# Patient Record
Sex: MALE | Race: WHITE | NOT HISPANIC OR LATINO | Employment: OTHER | URBAN - METROPOLITAN AREA
[De-identification: names, ages, dates, MRNs, and addresses within clinical notes are randomized per-mention and may not be internally consistent; named-entity substitution may affect disease eponyms.]

---

## 2017-06-12 DIAGNOSIS — B00.1 RECURRENT COLD SORES: ICD-10-CM

## 2017-06-13 NOTE — TELEPHONE ENCOUNTER
acyclovir (ZOVIRAX) 400 MG tablet     Last Written Prescription Date: 3/2/2016  Last Fill Quantity: 15, # refills: 2  Last Office Visit with [facility], [facility] or [facility] prescribing provider: 10/27/2016        Creatinine   Date Value Ref Range Status   06/10/2016 1.18 0.66 - 1.25 mg/dL Final

## 2017-06-15 RX ORDER — ACYCLOVIR 400 MG/1
400 TABLET ORAL 3 TIMES DAILY
Qty: 15 TABLET | Refills: 0 | Status: SHIPPED | OUTPATIENT
Start: 2017-06-15 | End: 2017-10-24

## 2017-06-15 NOTE — TELEPHONE ENCOUNTER
Medication is being filled for 1 time refill only due Patient needs labs 6/16.   Julianna Chowdhury RN

## 2017-10-16 DIAGNOSIS — E78.1 HYPERTRIGLYCERIDEMIA: ICD-10-CM

## 2017-10-16 NOTE — TELEPHONE ENCOUNTER
simvastatin (ZOCOR) 40 MG tablet     Last Written Prescription Date: 10/27/2016  Last Fill Quantity: 90, # refills: 3  Last Office Visit with G, P or [facility] prescribing provider: 10/27/2016       Lab Results   Component Value Date    CHOL 254 10/27/2016     Lab Results   Component Value Date    HDL 44 10/27/2016     Lab Results   Component Value Date     10/27/2016     Lab Results   Component Value Date    TRIG 350 10/27/2016     Lab Results   Component Value Date    CHOLHDLRERINO 4.4 11/07/2014

## 2017-10-17 RX ORDER — SIMVASTATIN 40 MG
40 TABLET ORAL AT BEDTIME
Qty: 90 TABLET | Refills: 0 | Status: SHIPPED | OUTPATIENT
Start: 2017-10-17 | End: 2017-10-24

## 2017-10-17 NOTE — TELEPHONE ENCOUNTER
Patient calls back.  Advised.  Appointment scheduled.    Next 5 appointments (look out 90 days)     Oct 24, 2017  8:00 AM CDT   SHORT with Alfredo Fink MD   [facility]    [address] [place] [id number] [phone]                Adwoa Dominguez RN  Message handled by Nurse Triage.

## 2017-10-17 NOTE — TELEPHONE ENCOUNTER
LM on  to call back.     Routing refill request to provider for review/approval because:  Patient needs to be seen because:  Due for annual physical  LDL not at goal.     Bertha SOUZA, RN, BSN, PHN  [place] [facility] RN

## 2017-10-24 ENCOUNTER — OFFICE VISIT (OUTPATIENT)
Dept: PEDIATRICS | Facility: CLINIC | Age: 52
End: 2017-10-24
Payer: COMMERCIAL

## 2017-10-24 VITALS
SYSTOLIC BLOOD PRESSURE: 126 MMHG | OXYGEN SATURATION: 98 % | HEIGHT: 72 IN | BODY MASS INDEX: 26.28 KG/M2 | WEIGHT: 194 LBS | TEMPERATURE: 97.8 F | HEART RATE: 62 BPM | DIASTOLIC BLOOD PRESSURE: 88 MMHG

## 2017-10-24 DIAGNOSIS — M10.9 GOUT, UNSPECIFIED CAUSE, UNSPECIFIED CHRONICITY, UNSPECIFIED SITE: ICD-10-CM

## 2017-10-24 DIAGNOSIS — E78.1 HYPERTRIGLYCERIDEMIA: ICD-10-CM

## 2017-10-24 DIAGNOSIS — D22.5 MELANOCYTIC NEVUS OF TRUNK: ICD-10-CM

## 2017-10-24 DIAGNOSIS — B00.1 RECURRENT COLD SORES: ICD-10-CM

## 2017-10-24 DIAGNOSIS — Z00.00 ENCOUNTER FOR ROUTINE ADULT HEALTH EXAMINATION WITHOUT ABNORMAL FINDINGS: ICD-10-CM

## 2017-10-24 DIAGNOSIS — Z12.11 SCREEN FOR COLON CANCER: Primary | ICD-10-CM

## 2017-10-24 LAB
ALBUMIN SERPL-MCNC: 4.1 G/DL (ref 3.4–5)
ALP SERPL-CCNC: 57 U/L (ref 40–150)
ALT SERPL W P-5'-P-CCNC: 67 U/L (ref 0–70)
ANION GAP SERPL CALCULATED.3IONS-SCNC: 6 MMOL/L (ref 3–14)
AST SERPL W P-5'-P-CCNC: 47 U/L (ref 0–45)
BILIRUB SERPL-MCNC: 1.6 MG/DL (ref 0.2–1.3)
BUN SERPL-MCNC: 12 MG/DL (ref 7–30)
CALCIUM SERPL-MCNC: 9.1 MG/DL (ref 8.5–10.1)
CHLORIDE SERPL-SCNC: 101 MMOL/L (ref 94–109)
CHOLEST SERPL-MCNC: 209 MG/DL
CO2 SERPL-SCNC: 30 MMOL/L (ref 20–32)
CREAT SERPL-MCNC: 1.17 MG/DL (ref 0.66–1.25)
GFR SERPL CREATININE-BSD FRML MDRD: 65 ML/MIN/1.7M2
GLUCOSE SERPL-MCNC: 95 MG/DL (ref 70–99)
HDLC SERPL-MCNC: 45 MG/DL
LDLC SERPL CALC-MCNC: ABNORMAL MG/DL
LDLC SERPL DIRECT ASSAY-MCNC: 117 MG/DL
NONHDLC SERPL-MCNC: 164 MG/DL
POTASSIUM SERPL-SCNC: 3.7 MMOL/L (ref 3.4–5.3)
PROT SERPL-MCNC: 8.3 G/DL (ref 6.8–8.8)
SODIUM SERPL-SCNC: 137 MMOL/L (ref 133–144)
TRIGL SERPL-MCNC: 452 MG/DL
URATE SERPL-MCNC: 8.5 MG/DL (ref 3.5–7.2)

## 2017-10-24 PROCEDURE — 36415 COLL VENOUS BLD VENIPUNCTURE: CPT | Performed by: INTERNAL MEDICINE

## 2017-10-24 PROCEDURE — 80053 COMPREHEN METABOLIC PANEL: CPT | Performed by: INTERNAL MEDICINE

## 2017-10-24 PROCEDURE — 80061 LIPID PANEL: CPT | Performed by: INTERNAL MEDICINE

## 2017-10-24 PROCEDURE — 83721 ASSAY OF BLOOD LIPOPROTEIN: CPT | Mod: 59 | Performed by: INTERNAL MEDICINE

## 2017-10-24 PROCEDURE — 99396 PREV VISIT EST AGE 40-64: CPT | Performed by: INTERNAL MEDICINE

## 2017-10-24 PROCEDURE — 84550 ASSAY OF BLOOD/URIC ACID: CPT | Performed by: INTERNAL MEDICINE

## 2017-10-24 RX ORDER — ACYCLOVIR 400 MG/1
400 TABLET ORAL 3 TIMES DAILY
Qty: 15 TABLET | Refills: 3 | Status: SHIPPED | OUTPATIENT
Start: 2017-10-24 | End: 2018-12-26

## 2017-10-24 RX ORDER — SIMVASTATIN 40 MG
40 TABLET ORAL AT BEDTIME
Qty: 90 TABLET | Refills: 3 | Status: SHIPPED | OUTPATIENT
Start: 2017-10-24 | End: 2018-10-24

## 2017-10-24 NOTE — MR AVS SNAPSHOT
"              After Visit Summary   10/24/2017    Ian Moncada    MRN: [id number]           Patient Information     Date Of Birth          1965        Visit Information        Provider Department      10/24/2017 8:00 AM Alfredo Fink MD [facility]        Today's Diagnoses     Screen for colon cancer    -  1    Hypertriglyceridemia        Recurrent cold sores        Encounter for routine adult health examination without abnormal findings        Melanocytic nevus of trunk        Gout, unspecified cause, unspecified chronicity, unspecified site          Care Instructions    Lab work downstairs today.  Directions:  As you walk through the first floor, you'll see (on the right) first the pharmacy, then some bathrooms, then the \"Lab and Imaging\" area. Give them your name at the window there and wait for them to call you.     Colonoscopy: you can call [phone] to set up your colonoscopy.  If they have not heard from you, they may call you directly.    No change in meds.    Goal weight: < 190.    Conisder allopurinol for frequent gout attacks, especially if uric acid level is high.       Preventive Health Recommendations  Male Ages 50 - 64    Yearly exam:             See your health care provider every year in order to  o   Review health changes.   o   Discuss preventive care.    o   Review your medicines if your doctor has prescribed any.     Have a cholesterol test every 5 years, or more frequently if you are at risk for high cholesterol/heart disease.     Have a diabetes test (fasting glucose) every three years. If you are at risk for diabetes, you should have this test more often.     Have a colonoscopy at age 50, or have a yearly FIT test (stool test). These exams will check for colon cancer.      Talk with your health care provider about whether or not a prostate cancer screening test (PSA) is right for you.    You should be tested each year for STDs (sexually transmitted diseases), if you re at " risk.     Shots: Get a flu shot each year. Get a tetanus shot every 10 years.     Nutrition:    Eat at least 5 servings of fruits and vegetables daily.     Eat whole-grain bread, whole-wheat pasta and brown rice instead of white grains and rice.     Talk to your provider about Calcium and Vitamin D.     Lifestyle    Exercise for at least 150 minutes a week (30 minutes a day, 5 days a week). This will help you control your weight and prevent disease.     Limit alcohol to one drink per day.     No smoking.     Wear sunscreen to prevent skin cancer.     See your dentist every six months for an exam and cleaning.     See your eye doctor every 1 to 2 years.            Follow-ups after your visit        Additional Services     GASTROENTEROLOGY ADULT REF PROCEDURE ONLY       Last Lab Result: Creatinine (mg/dL)       Date                     Value                 06/10/2016               1.18             ----------  Body mass index is 26.31 kg/(m^2).     Needed:  No  Language:  English    Patient will be contacted to schedule procedure.     Please be aware that coverage of these services is subject to the terms and limitations of your health insurance plan.  Call member services at your health plan with any benefit or coverage questions.  Any procedures must be performed at a [facility] facility OR coordinated by your clinic's referral office.    Please bring the following with you to your appointment:    (1) Any X-Rays, CTs or MRIs which have been performed.  Contact the facility where they were done to arrange for  prior to your scheduled appointment.    (2) List of current medications   (3) This referral request   (4) Any documents/labs given to you for this referral                  Who to contact     If you have questions or need follow up information about today's clinic visit or your schedule please contact [facility] directly at [phone].  Normal or non-critical lab and imaging  results will be communicated to you by MyChart, letter or phone within 4 business days after the clinic has received the results. If you do not hear from us within 7 days, please contact the clinic through [employer] or phone. If you have a critical or abnormal lab result, we will notify you by phone as soon as possible.  Submit refill requests through [employer] or call your pharmacy and they will forward the refill request to us. Please allow 3 business days for your refill to be completed.          Additional Information About Your Visit        [employer] Information     [employer] gives you secure access to your electronic health record. If you see a primary care provider, you can also send messages to your care team and make appointments. If you have questions, please call your primary care clinic.  If you do not have a primary care provider, please call [phone] and they will assist you.        Care EveryWhere ID     This is your Care EveryWhere ID. This could be used by other organizations to access your [facility] medical records  [id number]        Your Vitals Were     Pulse Temperature Height Pulse Oximetry BMI (Body Mass Index)       62 97.8  F (36.6  C) (Oral) 6' (1.829 m) 98% 26.31 kg/m2        Blood Pressure from Last 3 Encounters:   10/24/17 126/88   10/27/16 120/78   06/10/16 122/80    Weight from Last 3 Encounters:   10/24/17 194 lb (88 kg)   10/27/16 190 lb 7 oz (86.4 kg)   06/10/16 192 lb 1 oz (87.1 kg)              We Performed the Following     Comprehensive metabolic panel     GASTROENTEROLOGY ADULT REF PROCEDURE ONLY     Lipid panel reflex to direct LDL     Uric acid          Where to get your medicines      These medications were sent to [facility] [address] - [address]  [address] [address]     Phone:  [phone]     acyclovir 400 MG tablet    simvastatin 40 MG tablet          Primary Care Provider Office Phone # Fax #    Alfredo Fink,  [phone] [address] [place] [address]        Equal Access to Services     [facility] : Hadii aad ku hadnilesrosa Bebe, wabrittanyda rossanamarcosha, qaybta kastaceyda bjsavchapis, waxadama mindiin hayaapaulo lorenzoladarius gayle laJuan Danielmacario crawford. So [facility] [phone].    ATENCIÓN: Si habla español, tiene a guerin disposición servicios gratuitos de asistencia lingüística. Llame al [phone].    We comply with applicable federal civil rights laws and [place] laws. We do not discriminate on the basis of race, color, national origin, age, disability, sex, sexual orientation, or gender identity.            Thank you!     Thank you for choosing [facility]  for your care. Our goal is always to provide you with excellent care. Hearing back from our patients is one way we can continue to improve our services. Please take a few minutes to complete the written survey that you may receive in the mail after your visit with us. Thank you!             Your Updated Medication List - Protect others around you: Learn how to safely use, store and throw away your medicines at www.disposemymeds.org.          This list is accurate as of: 10/24/17  8:29 AM.  Always use your most recent med list.                   Brand Name Dispense Instructions for use Diagnosis    acyclovir 400 MG tablet    ZOVIRAX    15 tablet    Take 1 tablet (400 mg) by mouth 3 times daily    Recurrent cold sores       fish oil-omega-3 fatty acids 1000 MG capsule     120 capsule    Take 2 capsules by mouth daily.    Hypertriglyceridemia       MULTIVITAMIN TABS   OR      1 TABLET DAILY        simvastatin 40 MG tablet    ZOCOR    90 tablet    Take 1 tablet (40 mg) by mouth At Bedtime    Hypertriglyceridemia

## 2017-10-24 NOTE — PROGRESS NOTES
SUBJECTIVE:   CC: Ian Moncada is an 52 year old male who presents for preventative health visit.     Physical   Annual:     Getting at least 3 servings of Calcium per day::  Yes    Bi-annual eye exam::  Yes    Dental care twice a year::  Yes    Sleep apnea or symptoms of sleep apnea::  None    Diet::  Regular (no restrictions)    Frequency of exercise::  None    Taking medications regularly::  Yes    Medication side effects::  None    Additional concerns today::  No      Pt has no specific concerns today. Needs refills of medication.        Today's PHQ-2 Score: PHQ-2 ( 1999 Pfizer) 10/24/2017   Q1: Little interest or pleasure in doing things 0   Q2: Feeling down, depressed or hopeless 0   PHQ-2 Score 0   Q1: Little interest or pleasure in doing things Not at all   Q2: Feeling down, depressed or hopeless Not at all   PHQ-2 Score 0       Abuse: Current or Past(Physical, Sexual or Emotional)- No  Do you feel safe in your environment - Yes    Social History   Substance Use Topics     Smoking status: Never Smoker     Smokeless tobacco: Never Used     Alcohol use 4.2 oz/week     7 Standard drinks or equivalent per week     The patient does not drink >3 drinks per day nor >7 drinks per week.    Last PSA: No results found for: PSA    Reviewed orders with patient. Reviewed health maintenance and updated orders accordingly - Yes  Labs reviewed in EPIC  BP Readings from Last 3 Encounters:   10/24/17 126/88   10/27/16 120/78   06/10/16 122/80    Wt Readings from Last 3 Encounters:   10/24/17 194 lb (88 kg)   10/27/16 190 lb 7 oz (86.4 kg)   06/10/16 192 lb 1 oz (87.1 kg)                  Patient Active Problem List   Diagnosis     External hemorrhoids with other complication     Family history of ischemic heart disease     Impaired fasting glucose     Hyperlipidemia LDL goal <130     Hypertriglyceridemia     Melanocytic nevus of trunk     Past Surgical History:   Procedure Laterality Date     TONSILLECTOMY  1970s       Social  History   Substance Use Topics     Smoking status: Never Smoker     Smokeless tobacco: Never Used     Alcohol use 4.2 oz/week     7 Standard drinks or equivalent per week     Family History   Problem Relation Age of Onset     HEART DISEASE Paternal Grandfather      Neurologic Disorder Father      Guillaine [place]     HEART DISEASE Mother      quad bipass     HEART DISEASE Maternal Grandfather      HEART DISEASE Maternal Uncle      Cancer - colorectal Maternal Grandmother      HEART DISEASE Maternal Aunt          Current Outpatient Prescriptions   Medication Sig Dispense Refill     simvastatin (ZOCOR) 40 MG tablet Take 1 tablet (40 mg) by mouth At Bedtime 90 tablet 3     acyclovir (ZOVIRAX) 400 MG tablet Take 1 tablet (400 mg) by mouth 3 times daily 15 tablet 3     fish oil-omega-3 fatty acids (OMEGA 3) 1000 MG capsule Take 2 capsules by mouth daily. 120 capsule 3     MULTIVITAMIN TABS   OR 1 TABLET DAILY       [DISCONTINUED] simvastatin (ZOCOR) 40 MG tablet Take 1 tablet (40 mg) by mouth At Bedtime 90 tablet 0     [DISCONTINUED] acyclovir (ZOVIRAX) 400 MG tablet Take 1 tablet (400 mg) by mouth 3 times daily 15 tablet 0     Allergies   Allergen Reactions     Walnuts [Nuts]            Reviewed and updated as needed this visit by clinical staffTobacco  Allergies  Meds  Med Hx  Surg Hx  Fam Hx  Soc Hx        Reviewed and updated as needed this visit by Provider          Past Medical History:   Diagnosis Date     External hemorrhoids with other complication 7/25/2008    Banding 2007.     Hyperlipidemia LDL goal <130 8/12/2010     Hypertriglyceridemia 8/19/2010     Impaired fasting glucose 12/7/2009      Past Surgical History:   Procedure Laterality Date     TONSILLECTOMY  1970s       Review of Systems  C: NEGATIVE for fever, chills, change in weight  I: NEGATIVE for worrisome rashes, moles or lesions  E: NEGATIVE for vision changes or irritation  ENT: NEGATIVE for ear, mouth and throat problems  R: NEGATIVE for  significant cough or SOB  CV: NEGATIVE for chest pain, palpitations or peripheral edema  GI: NEGATIVE for nausea, abdominal pain, heartburn, or change in bowel habits   male: negative for dysuria, hematuria, decreased urinary stream, erectile dysfunction, urethral discharge  M: NEGATIVE for significant arthralgias or myalgia  N: NEGATIVE for weakness, dizziness or paresthesias  P: NEGATIVE for changes in mood or affect    OBJECTIVE:   BP (!) 126/98 (BP Location: Right arm, Cuff Size: Adult Large)  Pulse 62  Temp 97.8  F (36.6  C) (Oral)  Ht 6' (1.829 m)  Wt 194 lb (88 kg)  SpO2 98%  BMI 26.31 kg/m2    Physical Exam  GENERAL: healthy, alert and no distress  EYES: Eyes grossly normal to inspection, PERRL and conjunctivae and sclerae normal  HENT: ear canals and TM's normal, nose and mouth without ulcers or lesions  NECK: no adenopathy, no asymmetry, masses, or scars and thyroid normal to palpation  RESP: lungs clear to auscultation - no rales, rhonchi or wheezes  CV: regular rate and rhythm, normal S1 S2, no S3 or S4, no murmur, click or rub, no peripheral edema and peripheral pulses strong  ABDOMEN: soft, nontender, no hepatosplenomegaly, no masses and bowel sounds normal   (male): normal male genitalia without lesions or urethral discharge, no hernia  MS: no gross musculoskeletal defects noted, no edema  SKIN: no suspicious lesions or rashes  NEURO: Normal strength and tone, mentation intact and speech normal  PSYCH: mentation appears normal, affect normal/bright    ASSESSMENT/PLAN:   1. Screen for colon cancer    - GASTROENTEROLOGY ADULT REF PROCEDURE ONLY    2. Hypertriglyceridemia  Continue with current dosing.  - simvastatin (ZOCOR) 40 MG tablet; Take 1 tablet (40 mg) by mouth At Bedtime  Dispense: 90 tablet; Refill: 3    3. Recurrent cold sores  as needed dosing.   - acyclovir (ZOVIRAX) 400 MG tablet; Take 1 tablet (400 mg) by mouth 3 times daily  Dispense: 15 tablet; Refill: 3    4. Encounter for  routine adult health examination without abnormal findings  Discussed diet, exercise, testicular self exam, blood pressure, cholesterol, and need for cancer surveillance at appropriate ages.   - Lipid panel reflex to direct LDL  - Comprehensive metabolic panel    5. Melanocytic nevus of trunk  Photographed today, on central part of back.     6. Gout, unspecified cause, unspecified chronicity, unspecified site  Frequent mild attacks; patient refuses allopurinol.   - Uric acid    COUNSELING:   Reviewed preventive health counseling, as reflected in patient instructions       Regular exercise       Healthy diet/nutrition       Vision screening       Hearing screening       Family planning       Safe sex practices/STD prevention       Colon cancer screening       Prostate cancer screening    BP Screening:   Last 3 BP Readings:    BP Readings from Last 3 Encounters:   10/24/17 126/88   10/27/16 120/78   06/10/16 122/80       The following was recommended to the patient:  Re-screen BP within a year and recommended lifestyle modifications       reports that he has never smoked. He has never used smokeless tobacco.      Estimated body mass index is 26.31 kg/(m^2) as calculated from the following:    Height as of this encounter: 6' (1.829 m).    Weight as of this encounter: 194 lb (88 kg).   Weight management plan: Patient was referred to their PCP to discuss a diet and exercise plan.    Counseling Resources:  ATP IV Guidelines  Pooled Cohorts Equation Calculator  FRAX Risk Assessment  ICSI Preventive Guidelines  Dietary Guidelines for Americans, 2010  USDA's MyPlate  ASA Prophylaxis  Lung CA Screening    Alfredo Fink MD  [facility]

## 2018-10-24 DIAGNOSIS — E78.1 HYPERTRIGLYCERIDEMIA: ICD-10-CM

## 2018-10-24 RX ORDER — SIMVASTATIN 40 MG
TABLET ORAL
Qty: 30 TABLET | Refills: 0 | Status: SHIPPED | OUTPATIENT
Start: 2018-10-24 | End: 2018-12-12

## 2018-10-24 NOTE — TELEPHONE ENCOUNTER
Medication is being filled for 1 time refill only due to:  Pt due for office visit and lab work.     Joanna Alberto RN

## 2018-12-12 DIAGNOSIS — E78.1 HYPERTRIGLYCERIDEMIA: ICD-10-CM

## 2018-12-12 NOTE — TELEPHONE ENCOUNTER
"Requested Prescriptions   Pending Prescriptions Disp Refills     simvastatin (ZOCOR) 40 MG tablet [Pharmacy Med Name: SIMVASTATIN 40MG TABLETS]    Last Written Prescription Date:  10/24/2018  Last Fill Quantity: 30,  # refills: 0   Last office visit: 10/24/2017 with prescribing provider:  Alfredo Fink     Future Office Visit:     30 tablet 0     Sig: TAKE 1 TABLET BY MOUTH EVERY NIGHT AT BEDTIME    Statins Protocol Failed - 12/12/2018  9:45 AM       Failed - LDL on file in past 12 months    Recent Labs   Lab Test 10/24/17  0831   LDL Cannot estimate LDL when triglyceride exceeds 400 mg/dL  117*            Failed - Recent (12 mo) or future (30 days) visit within the authorizing provider's specialty    Patient had office visit in the last 12 months or has a visit in the next 30 days with authorizing provider or within the authorizing provider's specialty.  See \"Patient Info\" tab in inbasket, or \"Choose Columns\" in Meds & Orders section of the refill encounter.             Passed - No abnormal creatine kinase in past 12 months    No lab results found.            Passed - Patient is age 18 or older          "

## 2018-12-14 RX ORDER — SIMVASTATIN 40 MG
TABLET ORAL
Qty: 90 TABLET | Refills: 3 | Status: SHIPPED | OUTPATIENT
Start: 2018-12-14 | End: 2019-12-07

## 2018-12-14 NOTE — TELEPHONE ENCOUNTER
Routing refill request to provider for review/approval because:  Christina given x1 and patient did not follow up, please advise  Labs not current:  LDL  Patient needs to be seen because it has been more than 1 year since last office visit.  Doreen VOGT RN - Triage  [facility]

## 2018-12-26 DIAGNOSIS — B00.1 RECURRENT COLD SORES: ICD-10-CM

## 2018-12-26 RX ORDER — ACYCLOVIR 400 MG/1
TABLET ORAL
Qty: 15 TABLET | Refills: 0 | Status: SHIPPED | OUTPATIENT
Start: 2018-12-26 | End: 2019-01-15

## 2018-12-26 NOTE — TELEPHONE ENCOUNTER
"Requested Prescriptions   Pending Prescriptions Disp Refills     acyclovir (ZOVIRAX) 400 MG tablet [Pharmacy Med Name: ACYCLOVIR 400MG TABLETS]    Last Written Prescription Date:  10/24/2017  Last Fill Quantity: 15,  # refills: 3   Last office visit: 10/24/2017 with prescribing provider:  Alfredo Fink     Future Office Visit:     15 tablet 0     Sig: TAKE 1 TABLET(400 MG) BY MOUTH THREE TIMES DAILY    Antivirals for Herpes Protocol Failed - 12/26/2018  8:18 AM       Failed - Recent (12 mo) or future (30 days) visit within the authorizing provider's specialty    Patient had office visit in the last 12 months or has a visit in the next 30 days with authorizing provider or within the authorizing provider's specialty.  See \"Patient Info\" tab in inbasket, or \"Choose Columns\" in Meds & Orders section of the refill encounter.             Failed - Normal serum creatinine on file in past 12 months    Recent Labs   Lab Test 10/24/17  0831   CR 1.17            Passed - Patient is age 12 or older          "

## 2018-12-26 NOTE — TELEPHONE ENCOUNTER
30 day supply given.  Patient is due for yearly physical and lab work.  Please call and assist with scheduling appointment prior to next refill   Doreen VOGT RN - Triage  [facility]

## 2018-12-27 ENCOUNTER — MYC MEDICAL ADVICE (OUTPATIENT)
Dept: PEDIATRICS | Facility: CLINIC | Age: 53
End: 2018-12-27

## 2019-01-15 ENCOUNTER — OFFICE VISIT (OUTPATIENT)
Dept: PEDIATRICS | Facility: CLINIC | Age: 54
End: 2019-01-15
Payer: COMMERCIAL

## 2019-01-15 VITALS
BODY MASS INDEX: 26.81 KG/M2 | SYSTOLIC BLOOD PRESSURE: 110 MMHG | HEART RATE: 59 BPM | HEIGHT: 72 IN | WEIGHT: 197.9 LBS | TEMPERATURE: 97.6 F | OXYGEN SATURATION: 96 % | DIASTOLIC BLOOD PRESSURE: 80 MMHG

## 2019-01-15 DIAGNOSIS — Z00.00 ENCOUNTER FOR ROUTINE ADULT HEALTH EXAMINATION WITHOUT ABNORMAL FINDINGS: Primary | ICD-10-CM

## 2019-01-15 DIAGNOSIS — L98.9 SKIN LESION: ICD-10-CM

## 2019-01-15 DIAGNOSIS — H90.8 MIXED CONDUCTIVE AND SENSORINEURAL HEARING LOSS, UNSPECIFIED LATERALITY: ICD-10-CM

## 2019-01-15 DIAGNOSIS — Z12.11 SPECIAL SCREENING FOR MALIGNANT NEOPLASMS, COLON: ICD-10-CM

## 2019-01-15 DIAGNOSIS — B00.1 RECURRENT COLD SORES: ICD-10-CM

## 2019-01-15 DIAGNOSIS — E78.1 HYPERTRIGLYCERIDEMIA: ICD-10-CM

## 2019-01-15 LAB
ALBUMIN SERPL-MCNC: 4.1 G/DL (ref 3.4–5)
ALP SERPL-CCNC: 45 U/L (ref 40–150)
ALT SERPL W P-5'-P-CCNC: 56 U/L (ref 0–70)
ANION GAP SERPL CALCULATED.3IONS-SCNC: 6 MMOL/L (ref 3–14)
AST SERPL W P-5'-P-CCNC: 43 U/L (ref 0–45)
BILIRUB SERPL-MCNC: 1.2 MG/DL (ref 0.2–1.3)
BUN SERPL-MCNC: 10 MG/DL (ref 7–30)
CALCIUM SERPL-MCNC: 9.5 MG/DL (ref 8.5–10.1)
CHLORIDE SERPL-SCNC: 103 MMOL/L (ref 94–109)
CHOLEST SERPL-MCNC: 196 MG/DL
CO2 SERPL-SCNC: 28 MMOL/L (ref 20–32)
CREAT SERPL-MCNC: 1.12 MG/DL (ref 0.66–1.25)
GFR SERPL CREATININE-BSD FRML MDRD: 74 ML/MIN/{1.73_M2}
GLUCOSE SERPL-MCNC: 99 MG/DL (ref 70–99)
HDLC SERPL-MCNC: 41 MG/DL
LDLC SERPL CALC-MCNC: 94 MG/DL
NONHDLC SERPL-MCNC: 155 MG/DL
POTASSIUM SERPL-SCNC: 3.9 MMOL/L (ref 3.4–5.3)
PROT SERPL-MCNC: 7.9 G/DL (ref 6.8–8.8)
SODIUM SERPL-SCNC: 137 MMOL/L (ref 133–144)
TRIGL SERPL-MCNC: 307 MG/DL
URATE SERPL-MCNC: 7.2 MG/DL (ref 3.5–7.2)

## 2019-01-15 PROCEDURE — 99396 PREV VISIT EST AGE 40-64: CPT | Performed by: INTERNAL MEDICINE

## 2019-01-15 PROCEDURE — 84550 ASSAY OF BLOOD/URIC ACID: CPT | Performed by: INTERNAL MEDICINE

## 2019-01-15 PROCEDURE — 80061 LIPID PANEL: CPT | Performed by: INTERNAL MEDICINE

## 2019-01-15 PROCEDURE — 80053 COMPREHEN METABOLIC PANEL: CPT | Performed by: INTERNAL MEDICINE

## 2019-01-15 PROCEDURE — 36415 COLL VENOUS BLD VENIPUNCTURE: CPT | Performed by: INTERNAL MEDICINE

## 2019-01-15 RX ORDER — ACYCLOVIR 400 MG/1
TABLET ORAL
Qty: 15 TABLET | Refills: 4 | Status: SHIPPED | OUTPATIENT
Start: 2019-01-15 | End: 2020-03-16

## 2019-01-15 ASSESSMENT — ENCOUNTER SYMPTOMS
NAUSEA: 0
SORE THROAT: 0
HEMATOCHEZIA: 0
JOINT SWELLING: 0
COUGH: 0
DYSURIA: 0
HEARTBURN: 0
ABDOMINAL PAIN: 0
HEMATURIA: 0
CONSTIPATION: 0
EYE PAIN: 0
DIARRHEA: 0
PALPITATIONS: 0
CHILLS: 0
ARTHRALGIAS: 0
SHORTNESS OF BREATH: 0
FREQUENCY: 0
HEADACHES: 0
PARESTHESIAS: 0
DIZZINESS: 0
MYALGIAS: 0
WEAKNESS: 0

## 2019-01-15 ASSESSMENT — MIFFLIN-ST. JEOR: SCORE: 1775.67

## 2019-01-15 NOTE — PATIENT INSTRUCTIONS
"Lab work downstairs today.  Directions:  As you walk through the first floor, you'll see (on the right) first the pharmacy, then some bathrooms, then the \"Lab and Imaging\" area. Give them your name at the window there and wait for them to call you.    Consider getting your shingles vaccine (\"Shingrix\") at our pharmacy downstairs (or at another pharmacy), sometime this year--even if you've already received the old \"Zostavax\" shingles vaccine.  The \"Shingrix\" has better immunity and lasts longer, and is cheaper if you get it directly at a pharmacy.  You should not need an appointment.     You will need a second Shingrix anywhere from 2-6 months later.    Colonoscopy: you can call [phone] to set up your colonoscopy.  If they have not heard from you, they may call you directly.    Cardio exercise is probably the most helpful thing for your heart and future health.  Try to get about 30 minutes of sustained cardio exercise (biking, running, swimming, fast walking) every other day or even every day.  Keeping your heart rate at a \"target\" of (220 - your age) x 0.80 will be your goal.  For example, if you're 60 years old, this would be (220 - 60) x 0.80 = 200 beats per minute for those 30 minutes of exercise.      Cholesterol and lipid values are broken down into three different types:  the LDL (or bad) cholesterol, the HDL (or good) cholesterol, and the Triglycerides.     The goals for each of these levels vary depending on your cardiac risk factors--that is, if you have more risk factors (like age, male sex, diabetes, hypertension, heart disease, stroke, smoking) then you would want your LDL cholesterol goal or your triglyceride goal to be lower.  If you have no risk factors, it may be acceptable to have your cholesterol level slightly higher.    The best way to achieve a lower cholesterol level is a diet that is similar to the Mediterranean diet: lean proteins like chicken, fish, and beans; lots of vegetables and " fruits; whole grain foods; and healthy fats like olive oil, avocado, and almonds.  Try to avoid foods that are higher in saturated fats (like red meats and cheese) as well as fried foods like chips, crackers, and anything deep fried.     Your HDL cholesterol, while not as critical as the LDL and triglyceride levels, can be improved with  exercise--preferably 30-45 minutes of cardiovascular activity 3 to 4 times per week.                 Preventive Health Recommendations  Male Ages 50 - 64    Yearly exam:             See your health care provider every year in order to  o   Review health changes.   o   Discuss preventive care.    o   Review your medicines if your doctor has prescribed any.     Have a cholesterol test every 5 years, or more frequently if you are at risk for high cholesterol/heart disease.     Have a diabetes test (fasting glucose) every three years. If you are at risk for diabetes, you should have this test more often.     Have a colonoscopy at age 50, or have a yearly FIT test (stool test). These exams will check for colon cancer.      Talk with your health care provider about whether or not a prostate cancer screening test (PSA) is right for you.    You should be tested each year for STDs (sexually transmitted diseases), if you re at risk.     Shots: Get a flu shot each year. Get a tetanus shot every 10 years.     Nutrition:    Eat at least 5 servings of fruits and vegetables daily.     Eat whole-grain bread, whole-wheat pasta and brown rice instead of white grains and rice.     Get adequate Calcium and Vitamin D.     Lifestyle    Exercise for at least 150 minutes a week (30 minutes a day, 5 days a week). This will help you control your weight and prevent disease.     Limit alcohol to one drink per day.     No smoking.     Wear sunscreen to prevent skin cancer.     See your dentist every six months for an exam and cleaning.     See your eye doctor every 1 to 2 years.

## 2019-01-15 NOTE — PROGRESS NOTES
SUBJECTIVE:   CC: Ian Moncada is an 53 year old male who presents for preventative health visit.     Physical   Annual:     Getting at least 3 servings of Calcium per day:  Yes    Bi-annual eye exam:  Yes    Dental care twice a year:  Yes    Sleep apnea or symptoms of sleep apnea:  None    Diet:  Regular (no restrictions)    Frequency of exercise:  2-3 days/week    Duration of exercise:  Less than 15 minutes    Taking medications regularly:  Yes    Medication side effects:  None    Additional concerns today:  No      Exercises: tries to do treadmill every other day 20 min.         Today's PHQ-2 Score:   PHQ-2 ( 1999 Pfizer) 1/15/2019   Q1: Little interest or pleasure in doing things 0   Q2: Feeling down, depressed or hopeless 0   PHQ-2 Score 0   Q1: Little interest or pleasure in doing things Not at all   Q2: Feeling down, depressed or hopeless Not at all   PHQ-2 Score 0       Abuse: Current or Past(Physical, Sexual or Emotional)- No  Do you feel safe in your environment? Yes    Social History     Tobacco Use     Smoking status: Never Smoker     Smokeless tobacco: Never Used   Substance Use Topics     Alcohol use: Yes     Alcohol/week: 4.2 oz     Types: 7 Standard drinks or equivalent per week     Alcohol Use 1/15/2019   If you drink alcohol do you typically have greater than 3 drinks per day OR greater than 7 drinks per week? No       Last PSA: No results found for: PSA    Reviewed orders with patient. Reviewed health maintenance and updated orders accordingly - Yes  Labs reviewed in EPIC  BP Readings from Last 3 Encounters:   01/15/19 110/80   10/24/17 126/88   10/27/16 120/78    Wt Readings from Last 3 Encounters:   01/15/19 89.8 kg (197 lb 14.4 oz)   10/24/17 88 kg (194 lb)   10/27/16 86.4 kg (190 lb 7 oz)                  Patient Active Problem List   Diagnosis     External hemorrhoids with other complication     Family history of ischemic heart disease     Impaired fasting glucose     Hyperlipidemia LDL goal  <130     Hypertriglyceridemia     Melanocytic nevus of trunk     Past Surgical History:   Procedure Laterality Date     TONSILLECTOMY  1970s       Social History     Tobacco Use     Smoking status: Never Smoker     Smokeless tobacco: Never Used   Substance Use Topics     Alcohol use: Yes     Alcohol/week: 4.2 oz     Types: 7 Standard drinks or equivalent per week     Family History   Problem Relation Age of Onset     Heart Disease Paternal Grandfather      Neurologic Disorder Father         [place]     Heart Disease Mother         quad bipass     Heart Disease Maternal Grandfather      Heart Disease Maternal Uncle      Cancer - colorectal Maternal Grandmother      Heart Disease Maternal Aunt          Current Outpatient Medications   Medication Sig Dispense Refill     acyclovir (ZOVIRAX) 400 MG tablet TAKE 1 TABLET(400 MG) BY MOUTH THREE TIMES DAILY 15 tablet 4     fish oil-omega-3 fatty acids (OMEGA 3) 1000 MG capsule Take 2 capsules by mouth daily. 120 capsule 3     MULTIVITAMIN TABS   OR 1 TABLET DAILY       simvastatin (ZOCOR) 40 MG tablet TAKE 1 TABLET BY MOUTH EVERY NIGHT AT BEDTIME 90 tablet 3     Allergies   Allergen Reactions     Walnuts [Nuts]        Reviewed and updated as needed this visit by clinical staff  Tobacco  Allergies  Meds         Reviewed and updated as needed this visit by Provider          Past Medical History:   Diagnosis Date     External hemorrhoids with other complication 7/25/2008    Banding 2007.     Hyperlipidemia LDL goal <130 8/12/2010     Hypertriglyceridemia 8/19/2010     Impaired fasting glucose 12/7/2009      Past Surgical History:   Procedure Laterality Date     TONSILLECTOMY  1970s       Review of Systems   Constitutional: Negative for chills.   HENT: Negative for congestion, ear pain, hearing loss and sore throat.    Eyes: Negative for pain and visual disturbance.   Respiratory: Negative for cough and shortness of breath.    Cardiovascular: Negative for chest pain,  palpitations and peripheral edema.   Gastrointestinal: Negative for abdominal pain, constipation, diarrhea, heartburn, hematochezia and nausea.   Genitourinary: Negative for discharge, dysuria, frequency, genital sores, hematuria, impotence and urgency.   Musculoskeletal: Negative for arthralgias, joint swelling and myalgias.   Skin: Negative for rash.   Neurological: Negative for dizziness, weakness, headaches and paresthesias.   Psychiatric/Behavioral: Negative for mood changes.     OBJECTIVE:   /80   Pulse 59   Temp 97.6  F (36.4  C) (Oral)   Ht 1.829 m (6')   Wt 89.8 kg (197 lb 14.4 oz)   SpO2 96%   BMI 26.84 kg/m      Physical Exam  GENERAL: healthy, alert and no distress  EYES: Eyes grossly normal to inspection, PERRL and conjunctivae and sclerae normal  HENT: ear canals and TM's normal, nose and mouth without ulcers or lesions  NECK: no adenopathy, no asymmetry, masses, or scars and thyroid normal to palpation  RESP: lungs clear to auscultation - no rales, rhonchi or wheezes  CV: regular rate and rhythm, normal S1 S2, no S3 or S4, no murmur, click or rub, no peripheral edema and peripheral pulses strong  ABDOMEN: soft, nontender, no hepatosplenomegaly, no masses and bowel sounds normal   (male): normal male genitalia without lesions or urethral discharge, no hernia  MS: no gross musculoskeletal defects noted, no edema  SKIN: no suspicious lesions or rashes  NEURO: Normal strength and tone, mentation intact and speech normal  PSYCH: mentation appears normal, affect normal/bright    Diagnostic Test Results:  none     ASSESSMENT/PLAN:   1. Recurrent cold sores  Refilled for as needed use; about every 4 months.   - acyclovir (ZOVIRAX) 400 MG tablet; TAKE 1 TABLET(400 MG) BY MOUTH THREE TIMES DAILY  Dispense: 15 tablet; Refill: 4    2. Hypertriglyceridemia  Continue statin for elevated triglycerides.   - Lipid Profile (Chol, Trig, HDL, LDL calc)    3. Special screening for malignant neoplasms,  colon    - GASTROENTEROLOGY ADULT REF PROCEDURE ONLY  - Comprehensive metabolic panel    4. Encounter for routine adult health examination without abnormal findings  Discussed diet, exercise, testicular self exam, blood pressure, cholesterol, and need for cancer surveillance at appropriate ages.   - Uric acid    5. Mixed conductive and sensorineural hearing loss, unspecified laterality    - OTOLARYNGOLOGY REFERRAL    6. Skin lesion  Lesion on back not very suspicious, but unable to monitor it well.   - DERMATOLOGY REFERRAL    COUNSELING:   Reviewed preventive health counseling, as reflected in patient instructions       Regular exercise       Healthy diet/nutrition       Vision screening       Hearing screening       Colon cancer screening       Prostate cancer screening    BP Readings from Last 1 Encounters:   01/15/19 110/80     Estimated body mass index is 26.84 kg/m  as calculated from the following:    Height as of this encounter: 1.829 m (6').    Weight as of this encounter: 89.8 kg (197 lb 14.4 oz).           reports that  has never smoked. he has never used smokeless tobacco.      Counseling Resources:  ATP IV Guidelines  Pooled Cohorts Equation Calculator  FRAX Risk Assessment  ICSI Preventive Guidelines  Dietary Guidelines for Americans, 2010  USDA's MyPlate  ASA Prophylaxis  Lung CA Screening    Alfredo Fink MD  [facility]

## 2019-03-29 ENCOUNTER — TRANSFERRED RECORDS (OUTPATIENT)
Dept: HEALTH INFORMATION MANAGEMENT | Facility: CLINIC | Age: 54
End: 2019-03-29

## 2019-09-22 ENCOUNTER — APPOINTMENT (OUTPATIENT)
Dept: GENERAL RADIOLOGY | Facility: CLINIC | Age: 54
End: 2019-09-22
Attending: EMERGENCY MEDICINE
Payer: COMMERCIAL

## 2019-09-22 ENCOUNTER — HOSPITAL ENCOUNTER (EMERGENCY)
Facility: CLINIC | Age: 54
Discharge: HOME | End: 2019-09-22
Attending: EMERGENCY MEDICINE | Admitting: EMERGENCY MEDICINE
Payer: COMMERCIAL

## 2019-09-22 VITALS
RESPIRATION RATE: 18 BRPM | SYSTOLIC BLOOD PRESSURE: 118 MMHG | DIASTOLIC BLOOD PRESSURE: 88 MMHG | HEART RATE: 72 BPM | OXYGEN SATURATION: 99 %

## 2019-09-22 DIAGNOSIS — R07.9 CHEST PAIN, UNSPECIFIED TYPE: ICD-10-CM

## 2019-09-22 LAB
ANION GAP SERPL CALCULATED.3IONS-SCNC: 7 MMOL/L (ref 3–14)
BASOPHILS # BLD AUTO: 0.1 10E9/L (ref 0–0.2)
BASOPHILS NFR BLD AUTO: 0.5 %
BUN SERPL-MCNC: 10 MG/DL (ref 7–30)
CALCIUM SERPL-MCNC: 9 MG/DL (ref 8.5–10.1)
CHLORIDE SERPL-SCNC: 106 MMOL/L (ref 94–109)
CO2 SERPL-SCNC: 27 MMOL/L (ref 20–32)
CREAT SERPL-MCNC: 1.02 MG/DL (ref 0.66–1.25)
DIFFERENTIAL METHOD BLD: ABNORMAL
EOSINOPHIL # BLD AUTO: 0.2 10E9/L (ref 0–0.7)
EOSINOPHIL NFR BLD AUTO: 1.9 %
ERYTHROCYTE [DISTWIDTH] IN BLOOD BY AUTOMATED COUNT: 11.8 % (ref 10–15)
GFR SERPL CREATININE-BSD FRML MDRD: 82 ML/MIN/{1.73_M2}
GLUCOSE SERPL-MCNC: 102 MG/DL (ref 70–99)
HCT VFR BLD AUTO: 38.5 % (ref 40–53)
HGB BLD-MCNC: 13.1 G/DL (ref 13.3–17.7)
IMM GRANULOCYTES # BLD: 0 10E9/L (ref 0–0.4)
IMM GRANULOCYTES NFR BLD: 0.3 %
LYMPHOCYTES # BLD AUTO: 1.4 10E9/L (ref 0.8–5.3)
LYMPHOCYTES NFR BLD AUTO: 13.2 %
MCH RBC QN AUTO: 33.9 PG (ref 26.5–33)
MCHC RBC AUTO-ENTMCNC: 34 G/DL (ref 31.5–36.5)
MCV RBC AUTO: 100 FL (ref 78–100)
MONOCYTES # BLD AUTO: 1 10E9/L (ref 0–1.3)
MONOCYTES NFR BLD AUTO: 9.3 %
NEUTROPHILS # BLD AUTO: 8 10E9/L (ref 1.6–8.3)
NEUTROPHILS NFR BLD AUTO: 74.8 %
NRBC # BLD AUTO: 0 10*3/UL
NRBC BLD AUTO-RTO: 0 /100
PLATELET # BLD AUTO: 284 10E9/L (ref 150–450)
POTASSIUM SERPL-SCNC: 4 MMOL/L (ref 3.4–5.3)
RBC # BLD AUTO: 3.86 10E12/L (ref 4.4–5.9)
SODIUM SERPL-SCNC: 140 MMOL/L (ref 133–144)
TROPONIN I SERPL-MCNC: <0.015 UG/L (ref 0–0.04)
TROPONIN I SERPL-MCNC: <0.015 UG/L (ref 0–0.04)
WBC # BLD AUTO: 10.7 10E9/L (ref 4–11)

## 2019-09-22 PROCEDURE — 80048 BASIC METABOLIC PNL TOTAL CA: CPT | Performed by: EMERGENCY MEDICINE

## 2019-09-22 PROCEDURE — 25000132 ZZH RX MED GY IP 250 OP 250 PS 637: Performed by: EMERGENCY MEDICINE

## 2019-09-22 PROCEDURE — 93005 ELECTROCARDIOGRAM TRACING: CPT

## 2019-09-22 PROCEDURE — 99285 EMERGENCY DEPT VISIT HI MDM: CPT | Mod: 25

## 2019-09-22 PROCEDURE — 71046 X-RAY EXAM CHEST 2 VIEWS: CPT

## 2019-09-22 PROCEDURE — 84484 ASSAY OF TROPONIN QUANT: CPT | Performed by: EMERGENCY MEDICINE

## 2019-09-22 PROCEDURE — 85025 COMPLETE CBC W/AUTO DIFF WBC: CPT | Performed by: EMERGENCY MEDICINE

## 2019-09-22 RX ORDER — NITROGLYCERIN 0.4 MG/1
0.4 TABLET SUBLINGUAL ONCE
Status: COMPLETED | OUTPATIENT
Start: 2019-09-22 | End: 2019-09-22

## 2019-09-22 RX ORDER — ASPIRIN 81 MG/1
81 TABLET ORAL DAILY
COMMUNITY
End: 2021-10-27

## 2019-09-22 RX ORDER — ASPIRIN 81 MG/1
162 TABLET, CHEWABLE ORAL ONCE
Status: COMPLETED | OUTPATIENT
Start: 2019-09-22 | End: 2019-09-22

## 2019-09-22 RX ADMIN — ASPIRIN 81 MG 162 MG: 81 TABLET ORAL at 09:16

## 2019-09-22 RX ADMIN — NITROGLYCERIN 0.4 MG: 0.4 TABLET SUBLINGUAL at 09:16

## 2019-09-22 ASSESSMENT — ENCOUNTER SYMPTOMS
SHORTNESS OF BREATH: 1
ARTHRALGIAS: 1
DIAPHORESIS: 1
LIGHT-HEADEDNESS: 0
APPETITE CHANGE: 1
DIZZINESS: 0
CHEST TIGHTNESS: 1
COUGH: 1
NUMBNESS: 1
NAUSEA: 0

## 2019-09-22 NOTE — ED PROVIDER NOTES
History     Chief Complaint:  Chest pain    HPI   Ian Moncada is a 54 year old male who presents to the emergency department today with chest pain. The patient reports chest pressure started last night around 2200 and has been constant since then. The pain starts on the left chest and radiates up to his left neck and shoulder, with associated left arm numbness last night, diaphoresis, decreased appetite, and shortness of breath. He has a nonbloody cough and some pain with deep breaths. He denies dizziness, light-headedness, nausea.  Patient had pain like this a few weeks ago when he was coming home from [facility]. This feels like the same type of pain. He does drink alcohol, but denies drug use.     Cardiac/PE/DVT Risk Factors:  History of hypertension - negative   History of hyperlipidemia - positive   History of diabetes - negative   History of smoking - negative   Personal history of PE/DVT - negative   Family history of PE/DVT - negative   Family history of heart complications - positive   Other immobilizations - negative  Cancer - negative      Allergies:  Walnuts [Nuts]     Medications:    Aspirin  Zovirax  Simvastatin     Past Medical History:    External hemorrhoids  Hyperlipidemia  Hypertriglyceridemia  Impaired fasting glucose  Melanocytic nevus of trunk     Past Surgical History:    Tonsillectomy     Family History:    Heart disease  Neurologic disorder  Colorectal cancer     Social History:  The patient was accompanied to the ED by son.  Smoking Status: Never  Smokeless Tobacco: Never  Alcohol Use: Yes   Marital Status:      Review of Systems   Constitutional: Positive for appetite change and diaphoresis.   Respiratory: Positive for cough (nonbloody), chest tightness and shortness of breath.    Cardiovascular: Positive for chest pain (left side).   Gastrointestinal: Negative for nausea.   Musculoskeletal: Positive for arthralgias (left shoulder pain).   Neurological: Positive for numbness (left  arm). Negative for dizziness and light-headedness.   All other systems reviewed and are negative.      Physical Exam     Patient Vitals for the past 24 hrs:   BP Pulse Heart Rate Resp SpO2   09/22/19 1200 118/88 72 -- -- 99 %   09/22/19 1115 (!) 125/91 71 -- -- 98 %   09/22/19 1045 (!) 114/93 74 -- -- 99 %   09/22/19 1010 110/72 72 -- -- 98 %   09/22/19 1005 96/68 69 -- -- 98 %   09/22/19 1000 109/71 73 -- -- 98 %   09/22/19 0955 97/80 73 -- -- --   09/22/19 0935 (!) 139/97 81 74 18 97 %   09/22/19 0930 (!) 131/99 82 78 19 96 %   09/22/19 0925 129/86 78 79 18 96 %   09/22/19 0900 (!) 144/102 92 81 12 98 %      Physical Exam  General: Patient is awake, alert and interactive when I enter the room  Head: The scalp, face, and head appear normal  Eyes: The pupils are equal, round, and reactive to light. Conjunctivae and sclerae are normal  ENT: External acoustic canals are normal. The oropharynx is normal without erythema. Uvula is in the midline  Neck: Normal range of motion. No anterior cervical lymphadenopathy noted  CV: Regular rate. S1/S2. No murmurs.   Resp: Lungs are clear without wheezes or rales. No respiratory distress.   GI: Abdomen is soft, no rigidity, guarding, or rebound. No distension. No tenderness to palpation in any quadrant.  MS: Normal tone. Joints grossly normal without effusions. No asymmetric leg swelling, calf or thigh tenderness.    Skin: No rash or lesions noted. Normal capillary refill noted  Neuro: Speech is normal and fluent. Face is symmetric. Moving all extremities.   Psych:  Normal affect.  Appropriate interactions.       Emergency Department Course     ECG:  Indication: Chest pain  Completed at 0854.  Read at 0857.   Normal sinus rhythm   Small T-wave inversion inferior V5-V6  Abnormal ECG    Rate 88 bpm. VA interval 146. QRS duration 80. QT/QTc 352/425. P-R-T axes 56 31 28.     Imaging:  Radiology findings were communicated with the patient and family who voiced understanding of the  findings.  XR Chest 2 Views   Final Result   IMPRESSION: No active infiltrates. No change.          MD JEANMARIE SANCHEZ Exercise stress test    (Results Pending)   Report per radiology      Laboratory:  Laboratory findings were communicated with the patient and family who voiced understanding of the findings.  Troponin (Collected 0856): <0.015  CBC: AWNL (WBC 10.7, HGB 13.1, )   CMP: 102 Glucose WNL (Creatinine 1.02)      Interventions:  0916: Aspirin 162 mg PO   0916: Nitrostat 0.4 mg Sublingual      Emergency Department Course:  Nursing notes and vitals reviewed.  0900: I performed an exam of the patient as documented above.   IV was inserted and blood was drawn for laboratory testing, results above.  The patient was sent for a Chest XR while in the emergency department, results above.   1146: Patient rechecked and updated.    1147: Findings and plan explained to the Patient. Patient discharged home with instructions regarding supportive care, medications, and reasons to return. The importance of close follow-up was reviewed.    I personally reviewed the laboratory and imaging results with the Patient and answered all related questions prior to discharge.     Impression & Plan     Medical Decision Making:  Patient is a 54-year-old gentleman with past medical history of hyperlipidemia who presents to the emergency department today with chest pain that began at 10:00 last night.  He notes associated symptoms of left arm pain, diaphoresis and shortness of breath.  Upon initial evaluation he is hemodynamically stable with normal vital signs.  He is afebrile.  Physical exam as detailed above highlighted by normal peripheral perfusion and no acute distress.  EKG was obtained which does show new T wave inversions in the inferior leads as well as V5 and V6 when compared to previous.  Troponins were obtained and trended and were negative x2.  Patient was given nitro which did improve his pain as well.  At this  point I had a discussion with the patient and I recommended staying overnight for observation given his classic cardiac history and EKG changes however the patient was adamant that he did not want to stay in the hospital overnight.  I did discussion with the patient regarding the risks of going home versus staying in the hospital and he and his son would like to go home at this time.  I will order a stress test for the patient as well as cardiology referral for further evaluation and treatment.  I did discuss return precautions with him and stressed to the need for follow-up.  All questions were answered and the patient was discharged home.  He remained hemodynamically stable under my care.    Diagnosis:    ICD-10-CM    1. Chest pain, unspecified type R07.9 CARDIOLOGY EVAL ADULT REFERRAL     NM Exercise stress test       Disposition:  discharged to home     Scribe Disclosure:  Eliza RODRÍGUEZ MD, am serving as a scribe at 9:05 AM on 9/22/2019 to document services personally performed by Claudio Lake based on my observations and the provider's statements to me.    9/22/2019   [facility] EMERGENCY DEPARTMENT       Claudio Lake MD  09/22/19 9085

## 2019-09-22 NOTE — ED AVS SNAPSHOT
[facility] Emergency Department  [address]  [facility] [address]  Phone:  [phone]  Fax:  [phone]                                    Ian Moncada   MRN: [id number]    Department:  [facility] Emergency Department   Date of Visit:  9/22/2019           After Visit Summary Signature Page    I have received my discharge instructions, and my questions have been answered. I have discussed any challenges I see with this plan with the nurse or doctor.    ..........................................................................................................................................  Patient/Patient Representative Signature      ..........................................................................................................................................  Patient Representative Print Name and Relationship to Patient    ..................................................               ................................................  Date                                   Time    ..........................................................................................................................................  Reviewed by Signature/Title    ...................................................              ..............................................  Date                                               Time          22EPIC Rev 08/18

## 2019-09-22 NOTE — ED TRIAGE NOTES
Last couple of weeks pt has had intermittent mid sternal chest pain that radiates to his back. Last night the patient's pain got worse and constant along with radiation now to his LUE. Pain currently 7/10. SOB present. Took 2 81 mg asa last night

## 2019-09-22 NOTE — ED NOTES
Pt ready to discharge home with family member. IV dc'd. Reviewed avs and f/u with cardiologist reccomendation

## 2019-09-23 LAB — INTERPRETATION ECG - MUSE: NORMAL

## 2019-09-26 ENCOUNTER — TRANSFERRED RECORDS (OUTPATIENT)
Dept: HEALTH INFORMATION MANAGEMENT | Facility: CLINIC | Age: 54
End: 2019-09-26

## 2019-10-03 ENCOUNTER — HOSPITAL ENCOUNTER (OUTPATIENT)
Dept: NUCLEAR MEDICINE | Facility: CLINIC | Age: 54
Setting detail: NUCLEAR MEDICINE
End: 2019-10-03
Attending: EMERGENCY MEDICINE
Payer: COMMERCIAL

## 2019-10-03 ENCOUNTER — HOSPITAL ENCOUNTER (OUTPATIENT)
Dept: CARDIOLOGY | Facility: CLINIC | Age: 54
Discharge: HOME | End: 2019-10-03
Attending: EMERGENCY MEDICINE | Admitting: EMERGENCY MEDICINE
Payer: COMMERCIAL

## 2019-10-03 ENCOUNTER — HEALTH MAINTENANCE LETTER (OUTPATIENT)
Age: 54
End: 2019-10-03

## 2019-10-03 DIAGNOSIS — R07.9 CHEST PAIN, UNSPECIFIED TYPE: ICD-10-CM

## 2019-10-03 PROCEDURE — A9502 TC99M TETROFOSMIN: HCPCS | Performed by: EMERGENCY MEDICINE

## 2019-10-03 PROCEDURE — 78452 HT MUSCLE IMAGE SPECT MULT: CPT

## 2019-10-03 PROCEDURE — 93016 CV STRESS TEST SUPVJ ONLY: CPT | Performed by: INTERNAL MEDICINE

## 2019-10-03 PROCEDURE — 93017 CV STRESS TEST TRACING ONLY: CPT

## 2019-10-03 PROCEDURE — 93018 CV STRESS TEST I&R ONLY: CPT | Performed by: INTERNAL MEDICINE

## 2019-10-03 PROCEDURE — 34300033 ZZH RX 343: Performed by: EMERGENCY MEDICINE

## 2019-10-03 PROCEDURE — 78452 HT MUSCLE IMAGE SPECT MULT: CPT | Performed by: INTERNAL MEDICINE

## 2019-10-03 RX ADMIN — TETROFOSMIN 31.5 MCI.: 1.38 INJECTION, POWDER, LYOPHILIZED, FOR SOLUTION INTRAVENOUS at 09:19

## 2019-10-03 RX ADMIN — TETROFOSMIN 10.1 MCI.: 1.38 INJECTION, POWDER, LYOPHILIZED, FOR SOLUTION INTRAVENOUS at 07:56

## 2019-10-09 ENCOUNTER — E-VISIT (OUTPATIENT)
Dept: PEDIATRICS | Facility: CLINIC | Age: 54
End: 2019-10-09
Payer: COMMERCIAL

## 2019-10-09 DIAGNOSIS — M10.9 GOUT, UNSPECIFIED CAUSE, UNSPECIFIED CHRONICITY, UNSPECIFIED SITE: Primary | ICD-10-CM

## 2019-10-09 PROCEDURE — 99444 ZZC PHYSICIAN ONLINE EVALUATION & MANAGEMENT SERVICE: CPT | Performed by: INTERNAL MEDICINE

## 2019-10-10 RX ORDER — INDOMETHACIN 50 MG/1
50 CAPSULE ORAL
Qty: 30 CAPSULE | Refills: 0 | Status: SHIPPED | OUTPATIENT
Start: 2019-10-10 | End: 2021-02-02

## 2019-11-01 ENCOUNTER — OFFICE VISIT (OUTPATIENT)
Dept: CARDIOLOGY | Facility: CLINIC | Age: 54
End: 2019-11-01
Attending: EMERGENCY MEDICINE
Payer: COMMERCIAL

## 2019-11-01 VITALS
HEIGHT: 72 IN | DIASTOLIC BLOOD PRESSURE: 88 MMHG | BODY MASS INDEX: 26.89 KG/M2 | WEIGHT: 198.5 LBS | SYSTOLIC BLOOD PRESSURE: 128 MMHG | HEART RATE: 68 BPM

## 2019-11-01 DIAGNOSIS — Z82.49 FAMILY HISTORY OF CORONARY ARTERIOSCLEROSIS: ICD-10-CM

## 2019-11-01 DIAGNOSIS — Z13.6 SCREENING FOR CARDIOVASCULAR CONDITION: Primary | ICD-10-CM

## 2019-11-01 DIAGNOSIS — E78.5 HYPERLIPIDEMIA LDL GOAL <100: ICD-10-CM

## 2019-11-01 PROCEDURE — 93000 ELECTROCARDIOGRAM COMPLETE: CPT | Performed by: INTERNAL MEDICINE

## 2019-11-01 PROCEDURE — 99204 OFFICE O/P NEW MOD 45 MIN: CPT | Performed by: INTERNAL MEDICINE

## 2019-11-01 ASSESSMENT — MIFFLIN-ST. JEOR: SCORE: 1773.39

## 2019-11-01 NOTE — PATIENT INSTRUCTIONS
November 1, 2019    Thank you for allowing our Cardiology team to participate in your care.     Please note the following changes to your heart treatment plan:     Medication changes:   - none    Tests to be done:  - CT calcium score    Follow up:  - We will arrange for you to follow up in 1 year or sooner as needed    Please contact our team at [phone] for any questions or concerns.   If you are having a medical emergency, please call 911.       Sincerely,    Keshav Ochoa MD  Cardiology - [facility]    [facility] - [facility] [place]

## 2019-11-01 NOTE — PROGRESS NOTES
Service Date: 11/01/2019      CARDIOLOGY CONSULT NOTE       HISTORY OF PRESENT ILLNESS:      I had the opportunity to see patient Ian Moncada today in Cardiology Clinic for consultation.  As you know, he is a 54-year-old male with a past medical history significant for hyperlipidemia who was recently seen in the Emergency Department for chest pain and who now presents for followup.      He reports that back in September he had an episode of chest discomfort with radiation to his left arm that occurred while he was about ready to go to bed.  This persisted overnight and lasted until the morning, and since it did not resolve on its own, he presented to the emergency department for further evaluation and management.  He was seen by Dr. Claudio Lake on 09/22/2019.  At that time in the emergency department, he was found to be normotensive with blood pressure of 118/88, heart rate 72.  ECG showed normal sinus rhythm with small T-wave inversions in leads III, aVF and nonspecific T-wave changes in V5 and V6.  QTc 425 milliseconds.  Evaluation included serial troponins which were negative x2.  At that point, he was found to be low risk and he was discharged with a plan for stress testing and cardiology followup.      After that episode, he states that his chest pain actually persisted for approximately 1-1/2 days afterward. On reflection, he remarks that his chest pain was associated with swelling and pain in his knees and ankles, which he associates with his typical gout attacks.  In retrospect, he feels like the chest discomfort as well as knee pain and ankle pain was representative of generalized arthralgias which he does have with his gout attacks.  He states that he did not really correlate the symptoms together because his gout attacks are fairly infrequent.  However, he did have salmon the day before that episode and he states that seafood does reliably bring about his gout attacks and related arthralgias.       He underwent an exercise nuclear stress test on 10/03/2019 that showed normal perfusion without evidence for ischemia or infarct.  Currently, he states that he feels better and has not had recurrence of the chest pain or generalized arthralgias that he had before.  At baseline, he is fairly physically active, walking for approximately 3-4 days a week for 2-3 miles each time.  He denies any associated chest pain, chest discomfort or abnormal shortness of breath.  No decline in exertional capacity.  He denies any recent episodes of dizziness, lightheadedness, palpitations.  No symptoms consistent with orthopnea, PND or lower extremity swelling.      ASSESSMENT AND PLAN:      In summary, patient Ian Moncada is a pleasant 54-year-old male with a past medical history significant for hyperlipidemia who presents for further evaluation and management of chest pain.  His chest pain is atypical in nature in that it is not exertional, not relieved by rest and this episode that occurred in September lasted for several days with nonspecific ECG changes as well as negative cardiac biomarkers.  He did have a nuclear stress test done on 10/03/2019 that was negative for ischemia.  I do not believe that this episode of chest pain represents angina.  He does have a history of gout and it is possible that this chest pain and arm pain is reflective of generalized arthralgias related to a gout flare.  His 10-year ASCVD risk is 5.8%.  He is already on simvastatin 40 mg daily.      At this time, we will proceed with a screening coronary calcium CT scan for further risk stratification.  His blood pressure remains well controlled.  He does not require any change to his current cardiac regimen at this time.  We will contact him with the results of the screening coronary calcium CT scan, and if this does show evidence of coronary calcifications, I will switch him to either atorvastatin or rosuvastatin, as well as recommend a daily aspirin.   Otherwise, we will plan to see him back in 1 year for followup.      Thank you for allowing our team to participate in the care of patient Flor Meyer.  Please do not hesitate to call or page me anytime with questions or concerns.      Keshav Ochoa MD  [facility]  Cardiology  Pager:  [phone]  Text Page   2019          D: 2019   T: 2019   MT: yvonne      Name:     FLOR MEYER   MRN:      [id number]        Account:      [id number]   :      1965           Service Date: 2019      Document: [id number]

## 2019-11-01 NOTE — LETTER
11/1/2019    Alfredo Fink MD  [address] [place] [address]    RE: Ian Moncada       Dear Colleague,    I had the pleasure of seeing Ian Moncada in the [facility].        Cardiology Clinic Consultation:    November 1, 2019   Patient Name: Ian Moncada  Patient MRN: [id number]    Consult reason: chest pain    HPI and Assessment and Plan/Recommendations:    Please see dictation. #[id number]    10-year ASCVD 5.8%    Thank you for allowing our team to participate in the care of Ian Moncada.  Please do not hesitate to call or page me with any questions or concerns.    Sincerely,     Kesahv Ochoa MD  [facility]  Cardiology  Pager:  [phone]  Text Page   November 1, 2019    cc  Claudio Lake MD  [facility]  [address] [address]    Past Medical History:   Past Medical History:   Diagnosis Date     External hemorrhoids with other complication 7/25/2008    Banding 2007.     Hyperlipidemia LDL goal <130 8/12/2010     Hypertriglyceridemia 8/19/2010     Impaired fasting glucose 12/7/2009       Past Surgical History:   Past Surgical History:   Procedure Laterality Date     TONSILLECTOMY  1970s       Medications (outpatient):  Current Outpatient Medications   Medication Sig Dispense Refill     acyclovir (ZOVIRAX) 400 MG tablet TAKE 1 TABLET(400 MG) BY MOUTH THREE TIMES DAILY 15 tablet 4     aspirin 81 MG EC tablet Take 81 mg by mouth daily       fish oil-omega-3 fatty acids (OMEGA 3) 1000 MG capsule Take 2 capsules by mouth daily. 120 capsule 3     indomethacin (INDOCIN) 50 MG capsule Take 1 capsule (50 mg) by mouth 3 times daily (with meals) 30 capsule 0     MULTIVITAMIN TABS   OR 1 TABLET DAILY       simvastatin (ZOCOR) 40 MG tablet TAKE 1 TABLET BY MOUTH EVERY NIGHT AT BEDTIME 90 tablet 3       Allergies:  Allergies   Allergen Reactions     Walnuts [Nuts]        Social History:   History   Drug Use No       History   Smoking Status     Never Smoker   Smokeless Tobacco     Never Used     Social History    Substance and Sexual Activity      Alcohol use: Yes        Alcohol/week: 7.0 standard drinks        Types: 7 Standard drinks or equivalent per week       Family History:  Family History   Problem Relation Age of Onset     Heart Disease Paternal Grandfather      Neurologic Disorder Father         Guillaine Justin     Heart Disease Mother         quad bipass     Heart Disease Maternal Grandfather      Heart Disease Maternal Uncle      Cancer - colorectal Maternal Grandmother      Heart Disease Maternal Aunt        Review of Systems:   A complete review of systems was negative except as mentioned in the History of Present Illness.     Objective & Physical Exam:  /88 (BP Location: Right arm, Patient Position: Sitting, Cuff Size: Adult Large)   Pulse 68   Ht 1.829 m (6')   Wt 90 kg (198 lb 8 oz)   BMI 26.92 kg/m     Wt Readings from Last 2 Encounters:   11/01/19 90 kg (198 lb 8 oz)   01/15/19 89.8 kg (197 lb 14.4 oz)     Body mass index is 26.92 kg/m .   Body surface area is 2.14 meters squared.    Constitutional: appears stated age, in no apparent distress, appears to be well nourished  Eyes: sclera anicteric, conjunctiva normal, no lesions on eyelids or lashes  ENT: normocephalic, without obvious abnormality, atraumatic, external ears without lesions   Pulmonary: clear to auscultation bilaterally, no wheezes, no rales, no increased work of breathing  Cardiovascular: JVP normal, regular rate, regular rhythm, normal S1 and S2, no S3, S4, no murmur appreciated, no lower extremity edema  Gastrointestinal: abdominal exam benign, non-tender, no rigidity, no guarding  Neurologic: awake, alert, face symmetrical, moves all extremities  Skin: no abnormal rashes or lesions on limited exam, nails normal without discoloration or clubbing, no jaundice  Psychiatric: affect is normal, answers questions appropriately,  oriented to self and place    Labs reviewed:  Lab Results   Component Value Date    WBC 10.7 09/22/2019    RBC 3.86 (L) 09/22/2019    HGB 13.1 (L) 09/22/2019    HCT 38.5 (L) 09/22/2019     09/22/2019    MCH 33.9 (H) 09/22/2019    MCHC 34.0 09/22/2019    RDW 11.8 09/22/2019     09/22/2019     Sodium   Date Value Ref Range Status   09/22/2019 140 133 - 144 mmol/L Final     Potassium   Date Value Ref Range Status   09/22/2019 4.0 3.4 - 5.3 mmol/L Final     Chloride   Date Value Ref Range Status   09/22/2019 106 94 - 109 mmol/L Final     Carbon Dioxide   Date Value Ref Range Status   09/22/2019 27 20 - 32 mmol/L Final     Anion Gap   Date Value Ref Range Status   09/22/2019 7 3 - 14 mmol/L Final     Glucose   Date Value Ref Range Status   09/22/2019 102 (H) 70 - 99 mg/dL Final     Urea Nitrogen   Date Value Ref Range Status   09/22/2019 10 7 - 30 mg/dL Final     Creatinine   Date Value Ref Range Status   09/22/2019 1.02 0.66 - 1.25 mg/dL Final     GFR Estimate   Date Value Ref Range Status   09/22/2019 82 >60 mL/min/[1.73_m2] Final     Comment:     Non  GFR Calc  Starting 12/18/2018, serum creatinine based estimated GFR (eGFR) will be   calculated using the Chronic Kidney Disease Epidemiology Collaboration   (CKD-EPI) equation.       Calcium   Date Value Ref Range Status   09/22/2019 9.0 8.5 - 10.1 mg/dL Final     Bilirubin Total   Date Value Ref Range Status   01/15/2019 1.2 0.2 - 1.3 mg/dL Final     Alkaline Phosphatase   Date Value Ref Range Status   01/15/2019 45 40 - 150 U/L Final     ALT   Date Value Ref Range Status   01/15/2019 56 0 - 70 U/L Final     AST   Date Value Ref Range Status   01/15/2019 43 0 - 45 U/L Final     Recent Labs   Lab Test 01/15/19  0853 10/24/17  0831  11/07/14  0829 10/04/13  0851   CHOL 196 209*   < > 222* 205*   HDL 41 45   < > 50 45   LDL 94 Cannot estimate LDL when triglyceride exceeds 400 mg/dL  117*   < > Cannot estimate LDL when triglyceride exceeds  400 mg/dL  128 83   TRIG 307* 452*   < > 458* 381*   CHOLHDLRATIO  --   --   --  4.4 4.5    < > = values in this interval not displayed.      No results found for: A1C       Service Date: 11/01/2019      CARDIOLOGY CONSULT NOTE       HISTORY OF PRESENT ILLNESS:      I had the opportunity to see patient Ian Moncada today in Cardiology Clinic for consultation.  As you know, he is a 54-year-old male with a past medical history significant for hyperlipidemia who was recently seen in the Emergency Department for chest pain and who now presents for followup.      He reports that back in September he had an episode of chest discomfort with radiation to his left arm that occurred while he was about ready to go to bed.  This persisted overnight and lasted until the morning, and since it did not resolve on its own, he presented to the emergency department for further evaluation and management.  He was seen by Dr. Claudio Lake on 09/22/2019.  At that time in the emergency department, he was found to be normotensive with blood pressure of 118/88, heart rate 72.  ECG showed normal sinus rhythm with small T-wave inversions in leads III, aVF and nonspecific T-wave changes in V5 and V6.  QTc 425 milliseconds.  Evaluation included serial troponins which were negative x2.  At that point, he was found to be low risk and he was discharged with a plan for stress testing and cardiology followup.      After that episode, he states that his chest pain actually persisted for approximately 1-1/2 days afterward. On reflection, he remarks that his chest pain was associated with swelling and pain in his knees and ankles, which he associates with his typical gout attacks.  In retrospect, he feels like the chest discomfort as well as knee pain and ankle pain was representative of generalized arthralgias which he does have with his gout attacks.  He states that he did not really correlate the symptoms together because his gout attacks are  fairly infrequent.  However, he did have salmon the day before that episode and he states that seafood does reliably bring about his gout attacks and related arthralgias.      He underwent an exercise nuclear stress test on 10/03/2019 that showed normal perfusion without evidence for ischemia or infarct.  Currently, he states that he feels better and has not had recurrence of the chest pain or generalized arthralgias that he had before.  At baseline, he is fairly physically active, walking for approximately 3-4 days a week for 2-3 miles each time.  He denies any associated chest pain, chest discomfort or abnormal shortness of breath.  No decline in exertional capacity.  He denies any recent episodes of dizziness, lightheadedness, palpitations.  No symptoms consistent with orthopnea, PND or lower extremity swelling.      ASSESSMENT AND PLAN:      In summary, patient Ian Moncada is a pleasant 54-year-old male with a past medical history significant for hyperlipidemia who presents for further evaluation and management of chest pain.  His chest pain is atypical in nature in that it is not exertional, not relieved by rest and this episode that occurred in September lasted for several days with nonspecific ECG changes as well as negative cardiac biomarkers.  He did have a nuclear stress test done on 10/03/2019 that was negative for ischemia.  I do not believe that this episode of chest pain represents angina.  He does have a history of gout and it is possible that this chest pain and arm pain is reflective of generalized arthralgias related to a gout flare.  His 10-year ASCVD risk is 5.8%.  He is already on simvastatin 40 mg daily.      At this time, we will proceed with a screening coronary calcium CT scan for further risk stratification.  His blood pressure remains well controlled.  He does not require any change to his current cardiac regimen at this time.  We will contact him with the results of the screening coronary  calcium CT scan, and if this does show evidence of coronary calcifications, I will switch him to either atorvastatin or rosuvastatin, as well as recommend a daily aspirin.  Otherwise, we will plan to see him back in 1 year for followup.      Thank you for allowing our team to participate in the care of patient Ian Meyer.  Please do not hesitate to call or page me anytime with questions or concerns.      Keshav Ochoa MD  [facility]  Cardiology  Pager:  [phone]  Text Page   2019          D: 2019   T: 2019   MT: yvonne      Name:     IAN MEYER   MRN:      [id number]        Account:      [id number]   :      1965           Service Date: 2019      Document: [id number]        Thank you for allowing me to participate in the care of your patient.      Sincerely,     Keshav Ochoa MD     [facility]    cc:   Claudio Lake MD  [facility]  [address] [place] [id number]

## 2019-11-01 NOTE — PROGRESS NOTES
Cardiology Clinic Consultation:    November 1, 2019   Patient Name: Ian Moncada  Patient MRN: [id number]    Consult reason: chest pain    HPI and Assessment and Plan/Recommendations:    Please see dictation. #[id number]    10-year ASCVD 5.8%    Thank you for allowing our team to participate in the care of Ian Moncada.  Please do not hesitate to call or page me with any questions or concerns.    Sincerely,     Keshav Ochoa MD  [facility]  Cardiology  Pager:  [phone]  Text Page   November 1, 2019    cc  Claudio Lake MD  [facility]  [address]  [place] [address]    Past Medical History:   Past Medical History:   Diagnosis Date     External hemorrhoids with other complication 7/25/2008    Banding 2007.     Hyperlipidemia LDL goal <130 8/12/2010     Hypertriglyceridemia 8/19/2010     Impaired fasting glucose 12/7/2009       Past Surgical History:   Past Surgical History:   Procedure Laterality Date     TONSILLECTOMY  1970s       Medications (outpatient):  Current Outpatient Medications   Medication Sig Dispense Refill     acyclovir (ZOVIRAX) 400 MG tablet TAKE 1 TABLET(400 MG) BY MOUTH THREE TIMES DAILY 15 tablet 4     aspirin 81 MG EC tablet Take 81 mg by mouth daily       fish oil-omega-3 fatty acids (OMEGA 3) 1000 MG capsule Take 2 capsules by mouth daily. 120 capsule 3     indomethacin (INDOCIN) 50 MG capsule Take 1 capsule (50 mg) by mouth 3 times daily (with meals) 30 capsule 0     MULTIVITAMIN TABS   OR 1 TABLET DAILY       simvastatin (ZOCOR) 40 MG tablet TAKE 1 TABLET BY MOUTH EVERY NIGHT AT BEDTIME 90 tablet 3       Allergies:  Allergies   Allergen Reactions     Walnuts [Nuts]        Social History:   History   Drug Use No      History   Smoking Status     Never Smoker   Smokeless Tobacco     Never Used     Social History    Substance and Sexual Activity      Alcohol use: Yes        Alcohol/week: 7.0 standard drinks        Types: 7  Standard drinks or equivalent per week       Family History:  Family History   Problem Relation Age of Onset     Heart Disease Paternal Grandfather      Neurologic Disorder Father         Guillaine Chester     Heart Disease Mother         quad bipass     Heart Disease Maternal Grandfather      Heart Disease Maternal Uncle      Cancer - colorectal Maternal Grandmother      Heart Disease Maternal Aunt        Review of Systems:   A complete review of systems was negative except as mentioned in the History of Present Illness.     Objective & Physical Exam:  /88 (BP Location: Right arm, Patient Position: Sitting, Cuff Size: Adult Large)   Pulse 68   Ht 1.829 m (6')   Wt 90 kg (198 lb 8 oz)   BMI 26.92 kg/m    Wt Readings from Last 2 Encounters:   11/01/19 90 kg (198 lb 8 oz)   01/15/19 89.8 kg (197 lb 14.4 oz)     Body mass index is 26.92 kg/m .   Body surface area is 2.14 meters squared.    Constitutional: appears stated age, in no apparent distress, appears to be well nourished  Eyes: sclera anicteric, conjunctiva normal, no lesions on eyelids or lashes  ENT: normocephalic, without obvious abnormality, atraumatic, external ears without lesions   Pulmonary: clear to auscultation bilaterally, no wheezes, no rales, no increased work of breathing  Cardiovascular: JVP normal, regular rate, regular rhythm, normal S1 and S2, no S3, S4, no murmur appreciated, no lower extremity edema  Gastrointestinal: abdominal exam benign, non-tender, no rigidity, no guarding  Neurologic: awake, alert, face symmetrical, moves all extremities  Skin: no abnormal rashes or lesions on limited exam, nails normal without discoloration or clubbing, no jaundice  Psychiatric: affect is normal, answers questions appropriately, oriented to self and place    Labs reviewed:  Lab Results   Component Value Date    WBC 10.7 09/22/2019    RBC 3.86 (L) 09/22/2019    HGB 13.1 (L) 09/22/2019    HCT 38.5 (L) 09/22/2019     09/22/2019    MCH 33.9  (H) 09/22/2019    MCHC 34.0 09/22/2019    RDW 11.8 09/22/2019     09/22/2019     Sodium   Date Value Ref Range Status   09/22/2019 140 133 - 144 mmol/L Final     Potassium   Date Value Ref Range Status   09/22/2019 4.0 3.4 - 5.3 mmol/L Final     Chloride   Date Value Ref Range Status   09/22/2019 106 94 - 109 mmol/L Final     Carbon Dioxide   Date Value Ref Range Status   09/22/2019 27 20 - 32 mmol/L Final     Anion Gap   Date Value Ref Range Status   09/22/2019 7 3 - 14 mmol/L Final     Glucose   Date Value Ref Range Status   09/22/2019 102 (H) 70 - 99 mg/dL Final     Urea Nitrogen   Date Value Ref Range Status   09/22/2019 10 7 - 30 mg/dL Final     Creatinine   Date Value Ref Range Status   09/22/2019 1.02 0.66 - 1.25 mg/dL Final     GFR Estimate   Date Value Ref Range Status   09/22/2019 82 >60 mL/min/[1.73_m2] Final     Comment:     Non  GFR Calc  Starting 12/18/2018, serum creatinine based estimated GFR (eGFR) will be   calculated using the Chronic Kidney Disease Epidemiology Collaboration   (CKD-EPI) equation.       Calcium   Date Value Ref Range Status   09/22/2019 9.0 8.5 - 10.1 mg/dL Final     Bilirubin Total   Date Value Ref Range Status   01/15/2019 1.2 0.2 - 1.3 mg/dL Final     Alkaline Phosphatase   Date Value Ref Range Status   01/15/2019 45 40 - 150 U/L Final     ALT   Date Value Ref Range Status   01/15/2019 56 0 - 70 U/L Final     AST   Date Value Ref Range Status   01/15/2019 43 0 - 45 U/L Final     Recent Labs   Lab Test 01/15/19  0853 10/24/17  0831  11/07/14  0829 10/04/13  0851   CHOL 196 209*   < > 222* 205*   HDL 41 45   < > 50 45   LDL 94 Cannot estimate LDL when triglyceride exceeds 400 mg/dL  117*   < > Cannot estimate LDL when triglyceride exceeds 400 mg/dL  128 83   TRIG 307* 452*   < > 458* 381*   CHOLHDLRATIO  --   --   --  4.4 4.5    < > = values in this interval not displayed.      No results found for: A1C

## 2019-11-01 NOTE — LETTER
11/1/2019      Alfredo Fink MD  [address] [place] [address]      RE: Ian Moncada       Dear Colleague,    I had the pleasure of seeing Ian Moncada in the [facility].    Service Date: 11/01/2019      CARDIOLOGY CONSULT NOTE       HISTORY OF PRESENT ILLNESS:      I had the opportunity to see patient Ian Moncada today in Cardiology Clinic for consultation.  As you know, he is a 54-year-old male with a past medical history significant for hyperlipidemia who was recently seen in the Emergency Department for chest pain and who now presents for followup.      He reports that back in September he had an episode of chest discomfort with radiation to his left arm that occurred while he was about ready to go to bed.  This persisted overnight and lasted until the morning, and since it did not resolve on its own, he presented to the emergency department for further evaluation and management.  He was seen by Dr. Claudio Lake on 09/22/2019.  At that time in the emergency department, he was found to be normotensive with blood pressure of 118/88, heart rate 72.  ECG showed normal sinus rhythm with small T-wave inversions in leads III, aVF and nonspecific T-wave changes in V5 and V6.  QTc 425 milliseconds.  Evaluation included serial troponins which were negative x2.  At that point, he was found to be low risk and he was discharged with a plan for stress testing and cardiology followup.      After that episode, he states that his chest pain actually persisted for approximately 1-1/2 days afterward. On reflection, he remarks that his chest pain was associated with swelling and pain in his knees and ankles, which he associates with his typical gout attacks.  In retrospect, he feels like the chest discomfort as well as knee pain and ankle pain was representative of generalized arthralgias which he does have with his gout attacks.  He states that he did not really correlate the  symptoms together because his gout attacks are fairly infrequent.  However, he did have salmon the day before that episode and he states that seafood does reliably bring about his gout attacks and related arthralgias.      He underwent an exercise nuclear stress test on 10/03/2019 that showed normal perfusion without evidence for ischemia or infarct.  Currently, he states that he feels better and has not had recurrence of the chest pain or generalized arthralgias that he had before.  At baseline, he is fairly physically active, walking for approximately 3-4 days a week for 2-3 miles each time.  He denies any associated chest pain, chest discomfort or abnormal shortness of breath.  No decline in exertional capacity.  He denies any recent episodes of dizziness, lightheadedness, palpitations.  No symptoms consistent with orthopnea, PND or lower extremity swelling.      ASSESSMENT AND PLAN:      In summary, patient Ian Moncada is a pleasant 54-year-old male with a past medical history significant for hyperlipidemia who presents for further evaluation and management of chest pain.  His chest pain is atypical in nature in that it is not exertional, not relieved by rest and this episode that occurred in September lasted for several days with nonspecific ECG changes as well as negative cardiac biomarkers.  He did have a nuclear stress test done on 10/03/2019 that was negative for ischemia.  I do not believe that this episode of chest pain represents angina.  He does have a history of gout and it is possible that this chest pain and arm pain is reflective of generalized arthralgias related to a gout flare.  His 10-year ASCVD risk is 5.8%.  He is already on simvastatin 40 mg daily.      At this time, we will proceed with a screening coronary calcium CT scan for further risk stratification.  His blood pressure remains well controlled.  He does not require any change to his current cardiac regimen at this time.  We will contact  him with the results of the screening coronary calcium CT scan, and if this does show evidence of coronary calcifications, I will switch him to either atorvastatin or rosuvastatin, as well as recommend a daily aspirin.  Otherwise, we will plan to see him back in 1 year for followup.      Thank you for allowing our team to participate in the care of patient Ian Meyer.  Please do not hesitate to call or page me anytime with questions or concerns.      Keshav Ochoa MD  [facility]  Cardiology  Pager:  [phone]  Text Page   2019          D: 2019   T: 2019   MT: yvonne      Name:     IAN MEYER   MRN:      [id number]        Account:      [id number]   :      1965           Service Date: 2019      Document: [id number]           Outpatient Encounter Medications as of 2019   Medication Sig Dispense Refill     acyclovir (ZOVIRAX) 400 MG tablet TAKE 1 TABLET(400 MG) BY MOUTH THREE TIMES DAILY 15 tablet 4     aspirin 81 MG EC tablet Take 81 mg by mouth daily       fish oil-omega-3 fatty acids (OMEGA 3) 1000 MG capsule Take 2 capsules by mouth daily. 120 capsule 3     indomethacin (INDOCIN) 50 MG capsule Take 1 capsule (50 mg) by mouth 3 times daily (with meals) 30 capsule 0     MULTIVITAMIN TABS   OR 1 TABLET DAILY       simvastatin (ZOCOR) 40 MG tablet TAKE 1 TABLET BY MOUTH EVERY NIGHT AT BEDTIME 90 tablet 3     No facility-administered encounter medications on file as of 2019.        Again, thank you for allowing me to participate in the care of your patient.      Sincerely,    Keshav Ochoa MD     [facility]    cc:   Claudio Lake MD  [facility]  [address]  [place] [address]

## 2019-12-09 ENCOUNTER — PRE VISIT (OUTPATIENT)
Dept: PEDIATRICS | Facility: CLINIC | Age: 54
End: 2019-12-09

## 2019-12-09 NOTE — TELEPHONE ENCOUNTER
Pre-Visit Planning     Future Appointments   Date Time Provider Department Center   12/10/2019 11:00 AM Kaykay Delgadillo APRN CNP [facility]     Arrival Time for this Appointment:    Appointment Notes for this encounter:   Data Unavailable    Questionnaires Reviewed/Assigned  No additional questionnaires are needed        Patient preferred phone number: [phone]    Unable to reach. Left voicemail. Advised patient to call clinic back at [phone].

## 2019-12-10 ENCOUNTER — OFFICE VISIT (OUTPATIENT)
Dept: PEDIATRICS | Facility: CLINIC | Age: 54
End: 2019-12-10
Payer: COMMERCIAL

## 2019-12-10 VITALS
SYSTOLIC BLOOD PRESSURE: 134 MMHG | TEMPERATURE: 98.2 F | OXYGEN SATURATION: 100 % | BODY MASS INDEX: 27.76 KG/M2 | HEART RATE: 65 BPM | HEIGHT: 71 IN | RESPIRATION RATE: 18 BRPM | WEIGHT: 198.3 LBS | DIASTOLIC BLOOD PRESSURE: 88 MMHG

## 2019-12-10 DIAGNOSIS — L23.9 ALLERGIC CONTACT DERMATITIS, UNSPECIFIED TRIGGER: Primary | ICD-10-CM

## 2019-12-10 PROCEDURE — 99213 OFFICE O/P EST LOW 20 MIN: CPT | Performed by: NURSE PRACTITIONER

## 2019-12-10 RX ORDER — TRIAMCINOLONE ACETONIDE 1 MG/G
CREAM TOPICAL
Qty: 80 G | Refills: 0 | Status: SHIPPED | OUTPATIENT
Start: 2019-12-10 | End: 2021-02-02

## 2019-12-10 SDOH — HEALTH STABILITY: MENTAL HEALTH: HOW MANY STANDARD DRINKS CONTAINING ALCOHOL DO YOU HAVE ON A TYPICAL DAY?: 1 OR 2

## 2019-12-10 SDOH — HEALTH STABILITY: MENTAL HEALTH: HOW OFTEN DO YOU HAVE A DRINK CONTAINING ALCOHOL?: 4 OR MORE TIMES A WEEK

## 2019-12-10 ASSESSMENT — MIFFLIN-ST. JEOR: SCORE: 1760.57

## 2019-12-10 NOTE — PATIENT INSTRUCTIONS
Avoid hot showers which will dry out the skin  Apply a gentle moisturizer to the skin 2-3 times daily  Apply Kenalog cream three times daily for no longer than 2 weeks  If rash worsens, or have not nearly resolved within 2 weeks please let me know  Ok to continue with Claritin in the morning and 50 mg Benadryl at bedtime

## 2019-12-10 NOTE — PROGRESS NOTES
Subjective     Ian Moncada is a 54 year old male who presents to clinic today for the following health issues:    HPI   Rash      Duration: 3 weeks    Description  Location: both arms   Itching: severe    Intensity:  moderate    Accompanying signs and symptoms: None    History (similar episodes/previous evaluation): None    Precipitating or alleviating factors:  New exposures:  None  Recent travel: no      Therapies tried and outcome: hydrocortisone cream -  Helps for 1 hour, Benadryl/diphenhydramine -  Helped him sleep and Claritin/loratadine -  Helped a little    He developed an itchy rash to bilateral upper extremities 3 weeks ago  No recent travel or change on topical products  No obvious contact with chemicals  Works as   Rash has not spread beyond upper extremities  He has been applying topical hydrocortisone and taking Claritin in the morning and Benadryl at night  Benadryl helps intially but he wakes up a few hours later with intense itching  Denies oral swelling, cough, SOB        Patient Active Problem List   Diagnosis     External hemorrhoids with other complication     Family history of ischemic heart disease     Impaired fasting glucose     Hyperlipidemia LDL goal <130     Hypertriglyceridemia     Melanocytic nevus of trunk     Past Surgical History:   Procedure Laterality Date     TONSILLECTOMY  [facility]       Social History     Tobacco Use     Smoking status: Never Smoker     Smokeless tobacco: Never Used   Substance Use Topics     Alcohol use: Yes     Alcohol/week: 7.0 standard drinks     Types: 7 Standard drinks or equivalent per week     Frequency: 4 or more times a week     Drinks per session: 1 or 2     Family History   Problem Relation Age of Onset     Heart Disease Paternal Grandfather      Neurologic Disorder Father         Guillaine [place]     Heart Disease Mother         quad bipass     Heart Disease Maternal Grandfather      Heart Disease Maternal Uncle      Cancer - colorectal  "Maternal Grandmother      Heart Disease Maternal Aunt          BP Readings from Last 3 Encounters:   12/10/19 134/88   11/01/19 128/88   09/22/19 118/88    Wt Readings from Last 3 Encounters:   12/10/19 89.9 kg (198 lb 4.8 oz)   11/01/19 90 kg (198 lb 8 oz)   01/15/19 89.8 kg (197 lb 14.4 oz)                    Reviewed and updated as needed this visit by Provider         Review of Systems   ROS COMP: Constitutional, HEENT, cardiovascular, pulmonary, gi and gu systems are negative, except as otherwise noted.      Objective    /88 (BP Location: Right arm, Patient Position: Chair, Cuff Size: Adult Regular)   Pulse 65   Temp 98.2  F (36.8  C) (Oral)   Resp 18   Ht 1.81 m (5' 11.25\")   Wt 89.9 kg (198 lb 4.8 oz)   SpO2 100%   BMI 27.46 kg/m    Body mass index is 27.46 kg/m .  Physical Exam   GENERAL: healthy, alert and no distress  RESP: lungs clear to auscultation - no rales, rhonchi or wheezes  CV: regular rate and rhythm, normal S1 S2, no S3 or S4, no murmur, click or rub, no peripheral edema and peripheral pulses strong  SKIN: Erythematous coalescing macular rash bilateral upper extremities, most pronounced distally, spares the hands. Approximately 10 excoriated papules bilateral forearms      Diagnostic Test Results:  Labs reviewed in Epic        Assessment & Plan       ICD-10-CM    1. Allergic contact dermatitis, unspecified trigger L23.9 triamcinolone (KENALOG) 0.1 % external cream        BMI:   Estimated body mass index is 27.46 kg/m  as calculated from the following:    Height as of this encounter: 1.81 m (5' 11.25\").    Weight as of this encounter: 89.9 kg (198 lb 4.8 oz).           Patient Instructions   Avoid hot showers which will dry out the skin  Apply a gentle moisturizer to the skin 2-3 times daily  Apply Kenalog cream three times daily for no longer than 2 weeks  If rash worsens, or have not nearly resolved within 2 weeks please let me know  Ok to continue with Claritin in the morning and " 50 mg Benadryl at bedtime        Return in about 1 month (around 1/10/2020) for Physical Exam.    SILVA [facility]argrove [facility]

## 2020-03-07 DIAGNOSIS — E78.1 HYPERTRIGLYCERIDEMIA: ICD-10-CM

## 2020-03-09 RX ORDER — SIMVASTATIN 40 MG
40 TABLET ORAL AT BEDTIME
Qty: 90 TABLET | Refills: 0 | Status: SHIPPED | OUTPATIENT
Start: 2020-03-09 | End: 2020-06-18

## 2020-03-09 NOTE — TELEPHONE ENCOUNTER
"Medication is being filled for 1 time refill only due to:  Patient needs labs : past due for annual LDL.   90 day redd refill given, per [facility] refill protocol for [facility] sites    Karolina Hunter RN  [facility]        Requested Prescriptions   Pending Prescriptions Disp Refills     simvastatin (ZOCOR) 40 MG tablet [Pharmacy Med Name: SIMVASTATIN 40MG TABLETS] 90 tablet 0     Sig: TAKE 1 TABLET BY MOUTH EVERY NIGHT AT BEDTIME       Statins Protocol Failed - 3/7/2020  3:27 AM        Failed - LDL on file in past 12 months     Recent Labs   Lab Test 01/15/19  0853   LDL 94             Passed - No abnormal creatine kinase in past 12 months     No lab results found.             Passed - Recent (12 mo) or future (30 days) visit within the authorizing provider's specialty     Patient has had an office visit with the authorizing provider or a provider within the authorizing providers department within the previous 12 mos or has a future within next 30 days. See \"Patient Info\" tab in inbasket, or \"Choose Columns\" in Meds & Orders section of the refill encounter.              Passed - Medication is active on med list        Passed - Patient is age 18 or older           Last Written Prescription Date:  12/9/19  Last Fill Quantity: 90,  # refills: 0   Last office visit: 12/10/2019 with prescribing provider:     Future Office Visit:      " Respiratory

## 2020-03-13 DIAGNOSIS — B00.1 RECURRENT COLD SORES: ICD-10-CM

## 2020-03-16 RX ORDER — ACYCLOVIR 400 MG/1
TABLET ORAL
Qty: 15 TABLET | Refills: 4 | Status: SHIPPED | OUTPATIENT
Start: 2020-03-16 | End: 2021-02-02

## 2020-03-22 ENCOUNTER — HEALTH MAINTENANCE LETTER (OUTPATIENT)
Age: 55
End: 2020-03-22

## 2020-04-02 ENCOUNTER — TRANSFERRED RECORDS (OUTPATIENT)
Dept: HEALTH INFORMATION MANAGEMENT | Facility: CLINIC | Age: 55
End: 2020-04-02

## 2020-05-01 ENCOUNTER — E-VISIT (OUTPATIENT)
Dept: PEDIATRICS | Facility: CLINIC | Age: 55
End: 2020-05-01
Payer: COMMERCIAL

## 2020-05-01 DIAGNOSIS — R21 RASH: Primary | ICD-10-CM

## 2020-05-01 PROCEDURE — 99422 OL DIG E/M SVC 11-20 MIN: CPT | Performed by: INTERNAL MEDICINE

## 2020-05-04 ENCOUNTER — MYC MEDICAL ADVICE (OUTPATIENT)
Dept: PEDIATRICS | Facility: CLINIC | Age: 55
End: 2020-05-04

## 2020-05-04 DIAGNOSIS — R21 RASH: Primary | ICD-10-CM

## 2020-05-04 RX ORDER — PREDNISONE 20 MG/1
40 TABLET ORAL DAILY
Qty: 10 TABLET | Refills: 0 | Status: SHIPPED | OUTPATIENT
Start: 2020-05-04 | End: 2020-05-09

## 2020-05-04 NOTE — TELEPHONE ENCOUNTER
Please see [facility] message from patient:    [facility] not received the prescription to prednisone    Will route to Dr. Fink to review/advise.    Aliya CRANDALL RN     no

## 2020-05-07 ENCOUNTER — TRANSFERRED RECORDS (OUTPATIENT)
Dept: HEALTH INFORMATION MANAGEMENT | Facility: CLINIC | Age: 55
End: 2020-05-07

## 2020-06-17 DIAGNOSIS — E78.1 HYPERTRIGLYCERIDEMIA: ICD-10-CM

## 2020-06-17 NOTE — TELEPHONE ENCOUNTER
Routing refill request to provider for review/approval because:  Labs not current:  LDL    Lidia Anne, RN   [facility] -- Triage Nurse

## 2020-06-18 RX ORDER — SIMVASTATIN 40 MG
TABLET ORAL
Qty: 90 TABLET | Refills: 0 | Status: SHIPPED | OUTPATIENT
Start: 2020-06-18 | End: 2020-10-02

## 2020-11-17 ENCOUNTER — OFFICE VISIT (OUTPATIENT)
Dept: PEDIATRICS | Facility: CLINIC | Age: 55
End: 2020-11-17
Payer: COMMERCIAL

## 2020-11-17 ENCOUNTER — MYC MEDICAL ADVICE (OUTPATIENT)
Dept: PEDIATRICS | Facility: CLINIC | Age: 55
End: 2020-11-17

## 2020-11-17 VITALS
SYSTOLIC BLOOD PRESSURE: 132 MMHG | RESPIRATION RATE: 12 BRPM | TEMPERATURE: 98 F | HEART RATE: 74 BPM | DIASTOLIC BLOOD PRESSURE: 90 MMHG | BODY MASS INDEX: 28.11 KG/M2 | OXYGEN SATURATION: 100 % | WEIGHT: 203 LBS

## 2020-11-17 DIAGNOSIS — S16.1XXA STRAIN OF NECK MUSCLE, INITIAL ENCOUNTER: Primary | ICD-10-CM

## 2020-11-17 PROCEDURE — 99213 OFFICE O/P EST LOW 20 MIN: CPT | Performed by: PHYSICIAN ASSISTANT

## 2020-11-17 RX ORDER — CYCLOBENZAPRINE HCL 10 MG
10 TABLET ORAL 3 TIMES DAILY PRN
Qty: 15 TABLET | Refills: 0 | Status: SHIPPED | OUTPATIENT
Start: 2020-11-17 | End: 2021-02-02

## 2020-11-17 NOTE — PROGRESS NOTES
Subjective     Ian Moncada is a 55 year old male who presents to clinic today for the following health issues:    HPI   Back Pain--neck left  Onset/Duration: 2am this morning  Description:   Location of pain: shoulders between shoulder blades  Character of pain: constant - like pulled muscle  Pain radiation: none  New numbness or weakness in arms: none  Intensity: Currently 8/10, At its worst 10+/10  Progression of Symptoms: same  History:   Specific cause: none  Left hand dominant  [place] health insurance  Pain interferes with job: YES  History of back problems: no prior back problems  Any previous MRI or X-rays: None  Sees a specialist for back pain: has been to chiropractor a few times a year   Alleviating factors:   Improved by: NSAIDs  - mild  Precipitating factors:  Worsened by: Standing and getting up from sitting, driving   Therapies tried and outcome: ibuprofen helped some    Soreness in calves bilaterally.     Review of Systems   Constitutional, HEENT, cardiovascular, pulmonary, gi and gu systems are negative, except as otherwise noted.      Objective    BP (!) 132/90 (BP Location: Right arm, Patient Position: Sitting, Cuff Size: Adult Large)   Pulse 74   Temp 98  F (36.7  C) (Tympanic)   Resp 12   Wt 92.1 kg (203 lb)   SpO2 100%   BMI 28.11 kg/m    Body mass index is 28.11 kg/m .  Physical Exam   ORTHO: Cervical Spine Exam: Inspection: normal cervical lordosis  Tender:  left paracervical muscles, left trapezius muscles  Range of Motion:  Limited Rotation laterally  Strength: Full strength of all neck muscles  Special tests:  Reproduction of radicular pattern    No results found for this or any previous visit (from the past 24 hour(s)).        Assessment & Plan   Strain of neck muscle, initial encounter  Begin flexeril, heat, ibuprofen.   - cyclobenzaprine (FLEXERIL) 10 MG tablet; Take 1 tablet (10 mg) by mouth 3 times daily as needed     MIGUEL Ojeda [facility]  [facility]

## 2021-01-15 ENCOUNTER — HEALTH MAINTENANCE LETTER (OUTPATIENT)
Age: 56
End: 2021-01-15

## 2021-02-02 ENCOUNTER — OFFICE VISIT (OUTPATIENT)
Dept: PEDIATRICS | Facility: CLINIC | Age: 56
End: 2021-02-02
Payer: COMMERCIAL

## 2021-02-02 VITALS
OXYGEN SATURATION: 99 % | WEIGHT: 198.8 LBS | TEMPERATURE: 96.5 F | SYSTOLIC BLOOD PRESSURE: 140 MMHG | HEIGHT: 64 IN | RESPIRATION RATE: 16 BRPM | DIASTOLIC BLOOD PRESSURE: 90 MMHG | HEART RATE: 69 BPM | BODY MASS INDEX: 33.94 KG/M2

## 2021-02-02 DIAGNOSIS — R03.0 ELEVATED BLOOD PRESSURE READING WITHOUT DIAGNOSIS OF HYPERTENSION: ICD-10-CM

## 2021-02-02 DIAGNOSIS — B00.1 RECURRENT COLD SORES: ICD-10-CM

## 2021-02-02 DIAGNOSIS — E78.5 HYPERLIPIDEMIA LDL GOAL <130: ICD-10-CM

## 2021-02-02 DIAGNOSIS — M25.562 ACUTE PAIN OF LEFT KNEE: ICD-10-CM

## 2021-02-02 DIAGNOSIS — Z00.00 ROUTINE GENERAL MEDICAL EXAMINATION AT A HEALTH CARE FACILITY: Primary | ICD-10-CM

## 2021-02-02 PROCEDURE — 80061 LIPID PANEL: CPT | Performed by: INTERNAL MEDICINE

## 2021-02-02 PROCEDURE — 99213 OFFICE O/P EST LOW 20 MIN: CPT | Mod: 25 | Performed by: INTERNAL MEDICINE

## 2021-02-02 PROCEDURE — 99396 PREV VISIT EST AGE 40-64: CPT | Performed by: INTERNAL MEDICINE

## 2021-02-02 PROCEDURE — 83721 ASSAY OF BLOOD LIPOPROTEIN: CPT | Mod: 59 | Performed by: INTERNAL MEDICINE

## 2021-02-02 PROCEDURE — 36415 COLL VENOUS BLD VENIPUNCTURE: CPT | Performed by: INTERNAL MEDICINE

## 2021-02-02 PROCEDURE — 80053 COMPREHEN METABOLIC PANEL: CPT | Performed by: INTERNAL MEDICINE

## 2021-02-02 RX ORDER — ACYCLOVIR 400 MG/1
400 TABLET ORAL EVERY 8 HOURS
Qty: 15 TABLET | Refills: 11 | Status: SHIPPED | OUTPATIENT
Start: 2021-02-02 | End: 2021-10-27

## 2021-02-02 ASSESSMENT — MIFFLIN-ST. JEOR: SCORE: 1639.26

## 2021-02-02 ASSESSMENT — ENCOUNTER SYMPTOMS
CHILLS: 0
HEMATOCHEZIA: 0
HEMATURIA: 0
CONSTIPATION: 0
DIARRHEA: 0
ABDOMINAL PAIN: 0
COUGH: 0

## 2021-02-02 NOTE — PATIENT INSTRUCTIONS
We are working hard to begin vaccinating more people against COVID-19. Currently, we are only vaccinating individuals age 75 and older and Phase 1a workers - healthcare workers who are unable to do their job remotely. Vaccine availability is very limited.    If you are 75 or older, or a healthcare worker who is unable to do your job remotely, please log in to EGT using this link to see if we have an open appointment and schedule an appointment.  If there are no appointments left, you will be unable to schedule and need to check back later.  If you are a healthcare worker, you will be asked to provide proof of employment at your appointment. If you cannot, you will be turned away.    Vaccine appointments are being added as they become available. Please check your EGT account frequently for availability. If you have technical difficulty using EGT, call [phone] for assistance.    You can learn more about the [facility]'s phased approach to administering the vaccine, with details on each phase, [email or web address].      Aa vaccine supply increases and we are able to open appointments to more groups, we will share that information on our website [email or web address]. Check this website to stay up to date on COVID-19 vaccination information.     Consider flu shot and shingles vaccine.     Follow up with pharmacy for a free blood pressure recheck in 2 weeks.  If still up, I'd recommend starting a blood pressure med and then coming back in 1 month for a visit with me.     If your knee still bothers you in 7-10 days, we can do an xray.     Otherwise, just ice three times daily for 15 min, and ibuprofen, and a knee sleeve.       Preventive Health Recommendations  Male Ages 50 - 64    Yearly exam:             See your health care provider every year in order to  o   Review health changes.   o   Discuss preventive care.    o   Review  your medicines if your doctor has prescribed any.     Have a cholesterol test every 5 years, or more frequently if you are at risk for high cholesterol/heart disease.     Have a diabetes test (fasting glucose) every three years. If you are at risk for diabetes, you should have this test more often.     Have a colonoscopy at age 50, or have a yearly FIT test (stool test). These exams will check for colon cancer.      Talk with your health care provider about whether or not a prostate cancer screening test (PSA) is right for you.    You should be tested each year for STDs (sexually transmitted diseases), if you re at risk.     Shots: Get a flu shot each year. Get a tetanus shot every 10 years.     Nutrition:    Eat at least 5 servings of fruits and vegetables daily.     Eat whole-grain bread, whole-wheat pasta and brown rice instead of white grains and rice.     Get adequate Calcium and Vitamin D.     Lifestyle    Exercise for at least 150 minutes a week (30 minutes a day, 5 days a week). This will help you control your weight and prevent disease.     Limit alcohol to one drink per day.     No smoking.     Wear sunscreen to prevent skin cancer.     See your dentist every six months for an exam and cleaning.     See your eye doctor every 1 to 2 years.

## 2021-02-02 NOTE — PROGRESS NOTES
SUBJECTIVE:   CC: Ian Moncada is an 55 year old male who presents for preventative health visit.       Patient has been advised of split billing requirements and indicates understanding: Yes  Healthy Habits:     Getting at least 3 servings of Calcium per day:  Yes    Bi-annual eye exam:  Yes    Dental care twice a year:  NO    Sleep apnea or symptoms of sleep apnea:  None    Diet:  Regular (no restrictions)    Frequency of exercise:  None    Taking medications regularly:  Yes    Medication side effects:  Not applicable    PHQ-2 Total Score: 0    Additional concerns today:  Yes        Today's PHQ-2 Score:   PHQ-2 ( 1999 Pfizer) 2/2/2021   Q1: Little interest or pleasure in doing things 0   Q2: Feeling down, depressed or hopeless 0   PHQ-2 Score 0   Q1: Little interest or pleasure in doing things Not at all   Q2: Feeling down, depressed or hopeless Not at all   PHQ-2 Score 0       Abuse: Current or Past(Physical, Sexual or Emotional)- No  Do you feel safe in your environment? Yes    Have you ever done Advance Care Planning? (For example, a Health Directive, POLST, or a discussion with a medical provider or your loved ones about your wishes): No, advance care planning information given to patient to review.  Patient declined advance care planning discussion at this time.    Social History     Tobacco Use     Smoking status: Never Smoker     Smokeless tobacco: Never Used   Substance Use Topics     Alcohol use: Yes     Alcohol/week: 7.0 standard drinks     Types: 7 Standard drinks or equivalent per week     Frequency: 4 or more times a week     Drinks per session: 1 or 2     Comment: pn the weekends     If you drink alcohol do you typically have >3 drinks per day or >7 drinks per week? No    Alcohol Use 2/2/2021   Prescreen: >3 drinks/day or >7 drinks/week? No   Prescreen: >3 drinks/day or >7 drinks/week? -       Last PSA: No results found for: PSA    Exercise:  Walking.  Has to stretch daily due to shingles.    Sleeping  "a problem due to knee and back pain.    Knee \"just popped\" a ffew days ago, and has pain on top of kneecap.  No trauma.  No history of similar pain.  Icing helps.      Reviewed orders with patient. Reviewed health maintenance and updated orders accordingly - Yes  Lab work is in process  Labs reviewed in EPIC  BP Readings from Last 3 Encounters:   02/02/21 (!) 140/90   11/17/20 (!) 132/90   12/10/19 134/88    Wt Readings from Last 3 Encounters:   02/02/21 90.2 kg (198 lb 12.8 oz)   11/17/20 92.1 kg (203 lb)   12/10/19 89.9 kg (198 lb 4.8 oz)                  Patient Active Problem List   Diagnosis     External hemorrhoids with other complication     Family history of ischemic heart disease     Impaired fasting glucose     Hyperlipidemia LDL goal <130     Hypertriglyceridemia     Melanocytic nevus of trunk     Past Surgical History:   Procedure Laterality Date     TONSILLECTOMY  1970s       Social History     Tobacco Use     Smoking status: Never Smoker     Smokeless tobacco: Never Used   Substance Use Topics     Alcohol use: Yes     Alcohol/week: 7.0 standard drinks     Types: 7 Standard drinks or equivalent per week     Frequency: 4 or more times a week     Drinks per session: 1 or 2     Comment: pn the weekends     Family History   Problem Relation Age of Onset     Heart Disease Paternal Grandfather      Neurologic Disorder Father         Guillaine [place]     Heart Disease Mother         quad bipass     Heart Disease Maternal Grandfather      Heart Disease Maternal Uncle      Cancer - colorectal Maternal Grandmother      Heart Disease Maternal Aunt          Current Outpatient Medications   Medication Sig Dispense Refill     acyclovir (ZOVIRAX) 400 MG tablet Take 1 tablet (400 mg) by mouth every 8 hours for 3 days 15 tablet 11     aspirin 81 MG EC tablet Take 81 mg by mouth daily       fish oil-omega-3 fatty acids (OMEGA 3) 1000 MG capsule Take 2 capsules by mouth daily. 120 capsule 3     MULTIVITAMIN TABS   OR 1 " "TABLET DAILY       simvastatin (ZOCOR) 40 MG tablet Take 1 tablet (40 mg) by mouth At Bedtime 90 tablet 3     Allergies   Allergen Reactions     Walnuts [Nuts]        Reviewed and updated as needed this visit by clinical staff  Tobacco  Allergies    Med Hx  Surg Hx  Fam Hx  Soc Hx        Reviewed and updated as needed this visit by Provider                  Past Medical History:   Diagnosis Date     External hemorrhoids with other complication 7/25/2008    Banding 2007.     Hyperlipidemia LDL goal <130 8/12/2010     Hypertriglyceridemia 8/19/2010     Impaired fasting glucose 12/7/2009      Past Surgical History:   Procedure Laterality Date     TONSILLECTOMY  1970s       Review of Systems   Constitutional: Negative for chills.   HENT: Negative for congestion.    Respiratory: Negative for cough.    Cardiovascular: Negative for chest pain.   Gastrointestinal: Negative for abdominal pain, constipation, diarrhea and hematochezia.   Genitourinary: Negative for hematuria.     CONSTITUTIONAL: NEGATIVE for fever, chills, change in weight  INTEGUMENTARY/SKIN: NEGATIVE for worrisome rashes, moles or lesions  EYES: NEGATIVE for vision changes or irritation  ENT: NEGATIVE for ear, mouth and throat problems  RESP: NEGATIVE for significant cough or SOB  CV: NEGATIVE for chest pain, palpitations or peripheral edema  GI: NEGATIVE for nausea, abdominal pain, heartburn, or change in bowel habits   male: negative for dysuria, hematuria, decreased urinary stream, erectile dysfunction, urethral discharge  MUSCULOSKELETAL: NEGATIVE for significant arthralgias or myalgia  NEURO: NEGATIVE for weakness, dizziness or paresthesias  PSYCHIATRIC: NEGATIVE for changes in mood or affect    OBJECTIVE:   BP (!) 140/90 (BP Location: Right arm, Patient Position: Sitting, Cuff Size: Adult Large)   Pulse 69   Temp 96.5  F (35.8  C) (Tympanic)   Resp 16   Ht 1.62 m (5' 3.78\")   Wt 90.2 kg (198 lb 12.8 oz)   SpO2 99%   BMI 34.36 kg/m  " "    Physical Exam  GENERAL: healthy, alert and no distress  EYES: Eyes grossly normal to inspection, PERRL and conjunctivae and sclerae normal  HENT: ear canals and TM's normal, nose and mouth without ulcers or lesions  NECK: no adenopathy, no asymmetry, masses, or scars and thyroid normal to palpation  RESP: lungs clear to auscultation - no rales, rhonchi or wheezes  CV: regular rate and rhythm, normal S1 S2, no S3 or S4, no murmur, click or rub, no peripheral edema and peripheral pulses strong  ABDOMEN: soft, nontender, no hepatosplenomegaly, no masses and bowel sounds normal  MS: pain on left patella, medially.   SKIN: no suspicious lesions or rashes  NEURO: Normal strength and tone, mentation intact and speech normal  PSYCH: mentation appears normal, affect normal/bright    Diagnostic Test Results:  Labs reviewed in Epic    ASSESSMENT/PLAN:   1. Routine general medical examination at a health care facility  Discussed diet, exercise, testicular self exam, blood pressure, cholesterol, and need for cancer surveillance at appropriate ages.   - Lipid panel reflex to direct LDL Fasting  - Comprehensive metabolic panel (BMP + Alb, Alk Phos, ALT, AST, Total. Bili, TP)    2. Recurrent cold sores  Refilled for 1 year.   - acyclovir (ZOVIRAX) 400 MG tablet; Take 1 tablet (400 mg) by mouth every 8 hours for 3 days  Dispense: 15 tablet; Refill: 11    3. Acute pain of left knee  Patient refused xray.  Uncertain mechanism of injury, but has pain along front side of patella, with a small \"step-off\" area.  Ibuprofen, ice, and compression for now.     4. Elevated blood pressure reading without diagnosis of hypertension  Not at goal.  Follow-up with pharmacy in 1 month; would starte ACE inhibitor and follow up 1 month later if needed.     5. Hyperlipidemia LDL goal <130  Continue statin.  Labs today.       Patient has been advised of split billing requirements and indicates understanding: Yes  COUNSELING:   Reviewed preventive " "health counseling, as reflected in patient instructions       Regular exercise       Healthy diet/nutrition       Vision screening       Alcohol Use        Colon cancer screening       Prostate cancer screening    Estimated body mass index is 34.36 kg/m  as calculated from the following:    Height as of this encounter: 1.62 m (5' 3.78\").    Weight as of this encounter: 90.2 kg (198 lb 12.8 oz).     Weight management plan: Patient was referred to their PCP to discuss a diet and exercise plan.    He reports that he has never smoked. He has never used smokeless tobacco.      Counseling Resources:  ATP IV Guidelines  Pooled Cohorts Equation Calculator  FRAX Risk Assessment  ICSI Preventive Guidelines  Dietary Guidelines for Americans, 2010  USDA's MyPlate  ASA Prophylaxis  Lung CA Screening    Alfredo Fink MD  [facility]  "

## 2021-02-03 LAB
ALBUMIN SERPL-MCNC: 4.1 G/DL (ref 3.4–5)
ALP SERPL-CCNC: 69 U/L (ref 40–150)
ALT SERPL W P-5'-P-CCNC: 83 U/L (ref 0–70)
ANION GAP SERPL CALCULATED.3IONS-SCNC: 2 MMOL/L (ref 3–14)
AST SERPL W P-5'-P-CCNC: 56 U/L (ref 0–45)
BILIRUB SERPL-MCNC: 1.1 MG/DL (ref 0.2–1.3)
BUN SERPL-MCNC: 14 MG/DL (ref 7–30)
CALCIUM SERPL-MCNC: 9.5 MG/DL (ref 8.5–10.1)
CHLORIDE SERPL-SCNC: 106 MMOL/L (ref 94–109)
CHOLEST SERPL-MCNC: 232 MG/DL
CO2 SERPL-SCNC: 28 MMOL/L (ref 20–32)
CREAT SERPL-MCNC: 1.04 MG/DL (ref 0.66–1.25)
GFR SERPL CREATININE-BSD FRML MDRD: 79 ML/MIN/{1.73_M2}
GLUCOSE SERPL-MCNC: 88 MG/DL (ref 70–99)
HDLC SERPL-MCNC: 53 MG/DL
LDLC SERPL CALC-MCNC: ABNORMAL MG/DL
LDLC SERPL DIRECT ASSAY-MCNC: 125 MG/DL
NONHDLC SERPL-MCNC: 179 MG/DL
POTASSIUM SERPL-SCNC: 4.2 MMOL/L (ref 3.4–5.3)
PROT SERPL-MCNC: 7.7 G/DL (ref 6.8–8.8)
SODIUM SERPL-SCNC: 136 MMOL/L (ref 133–144)
TRIGL SERPL-MCNC: 423 MG/DL

## 2021-06-21 ENCOUNTER — E-VISIT (OUTPATIENT)
Dept: PEDIATRICS | Facility: CLINIC | Age: 56
End: 2021-06-21
Payer: COMMERCIAL

## 2021-06-21 DIAGNOSIS — R55 VASOVAGAL SYNCOPE: Primary | ICD-10-CM

## 2021-06-21 PROCEDURE — 99421 OL DIG E/M SVC 5-10 MIN: CPT | Performed by: INTERNAL MEDICINE

## 2021-07-30 NOTE — PATIENT INSTRUCTIONS
"Lab work downstairs today.  Directions:  As you walk through the first floor, you'll see (on the right) first the pharmacy, then some bathrooms, then the \"Lab and Imaging\" area. Give them your name at the window there and wait for them to call you.     Colonoscopy: you can call [phone] to set up your colonoscopy.  If they have not heard from you, they may call you directly.    No change in meds.    Goal weight: < 190.    Conisder allopurinol for frequent gout attacks, especially if uric acid level is high.       Preventive Health Recommendations  Male Ages 50   64    Yearly exam:             See your health care provider every year in order to  o   Review health changes.   o   Discuss preventive care.    o   Review your medicines if your doctor has prescribed any.     Have a cholesterol test every 5 years, or more frequently if you are at risk for high cholesterol/heart disease.     Have a diabetes test (fasting glucose) every three years. If you are at risk for diabetes, you should have this test more often.     Have a colonoscopy at age 50, or have a yearly FIT test (stool test). These exams will check for colon cancer.      Talk with your health care provider about whether or not a prostate cancer screening test (PSA) is right for you.    You should be tested each year for STDs (sexually transmitted diseases), if you re at risk.     Shots: Get a flu shot each year. Get a tetanus shot every 10 years.     Nutrition:    Eat at least 5 servings of fruits and vegetables daily.     Eat whole-grain bread, whole-wheat pasta and brown rice instead of white grains and rice.     Talk to your provider about Calcium and Vitamin D.     Lifestyle    Exercise for at least 150 minutes a week (30 minutes a day, 5 days a week). This will help you control your weight and prevent disease.     Limit alcohol to one drink per day.     No smoking.     Wear sunscreen to prevent skin cancer.     See your dentist every six months for " Refill ropinirole and increase dose to 0.5mg. Doing better. Sleep study next month.    an exam and cleaning.     See your eye doctor every 1 to 2 years.

## 2021-09-05 ENCOUNTER — HEALTH MAINTENANCE LETTER (OUTPATIENT)
Age: 56
End: 2021-09-05

## 2021-10-13 ENCOUNTER — E-VISIT (OUTPATIENT)
Dept: PEDIATRICS | Facility: CLINIC | Age: 56
End: 2021-10-13
Payer: COMMERCIAL

## 2021-10-13 DIAGNOSIS — G40.909 RECURRENT SEIZURES (H): Primary | ICD-10-CM

## 2021-10-13 PROCEDURE — 99207 PR NO BILLABLE SERVICE THIS VISIT: CPT | Performed by: INTERNAL MEDICINE

## 2021-10-15 NOTE — TELEPHONE ENCOUNTER
The pt called back and he was offered appointments for the week of 10/18-10/22 but declined because he will be traveling out of town.   Lubna Gutierres on 10/15/2021 at 2:46 PM

## 2021-10-27 ENCOUNTER — OFFICE VISIT (OUTPATIENT)
Dept: PEDIATRICS | Facility: CLINIC | Age: 56
End: 2021-10-27
Payer: COMMERCIAL

## 2021-10-27 ENCOUNTER — TELEPHONE (OUTPATIENT)
Dept: NEUROLOGY | Facility: CLINIC | Age: 56
End: 2021-10-27

## 2021-10-27 ENCOUNTER — HOSPITAL ENCOUNTER (OUTPATIENT)
Dept: MRI IMAGING | Facility: CLINIC | Age: 56
Discharge: HOME | End: 2021-10-27
Attending: NURSE PRACTITIONER | Admitting: NURSE PRACTITIONER
Payer: COMMERCIAL

## 2021-10-27 VITALS
SYSTOLIC BLOOD PRESSURE: 138 MMHG | TEMPERATURE: 98.1 F | DIASTOLIC BLOOD PRESSURE: 95 MMHG | OXYGEN SATURATION: 99 % | BODY MASS INDEX: 33.7 KG/M2 | WEIGHT: 195 LBS | HEART RATE: 72 BPM

## 2021-10-27 VITALS
TEMPERATURE: 97.8 F | OXYGEN SATURATION: 97 % | HEART RATE: 72 BPM | DIASTOLIC BLOOD PRESSURE: 80 MMHG | WEIGHT: 195.4 LBS | SYSTOLIC BLOOD PRESSURE: 130 MMHG | BODY MASS INDEX: 33.77 KG/M2

## 2021-10-27 DIAGNOSIS — R79.89 ELEVATED LFTS: ICD-10-CM

## 2021-10-27 DIAGNOSIS — R03.0 ELEVATED BLOOD PRESSURE READING WITHOUT DIAGNOSIS OF HYPERTENSION: Primary | ICD-10-CM

## 2021-10-27 DIAGNOSIS — R56.9 SEIZURE-LIKE ACTIVITY (H): ICD-10-CM

## 2021-10-27 DIAGNOSIS — R56.9 SEIZURE-LIKE ACTIVITY (H): Primary | ICD-10-CM

## 2021-10-27 LAB
ALBUMIN SERPL-MCNC: 3.8 G/DL (ref 3.4–5)
ALP SERPL-CCNC: 55 U/L (ref 40–150)
ALT SERPL W P-5'-P-CCNC: 72 U/L (ref 0–70)
ANION GAP SERPL CALCULATED.3IONS-SCNC: 5 MMOL/L (ref 3–14)
AST SERPL W P-5'-P-CCNC: 48 U/L (ref 0–45)
BASOPHILS # BLD AUTO: 0 10E3/UL (ref 0–0.2)
BASOPHILS NFR BLD AUTO: 1 %
BILIRUB SERPL-MCNC: 0.8 MG/DL (ref 0.2–1.3)
BUN SERPL-MCNC: 11 MG/DL (ref 7–30)
CALCIUM SERPL-MCNC: 8.6 MG/DL (ref 8.5–10.1)
CHLORIDE BLD-SCNC: 106 MMOL/L (ref 94–109)
CO2 SERPL-SCNC: 28 MMOL/L (ref 20–32)
CREAT SERPL-MCNC: 0.98 MG/DL (ref 0.66–1.25)
CRP SERPL-MCNC: <2.9 MG/L (ref 0–8)
EOSINOPHIL # BLD AUTO: 0.1 10E3/UL (ref 0–0.7)
EOSINOPHIL NFR BLD AUTO: 3 %
ERYTHROCYTE [DISTWIDTH] IN BLOOD BY AUTOMATED COUNT: 11.6 % (ref 10–15)
GFR SERPL CREATININE-BSD FRML MDRD: 85 ML/MIN/1.73M2
GLUCOSE BLD-MCNC: 77 MG/DL (ref 70–99)
HCT VFR BLD AUTO: 41.7 % (ref 40–53)
HGB BLD-MCNC: 13.8 G/DL (ref 13.3–17.7)
IMM GRANULOCYTES # BLD: 0 10E3/UL
IMM GRANULOCYTES NFR BLD: 0 %
LYMPHOCYTES # BLD AUTO: 1.3 10E3/UL (ref 0.8–5.3)
LYMPHOCYTES NFR BLD AUTO: 29 %
MCH RBC QN AUTO: 33.9 PG (ref 26.5–33)
MCHC RBC AUTO-ENTMCNC: 33.1 G/DL (ref 31.5–36.5)
MCV RBC AUTO: 103 FL (ref 78–100)
MONOCYTES # BLD AUTO: 0.5 10E3/UL (ref 0–1.3)
MONOCYTES NFR BLD AUTO: 11 %
NEUTROPHILS # BLD AUTO: 2.4 10E3/UL (ref 1.6–8.3)
NEUTROPHILS NFR BLD AUTO: 56 %
NRBC # BLD AUTO: 0 10E3/UL
NRBC BLD AUTO-RTO: 0 /100
PLATELET # BLD AUTO: 227 10E3/UL (ref 150–450)
POTASSIUM BLD-SCNC: 3.9 MMOL/L (ref 3.4–5.3)
PROT SERPL-MCNC: 7.6 G/DL (ref 6.8–8.8)
RBC # BLD AUTO: 4.07 10E6/UL (ref 4.4–5.9)
SODIUM SERPL-SCNC: 139 MMOL/L (ref 133–144)
WBC # BLD AUTO: 4.4 10E3/UL (ref 4–11)

## 2021-10-27 PROCEDURE — 99207 REFERRAL TO ACUTE AND DIAGNOSTIC SERVICES: CPT | Performed by: STUDENT IN AN ORGANIZED HEALTH CARE EDUCATION/TRAINING PROGRAM

## 2021-10-27 PROCEDURE — 85025 COMPLETE CBC W/AUTO DIFF WBC: CPT | Performed by: NURSE PRACTITIONER

## 2021-10-27 PROCEDURE — A9585 GADOBUTROL INJECTION: HCPCS | Performed by: NURSE PRACTITIONER

## 2021-10-27 PROCEDURE — 70553 MRI BRAIN STEM W/O & W/DYE: CPT

## 2021-10-27 PROCEDURE — 80053 COMPREHEN METABOLIC PANEL: CPT | Performed by: NURSE PRACTITIONER

## 2021-10-27 PROCEDURE — 255N000002 HC RX 255 OP 636: Performed by: NURSE PRACTITIONER

## 2021-10-27 PROCEDURE — 86140 C-REACTIVE PROTEIN: CPT | Performed by: NURSE PRACTITIONER

## 2021-10-27 PROCEDURE — 99214 OFFICE O/P EST MOD 30 MIN: CPT | Performed by: NURSE PRACTITIONER

## 2021-10-27 PROCEDURE — 36415 COLL VENOUS BLD VENIPUNCTURE: CPT | Performed by: NURSE PRACTITIONER

## 2021-10-27 RX ORDER — GADOBUTROL 604.72 MG/ML
8 INJECTION INTRAVENOUS ONCE
Status: COMPLETED | OUTPATIENT
Start: 2021-10-27 | End: 2021-10-27

## 2021-10-27 RX ADMIN — GADOBUTROL 8 ML: 604.72 INJECTION INTRAVENOUS at 13:14

## 2021-10-27 NOTE — PATIENT INSTRUCTIONS
Results for orders placed or performed during the hospital encounter of 10/27/21   MR Brain w/o & w Contrast     Status: None    Narrative    MRI BRAIN WITHOUT AND WITH CONTRAST  10/27/2021 1:29 PM     HISTORY: New seizure like activity x 3 in 4 months.      TECHNIQUE:  Multiplanar, multisequence MRI of the brain without and  with 8 mL Gadavist.     COMPARISON: None.     FINDINGS: There is no evidence of acute infarct, hemorrhage, mass, or  herniation. A few subcortical white matter T2 hyperintensities which  are nonspecific, but likely related to chronic microvascular ischemic  disease. Ventricular size within normal limits without hydrocephalus.     There is no abnormal intracranial enhancement.     The facial structures appear normal. The major arterial T2 flow voids  at the base of the brain appear patent.       Impression    IMPRESSION:    1. No evidence of acute infarct, mass, hemorrhage, or herniation.  2. A few nonspecific white matter lesions most likely due to chronic  microvascular ischemic disease. These are not unexpected in a patient  of this age.       MARIA C WAGONER MD         SYSTEM ID:  [email or web address]   Results for orders placed or performed in visit on 10/27/21   Comprehensive metabolic panel     Status: Abnormal   Result Value Ref Range    Sodium 139 133 - 144 mmol/L    Potassium 3.9 3.4 - 5.3 mmol/L    Chloride 106 94 - 109 mmol/L    Carbon Dioxide (CO2) 28 20 - 32 mmol/L    Anion Gap 5 3 - 14 mmol/L    Urea Nitrogen 11 7 - 30 mg/dL    Creatinine 0.98 0.66 - 1.25 mg/dL    Calcium 8.6 8.5 - 10.1 mg/dL    Glucose 77 70 - 99 mg/dL    Alkaline Phosphatase 55 40 - 150 U/L    AST 48 (H) 0 - 45 U/L    ALT 72 (H) 0 - 70 U/L    Protein Total 7.6 6.8 - 8.8 g/dL    Albumin 3.8 3.4 - 5.0 g/dL    Bilirubin Total 0.8 0.2 - 1.3 mg/dL    GFR Estimate 85 >60 mL/min/1.73m2   CRP inflammation     Status: Normal   Result Value Ref Range    CRP Inflammation <2.9 0.0 - 8.0 mg/L   CBC with platelets and differential      Status: Abnormal   Result Value Ref Range    WBC Count 4.4 4.0 - 11.0 10e3/uL    RBC Count 4.07 (L) 4.40 - 5.90 10e6/uL    Hemoglobin 13.8 13.3 - 17.7 g/dL    Hematocrit 41.7 40.0 - 53.0 %     (H) 78 - 100 fL    MCH 33.9 (H) 26.5 - 33.0 pg    MCHC 33.1 31.5 - 36.5 g/dL    RDW 11.6 10.0 - 15.0 %    Platelet Count 227 150 - 450 10e3/uL    % Neutrophils 56 %    % Lymphocytes 29 %    % Monocytes 11 %    % Eosinophils 3 %    % Basophils 1 %    % Immature Granulocytes 0 %    NRBCs per 100 WBC 0 <1 /100    Absolute Neutrophils 2.4 1.6 - 8.3 10e3/uL    Absolute Lymphocytes 1.3 0.8 - 5.3 10e3/uL    Absolute Monocytes 0.5 0.0 - 1.3 10e3/uL    Absolute Eosinophils 0.1 0.0 - 0.7 10e3/uL    Absolute Basophils 0.0 0.0 - 0.2 10e3/uL    Absolute Immature Granulocytes 0.0 <=0.0 10e3/uL    Absolute NRBCs 0.0 10e3/uL   CBC with platelets differential     Status: Abnormal    Narrative    The following orders were created for panel order CBC with platelets differential.  Procedure                               Abnormality         Status                     ---------                               -----------         ------                     CBC with platelets and d...[[id number]]  Abnormal            Final result                 Please view results for these tests on the individual orders.     Your liver enzymes are elevated, I recommend you avoid alcohol and any medication metabolized in the liver (common OTC meds include tylenol, benadryl, tylenol PM etc).    Please follow up with your PCP for a recheck.      The head MRI did not show any mass, bleed or infarct.  There is nothing to explain why you had these episodes.

## 2021-10-27 NOTE — TELEPHONE ENCOUNTER
[facility] Call Center    Phone Message    May a detailed message be left on voicemail: yes     Reason for Call: Appointment Intake    Referring Provider Name:Shira Castillo MD in [facility]  Diagnosis and/or Symptoms: Seizure-like activity     Please contact the patient to schedule.    Action Taken: Other: [facility]    Travel Screening: Not Applicable

## 2021-10-27 NOTE — Clinical Note
No mass, bleed or infarct on the MRI, LFT sl elevated.  Asked him to f/u with PCP for labs  or neuro AND if happens again to go to ER Pt. unable to verbalize goals due to AMS

## 2021-10-27 NOTE — PROGRESS NOTES
Assessment & Plan     Seizure-like activity (H)    - Referral to Acute and Diagnostic Services (Day of diagnostic / First order acute)  - CBC with platelets differential  - Comprehensive metabolic panel  - CRP inflammation  - MR Brain w/o & w Contrast; Future  - IV access  - sodium chloride (PF) 0.9% PF flush 3 mL    Elevated blood pressure reading without diagnosis of hypertension      Elevated LFTs    Patient Instructions     Results for orders placed or performed during the hospital encounter of 10/27/21   MR Brain w/o & w Contrast     Status: None    Narrative    MRI BRAIN WITHOUT AND WITH CONTRAST  10/27/2021 1:29 PM     HISTORY: New seizure like activity x 3 in 4 months.      TECHNIQUE:  Multiplanar, multisequence MRI of the brain without and  with 8 mL Gadavist.     COMPARISON: None.     FINDINGS: There is no evidence of acute infarct, hemorrhage, mass, or  herniation. A few subcortical white matter T2 hyperintensities which  are nonspecific, but likely related to chronic microvascular ischemic  disease. Ventricular size within normal limits without hydrocephalus.     There is no abnormal intracranial enhancement.     The facial structures appear normal. The major arterial T2 flow voids  at the base of the brain appear patent.       Impression    IMPRESSION:    1. No evidence of acute infarct, mass, hemorrhage, or herniation.  2. A few nonspecific white matter lesions most likely due to chronic  microvascular ischemic disease. These are not unexpected in a patient  of this age.       MARIA C WAGONER MD         SYSTEM ID:  [email or web address]   Results for orders placed or performed in visit on 10/27/21   Comprehensive metabolic panel     Status: Abnormal   Result Value Ref Range    Sodium 139 133 - 144 mmol/L    Potassium 3.9 3.4 - 5.3 mmol/L    Chloride 106 94 - 109 mmol/L    Carbon Dioxide (CO2) 28 20 - 32 mmol/L    Anion Gap 5 3 - 14 mmol/L    Urea Nitrogen 11 7 - 30 mg/dL    Creatinine 0.98 0.66 - 1.25 mg/dL     Calcium 8.6 8.5 - 10.1 mg/dL    Glucose 77 70 - 99 mg/dL    Alkaline Phosphatase 55 40 - 150 U/L    AST 48 (H) 0 - 45 U/L    ALT 72 (H) 0 - 70 U/L    Protein Total 7.6 6.8 - 8.8 g/dL    Albumin 3.8 3.4 - 5.0 g/dL    Bilirubin Total 0.8 0.2 - 1.3 mg/dL    GFR Estimate 85 >60 mL/min/1.73m2   CRP inflammation     Status: Normal   Result Value Ref Range    CRP Inflammation <2.9 0.0 - 8.0 mg/L   CBC with platelets and differential     Status: Abnormal   Result Value Ref Range    WBC Count 4.4 4.0 - 11.0 10e3/uL    RBC Count 4.07 (L) 4.40 - 5.90 10e6/uL    Hemoglobin 13.8 13.3 - 17.7 g/dL    Hematocrit 41.7 40.0 - 53.0 %     (H) 78 - 100 fL    MCH 33.9 (H) 26.5 - 33.0 pg    MCHC 33.1 31.5 - 36.5 g/dL    RDW 11.6 10.0 - 15.0 %    Platelet Count 227 150 - 450 10e3/uL    % Neutrophils 56 %    % Lymphocytes 29 %    % Monocytes 11 %    % Eosinophils 3 %    % Basophils 1 %    % Immature Granulocytes 0 %    NRBCs per 100 WBC 0 <1 /100    Absolute Neutrophils 2.4 1.6 - 8.3 10e3/uL    Absolute Lymphocytes 1.3 0.8 - 5.3 10e3/uL    Absolute Monocytes 0.5 0.0 - 1.3 10e3/uL    Absolute Eosinophils 0.1 0.0 - 0.7 10e3/uL    Absolute Basophils 0.0 0.0 - 0.2 10e3/uL    Absolute Immature Granulocytes 0.0 <=0.0 10e3/uL    Absolute NRBCs 0.0 10e3/uL   CBC with platelets differential     Status: Abnormal    Narrative    The following orders were created for panel order CBC with platelets differential.  Procedure                               Abnormality         Status                     ---------                               -----------         ------                     CBC with platelets and d...[[id number]]  Abnormal            Final result                 Please view results for these tests on the individual orders.     Your liver enzymes are elevated, I recommend you avoid alcohol and any medication metabolized in the liver (common OTC meds include tylenol, benadryl, tylenol PM etc).    Please follow up with your PCP for a  "recheck.      The head MRI did not show any mass, bleed or infarct.  There is nothing to explain why you had these episodes.                        Return in about 1 week (around 11/3/2021), or if symptoms worsen or fail to improve, for Follow up, with PCP.    SILVA Ramirez CNP  [facility]osa is a 56 year old who presents  urgently to [facility]S by referral from Dr Lugo to rule out mass or bleed for the following health issues     HPI     Concern - \"3 episodes of what sounds like seizure at home over last four months - not witnessed, but with tongue biting during last episodes. Last one was ~2.5 weeks ago.\"  No recent head trauma. All in the morning between 10-11am while working from home.  Onset: As above  Description: as above  Intensity: moderate  Progression of Symptoms:  same  Accompanying Signs & Symptoms: once comes to: confused, hurt ankle \"pretty bad, I think I kicked the heater\"  Previous history of similar problem: \"Nothing ever, not as a kid either\"  Precipitating factors:        Worsened by: ? Stress  Alleviating factors:        Improved by: N/A  Therapies tried and outcome: N/A. Does usually drink a glass of wine with dinner but has not done so recently.     Currently no visual change, dizziness or lightheadedness  Reports tinitus worsening since the first episode.    Denies any incontinence at the time.  \"just woke up on the floor\"    Review of Systems   Constitutional, HEENT, cardiovascular, pulmonary, GI, , musculoskeletal, neuro, skin, endocrine and psych systems are negative, except as otherwise noted.      Objective    BP (!) 138/95 (BP Location: Left arm, Patient Position: Chair, Cuff Size: Adult Large)   Pulse 72   Temp 98.1  F (36.7  C) (Oral)   Wt 88.5 kg (195 lb)   SpO2 99%   BMI 33.70 kg/m    Body mass index is 33.7 kg/m .  Physical Exam   GENERAL: healthy, alert and no distress  EYES: Eyes grossly normal to inspection, PERRL and " conjunctivae and sclerae normal  HENT: ear canals and TM's normal, nose and mouth without ulcers or lesions  NECK: no adenopathy, no asymmetry, masses, or scars and thyroid normal to palpation  RESP: lungs clear to auscultation - no rales, rhonchi or wheezes  CV: regular rate and rhythm, normal S1 S2, no S3 or S4, no murmur, click or rub, no peripheral edema and peripheral pulses strong  MS: no gross musculoskeletal defects noted, no edema  SKIN: no suspicious lesions or rashes  NEURO: Normal strength and tone, sensory exam grossly normal, mentation intact and cranial nerves 2-12 intact  PSYCH: mentation appears normal, affect normal/bright    Results for orders placed or performed during the hospital encounter of 10/27/21   MR Brain w/o & w Contrast     Status: None    Narrative    MRI BRAIN WITHOUT AND WITH CONTRAST  10/27/2021 1:29 PM     HISTORY: New seizure like activity x 3 in 4 months.      TECHNIQUE:  Multiplanar, multisequence MRI of the brain without and  with 8 mL Gadavist.     COMPARISON: None.     FINDINGS: There is no evidence of acute infarct, hemorrhage, mass, or  herniation. A few subcortical white matter T2 hyperintensities which  are nonspecific, but likely related to chronic microvascular ischemic  disease. Ventricular size within normal limits without hydrocephalus.     There is no abnormal intracranial enhancement.     The facial structures appear normal. The major arterial T2 flow voids  at the base of the brain appear patent.       Impression    IMPRESSION:    1. No evidence of acute infarct, mass, hemorrhage, or herniation.  2. A few nonspecific white matter lesions most likely due to chronic  microvascular ischemic disease. These are not unexpected in a patient  of this age.       MARIA C WAGONER MD         SYSTEM ID:  [email or web address]   Results for orders placed or performed in visit on 10/27/21   Comprehensive metabolic panel     Status: Abnormal   Result Value Ref Range    Sodium 139 133 - 144  mmol/L    Potassium 3.9 3.4 - 5.3 mmol/L    Chloride 106 94 - 109 mmol/L    Carbon Dioxide (CO2) 28 20 - 32 mmol/L    Anion Gap 5 3 - 14 mmol/L    Urea Nitrogen 11 7 - 30 mg/dL    Creatinine 0.98 0.66 - 1.25 mg/dL    Calcium 8.6 8.5 - 10.1 mg/dL    Glucose 77 70 - 99 mg/dL    Alkaline Phosphatase 55 40 - 150 U/L    AST 48 (H) 0 - 45 U/L    ALT 72 (H) 0 - 70 U/L    Protein Total 7.6 6.8 - 8.8 g/dL    Albumin 3.8 3.4 - 5.0 g/dL    Bilirubin Total 0.8 0.2 - 1.3 mg/dL    GFR Estimate 85 >60 mL/min/1.73m2   CRP inflammation     Status: Normal   Result Value Ref Range    CRP Inflammation <2.9 0.0 - 8.0 mg/L   CBC with platelets and differential     Status: Abnormal   Result Value Ref Range    WBC Count 4.4 4.0 - 11.0 10e3/uL    RBC Count 4.07 (L) 4.40 - 5.90 10e6/uL    Hemoglobin 13.8 13.3 - 17.7 g/dL    Hematocrit 41.7 40.0 - 53.0 %     (H) 78 - 100 fL    MCH 33.9 (H) 26.5 - 33.0 pg    MCHC 33.1 31.5 - 36.5 g/dL    RDW 11.6 10.0 - 15.0 %    Platelet Count 227 150 - 450 10e3/uL    % Neutrophils 56 %    % Lymphocytes 29 %    % Monocytes 11 %    % Eosinophils 3 %    % Basophils 1 %    % Immature Granulocytes 0 %    NRBCs per 100 WBC 0 <1 /100    Absolute Neutrophils 2.4 1.6 - 8.3 10e3/uL    Absolute Lymphocytes 1.3 0.8 - 5.3 10e3/uL    Absolute Monocytes 0.5 0.0 - 1.3 10e3/uL    Absolute Eosinophils 0.1 0.0 - 0.7 10e3/uL    Absolute Basophils 0.0 0.0 - 0.2 10e3/uL    Absolute Immature Granulocytes 0.0 <=0.0 10e3/uL    Absolute NRBCs 0.0 10e3/uL   CBC with platelets differential     Status: Abnormal    Narrative    The following orders were created for panel order CBC with platelets differential.  Procedure                               Abnormality         Status                     ---------                               -----------         ------                     CBC with platelets and d...[[id number]]  Abnormal            Final result                 Please view results for these tests on the individual orders.

## 2021-10-27 NOTE — PATIENT INSTRUCTIONS
Report to ADS center at 12:00 noon for labs and brain MRI.     Patient Information for ADS appointment:    Has patient been assessed by a provider: YES  Name of referring provider:  Jonathan Lugo    Patient meets ADS Criteria:  YES   Established [place] Patient in last 3 years, > 16 years of age, does patient need pain management (ADS unable to treat elevated pain), special assistance needs (ambulatory patients only, do not have lift access for non-ambulatory patients)    Allergy to Contrast Dye-  no   If IV contrast is needed can order premedication for next day appointment using protocol    Potential MRI needed, does the patient have a pace maker or other metal in the body-  no   If has pace maker/metal the [facility] is unable to meet needs of patient if CT can not be done in place of MRI    Please Place Referral for ADS as:  FOA: non imaging patients (procedure, hydration, catheter, etc.)   DOD: Patients that require imaging for diagnosis and treatment plan    Patient needing Imaging with Claustrophobia:  no  If Claustrophobic please order appropriate medication to pharmacy for patient to  prior to arrival to ADS and  for safety.    Expected Arrival time of Patient to ADS directly from clinic:  12 noon    Important Patient Information for ADS Apt:  ? Please direct your patient to come directly to the ADS as room is being held for them at the ADS.  [address] [facility], [address].  [address].  ? Please instruct patient to not have anything to eat or drink prior to ADS arrival as this can increase wait times for imaging.  ? Please inform patient that ADS appointments are longer than standard clinic appointments due to the nature of their work up and on average can take minimum 2 hours.  Patients will not be let home until final results are read.    Referring provider contact information for consult if needed:  Brittney Walker patient requires a follow up appointment with you in 2-3 days when would be  best to place them on your schedule for that appointment?   Please call clinic - can follow with Dr Fink if needed (PCP)

## 2021-10-27 NOTE — PROGRESS NOTES
"  Assessment & Plan     Seizure-like activity (H)  3 episodes of what sounds like seizure at home over last four months - not witnessed, but with tongue biting during last episodes. No clear etiology for seizures from history - no recent head trauma, takes no meds that are likely to lower seizure threshold, no diabetes history to suggest hypoglycemia as cause, no report of alcohol withdrawal. Up to date on colon cancer screening w/ normal colonoscopy two years ago; no prior screening for prostate cancer done. Alternatively, could be syncope - no history of arrhythmias and had normal EKG in 2019. BP here not low to suggest chronic orthostatic hypotension. History concerning enough to warrant same-day evaluation w/ imaging, labs  - Will send to acute diagnostic services for expedited evaluation w/ brain MRI, labs today (discussed w/ provider at ADS, appointment scheduled for 12:00 noon); pending MRI and lab results, likely needs to see neurology and/or oncology, will make plans for referral w/ ADS after work up today  - Reiterated importance of NO driving at this time    BMI:   Estimated body mass index is 33.77 kg/m  as calculated from the following:    Height as of 2/2/21: 1.62 m (5' 3.78\").    Weight as of this encounter: 88.6 kg (195 lb 6.4 oz).   Weight management plan: Patient was referred to their PCP to discuss a diet and exercise plan.    Return in about 1 week (around 11/3/2021) for Follow up with PCP.       Plan was discussed with attending Dr. Castillo.     Gena Lugo MD  [facility]    I have seen the patient, discussed with the resident, was present during critical portion of visit, and was available to furnish services throughout the visit.  I agree with the history, physical and plan as documented above.    Shira Castillo MD  Internal Medicine - Pediatrics      Transfer to Acute and Diagnostic Services  I have contacted the staff at the [facility] Acute and Diagnostic Services " "Clinic at [phone] ([place]) to confirm patient acceptance.  Special Needs: None    Discussed transition to Acute & Diagnostic Services Clinic, and patient agrees with next level of care.  Patient transportation will be provided by the patient.    Subjective   Ian is a 56 year old who presents for the following health issues:  HPI     Concern - Seizures   Onset: 3 episodes since July   Description: unknown seizures- could be a couple of minutes, bit tongue once and sprained tendons in both ankles, kicked heater across room, pretty spent   Intensity: N/A   Progression of Symptoms:  same  Accompanying Signs & Symptoms: wake up on floor  Previous history of similar problem: none   Precipitating factors:        Worsened by: Unknown   Alleviating factors:        Improved by:None   Therapies tried and outcome: None    Concern - Bilateral Ringing of the Ears   Onset: since seizures started   Description: bilateral ear ringing   Intensity: mild to severe   Progression of Symptoms:  Worse after possible seizure and waxing and waning  Accompanying Signs & Symptoms: possible related to seizures, pain behind the ears, throbbing and ringing can be so bad it drowns out the tv   Previous history of similar problem: Yes - intermittent   Precipitating factors:        Worsened by: possible seizures   Alleviating factors:        Improved by: hearing aids   Therapies tried and outcome: Tylenol and hearing aids     First seizure-like episode in July - 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 Remembers waking up under his desk. Four weeks ago happened again while at home working. Two weeks ago third episode - bit his tongue that time. All three episodes happened during morning hours. No urinary incontinence. One episode he had \"aura\" beforehand - felt like he was floating. No dizziness, lightheadedness just prior. No weakness, numbness, tingling between episodes. Felt \"confused\" afterward.     Never any seizures before this. " None of these episodes have been witnessed. Sometimes with headaches after a long work day recently; Tylenol helps. No recent head trauma, MVAs. Maybe getting less sleep since July but no acute sleep deprivation just prior to these episodes.   Only medication is simvastatin. Only OTC med is acetaminophen. Gets nose bleeds with ASA so not taking this.  No personal history of diabetes. No personal history of cancer. No history of blood clots, CVA.  No family history of seizures or epilepsy. FH notable only for heart disease.  Has been instructed by PCP to not drive but has been driving anyway.    Normal colonoscopy in 2019. No PSA on file.    Review of Systems   Constitutional, HEENT, cardiovascular, pulmonary, gi and gu systems are negative, except as otherwise noted.      Objective    /80   Pulse 72   Temp 97.8  F (36.6  C)   Wt 88.6 kg (195 lb 6.4 oz)   SpO2 97%   BMI 33.77 kg/m    Body mass index is 33.77 kg/m .  Physical Exam   GENERAL: healthy, alert and no distress  NECK: no adenopathy, no asymmetry, masses, or scars and thyroid normal to palpation  ENT: TMs clear bilaterally  RESP: lungs clear to auscultation - no rales, rhonchi or wheezes  CV: regular rate and rhythm, normal S1 S2, no S3 or S4, no murmur, click or rub, no peripheral edema and peripheral pulses strong  ABDOMEN: soft, nontender, no hepatosplenomegaly, no masses and bowel sounds normal  MS: no gross musculoskeletal defects noted, no edema  NEURO: Normal strength and tone, mentation intact and speech normal. CNs grossly intact. Gait normal.

## 2022-01-01 NOTE — NURSING NOTE
Chief Complaint   Patient presents with     Physical       Initial BP (!) 126/98 (BP Location: Right arm, Cuff Size: Adult Large)  Pulse 62  Temp 97.8  F (36.6  C) (Oral)  Ht 6' (1.829 m)  Wt 194 lb (88 kg)  SpO2 98%  BMI 26.31 kg/m2 Estimated body mass index is 26.31 kg/(m^2) as calculated from the following:    Height as of this encounter: 6' (1.829 m).    Weight as of this encounter: 194 lb (88 kg).  Medication Reconciliation: complete     Ade Haskins MA   October 24, 2017,  8:08 AM     No restrictions

## 2022-01-07 DIAGNOSIS — E78.1 HYPERTRIGLYCERIDEMIA: ICD-10-CM

## 2022-01-10 RX ORDER — SIMVASTATIN 40 MG
TABLET ORAL
Qty: 90 TABLET | Refills: 0 | Status: SHIPPED | OUTPATIENT
Start: 2022-01-10 | End: 2022-12-18

## 2022-04-17 ENCOUNTER — HEALTH MAINTENANCE LETTER (OUTPATIENT)
Age: 57
End: 2022-04-17

## 2022-10-23 ENCOUNTER — HEALTH MAINTENANCE LETTER (OUTPATIENT)
Age: 57
End: 2022-10-23

## 2022-12-18 ENCOUNTER — APPOINTMENT (OUTPATIENT)
Dept: GENERAL RADIOLOGY | Facility: CLINIC | Age: 57
DRG: 029 | End: 2022-12-18
Attending: NEUROLOGICAL SURGERY
Payer: COMMERCIAL

## 2022-12-18 ENCOUNTER — APPOINTMENT (OUTPATIENT)
Dept: MRI IMAGING | Facility: CLINIC | Age: 57
End: 2022-12-18
Attending: EMERGENCY MEDICINE
Payer: COMMERCIAL

## 2022-12-18 ENCOUNTER — ANESTHESIA (OUTPATIENT)
Dept: SURGERY | Facility: CLINIC | Age: 57
DRG: 029 | End: 2022-12-18
Payer: COMMERCIAL

## 2022-12-18 ENCOUNTER — ANESTHESIA EVENT (OUTPATIENT)
Dept: SURGERY | Facility: CLINIC | Age: 57
DRG: 029 | End: 2022-12-18
Payer: COMMERCIAL

## 2022-12-18 ENCOUNTER — HOSPITAL ENCOUNTER (EMERGENCY)
Facility: CLINIC | Age: 57
Discharge: ANOTHER HOSPITAL | End: 2022-12-18
Attending: EMERGENCY MEDICINE | Admitting: EMERGENCY MEDICINE
Payer: COMMERCIAL

## 2022-12-18 ENCOUNTER — HOSPITAL ENCOUNTER (INPATIENT)
Facility: CLINIC | Age: 57
LOS: 9 days | Discharge: HOME HEALTH | DRG: 029 | End: 2022-12-27
Attending: NEUROLOGICAL SURGERY | Admitting: INTERNAL MEDICINE
Payer: COMMERCIAL

## 2022-12-18 ENCOUNTER — APPOINTMENT (OUTPATIENT)
Dept: CT IMAGING | Facility: CLINIC | Age: 57
End: 2022-12-18
Attending: EMERGENCY MEDICINE
Payer: COMMERCIAL

## 2022-12-18 VITALS
OXYGEN SATURATION: 99 % | WEIGHT: 195 LBS | HEART RATE: 84 BPM | BODY MASS INDEX: 26.41 KG/M2 | HEIGHT: 72 IN | DIASTOLIC BLOOD PRESSURE: 96 MMHG | RESPIRATION RATE: 18 BRPM | TEMPERATURE: 98.4 F | SYSTOLIC BLOOD PRESSURE: 170 MMHG

## 2022-12-18 DIAGNOSIS — G95.20 SPINAL CORD COMPRESSION (H): ICD-10-CM

## 2022-12-18 DIAGNOSIS — R33.8 ACUTE URINARY RETENTION: ICD-10-CM

## 2022-12-18 DIAGNOSIS — R78.81 STAPHYLOCOCCUS AUREUS BACTEREMIA: ICD-10-CM

## 2022-12-18 DIAGNOSIS — R33.9 URINARY RETENTION: ICD-10-CM

## 2022-12-18 DIAGNOSIS — G06.2 EPIDURAL ABSCESS: Primary | ICD-10-CM

## 2022-12-18 DIAGNOSIS — R52 PAIN: ICD-10-CM

## 2022-12-18 DIAGNOSIS — K59.00 CONSTIPATION, UNSPECIFIED CONSTIPATION TYPE: ICD-10-CM

## 2022-12-18 DIAGNOSIS — R29.898 BILATERAL LEG WEAKNESS: ICD-10-CM

## 2022-12-18 DIAGNOSIS — B95.61 STAPHYLOCOCCUS AUREUS BACTEREMIA: ICD-10-CM

## 2022-12-18 LAB
ABO/RH(D): NORMAL
ALBUMIN UR-MCNC: NEGATIVE MG/DL
ANION GAP SERPL CALCULATED.3IONS-SCNC: 16 MMOL/L (ref 7–15)
ANTIBODY SCREEN: NEGATIVE
APPEARANCE UR: CLEAR
BASOPHILS # BLD AUTO: 0 10E3/UL (ref 0–0.2)
BASOPHILS NFR BLD AUTO: 0 %
BILIRUB UR QL STRIP: NEGATIVE
BUN SERPL-MCNC: 17.5 MG/DL (ref 6–20)
CALCIUM SERPL-MCNC: 9.6 MG/DL (ref 8.6–10)
CHLORIDE SERPL-SCNC: 94 MMOL/L (ref 98–107)
COLOR UR AUTO: YELLOW
CREAT SERPL-MCNC: 1.69 MG/DL (ref 0.67–1.17)
CRP SERPL-MCNC: 295.69 MG/L
DEPRECATED HCO3 PLAS-SCNC: 23 MMOL/L (ref 22–29)
EOSINOPHIL # BLD AUTO: 0 10E3/UL (ref 0–0.7)
EOSINOPHIL NFR BLD AUTO: 0 %
ERYTHROCYTE [DISTWIDTH] IN BLOOD BY AUTOMATED COUNT: 12.9 % (ref 10–15)
ERYTHROCYTE [SEDIMENTATION RATE] IN BLOOD BY WESTERGREN METHOD: 45 MM/HR (ref 0–20)
GFR SERPL CREATININE-BSD FRML MDRD: 46 ML/MIN/1.73M2
GLUCOSE SERPL-MCNC: 115 MG/DL (ref 70–99)
GLUCOSE UR STRIP-MCNC: NEGATIVE MG/DL
GRAM STAIN RESULT: ABNORMAL
GRAM STAIN RESULT: ABNORMAL
HCT VFR BLD AUTO: 39.5 % (ref 40–53)
HGB BLD-MCNC: 12.9 G/DL (ref 13.3–17.7)
HGB UR QL STRIP: NEGATIVE
HOLD SPECIMEN: NORMAL
HYALINE CASTS: 1 /LPF
IMM GRANULOCYTES # BLD: 0.1 10E3/UL
IMM GRANULOCYTES NFR BLD: 1 %
KETONES UR STRIP-MCNC: 20 MG/DL
LACTATE SERPL-SCNC: 0.9 MMOL/L (ref 0.7–2)
LEUKOCYTE ESTERASE UR QL STRIP: NEGATIVE
LYMPHOCYTES # BLD AUTO: 0.7 10E3/UL (ref 0.8–5.3)
LYMPHOCYTES NFR BLD AUTO: 5 %
MCH RBC QN AUTO: 33 PG (ref 26.5–33)
MCHC RBC AUTO-ENTMCNC: 32.7 G/DL (ref 31.5–36.5)
MCV RBC AUTO: 101 FL (ref 78–100)
MONOCYTES # BLD AUTO: 1.2 10E3/UL (ref 0–1.3)
MONOCYTES NFR BLD AUTO: 8 %
MUCOUS THREADS #/AREA URNS LPF: PRESENT /LPF
NEUTROPHILS # BLD AUTO: 12.8 10E3/UL (ref 1.6–8.3)
NEUTROPHILS NFR BLD AUTO: 86 %
NITRATE UR QL: NEGATIVE
NRBC # BLD AUTO: 0 10E3/UL
NRBC BLD AUTO-RTO: 0 /100
PH UR STRIP: 5.5 [PH] (ref 5–7)
PLATELET # BLD AUTO: 213 10E3/UL (ref 150–450)
POTASSIUM SERPL-SCNC: 3.9 MMOL/L (ref 3.4–5.3)
RADIOLOGIST FLAGS: ABNORMAL
RBC # BLD AUTO: 3.91 10E6/UL (ref 4.4–5.9)
RBC URINE: 2 /HPF
SARS-COV-2 RNA RESP QL NAA+PROBE: NEGATIVE
SODIUM SERPL-SCNC: 133 MMOL/L (ref 136–145)
SP GR UR STRIP: 1.01 (ref 1–1.03)
SPECIMEN EXPIRATION DATE: NORMAL
UROBILINOGEN UR STRIP-MCNC: NORMAL MG/DL
WBC # BLD AUTO: 14.8 10E3/UL (ref 4–11)
WBC URINE: 4 /HPF

## 2022-12-18 PROCEDURE — 51702 INSERT TEMP BLADDER CATH: CPT

## 2022-12-18 PROCEDURE — 258N000003 HC RX IP 258 OP 636: Performed by: PHYSICIAN ASSISTANT

## 2022-12-18 PROCEDURE — 250N000011 HC RX IP 250 OP 636: Performed by: EMERGENCY MEDICINE

## 2022-12-18 PROCEDURE — 370N000017 HC ANESTHESIA TECHNICAL FEE, PER MIN: Performed by: NEUROLOGICAL SURGERY

## 2022-12-18 PROCEDURE — 009U0ZZ DRAINAGE OF SPINAL CANAL, OPEN APPROACH: ICD-10-PCS | Performed by: NEUROLOGICAL SURGERY

## 2022-12-18 PROCEDURE — 99222 1ST HOSP IP/OBS MODERATE 55: CPT | Mod: 57 | Performed by: NEUROLOGICAL SURGERY

## 2022-12-18 PROCEDURE — 87077 CULTURE AEROBIC IDENTIFY: CPT | Performed by: NEUROLOGICAL SURGERY

## 2022-12-18 PROCEDURE — 999N000141 HC STATISTIC PRE-PROCEDURE NURSING ASSESSMENT: Performed by: NEUROLOGICAL SURGERY

## 2022-12-18 PROCEDURE — U0003 INFECTIOUS AGENT DETECTION BY NUCLEIC ACID (DNA OR RNA); SEVERE ACUTE RESPIRATORY SYNDROME CORONAVIRUS 2 (SARS-COV-2) (CORONAVIRUS DISEASE [COVID-19]), AMPLIFIED PROBE TECHNIQUE, MAKING USE OF HIGH THROUGHPUT TECHNOLOGIES AS DESCRIBED BY CMS-2020-01-R: HCPCS | Performed by: EMERGENCY MEDICINE

## 2022-12-18 PROCEDURE — 36415 COLL VENOUS BLD VENIPUNCTURE: CPT | Performed by: EMERGENCY MEDICINE

## 2022-12-18 PROCEDURE — 85652 RBC SED RATE AUTOMATED: CPT | Performed by: EMERGENCY MEDICINE

## 2022-12-18 PROCEDURE — 250N000013 HC RX MED GY IP 250 OP 250 PS 637: Performed by: NURSE PRACTITIONER

## 2022-12-18 PROCEDURE — 250N000011 HC RX IP 250 OP 636: Performed by: NURSE ANESTHETIST, CERTIFIED REGISTERED

## 2022-12-18 PROCEDURE — 80048 BASIC METABOLIC PNL TOTAL CA: CPT | Performed by: EMERGENCY MEDICINE

## 2022-12-18 PROCEDURE — A9585 GADOBUTROL INJECTION: HCPCS | Performed by: EMERGENCY MEDICINE

## 2022-12-18 PROCEDURE — 90471 IMMUNIZATION ADMIN: CPT | Performed by: EMERGENCY MEDICINE

## 2022-12-18 PROCEDURE — 81003 URINALYSIS AUTO W/O SCOPE: CPT | Performed by: EMERGENCY MEDICINE

## 2022-12-18 PROCEDURE — 87077 CULTURE AEROBIC IDENTIFY: CPT | Performed by: EMERGENCY MEDICINE

## 2022-12-18 PROCEDURE — 272N000001 HC OR GENERAL SUPPLY STERILE: Performed by: NEUROLOGICAL SURGERY

## 2022-12-18 PROCEDURE — 85025 COMPLETE CBC W/AUTO DIFF WBC: CPT | Performed by: EMERGENCY MEDICINE

## 2022-12-18 PROCEDURE — C9803 HOPD COVID-19 SPEC COLLECT: HCPCS

## 2022-12-18 PROCEDURE — 250N000009 HC RX 250: Performed by: NURSE ANESTHETIST, CERTIFIED REGISTERED

## 2022-12-18 PROCEDURE — 250N000009 HC RX 250: Performed by: EMERGENCY MEDICINE

## 2022-12-18 PROCEDURE — 87070 CULTURE OTHR SPECIMN AEROBIC: CPT | Performed by: NEUROLOGICAL SURGERY

## 2022-12-18 PROCEDURE — 120N000001 HC R&B MED SURG/OB

## 2022-12-18 PROCEDURE — 72158 MRI LUMBAR SPINE W/O & W/DYE: CPT

## 2022-12-18 PROCEDURE — 258N000003 HC RX IP 258 OP 636: Performed by: NURSE ANESTHETIST, CERTIFIED REGISTERED

## 2022-12-18 PROCEDURE — 250N000025 HC SEVOFLURANE, PER MIN: Performed by: NEUROLOGICAL SURGERY

## 2022-12-18 PROCEDURE — 86850 RBC ANTIBODY SCREEN: CPT | Performed by: EMERGENCY MEDICINE

## 2022-12-18 PROCEDURE — 250N000013 HC RX MED GY IP 250 OP 250 PS 637: Performed by: PHYSICIAN ASSISTANT

## 2022-12-18 PROCEDURE — 86901 BLOOD TYPING SEROLOGIC RH(D): CPT | Performed by: EMERGENCY MEDICINE

## 2022-12-18 PROCEDURE — 250N000011 HC RX IP 250 OP 636: Performed by: INTERNAL MEDICINE

## 2022-12-18 PROCEDURE — 63266 EXCISE INTRSPINL LESION THRC: CPT | Performed by: NEUROLOGICAL SURGERY

## 2022-12-18 PROCEDURE — 710N000009 HC RECOVERY PHASE 1, LEVEL 1, PER MIN: Performed by: NEUROLOGICAL SURGERY

## 2022-12-18 PROCEDURE — 87075 CULTR BACTERIA EXCEPT BLOOD: CPT | Performed by: NEUROLOGICAL SURGERY

## 2022-12-18 PROCEDURE — 74177 CT ABD & PELVIS W/CONTRAST: CPT

## 2022-12-18 PROCEDURE — 99285 EMERGENCY DEPT VISIT HI MDM: CPT | Mod: 25

## 2022-12-18 PROCEDURE — 69990 MICROSURGERY ADD-ON: CPT | Performed by: NEUROLOGICAL SURGERY

## 2022-12-18 PROCEDURE — 999N000179 XR SURGERY CARM FLUORO LESS THAN 5 MIN W STILLS

## 2022-12-18 PROCEDURE — 250N000011 HC RX IP 250 OP 636: Performed by: PHYSICIAN ASSISTANT

## 2022-12-18 PROCEDURE — 72157 MRI CHEST SPINE W/O & W/DYE: CPT

## 2022-12-18 PROCEDURE — 90715 TDAP VACCINE 7 YRS/> IM: CPT | Performed by: EMERGENCY MEDICINE

## 2022-12-18 PROCEDURE — 86140 C-REACTIVE PROTEIN: CPT | Performed by: EMERGENCY MEDICINE

## 2022-12-18 PROCEDURE — 360N000084 HC SURGERY LEVEL 4 W/ FLUORO, PER MIN: Performed by: NEUROLOGICAL SURGERY

## 2022-12-18 PROCEDURE — 250N000011 HC RX IP 250 OP 636: Performed by: NEUROLOGICAL SURGERY

## 2022-12-18 PROCEDURE — 87149 DNA/RNA DIRECT PROBE: CPT | Performed by: EMERGENCY MEDICINE

## 2022-12-18 PROCEDURE — 255N000002 HC RX 255 OP 636: Performed by: EMERGENCY MEDICINE

## 2022-12-18 PROCEDURE — 93005 ELECTROCARDIOGRAM TRACING: CPT

## 2022-12-18 PROCEDURE — 258N000003 HC RX IP 258 OP 636: Performed by: EMERGENCY MEDICINE

## 2022-12-18 PROCEDURE — 83605 ASSAY OF LACTIC ACID: CPT | Performed by: EMERGENCY MEDICINE

## 2022-12-18 PROCEDURE — 250N000009 HC RX 250: Performed by: NEUROLOGICAL SURGERY

## 2022-12-18 PROCEDURE — 87205 SMEAR GRAM STAIN: CPT | Performed by: NEUROLOGICAL SURGERY

## 2022-12-18 PROCEDURE — 99223 1ST HOSP IP/OBS HIGH 75: CPT | Mod: AI | Performed by: INTERNAL MEDICINE

## 2022-12-18 PROCEDURE — 99207 PR NO BILLABLE SERVICE THIS VISIT: CPT | Performed by: NURSE PRACTITIONER

## 2022-12-18 RX ORDER — LIDOCAINE 40 MG/G
CREAM TOPICAL
Status: DISCONTINUED | OUTPATIENT
Start: 2022-12-18 | End: 2022-12-27 | Stop reason: HOSPADM

## 2022-12-18 RX ORDER — AMOXICILLIN 250 MG
2 CAPSULE ORAL 2 TIMES DAILY PRN
Status: DISCONTINUED | OUTPATIENT
Start: 2022-12-18 | End: 2022-12-27 | Stop reason: HOSPADM

## 2022-12-18 RX ORDER — BISACODYL 10 MG
10 SUPPOSITORY, RECTAL RECTAL DAILY PRN
Status: DISCONTINUED | OUTPATIENT
Start: 2022-12-18 | End: 2022-12-27 | Stop reason: HOSPADM

## 2022-12-18 RX ORDER — NEOSTIGMINE METHYLSULFATE 1 MG/ML
VIAL (ML) INJECTION PRN
Status: DISCONTINUED | OUTPATIENT
Start: 2022-12-18 | End: 2022-12-18

## 2022-12-18 RX ORDER — DEXAMETHASONE SODIUM PHOSPHATE 4 MG/ML
INJECTION, SOLUTION INTRA-ARTICULAR; INTRALESIONAL; INTRAMUSCULAR; INTRAVENOUS; SOFT TISSUE PRN
Status: DISCONTINUED | OUTPATIENT
Start: 2022-12-18 | End: 2022-12-18

## 2022-12-18 RX ORDER — ONDANSETRON 4 MG/1
4 TABLET, ORALLY DISINTEGRATING ORAL EVERY 6 HOURS PRN
Status: DISCONTINUED | OUTPATIENT
Start: 2022-12-18 | End: 2022-12-18

## 2022-12-18 RX ORDER — PROCHLORPERAZINE MALEATE 10 MG
10 TABLET ORAL EVERY 6 HOURS PRN
Status: DISCONTINUED | OUTPATIENT
Start: 2022-12-18 | End: 2022-12-27 | Stop reason: HOSPADM

## 2022-12-18 RX ORDER — CEFTRIAXONE 2 G/1
2 INJECTION, POWDER, FOR SOLUTION INTRAMUSCULAR; INTRAVENOUS EVERY 24 HOURS
Status: DISCONTINUED | OUTPATIENT
Start: 2022-12-19 | End: 2022-12-18

## 2022-12-18 RX ORDER — NALOXONE HYDROCHLORIDE 0.4 MG/ML
0.2 INJECTION, SOLUTION INTRAMUSCULAR; INTRAVENOUS; SUBCUTANEOUS
Status: DISCONTINUED | OUTPATIENT
Start: 2022-12-18 | End: 2022-12-27 | Stop reason: HOSPADM

## 2022-12-18 RX ORDER — HYDROMORPHONE HYDROCHLORIDE 1 MG/ML
0.5 INJECTION, SOLUTION INTRAMUSCULAR; INTRAVENOUS; SUBCUTANEOUS
Status: DISCONTINUED | OUTPATIENT
Start: 2022-12-18 | End: 2022-12-18 | Stop reason: HOSPADM

## 2022-12-18 RX ORDER — PIPERACILLIN SODIUM, TAZOBACTAM SODIUM 3; .375 G/15ML; G/15ML
3.38 INJECTION, POWDER, LYOPHILIZED, FOR SOLUTION INTRAVENOUS EVERY 6 HOURS
Status: DISCONTINUED | OUTPATIENT
Start: 2022-12-18 | End: 2022-12-19

## 2022-12-18 RX ORDER — KETAMINE HYDROCHLORIDE 10 MG/ML
INJECTION INTRAMUSCULAR; INTRAVENOUS PRN
Status: DISCONTINUED | OUTPATIENT
Start: 2022-12-18 | End: 2022-12-18

## 2022-12-18 RX ORDER — HYDROMORPHONE HCL IN WATER/PF 6 MG/30 ML
0.2 PATIENT CONTROLLED ANALGESIA SYRINGE INTRAVENOUS
Status: DISCONTINUED | OUTPATIENT
Start: 2022-12-18 | End: 2022-12-27 | Stop reason: HOSPADM

## 2022-12-18 RX ORDER — ONDANSETRON 4 MG/1
4 TABLET, ORALLY DISINTEGRATING ORAL EVERY 6 HOURS PRN
Status: DISCONTINUED | OUTPATIENT
Start: 2022-12-18 | End: 2022-12-27 | Stop reason: HOSPADM

## 2022-12-18 RX ORDER — CEFTRIAXONE 1 G/1
1 INJECTION, POWDER, FOR SOLUTION INTRAMUSCULAR; INTRAVENOUS EVERY 24 HOURS
Status: DISCONTINUED | OUTPATIENT
Start: 2022-12-18 | End: 2022-12-18

## 2022-12-18 RX ORDER — ONDANSETRON 2 MG/ML
INJECTION INTRAMUSCULAR; INTRAVENOUS PRN
Status: DISCONTINUED | OUTPATIENT
Start: 2022-12-18 | End: 2022-12-18

## 2022-12-18 RX ORDER — OXYCODONE HYDROCHLORIDE 5 MG/1
10 TABLET ORAL EVERY 4 HOURS PRN
Status: DISCONTINUED | OUTPATIENT
Start: 2022-12-18 | End: 2022-12-27 | Stop reason: HOSPADM

## 2022-12-18 RX ORDER — LABETALOL HYDROCHLORIDE 5 MG/ML
10-40 INJECTION, SOLUTION INTRAVENOUS EVERY 10 MIN PRN
Status: DISCONTINUED | OUTPATIENT
Start: 2022-12-18 | End: 2022-12-27 | Stop reason: HOSPADM

## 2022-12-18 RX ORDER — ONDANSETRON 2 MG/ML
4 INJECTION INTRAMUSCULAR; INTRAVENOUS EVERY 6 HOURS PRN
Status: DISCONTINUED | OUTPATIENT
Start: 2022-12-18 | End: 2022-12-27 | Stop reason: HOSPADM

## 2022-12-18 RX ORDER — HYDROCODONE BITARTRATE AND ACETAMINOPHEN 5; 325 MG/1; MG/1
1-2 TABLET ORAL EVERY 4 HOURS PRN
Status: ON HOLD | COMMUNITY
End: 2022-12-27

## 2022-12-18 RX ORDER — OXYCODONE HYDROCHLORIDE 5 MG/1
5 TABLET ORAL EVERY 4 HOURS PRN
Status: DISCONTINUED | OUTPATIENT
Start: 2022-12-18 | End: 2022-12-27 | Stop reason: HOSPADM

## 2022-12-18 RX ORDER — LIDOCAINE 40 MG/G
CREAM TOPICAL
Status: DISCONTINUED | OUTPATIENT
Start: 2022-12-18 | End: 2022-12-18

## 2022-12-18 RX ORDER — LIDOCAINE HYDROCHLORIDE 20 MG/ML
6 JELLY TOPICAL ONCE
Status: COMPLETED | OUTPATIENT
Start: 2022-12-18 | End: 2022-12-18

## 2022-12-18 RX ORDER — SODIUM CHLORIDE, SODIUM LACTATE, POTASSIUM CHLORIDE, CALCIUM CHLORIDE 600; 310; 30; 20 MG/100ML; MG/100ML; MG/100ML; MG/100ML
INJECTION, SOLUTION INTRAVENOUS CONTINUOUS
Status: DISCONTINUED | OUTPATIENT
Start: 2022-12-18 | End: 2022-12-18

## 2022-12-18 RX ORDER — ONDANSETRON 2 MG/ML
4 INJECTION INTRAMUSCULAR; INTRAVENOUS EVERY 30 MIN PRN
Status: DISCONTINUED | OUTPATIENT
Start: 2022-12-18 | End: 2022-12-18 | Stop reason: HOSPADM

## 2022-12-18 RX ORDER — ESMOLOL HYDROCHLORIDE 10 MG/ML
INJECTION INTRAVENOUS PRN
Status: DISCONTINUED | OUTPATIENT
Start: 2022-12-18 | End: 2022-12-18

## 2022-12-18 RX ORDER — MAGNESIUM HYDROXIDE 1200 MG/15ML
LIQUID ORAL PRN
Status: DISCONTINUED | OUTPATIENT
Start: 2022-12-18 | End: 2022-12-18 | Stop reason: HOSPADM

## 2022-12-18 RX ORDER — BISACODYL 10 MG
10 SUPPOSITORY, RECTAL RECTAL DAILY PRN
Status: DISCONTINUED | OUTPATIENT
Start: 2022-12-18 | End: 2022-12-20

## 2022-12-18 RX ORDER — DEXMEDETOMIDINE HYDROCHLORIDE 4 UG/ML
INJECTION, SOLUTION INTRAVENOUS PRN
Status: DISCONTINUED | OUTPATIENT
Start: 2022-12-18 | End: 2022-12-18

## 2022-12-18 RX ORDER — PROPOFOL 10 MG/ML
INJECTION, EMULSION INTRAVENOUS CONTINUOUS PRN
Status: DISCONTINUED | OUTPATIENT
Start: 2022-12-18 | End: 2022-12-18

## 2022-12-18 RX ORDER — POLYETHYLENE GLYCOL 3350 17 G/17G
17 POWDER, FOR SOLUTION ORAL DAILY
Status: DISCONTINUED | OUTPATIENT
Start: 2022-12-19 | End: 2022-12-27 | Stop reason: HOSPADM

## 2022-12-18 RX ORDER — ACETAMINOPHEN 325 MG/1
650 TABLET ORAL EVERY 4 HOURS PRN
Status: DISCONTINUED | OUTPATIENT
Start: 2022-12-21 | End: 2022-12-27 | Stop reason: HOSPADM

## 2022-12-18 RX ORDER — METHOCARBAMOL 750 MG/1
750 TABLET, FILM COATED ORAL EVERY 6 HOURS PRN
Status: ON HOLD | COMMUNITY
End: 2022-12-18

## 2022-12-18 RX ORDER — GLYCOPYRROLATE 0.2 MG/ML
INJECTION, SOLUTION INTRAMUSCULAR; INTRAVENOUS PRN
Status: DISCONTINUED | OUTPATIENT
Start: 2022-12-18 | End: 2022-12-18

## 2022-12-18 RX ORDER — SODIUM CHLORIDE 9 MG/ML
INJECTION, SOLUTION INTRAVENOUS CONTINUOUS
Status: DISCONTINUED | OUTPATIENT
Start: 2022-12-18 | End: 2022-12-20

## 2022-12-18 RX ORDER — ONDANSETRON 4 MG/1
4 TABLET, ORALLY DISINTEGRATING ORAL EVERY 30 MIN PRN
Status: DISCONTINUED | OUTPATIENT
Start: 2022-12-18 | End: 2022-12-18 | Stop reason: HOSPADM

## 2022-12-18 RX ORDER — GADOBUTROL 604.72 MG/ML
9 INJECTION INTRAVENOUS ONCE
Status: COMPLETED | OUTPATIENT
Start: 2022-12-18 | End: 2022-12-18

## 2022-12-18 RX ORDER — HYDRALAZINE HYDROCHLORIDE 20 MG/ML
10-20 INJECTION INTRAMUSCULAR; INTRAVENOUS EVERY 30 MIN PRN
Status: DISCONTINUED | OUTPATIENT
Start: 2022-12-18 | End: 2022-12-27 | Stop reason: HOSPADM

## 2022-12-18 RX ORDER — LIDOCAINE HYDROCHLORIDE 20 MG/ML
INJECTION, SOLUTION INFILTRATION; PERINEURAL PRN
Status: DISCONTINUED | OUTPATIENT
Start: 2022-12-18 | End: 2022-12-18

## 2022-12-18 RX ORDER — FENTANYL CITRATE 50 UG/ML
25 INJECTION, SOLUTION INTRAMUSCULAR; INTRAVENOUS EVERY 5 MIN PRN
Status: DISCONTINUED | OUTPATIENT
Start: 2022-12-18 | End: 2022-12-18 | Stop reason: HOSPADM

## 2022-12-18 RX ORDER — HYDROMORPHONE HCL IN WATER/PF 6 MG/30 ML
0.4 PATIENT CONTROLLED ANALGESIA SYRINGE INTRAVENOUS
Status: DISCONTINUED | OUTPATIENT
Start: 2022-12-18 | End: 2022-12-27 | Stop reason: HOSPADM

## 2022-12-18 RX ORDER — NALOXONE HYDROCHLORIDE 0.4 MG/ML
0.4 INJECTION, SOLUTION INTRAMUSCULAR; INTRAVENOUS; SUBCUTANEOUS
Status: DISCONTINUED | OUTPATIENT
Start: 2022-12-18 | End: 2022-12-27 | Stop reason: HOSPADM

## 2022-12-18 RX ORDER — IOPAMIDOL 755 MG/ML
500 INJECTION, SOLUTION INTRAVASCULAR ONCE
Status: COMPLETED | OUTPATIENT
Start: 2022-12-18 | End: 2022-12-18

## 2022-12-18 RX ORDER — MEPERIDINE HYDROCHLORIDE 25 MG/ML
12.5 INJECTION INTRAMUSCULAR; INTRAVENOUS; SUBCUTANEOUS EVERY 5 MIN PRN
Status: DISCONTINUED | OUTPATIENT
Start: 2022-12-18 | End: 2022-12-18 | Stop reason: HOSPADM

## 2022-12-18 RX ORDER — HYDROMORPHONE HCL IN WATER/PF 6 MG/30 ML
0.2 PATIENT CONTROLLED ANALGESIA SYRINGE INTRAVENOUS EVERY 5 MIN PRN
Status: DISCONTINUED | OUTPATIENT
Start: 2022-12-18 | End: 2022-12-18 | Stop reason: HOSPADM

## 2022-12-18 RX ORDER — FENTANYL CITRATE 50 UG/ML
50 INJECTION, SOLUTION INTRAMUSCULAR; INTRAVENOUS EVERY 5 MIN PRN
Status: DISCONTINUED | OUTPATIENT
Start: 2022-12-18 | End: 2022-12-18 | Stop reason: HOSPADM

## 2022-12-18 RX ORDER — HYDROMORPHONE HCL IN WATER/PF 6 MG/30 ML
0.4 PATIENT CONTROLLED ANALGESIA SYRINGE INTRAVENOUS EVERY 5 MIN PRN
Status: DISCONTINUED | OUTPATIENT
Start: 2022-12-18 | End: 2022-12-18 | Stop reason: HOSPADM

## 2022-12-18 RX ORDER — SODIUM CHLORIDE, SODIUM LACTATE, POTASSIUM CHLORIDE, CALCIUM CHLORIDE 600; 310; 30; 20 MG/100ML; MG/100ML; MG/100ML; MG/100ML
INJECTION, SOLUTION INTRAVENOUS CONTINUOUS
Status: DISCONTINUED | OUTPATIENT
Start: 2022-12-18 | End: 2022-12-18 | Stop reason: HOSPADM

## 2022-12-18 RX ORDER — AMOXICILLIN 250 MG
1 CAPSULE ORAL 2 TIMES DAILY PRN
Status: DISCONTINUED | OUTPATIENT
Start: 2022-12-18 | End: 2022-12-27 | Stop reason: HOSPADM

## 2022-12-18 RX ORDER — ACETAMINOPHEN 325 MG/1
975 TABLET ORAL EVERY 8 HOURS
Status: COMPLETED | OUTPATIENT
Start: 2022-12-18 | End: 2022-12-21

## 2022-12-18 RX ORDER — PROPOFOL 10 MG/ML
INJECTION, EMULSION INTRAVENOUS PRN
Status: DISCONTINUED | OUTPATIENT
Start: 2022-12-18 | End: 2022-12-18

## 2022-12-18 RX ORDER — FENTANYL CITRATE 50 UG/ML
INJECTION, SOLUTION INTRAMUSCULAR; INTRAVENOUS PRN
Status: DISCONTINUED | OUTPATIENT
Start: 2022-12-18 | End: 2022-12-18

## 2022-12-18 RX ORDER — AMOXICILLIN 250 MG
1 CAPSULE ORAL 2 TIMES DAILY
Status: DISCONTINUED | OUTPATIENT
Start: 2022-12-18 | End: 2022-12-27 | Stop reason: HOSPADM

## 2022-12-18 RX ORDER — ONDANSETRON 2 MG/ML
4 INJECTION INTRAMUSCULAR; INTRAVENOUS EVERY 6 HOURS PRN
Status: DISCONTINUED | OUTPATIENT
Start: 2022-12-18 | End: 2022-12-18

## 2022-12-18 RX ADMIN — CLOSTRIDIUM TETANI TOXOID ANTIGEN (FORMALDEHYDE INACTIVATED), CORYNEBACTERIUM DIPHTHERIAE TOXOID ANTIGEN (FORMALDEHYDE INACTIVATED), BORDETELLA PERTUSSIS TOXOID ANTIGEN (GLUTARALDEHYDE INACTIVATED), BORDETELLA PERTUSSIS FILAMENTOUS HEMAGGLUTININ ANTIGEN (FORMALDEHYDE INACTIVATED), BORDETELLA PERTUSSIS PERTACTIN ANTIGEN, AND BORDETELLA PERTUSSIS FIMBRIAE 2/3 ANTIGEN 0.5 ML: 5; 2; 2.5; 5; 3; 5 INJECTION, SUSPENSION INTRAMUSCULAR at 09:23

## 2022-12-18 RX ADMIN — GLYCOPYRROLATE 0.2 MG: 0.2 INJECTION, SOLUTION INTRAMUSCULAR; INTRAVENOUS at 15:40

## 2022-12-18 RX ADMIN — GADOBUTROL 9 ML: 604.72 INJECTION INTRAVENOUS at 11:07

## 2022-12-18 RX ADMIN — PIPERACILLIN AND TAZOBACTAM 3.38 G: 3; .375 INJECTION, POWDER, FOR SOLUTION INTRAVENOUS at 23:41

## 2022-12-18 RX ADMIN — ROCURONIUM BROMIDE 20 MG: 50 INJECTION, SOLUTION INTRAVENOUS at 15:10

## 2022-12-18 RX ADMIN — LIDOCAINE HYDROCHLORIDE 6 ML: 20 JELLY TOPICAL at 09:43

## 2022-12-18 RX ADMIN — Medication 1 MG: at 23:40

## 2022-12-18 RX ADMIN — ROCURONIUM BROMIDE 10 MG: 50 INJECTION, SOLUTION INTRAVENOUS at 15:46

## 2022-12-18 RX ADMIN — PHENYLEPHRINE HYDROCHLORIDE 100 MCG: 10 INJECTION INTRAVENOUS at 15:56

## 2022-12-18 RX ADMIN — ESMOLOL HYDROCHLORIDE 20 MG: 10 INJECTION, SOLUTION INTRAVENOUS at 16:39

## 2022-12-18 RX ADMIN — MIDAZOLAM 2 MG: 1 INJECTION INTRAMUSCULAR; INTRAVENOUS at 14:34

## 2022-12-18 RX ADMIN — HYDROMORPHONE HYDROCHLORIDE 0.5 MG: 1 INJECTION, SOLUTION INTRAMUSCULAR; INTRAVENOUS; SUBCUTANEOUS at 15:18

## 2022-12-18 RX ADMIN — FENTANYL CITRATE 50 MCG: 50 INJECTION, SOLUTION INTRAMUSCULAR; INTRAVENOUS at 14:43

## 2022-12-18 RX ADMIN — PHENYLEPHRINE HYDROCHLORIDE 100 MCG: 10 INJECTION INTRAVENOUS at 15:30

## 2022-12-18 RX ADMIN — SODIUM CHLORIDE, POTASSIUM CHLORIDE, SODIUM LACTATE AND CALCIUM CHLORIDE: 600; 310; 30; 20 INJECTION, SOLUTION INTRAVENOUS at 15:41

## 2022-12-18 RX ADMIN — PROPOFOL 200 MG: 10 INJECTION, EMULSION INTRAVENOUS at 14:43

## 2022-12-18 RX ADMIN — ACETAMINOPHEN 975 MG: 325 TABLET, FILM COATED ORAL at 21:36

## 2022-12-18 RX ADMIN — PHENYLEPHRINE HYDROCHLORIDE 150 MCG: 10 INJECTION INTRAVENOUS at 15:33

## 2022-12-18 RX ADMIN — DEXAMETHASONE SODIUM PHOSPHATE 4 MG: 4 INJECTION, SOLUTION INTRA-ARTICULAR; INTRALESIONAL; INTRAMUSCULAR; INTRAVENOUS; SOFT TISSUE at 15:07

## 2022-12-18 RX ADMIN — PHENYLEPHRINE HYDROCHLORIDE 100 MCG: 10 INJECTION INTRAVENOUS at 16:17

## 2022-12-18 RX ADMIN — VANCOMYCIN HYDROCHLORIDE 1750 MG: 10 INJECTION, POWDER, LYOPHILIZED, FOR SOLUTION INTRAVENOUS at 20:42

## 2022-12-18 RX ADMIN — GLYCOPYRROLATE 0.7 MG: 0.2 INJECTION, SOLUTION INTRAMUSCULAR; INTRAVENOUS at 16:29

## 2022-12-18 RX ADMIN — SODIUM CHLORIDE 64 ML: 9 INJECTION, SOLUTION INTRAVENOUS at 12:41

## 2022-12-18 RX ADMIN — SODIUM CHLORIDE, POTASSIUM CHLORIDE, SODIUM LACTATE AND CALCIUM CHLORIDE: 600; 310; 30; 20 INJECTION, SOLUTION INTRAVENOUS at 12:06

## 2022-12-18 RX ADMIN — FENTANYL CITRATE 50 MCG: 50 INJECTION, SOLUTION INTRAMUSCULAR; INTRAVENOUS at 14:47

## 2022-12-18 RX ADMIN — PHENYLEPHRINE HYDROCHLORIDE 100 MCG: 10 INJECTION INTRAVENOUS at 14:58

## 2022-12-18 RX ADMIN — PHENYLEPHRINE HYDROCHLORIDE 0.3 MCG/KG/MIN: 10 INJECTION INTRAVENOUS at 15:09

## 2022-12-18 RX ADMIN — ONDANSETRON 4 MG: 2 INJECTION INTRAMUSCULAR; INTRAVENOUS at 16:06

## 2022-12-18 RX ADMIN — NEOSTIGMINE METHYLSULFATE 4.5 MG: 1 INJECTION, SOLUTION INTRAVENOUS at 16:29

## 2022-12-18 RX ADMIN — LIDOCAINE HYDROCHLORIDE 100 MG: 20 INJECTION, SOLUTION INFILTRATION; PERINEURAL at 14:43

## 2022-12-18 RX ADMIN — Medication 20 MG: at 15:29

## 2022-12-18 RX ADMIN — DEXMEDETOMIDINE HYDROCHLORIDE 20 MCG: 200 INJECTION INTRAVENOUS at 14:47

## 2022-12-18 RX ADMIN — SUGAMMADEX 200 MG: 100 INJECTION, SOLUTION INTRAVENOUS at 16:38

## 2022-12-18 RX ADMIN — PHENYLEPHRINE HYDROCHLORIDE 100 MCG: 10 INJECTION INTRAVENOUS at 15:27

## 2022-12-18 RX ADMIN — PROPOFOL 30 MCG/KG/MIN: 10 INJECTION, EMULSION INTRAVENOUS at 14:57

## 2022-12-18 RX ADMIN — CEFTRIAXONE SODIUM 1 G: 1 INJECTION, POWDER, FOR SOLUTION INTRAMUSCULAR; INTRAVENOUS at 19:01

## 2022-12-18 RX ADMIN — SENNOSIDES AND DOCUSATE SODIUM 1 TABLET: 50; 8.6 TABLET ORAL at 20:38

## 2022-12-18 RX ADMIN — PHENYLEPHRINE HYDROCHLORIDE 100 MCG: 10 INJECTION INTRAVENOUS at 16:24

## 2022-12-18 RX ADMIN — IOPAMIDOL 95 ML: 755 INJECTION, SOLUTION INTRAVENOUS at 12:41

## 2022-12-18 RX ADMIN — ROCURONIUM BROMIDE 50 MG: 50 INJECTION, SOLUTION INTRAVENOUS at 14:44

## 2022-12-18 RX ADMIN — PROPOFOL 50 MG: 10 INJECTION, EMULSION INTRAVENOUS at 14:47

## 2022-12-18 ASSESSMENT — ENCOUNTER SYMPTOMS
DIFFICULTY URINATING: 1
APPETITE CHANGE: 1
SORE THROAT: 0
ABDOMINAL PAIN: 1
WEAKNESS: 1
COUGH: 0
DYSRHYTHMIAS: 0
SEIZURES: 0
BACK PAIN: 1
NUMBNESS: 1
FEVER: 0

## 2022-12-18 ASSESSMENT — ACTIVITIES OF DAILY LIVING (ADL)
ADLS_ACUITY_SCORE: 36
ADLS_ACUITY_SCORE: 36
ADLS_ACUITY_SCORE: 40
ADLS_ACUITY_SCORE: 37
ADLS_ACUITY_SCORE: 37
ADLS_ACUITY_SCORE: 35
ADLS_ACUITY_SCORE: 37
ADLS_ACUITY_SCORE: 40

## 2022-12-18 ASSESSMENT — LIFESTYLE VARIABLES: TOBACCO_USE: 0

## 2022-12-18 NOTE — ANESTHESIA PROCEDURE NOTES
Airway       Patient location during procedure: OR       Procedure Start/Stop Times: 12/18/2022 2:46 PM  Staff -        CRNA: Rita Juan APRN CRNA       Performed By: CRNAIndications and Patient Condition       Indications for airway management: nnamdi-procedural and airway protection       Induction type:intravenous       Mask difficulty assessment: 2 - vent by mask + OA or adjuvant +/- NMBA    Final Airway Details       Final airway type: endotracheal airway       Successful airway: ETT - single  Endotracheal Airway Details        ETT size (mm): 8.0       Cuffed: yes       Successful intubation technique: video laryngoscopy       VL Blade Size: Glidescope 4       Grade View of Cords: 1       Adjucts: stylet       Position: Right       Measured from: lips       Secured at (cm): 24       Bite block used: Soft    Post intubation assessment        Placement verified by: capnometry, equal breath sounds and chest rise        Number of attempts at approach: 1       Number of other approaches attempted: 0       Secured with: pink tape       Ease of procedure: easy       Dentition: Intact and Unchanged    Medication(s) Administered   Medication Administration Time: 12/18/2022 2:46 PM

## 2022-12-18 NOTE — ANESTHESIA POSTPROCEDURE EVALUATION
Patient: Ian Moncada    Procedure: Procedure(s):  THORACIC LUMBAR DECOMPRESSION FOR EVACUATION AND EPIDURAL ABSCESS VERSUS HEMATOMA       Anesthesia Type:  General    Note:  Disposition: Inpatient   Postop Pain Control: Uneventful            Sign Out: Well controlled pain   PONV: No   Neuro/Psych: Uneventful            Sign Out: Acceptable/Baseline neuro status   Airway/Respiratory: Uneventful            Sign Out: Acceptable/Baseline resp. status   CV/Hemodynamics: Uneventful            Sign Out: Acceptable CV status   Other NRE: NONE   DID A NON-ROUTINE EVENT OCCUR? No           Last vitals:  Vitals Value Taken Time   /89 12/18/22 1720   Temp 37.4  C (99.3  F) 12/18/22 1644   Pulse 98 12/18/22 1729   Resp 20 12/18/22 1729   SpO2 98 % 12/18/22 1729   Vitals shown include unvalidated device data.    Electronically Signed By: Rigo Diehl MD  December 18, 2022  5:30 PM

## 2022-12-18 NOTE — ANESTHESIA PREPROCEDURE EVALUATION
Anesthesia Pre-Procedure Evaluation    Patient: Ian Moncada   MRN: [id number] : 1965        Procedure : Procedure(s):  THORACIC LUMBAR DECOMPRESSION FOR EVACUATION AND EPIDURAL ABSCESS VERSUS HEMATOMA          Past Medical History:   Diagnosis Date     External hemorrhoids with other complication 2008    Banding 2007.     Hyperlipidemia LDL goal <130 2010     Hypertriglyceridemia 2010     Impaired fasting glucose 2009      Past Surgical History:   Procedure Laterality Date     TONSILLECTOMY  1970s      Allergies   Allergen Reactions     Walnuts [Nuts]       Social History     Tobacco Use     Smoking status: Never     Smokeless tobacco: Never   Substance Use Topics     Alcohol use: Yes     Alcohol/week: 7.0 standard drinks     Types: 7 Standard drinks or equivalent per week     Comment: pn the weekends      Wt Readings from Last 1 Encounters:   22 88.5 kg (195 lb)        Anesthesia Evaluation   Pt has had prior anesthetic. Type: General.    No history of anesthetic complications       ROS/MED HX  ENT/Pulmonary:    (-) tobacco use, asthma and sleep apnea   Neurologic: Comment: Epidural abscess vs hematoma, back pain, leg weakness, urinary incontinence      (+) no peripheral neuropathy  (-) no seizures and no CVA   Cardiovascular:     (+) Dyslipidemia ----- (-) hypertension, CAD and arrhythmias   METS/Exercise Tolerance:     Hematologic:       Musculoskeletal:       GI/Hepatic:    (-) GERD   Renal/Genitourinary:     (+) renal disease, type: ARF,     Endo:    (-) Type II DM and thyroid disease   Psychiatric/Substance Use:       Infectious Disease:    (-) Recent Fever   Malignancy:       Other: Comment: Mild hyponatremia - 133           Physical Exam    Airway  airway exam normal      Mallampati: III   TM distance: > 3 FB   Neck ROM: full   Mouth opening: > 3 cm    Respiratory Devices and Support         Dental  no notable dental history     (+) caps      Cardiovascular    cardiovascular exam normal          Pulmonary   pulmonary exam normal                  Recent Results (from the past 744 hour(s))   MR Thoracic Spine w/o & w Contrast   Result Value    Radiologist flags Acute spinal cord compression/injury (AA)    Narrative    EXAM: MR THORACIC SPINE W/O and W CONTRAST  LOCATION: [facility]  DATE/TIME: 12/18/2022 11:05 AM    INDICATION: Back pain, neuro deficit  COMPARISON: None.  CONTRAST: 9 mL Gadavist  TECHNIQUE: Routine Thoracic Spine MRI without and with IV contrast.    FINDINGS: There is moderate patchy nonspecific edema-like marrow signal change involving the T7 and T12 vertebral bodies with suggestion of possible subtle transversely oriented hypointense signal abnormality in the mid aspect of the T7 and T12 vertebral   bodies. There is mild to moderate T7 and T12 vertebral body height loss. Recent T7 and T12 compression fractures cannot be excluded and are possible. Mild retropulsion of the posterior superior endplate of T12 along the ventral aspect of the spinal   canal. No significant fracture fragment retropulsion at the T7 level. Mild to moderate T5 vertebral body height loss, mild to moderate T9 vertebral height loss, and mild multilevel vertebral height loss elsewhere, presumably representing older   compression deformities with superimposed multilevel Schmorl's nodes.    Sagittal alignment appears normal. Scattered presumed Modic type I degenerative endplate signal changes seen in the mid to lower thoracic spine. Scattered Modic type II degenerative endplate signal changes are also noted. Mild scattered patchy edema   centered about the facet joints at multiple levels, nonspecific, but potentially degenerative in nature.    There is a large longitudinally extensive dorsal epidural fluid collection that extends from the level of T4-T5 into the lumbar region, measuring up to approximately 11-12 mm in anterior-posterior thickness at the level of  T12-L1. There is significant   associated mass effect upon and flattening of the spinal cord particularly from the level of T6-T7 into the visualized upper lumbar region, with areas of moderate to severe and severe spinal canal stenosis and spinal cord compression. Correlating with   lumbar spine MRI of same day, the dorsal epidural collection appears to extend down to the level of L2-L3. This lesion demonstrates prominent peripheral enhancement with some areas of enhancing internal material/septation. There is also diffuse dural   thickening and enhancement elsewhere throughout much of the thoracic and visualized upper lumbar spine.    Mild/moderate multilevel degenerative disc height loss is present. Multilevel disc bulges are seen with marginal endplate osteophytes. Multilevel degenerative facet disease, most pronounced in the mid to lower thoracic spine and at least moderate or   moderate to severe in degree at multiple levels. Mild/moderate multilevel neural foraminal narrowing is present on a degenerative basis.      Impression    IMPRESSION:  1.  Large longitudinally extensive dorsal epidural fluid collection extending from T4-T5 down to L2-L3 with prominent peripheral enhancement, resulting in diffuse moderate to severe/severe spinal canal stenosis and spinal cord compression. Primary   differential considerations would include epidural hematoma or epidural abscess. Recommend urgent spine surgery consultation.  2.  Patchy nonspecific edema-like marrow signal change and mild to moderate height loss involving the T7 and T12 vertebral bodies. Recent compression fractures are not excluded. Older mild/mild to moderate thoracic compression deformities elsewhere.  3.  Multilevel degenerative changes.    Findings were discussed with Dr. Matute by myself at approximately 11:32 AM on 12/18/2022      [Critical Result: Acute spinal cord compression/injury]    Finding was identified on 12/18/2022 11:19 AM.       Matute was contacted by me on 12/18/2022 11:32 AM and verbalized understanding of the critical result.    MR Lumbar Spine w/o & w Contrast    Narrative    EXAM: MR LUMBAR SPINE WITHOUT AND WITH CONTRAST  LOCATION: [facility]  DATE/TIME: 12/18/2022, 11:08 AM    INDICATION: Back pain, suspected cauda equina syndrome.  COMPARISON: Thoracic spine MRI same day.  CONTRAST: 9 mL Gadavist.  TECHNIQUE: Routine Lumbar Spine MRI without and with IV contrast.    FINDINGS: Nomenclature is based on five lumbar vertebral bodies. Age-indeterminate T12 compression deformity with mild to moderate height loss and patchy edema-like marrow signal change in the T12 vertebral body with slight retropulsion of the   posterosuperior endplate along the ventral epidural space. Probable chronic mild to moderate L4 superior endplate compression deformity/Schmorl's node. Otherwise normal lumbar vertebral body heights. Sagittal alignment is normal.    Correlating with thoracic spine MRI of same day, there is a large longitudinally extensive dorsal epidural fluid collection that extends from approximately the level of T4-T5 down to the level of L2-L3. This is most pronounced in the lumbar region at the   level of T12-L1 where it measures up to 11-12 mm in anterior-posterior thickness. There is severe associated mass effect on the distal spinal cord with evidence for cord compression from the lower thoracic region down to the level of L1-L2.    Scattered areas of disc desiccation are noted. Minimal disc height loss at L3-L4. The other lumbar intervertebral discs are relatively maintained in height. There is mild to moderate multilevel degenerative facet arthropathy. As stated above, severe   spinal canal stenosis at T11-T12, T12-L1, and L1-L2. Mild spinal canal stenosis at L2-L3, L3-L4 and L4-L5. No high-grade neural foraminal stenosis identified.    There appears to be a fluid collection in the left retroperitoneal space  that is only partially imaged along the lateral aspect of the left psoas muscle that measures at least 62 mm x 26 mm in the axial plane with irregular peripheral enhancement. There   is abnormal patchy enhancement marginating the left psoas muscle and surrounding the fluid collection in the left retroperitoneal space.      Impression    IMPRESSION:  1.  Longitudinally extensive dorsal epidural fluid collection in the thoracolumbar spine extending down to the level of L2-L3 with severe associated mass effect on the spinal cord/spinal cord compression, as described. Primary differential considerations   would include epidural abscess or epidural hematoma. Recommend clinical correlation and urgent neurosurgery consultation.  2.  Partial visualization of a nonspecific fluid collection with peripheral enhancement in the left retroperitoneal space lateral to the left psoas muscle measuring up to 62 mm in the axial plane, as described. This also may represent an abscess or   hematoma. Consider CT of the abdomen and pelvis with contrast for further evaluation.  3.  Degenerative changes, as described.    Preliminary findings regarding the thoracolumbar epidural fluid collection were discussed with Dr. Matute by myself at approximately 11:32 AM on 12/18/2022. Additional findings including a prominent left retroperitoneal fluid collection was discussed   with Dr. Matute at approximately 11:49 AM on 12/18/2022.     CT Abdomen Pelvis w Contrast    Narrative    EXAM: CT ABDOMEN PELVIS W CONTRAST  LOCATION: [facility]  DATE/TIME: 12/18/2022 12:51 PM    INDICATION: Evaluate fluid collection seen on MRI.  COMPARISON: Spinal MRI 12/18/2022.  TECHNIQUE: CT scan of the abdomen and pelvis was performed following injection of IV contrast. Multiplanar reformats were obtained. Dose reduction techniques were used.  CONTRAST: 95mL Isovue 370    FINDINGS:   LOWER CHEST: Lingular and lower lobe atelectasis.  Questionable trace left pleural effusion. Small hiatal hernia.    HEPATOBILIARY: Normal.    PANCREAS: Normal.    SPLEEN: Normal.    ADRENAL GLANDS: Normal.    KIDNEYS/BLADDER: Normal kidneys. No hydronephrosis. Excreted contrast from the recent MRI is present throughout the collecting system as well as in the urinary bladder. Urinary bladder contains a Dacosta catheter.    BOWEL: No obstruction or inflammation. Appendix is not visualized.    LYMPH NODES: No enlarged lymph nodes.    VASCULATURE: Retroaortic left renal vein. Patent portal, splenic, and superior mesenteric veins. No abdominal aortic aneurysm.     PERITONEUM/RETROPERITONEUM: Small amount of ill-defined left retroperitoneal fluid extending along the left posterior pararenal space and paracolic gutter into the pelvis. Trace pelvic fluid.     PELVIC ORGANS: Normal.    MUSCULOSKELETAL: Mild compression deformity of the T12 vertebral body. L3 vertebral body hemangioma. Multilevel degenerative changes of the spine with multiple Schmorl's nodes. Known epidural fluid collection is not well visualized. Small left femoral   bone island.      Impression    IMPRESSION:     1.  Small amount of ill-defined simple left retroperitoneal fluid extending along the left posterior pararenal space and paracolic gutter into the pelvis, of uncertain etiology. Sequela of a previously ruptured calyx may be a possibility if there was   previous hydronephrosis from the reported urinary retention. No current hydronephrosis or evidence of urine leak.       OUTSIDE LABS:  CBC:   Lab Results   Component Value Date    WBC 14.8 (H) 12/18/2022    WBC 4.4 10/27/2021    HGB 12.9 (L) 12/18/2022    HGB 13.8 10/27/2021    HCT 39.5 (L) 12/18/2022    HCT 41.7 10/27/2021     12/18/2022     10/27/2021     BMP:   Lab Results   Component Value Date     (L) 12/18/2022     10/27/2021    POTASSIUM 3.9 12/18/2022    POTASSIUM 3.9 10/27/2021    CHLORIDE 94 (L) 12/18/2022     CHLORIDE 106 10/27/2021    CO2 23 12/18/2022    CO2 28 10/27/2021    BUN 17.5 12/18/2022    BUN 11 10/27/2021    CR 1.69 (H) 12/18/2022    CR 0.98 10/27/2021     (H) 12/18/2022    GLC 77 10/27/2021     COAGS: No results found for: PTT, INR, FIBR  POC: No results found for: BGM, HCG, HCGS  HEPATIC:   Lab Results   Component Value Date    ALBUMIN 3.8 10/27/2021    PROTTOTAL 7.6 10/27/2021    ALT 72 (H) 10/27/2021    AST 48 (H) 10/27/2021    GGT 55 03/29/2010    ALKPHOS 55 10/27/2021    BILITOTAL 0.8 10/27/2021     OTHER:   Lab Results   Component Value Date    LACT 0.9 12/18/2022    LINA 9.6 12/18/2022    AMYLASE 60 03/29/2010    .69 (H) 12/18/2022    SED 45 (H) 12/18/2022       Anesthesia Plan    ASA Status:  2   NPO Status:  NPO Appropriate    Anesthesia Type: General.     - Airway: ETT   Induction: Intravenous.   Maintenance: Balanced.   Techniques and Equipment:     - Airway: Video-Laryngoscope       - Drips/Meds: Ketamine     Consents    Anesthesia Plan(s) and associated risks, benefits, and realistic alternatives discussed. Questions answered and patient/representative(s) expressed understanding.    - Discussed:     - Discussed with:  Patient         Postoperative Care    Pain management: IV analgesics, Oral pain medications.   PONV prophylaxis: Ondansetron (or other 5HT-3), Dexamethasone or Solumedrol, Background Propofol Infusion     Comments:    Other Comments: Phenylephrine gtt prn             Raghu Leonard MD

## 2022-12-18 NOTE — CONSULTS
[facility]    Neurosurgery Consultation     Date of Admission:  12/18/2022  Date of Consult (When I saw the patient): 12/18/22    Assessment & Plan   Ian Moncada is a 57 year old male who was admitted on 12/18/2022. Ian Moncada is a 57 year old male with no significant PMHx presented with 2-3 days of LLE paresthesias, weakness, urinary incontinence, urinary retention with imaging evidence as demonstrated below. Patient historian and reliable. Patient thought he pulled his back out 6 days ago and had low back pain. He went to Urgent Care and received pain medications and muscle relaxants which assisted with back pain. He no longer has complaints of back pain. 2-3 days ago he started noticing paresthesias into left groin, anterolateral thigh, diffusely from left knee and into foot and toes with associated weakness bilateral hip flexion and left knee. Denies paresthesias into RLE. Admits to urinary retention and urinary leaking with movement. These symptoms have never occurred below. Admits to multiple falls due to bilateral hip weakness. Denies recent travel, fevers, infectious s/s, no IVDU.     EXAM: MR THORACIC SPINE W/O and W CONTRAST  LOCATION: [facility]  DATE/TIME: 12/18/2022 11:05 AM     INDICATION: Back pain, neuro deficit  COMPARISON: None.  CONTRAST: 9 mL Gadavist  TECHNIQUE: Routine Thoracic Spine MRI without and with IV contrast.     FINDINGS: There is moderate patchy nonspecific edema-like marrow signal change involving the T7 and T12 vertebral bodies with suggestion of possible subtle transversely oriented hypointense signal abnormality in the mid aspect of the T7 and T12 vertebral   bodies. There is mild to moderate T7 and T12 vertebral body height loss. Recent T7 and T12 compression fractures cannot be excluded and are possible. Mild retropulsion of the posterior superior endplate of T12 along the ventral aspect of the spinal   canal. No significant  fracture fragment retropulsion at the T7 level. Mild to moderate T5 vertebral body height loss, mild to moderate T9 vertebral height loss, and mild multilevel vertebral height loss elsewhere, presumably representing older   compression deformities with superimposed multilevel Schmorl's nodes.     Sagittal alignment appears normal. Scattered presumed Modic type I degenerative endplate signal changes seen in the mid to lower thoracic spine. Scattered Modic type II degenerative endplate signal changes are also noted. Mild scattered patchy edema   centered about the facet joints at multiple levels, nonspecific, but potentially degenerative in nature.     There is a large longitudinally extensive dorsal epidural fluid collection that extends from the level of T4-T5 into the lumbar region, measuring up to approximately 11-12 mm in anterior-posterior thickness at the level of T12-L1. There is significant   associated mass effect upon and flattening of the spinal cord particularly from the level of T6-T7 into the visualized upper lumbar region, with areas of moderate to severe and severe spinal canal stenosis and spinal cord compression. Correlating with   lumbar spine MRI of same day, the dorsal epidural collection appears to extend down to the level of L2-L3. This lesion demonstrates prominent peripheral enhancement with some areas of enhancing internal material/septation. There is also diffuse dural   thickening and enhancement elsewhere throughout much of the thoracic and visualized upper lumbar spine.     Mild/moderate multilevel degenerative disc height loss is present. Multilevel disc bulges are seen with marginal endplate osteophytes. Multilevel degenerative facet disease, most pronounced in the mid to lower thoracic spine and at least moderate or   moderate to severe in degree at multiple levels. Mild/moderate multilevel neural foraminal narrowing is present on a degenerative basis.                                                                       IMPRESSION:  1.  Large longitudinally extensive dorsal epidural fluid collection extending from T4-T5 down to L2-L3 with prominent peripheral enhancement, resulting in diffuse moderate to severe/severe spinal canal stenosis and spinal cord compression. Primary   differential considerations would include epidural hematoma or epidural abscess. Recommend urgent spine surgery consultation.  2.  Patchy nonspecific edema-like marrow signal change and mild to moderate height loss involving the T7 and T12 vertebral bodies. Recent compression fractures are not excluded. Older mild/mild to moderate thoracic compression deformities elsewhere.  3.  Multilevel degenerative changes.     Findings were discussed with Dr. Matute by myself at approximately 11:32 AM on 12/18/2022        [Critical Result: Acute spinal cord compression/injury]     Finding was identified on 12/18/2022 11:19 AM.      Dr. Matute was contacted by me on 12/18/2022 11:32 AM and verbalized understanding of the critical result.       EXAM: MR LUMBAR SPINE WITHOUT AND WITH CONTRAST  LOCATION: [facility]  DATE/TIME: 12/18/2022, 11:08 AM     INDICATION: Back pain, suspected cauda equina syndrome.  COMPARISON: Thoracic spine MRI same day.  CONTRAST: 9 mL Gadavist.  TECHNIQUE: Routine Lumbar Spine MRI without and with IV contrast.     FINDINGS: Nomenclature is based on five lumbar vertebral bodies. Age-indeterminate T12 compression deformity with mild to moderate height loss and patchy edema-like marrow signal change in the T12 vertebral body with slight retropulsion of the   posterosuperior endplate along the ventral epidural space. Probable chronic mild to moderate L4 superior endplate compression deformity/Schmorl's node. Otherwise normal lumbar vertebral body heights. Sagittal alignment is normal.     Correlating with thoracic spine MRI of same day, there is a large longitudinally extensive dorsal  epidural fluid collection that extends from approximately the level of T4-T5 down to the level of L2-L3. This is most pronounced in the lumbar region at the   level of T12-L1 where it measures up to 11-12 mm in anterior-posterior thickness. There is severe associated mass effect on the distal spinal cord with evidence for cord compression from the lower thoracic region down to the level of L1-L2.     Scattered areas of disc desiccation are noted. Minimal disc height loss at L3-L4. The other lumbar intervertebral discs are relatively maintained in height. There is mild to moderate multilevel degenerative facet arthropathy. As stated above, severe   spinal canal stenosis at T11-T12, T12-L1, and L1-L2. Mild spinal canal stenosis at L2-L3, L3-L4 and L4-L5. No high-grade neural foraminal stenosis identified.     There appears to be a fluid collection in the left retroperitoneal space that is only partially imaged along the lateral aspect of the left psoas muscle that measures at least 62 mm x 26 mm in the axial plane with irregular peripheral enhancement. There   is abnormal patchy enhancement marginating the left psoas muscle and surrounding the fluid collection in the left retroperitoneal space.                                                                      IMPRESSION:  1.  Longitudinally extensive dorsal epidural fluid collection in the thoracolumbar spine extending down to the level of L2-L3 with severe associated mass effect on the spinal cord/spinal cord compression, as described. Primary differential considerations   would include epidural abscess or epidural hematoma. Recommend clinical correlation and urgent neurosurgery consultation.  2.  Partial visualization of a nonspecific fluid collection with peripheral enhancement in the left retroperitoneal space lateral to the left psoas muscle measuring up to 62 mm in the axial plane, as described. This also may represent an abscess or   hematoma. Consider CT of  the abdomen and pelvis with contrast for further evaluation.  3.  Degenerative changes, as described.     Preliminary findings regarding the thoracolumbar epidural fluid collection were discussed with Dr. Matute by myself at approximately 11:32 AM on 12/18/2022. Additional findings including a prominent left retroperitoneal fluid collection was discussed   with Dr. Matute at approximately 11:49 AM on 12/18/2022.    Clinical history, imaging and plans reviewed myself as well as with Dr. Courtney, patient, wife, EDMD. Plan for emergent transfer to [facility], [place] for thoracolumbar decompression with Dr. Courtney. DO NOT GIVE ABX. Transfer in process, patient has been NPO. All in agreement.     I have discussed the following assessment and plan with Dr. Courtney who is in agreement with the initial plan and will follow up with further consultation recommendations.    Kassandra Hartman PA-C  [facility]  [address] [address]    Tel [phone]  Pager [phone]      Code Status    No Order    Reason for Consult   Reason for consult: I was asked by Dr. Matute to evaluate this patient for imaging findings.    Primary Care Physician   Physician No Ref-Primary    Chief Complaint   Weakness, paresthesias, urinary incontinence, urinary retention     History is obtained from the patient, electronic health record and emergency department physician    History of Present Illness   Ian Moncada is a 57 year old male with no significant PMHx presented with 2-3 days of LLE paresthesias, weakness, urinary incontinence, urinary retention with imaging evidence as demonstrated below. Patient historian and reliable. Patient thought he pulled his back out 6 days ago and had low back pain. He went to Urgent Care and received pain medications and muscle relaxants which assisted with back pain. He no longer has complaints of back pain. 2-3 days ago he started noticing  paresthesias into left groin, anterolateral thigh, diffusely from left knee and into foot and toes with associated weakness bilateral hip flexion and left knee. Denies paresthesias into RLE. Admits to urinary retention and urinary leaking with movement. These symptoms have never occurred below. Admits to multiple falls due to bilateral hip weakness. Denies recent travel, fevers, infectious s/s, no IVDU.     EXAM: MR THORACIC SPINE W/O and W CONTRAST  LOCATION: [facility]  DATE/TIME: 12/18/2022 11:05 AM     INDICATION: Back pain, neuro deficit  COMPARISON: None.  CONTRAST: 9 mL Gadavist  TECHNIQUE: Routine Thoracic Spine MRI without and with IV contrast.     FINDINGS: There is moderate patchy nonspecific edema-like marrow signal change involving the T7 and T12 vertebral bodies with suggestion of possible subtle transversely oriented hypointense signal abnormality in the mid aspect of the T7 and T12 vertebral   bodies. There is mild to moderate T7 and T12 vertebral body height loss. Recent T7 and T12 compression fractures cannot be excluded and are possible. Mild retropulsion of the posterior superior endplate of T12 along the ventral aspect of the spinal   canal. No significant fracture fragment retropulsion at the T7 level. Mild to moderate T5 vertebral body height loss, mild to moderate T9 vertebral height loss, and mild multilevel vertebral height loss elsewhere, presumably representing older   compression deformities with superimposed multilevel Schmorl's nodes.     Sagittal alignment appears normal. Scattered presumed Modic type I degenerative endplate signal changes seen in the mid to lower thoracic spine. Scattered Modic type II degenerative endplate signal changes are also noted. Mild scattered patchy edema   centered about the facet joints at multiple levels, nonspecific, but potentially degenerative in nature.     There is a large longitudinally extensive dorsal epidural fluid  collection that extends from the level of T4-T5 into the lumbar region, measuring up to approximately 11-12 mm in anterior-posterior thickness at the level of T12-L1. There is significant   associated mass effect upon and flattening of the spinal cord particularly from the level of T6-T7 into the visualized upper lumbar region, with areas of moderate to severe and severe spinal canal stenosis and spinal cord compression. Correlating with   lumbar spine MRI of same day, the dorsal epidural collection appears to extend down to the level of L2-L3. This lesion demonstrates prominent peripheral enhancement with some areas of enhancing internal material/septation. There is also diffuse dural   thickening and enhancement elsewhere throughout much of the thoracic and visualized upper lumbar spine.     Mild/moderate multilevel degenerative disc height loss is present. Multilevel disc bulges are seen with marginal endplate osteophytes. Multilevel degenerative facet disease, most pronounced in the mid to lower thoracic spine and at least moderate or   moderate to severe in degree at multiple levels. Mild/moderate multilevel neural foraminal narrowing is present on a degenerative basis.                                                                      IMPRESSION:  1.  Large longitudinally extensive dorsal epidural fluid collection extending from T4-T5 down to L2-L3 with prominent peripheral enhancement, resulting in diffuse moderate to severe/severe spinal canal stenosis and spinal cord compression. Primary   differential considerations would include epidural hematoma or epidural abscess. Recommend urgent spine surgery consultation.  2.  Patchy nonspecific edema-like marrow signal change and mild to moderate height loss involving the T7 and T12 vertebral bodies. Recent compression fractures are not excluded. Older mild/mild to moderate thoracic compression deformities elsewhere.  3.  Multilevel degenerative  changes.     Findings were discussed with Dr. Matute by myself at approximately 11:32 AM on 12/18/2022        [Critical Result: Acute spinal cord compression/injury]     Finding was identified on 12/18/2022 11:19 AM.      Dr. Matute was contacted by me on 12/18/2022 11:32 AM and verbalized understanding of the critical result.       EXAM: MR LUMBAR SPINE WITHOUT AND WITH CONTRAST  LOCATION: [facility]  DATE/TIME: 12/18/2022, 11:08 AM     INDICATION: Back pain, suspected cauda equina syndrome.  COMPARISON: Thoracic spine MRI same day.  CONTRAST: 9 mL Gadavist.  TECHNIQUE: Routine Lumbar Spine MRI without and with IV contrast.     FINDINGS: Nomenclature is based on five lumbar vertebral bodies. Age-indeterminate T12 compression deformity with mild to moderate height loss and patchy edema-like marrow signal change in the T12 vertebral body with slight retropulsion of the   posterosuperior endplate along the ventral epidural space. Probable chronic mild to moderate L4 superior endplate compression deformity/Schmorl's node. Otherwise normal lumbar vertebral body heights. Sagittal alignment is normal.     Correlating with thoracic spine MRI of same day, there is a large longitudinally extensive dorsal epidural fluid collection that extends from approximately the level of T4-T5 down to the level of L2-L3. This is most pronounced in the lumbar region at the   level of T12-L1 where it measures up to 11-12 mm in anterior-posterior thickness. There is severe associated mass effect on the distal spinal cord with evidence for cord compression from the lower thoracic region down to the level of L1-L2.     Scattered areas of disc desiccation are noted. Minimal disc height loss at L3-L4. The other lumbar intervertebral discs are relatively maintained in height. There is mild to moderate multilevel degenerative facet arthropathy. As stated above, severe   spinal canal stenosis at T11-T12, T12-L1, and L1-L2.  Mild spinal canal stenosis at L2-L3, L3-L4 and L4-L5. No high-grade neural foraminal stenosis identified.     There appears to be a fluid collection in the left retroperitoneal space that is only partially imaged along the lateral aspect of the left psoas muscle that measures at least 62 mm x 26 mm in the axial plane with irregular peripheral enhancement. There   is abnormal patchy enhancement marginating the left psoas muscle and surrounding the fluid collection in the left retroperitoneal space.                                                                      IMPRESSION:  1.  Longitudinally extensive dorsal epidural fluid collection in the thoracolumbar spine extending down to the level of L2-L3 with severe associated mass effect on the spinal cord/spinal cord compression, as described. Primary differential considerations   would include epidural abscess or epidural hematoma. Recommend clinical correlation and urgent neurosurgery consultation.  2.  Partial visualization of a nonspecific fluid collection with peripheral enhancement in the left retroperitoneal space lateral to the left psoas muscle measuring up to 62 mm in the axial plane, as described. This also may represent an abscess or   hematoma. Consider CT of the abdomen and pelvis with contrast for further evaluation.  3.  Degenerative changes, as described.     Preliminary findings regarding the thoracolumbar epidural fluid collection were discussed with Dr. Matute by myself at approximately 11:32 AM on 12/18/2022. Additional findings including a prominent left retroperitoneal fluid collection was discussed   with Dr. Matute at approximately 11:49 AM on 12/18/2022.    Past Medical History   I have reviewed this patient's medical history and updated it with pertinent information if needed.   Past Medical History:   Diagnosis Date     External hemorrhoids with other complication 7/25/2008    Banding 2007.     Hyperlipidemia LDL goal <130 8/12/2010      Hypertriglyceridemia 2010     Impaired fasting glucose 2009       Past Surgical History   I have reviewed this patient's surgical history and updated it with pertinent information if needed.  Past Surgical History:   Procedure Laterality Date     TONSILLECTOMY  1970s       Prior to Admission Medications   Prior to Admission Medications   Prescriptions Last Dose Informant Patient Reported? Taking?   MULTIVITAMIN TABS   OR   Yes No   Si TABLET DAILY   fish oil-omega-3 fatty acids (OMEGA 3) 1000 MG capsule   Yes No   Sig: Take 2 capsules by mouth daily.      Facility-Administered Medications Last Administration Doses Remaining   sodium chloride (PF) 0.9% PF flush 3 mL None recorded         Allergies   Allergies   Allergen Reactions     Walnuts [Nuts]        Social History   I have reviewed this patient's social history and updated it with pertinent information if needed. Ian Moncada  reports that he has never smoked. He has never used smokeless tobacco. He reports current alcohol use of about 7.0 standard drinks per week. He reports that he does not use drugs.    Family History   I have reviewed this patient's family history and updated it with pertinent information if needed.   Family History   Problem Relation Age of Onset     Heart Disease Paternal Grandfather      Neurologic Disorder Father         Guillaine Lynbrook     Heart Disease Mother         quad bipass     Heart Disease Maternal Grandfather      Heart Disease Maternal Uncle      Cancer - colorectal Maternal Grandmother      Heart Disease Maternal Aunt        Review of Systems    ROS: 10 point ROS neg other than the symptoms noted above in the HPI.      Physical Exam   Temp: 98.4  F (36.9  C) Temp src: Oral BP: (!) 144/116 Pulse: 83   Resp: 18 SpO2: 99 % O2 Device: None (Room air)    Vital Signs with Ranges  Temp:  [98.4  F (36.9  C)] 98.4  F (36.9  C)  Pulse:  [77-83] 83  Resp:  [18] 18  BP: (144-176)/(105-116) 144/116  SpO2:  [97 %-100 %] 99  %  195 lbs 0 oz     , Blood pressure (!) 144/116, pulse 83, temperature 98.4  F (36.9  C), temperature source Oral, resp. rate 18, height 6' (1.829 m), weight 195 lb (88.5 kg), SpO2 99 %.  195 lbs 0 oz    NEUROLOGICAL EXAMINATION:   Mental status:  Alert and Oriented x 3, speech is fluent.  Cranial nerves:  II-XII intact.   Motor:    Shoulder Abduction:  Right:  5/5   Left:  5/5  Biceps:                      Right:  5/5   Left:  5/5  Triceps:                     Right:  5/5   Left:  5/5  Wrist Extensors:       Right:  5/5   Left:  5/5  Wrist Flexors:           Right:  5/5   Left:  5/5  interosseus :            Right:  5/5   Left:  5/5    BL hip flexion 2/5  Left knee extension 3+/5; knee flexion 3+/5  Right knee flex and ex, PF, DF 5/5   Left PF and DF 5/5     Sensation:  Decreased sensation along left anterolateral thigh, diffusely from knee and into foot and all toes on left. Sensation intact RLE   Reflexes:    Negative Clonus.        Thoracic and Lumbar examination reveals no tenderness of the spine or paraspinous muscles.      Data   All new lab and imaging data was personally reviewed by me.    EXAM: MR THORACIC SPINE W/O and W CONTRAST  LOCATION: [facility]  DATE/TIME: 12/18/2022 11:05 AM     INDICATION: Back pain, neuro deficit  COMPARISON: None.  CONTRAST: 9 mL Gadavist  TECHNIQUE: Routine Thoracic Spine MRI without and with IV contrast.     FINDINGS: There is moderate patchy nonspecific edema-like marrow signal change involving the T7 and T12 vertebral bodies with suggestion of possible subtle transversely oriented hypointense signal abnormality in the mid aspect of the T7 and T12 vertebral   bodies. There is mild to moderate T7 and T12 vertebral body height loss. Recent T7 and T12 compression fractures cannot be excluded and are possible. Mild retropulsion of the posterior superior endplate of T12 along the ventral aspect of the spinal   canal. No significant fracture fragment  retropulsion at the T7 level. Mild to moderate T5 vertebral body height loss, mild to moderate T9 vertebral height loss, and mild multilevel vertebral height loss elsewhere, presumably representing older   compression deformities with superimposed multilevel Schmorl's nodes.     Sagittal alignment appears normal. Scattered presumed Modic type I degenerative endplate signal changes seen in the mid to lower thoracic spine. Scattered Modic type II degenerative endplate signal changes are also noted. Mild scattered patchy edema   centered about the facet joints at multiple levels, nonspecific, but potentially degenerative in nature.     There is a large longitudinally extensive dorsal epidural fluid collection that extends from the level of T4-T5 into the lumbar region, measuring up to approximately 11-12 mm in anterior-posterior thickness at the level of T12-L1. There is significant   associated mass effect upon and flattening of the spinal cord particularly from the level of T6-T7 into the visualized upper lumbar region, with areas of moderate to severe and severe spinal canal stenosis and spinal cord compression. Correlating with   lumbar spine MRI of same day, the dorsal epidural collection appears to extend down to the level of L2-L3. This lesion demonstrates prominent peripheral enhancement with some areas of enhancing internal material/septation. There is also diffuse dural   thickening and enhancement elsewhere throughout much of the thoracic and visualized upper lumbar spine.     Mild/moderate multilevel degenerative disc height loss is present. Multilevel disc bulges are seen with marginal endplate osteophytes. Multilevel degenerative facet disease, most pronounced in the mid to lower thoracic spine and at least moderate or   moderate to severe in degree at multiple levels. Mild/moderate multilevel neural foraminal narrowing is present on a degenerative basis.                                                                       IMPRESSION:  1.  Large longitudinally extensive dorsal epidural fluid collection extending from T4-T5 down to L2-L3 with prominent peripheral enhancement, resulting in diffuse moderate to severe/severe spinal canal stenosis and spinal cord compression. Primary   differential considerations would include epidural hematoma or epidural abscess. Recommend urgent spine surgery consultation.  2.  Patchy nonspecific edema-like marrow signal change and mild to moderate height loss involving the T7 and T12 vertebral bodies. Recent compression fractures are not excluded. Older mild/mild to moderate thoracic compression deformities elsewhere.  3.  Multilevel degenerative changes.     Findings were discussed with Dr. Matute by myself at approximately 11:32 AM on 12/18/2022        [Critical Result: Acute spinal cord compression/injury]     Finding was identified on 12/18/2022 11:19 AM.      Dr. Matute was contacted by me on 12/18/2022 11:32 AM and verbalized understanding of the critical result.       EXAM: MR LUMBAR SPINE WITHOUT AND WITH CONTRAST  LOCATION: [facility]  DATE/TIME: 12/18/2022, 11:08 AM     INDICATION: Back pain, suspected cauda equina syndrome.  COMPARISON: Thoracic spine MRI same day.  CONTRAST: 9 mL Gadavist.  TECHNIQUE: Routine Lumbar Spine MRI without and with IV contrast.     FINDINGS: Nomenclature is based on five lumbar vertebral bodies. Age-indeterminate T12 compression deformity with mild to moderate height loss and patchy edema-like marrow signal change in the T12 vertebral body with slight retropulsion of the   posterosuperior endplate along the ventral epidural space. Probable chronic mild to moderate L4 superior endplate compression deformity/Schmorl's node. Otherwise normal lumbar vertebral body heights. Sagittal alignment is normal.     Correlating with thoracic spine MRI of same day, there is a large longitudinally extensive dorsal epidural fluid  collection that extends from approximately the level of T4-T5 down to the level of L2-L3. This is most pronounced in the lumbar region at the   level of T12-L1 where it measures up to 11-12 mm in anterior-posterior thickness. There is severe associated mass effect on the distal spinal cord with evidence for cord compression from the lower thoracic region down to the level of L1-L2.     Scattered areas of disc desiccation are noted. Minimal disc height loss at L3-L4. The other lumbar intervertebral discs are relatively maintained in height. There is mild to moderate multilevel degenerative facet arthropathy. As stated above, severe   spinal canal stenosis at T11-T12, T12-L1, and L1-L2. Mild spinal canal stenosis at L2-L3, L3-L4 and L4-L5. No high-grade neural foraminal stenosis identified.     There appears to be a fluid collection in the left retroperitoneal space that is only partially imaged along the lateral aspect of the left psoas muscle that measures at least 62 mm x 26 mm in the axial plane with irregular peripheral enhancement. There   is abnormal patchy enhancement marginating the left psoas muscle and surrounding the fluid collection in the left retroperitoneal space.                                                                      IMPRESSION:  1.  Longitudinally extensive dorsal epidural fluid collection in the thoracolumbar spine extending down to the level of L2-L3 with severe associated mass effect on the spinal cord/spinal cord compression, as described. Primary differential considerations   would include epidural abscess or epidural hematoma. Recommend clinical correlation and urgent neurosurgery consultation.  2.  Partial visualization of a nonspecific fluid collection with peripheral enhancement in the left retroperitoneal space lateral to the left psoas muscle measuring up to 62 mm in the axial plane, as described. This also may represent an abscess or   hematoma. Consider CT of the abdomen  and pelvis with contrast for further evaluation.  3.  Degenerative changes, as described.     Preliminary findings regarding the thoracolumbar epidural fluid collection were discussed with Dr. Matute by myself at approximately 11:32 AM on 12/18/2022. Additional findings including a prominent left retroperitoneal fluid collection was discussed   with Dr. Matute at approximately 11:49 AM on 12/18/2022.      CBC RESULTS:   Recent Labs   Lab Test 12/18/22  0921   WBC 14.8*   RBC 3.91*   HGB 12.9*   HCT 39.5*   *   MCH 33.0   MCHC 32.7   RDW 12.9        Basic Metabolic Panel:  Lab Results   Component Value Date     12/18/2022     02/02/2021      Lab Results   Component Value Date    POTASSIUM 3.9 12/18/2022    POTASSIUM 3.9 10/27/2021    POTASSIUM 4.2 02/02/2021     Lab Results   Component Value Date    CHLORIDE 94 12/18/2022    CHLORIDE 106 10/27/2021    CHLORIDE 106 02/02/2021     Lab Results   Component Value Date    LINA 9.6 12/18/2022    LINA 9.5 02/02/2021     Lab Results   Component Value Date    CO2 23 12/18/2022    CO2 28 10/27/2021    CO2 28 02/02/2021     Lab Results   Component Value Date    BUN 17.5 12/18/2022    BUN 11 10/27/2021    BUN 14 02/02/2021     Lab Results   Component Value Date    CR 1.69 12/18/2022    CR 1.04 02/02/2021     Lab Results   Component Value Date     12/18/2022    GLC 77 10/27/2021    GLC 88 02/02/2021     INR:  No results found for: INR

## 2022-12-18 NOTE — ED NOTES
Bed: ED18  Expected date: 12/18/22  Expected time: 8:05 AM  Means of arrival:   Comments:  [facility]

## 2022-12-18 NOTE — ANESTHESIA CARE TRANSFER NOTE
Patient: Ian Moncada    Procedure: Procedure(s):  THORACIC LUMBAR DECOMPRESSION FOR EVACUATION AND EPIDURAL ABSCESS VERSUS HEMATOMA       Diagnosis: Accident, initial encounter [X58.XXXA]  Diagnosis Additional Information: No value filed.    Anesthesia Type:   General     Note:    Oropharynx: oropharynx clear of all foreign objects and spontaneously breathing  Level of Consciousness: awake  Oxygen Supplementation: face mask  Level of Supplemental Oxygen (L/min / FiO2): 6  Independent Airway: airway patency satisfactory and stable  Dentition: dentition unchanged  Vital Signs Stable: post-procedure vital signs reviewed and stable  Report to RN Given: handoff report given  Patient transferred to: PACU  Comments: Neuromuscular blockade reversed after TOF 3/4, spontaneous respirations, adequate tidal volumes, followed commands to voice, oropharynx suctioned with soft flexible catheter, extubated atraumatically, extubated with suction, airway patent after extubation.  Oxygen via facemask at 6 liters per minute to PACU. Oxygen tubing connected to wall O2 in PACU, SpO2, NiBP, and EKG monitors and alarms on and functioning, Andres Hugger warmer connected to patient gown, report on patient's clinical status given to PACU RN, RN questions answered.     Handoff Report: Identifed the Patient, Identified the Reponsible Provider, Reviewed the pertinent medical history, Discussed the surgical course, Reviewed Intra-OP anesthesia mangement and issues during anesthesia, Set expectations for post-procedure period and Allowed opportunity for questions and acknowledgement of understanding      Vitals:  Vitals Value Taken Time   /91 12/18/22 1644   Temp     Pulse 89 12/18/22 1646   Resp 15 12/18/22 1646   SpO2 100 % 12/18/22 1646   Vitals shown include unvalidated device data.    Electronically Signed By: SILVA Lewis CRNA  December 18, 2022  4:47 PM

## 2022-12-18 NOTE — OP NOTE
Name of procedure: Minimal thoracic laminectomy for drainage of thoracolumbar epidural abscess; use the operating microscope    Preoperative diagnosis: Extensive epidural fluid collection extending from the upper thoracic spine down to the mid lumbar spine, most prominent with significant spinal cord compression from T12-L1    Postoperative diagnosis: Same    Surgeon: Evans Serna MD    First Assistant: None    Material for the laboratory for examination: Cultures of epidural pus for routine studies    EBL: 10 cc    Indications for the procedure: Patient is an otherwise healthy 57-year-old man admitted earlier to [facility] with a progressive 6-day complaint of back pain associated with progressive difficulty with ambulation, repeated falls and weakness and sensory changes involving his lower extremities.  Thoracic and lumbar MRI scan were performed and showed evidence of a large dorsal epidural fluid collection extending from the mid thoracic spine down to the mid lumbar spine with substantial compression at the thoracolumbar junction of the terminal spinal cord.  The majority of this fluid was located between T11 and L2.  Patient was noted in the emergency room to have profound weakness of his lower extremities and given the findings on the MRI scan was emergently transferred to [facility] for further evaluation and definitive therapy.  On arrival, he was awake alert and oriented cognizant of his significant neurologic deficit.  Iliopsoas were noted to be 2/5 bilaterally.  Quadriceps were 4/5 on the left and 5/5 on the right with significant weakness of the left ankle dorsiflexion rated at 3/5 with 4/5 right ankle dorsiflexion.  On static testing he was found to have at least 5-/5 strength with plantar flexion at the ankles.  Sensation was diminished in his bilateral lower extremities particularly in the left lower extremity.    I reviewed the clinical and radiographic findings at the bedside with  the patient and his wife prior to the start of the surgery.  I explained the differential diagnosis to them as well as management alternatives and risks of surgery.  I told him that risks of surgery include increased neurologic deficit up to and including permanent paraplegia, continued infection, need for additional surgeries reticulate if minimally invasive drainage was ineffective in relieving compression of the spinal cord effectively over this range.  Patient and his wife appear to have a good understanding the situation at the conclusion of our discussion and wished to proceed as I have recommended.    Description of the procedure: Patient was brought emergently to the operating room where is placed under suitable general endotracheal anesthesia and subsequently positioned in the prone position on a lumbar frame.  His back was sterilely prepped and draped over the thoracic and lumbar midline region.  A left paramedian incision approximately 2 cm in length was then made at the T12-L1 level.  Standard Metrix technique was used to place a 1.8 x 5 cm Metrix retractor tube at the inferior portion of the left T12 hemilamina.  Overlying muscle was removed and the lamina was visualized.  A very minimal laminectomy was performed.  There was good access to the ligamentum flavum between the lamina of T12 and L1.  This was fenestrated and opened with immediate return of very cloudy slightly thickened pus which emerged under pressure and filled at least half of the Metrix tube spontaneously.  This was cultured and sent for Gram stain and routine cultures.  Developed opening in the ligamentum flavum was enlarged and the epidural space was irrigated with Ancef containing irrigation.  The pediatric feeding tube was then advanced carefully in a cephalad direction with continued return of mostly liquid appearing pus.  The epidural space dorsally was gently irrigated to approximately 5 or 6 syringeful's of antibiotic irrigation.   There was good return which ultimately was clear.  The DH passage of the feeding tube was easier to pass in a cephalad direction ultimately to approximately 18 cm in length.  Same feeding tube was passed inferiorly with the same result.  Initially some liquid pus was irrigated out but ultimately after several rounds of gentle irrigation the irrigant returned as clear.  There was no significant bleeding noted in the epidural space.  Feeding tube was removed and the wound was again copiously irrigated.  Metrix retractor tube was withdrawn and the wound was closed in layers using interrupted inverted 3-0 Vicryl suture with a running undyed 4-0 Vicryl in a subcuticular stitch for skin.  Sterile dressing was then applied and patient was subsequently returned to supine position where he was awakened extubated and transported to recovery room in stable condition.

## 2022-12-18 NOTE — ED NOTES
Attempted to call report, charge from [facility] to call back to get report.

## 2022-12-18 NOTE — H&P
[facility]    History and Physical  Hospitalist       Date of Admission:  12/18/2022    Assessment & Plan   Ian Moncada is a 57 year old male with no significant past medical history and no prescribed medications who presented to the ED at [facility] with LLE paresthesias, weakness, urinary incontinence and urinary retention for the past 2-3 days. Patient believed that he may have pulled his back out 6 days ago and was experiencing low back pain as well. He cristobal to  and was prescribed pain medication and muscle relaxers that did improve his overall pain. Over the past 2-3 days, he began to notice paresthesia that radiated into the left groin down to his toes with associated weakness. He denies any paresthesia in the RLE. He also has been experiencing urinary retention and incontinence with movement. He has not previously experienced these symptoms. He has had multiple falls due to bilateral hip weakness as well.     Work-up in the ED demonstrated mild hyponatremia with a sodium level of 133, VANGIE with creatinine of 1.69, elevated anion gap, normal lactate at 0.9 and CRP of 295.69. Noted leukocytosis 14.8 with elevated neutrophil count. UA not consistent with infection. COVID negative. Blood cultures were drawn and are pending. COVID negative. CT ABD/Pelvis demonstrated a small amount of ill defined simple left retroperitoneal fluid extending along the left posterior pararenal space and paracolic gutter into the pelvis. No hydronephrosis or evidence of urinary leak. MRI of the lumbar and thoracis spine showed longitudinally extensive dorsal epidural fluid collection in the thoracolumbar spine extending down to the level of L2-3 with severe associated mass effect on the spinal cord/spinal cord compression; differentials include epidural abscess or hematoma. Partial visualization of a nonspecific fluid collection with peripheral enhancement in the left retroperitoneal space lateral to the left  psoas muscle measuring up to 62 mm; may represent abscess or hematoma. Neurosurgery was consulted by the ED provider. Dr. Courtney requested emergent transfer to [facility] to undergo a thoracolumbar decompression. It was recommended to NOT start antibiotics prior to transfer. The patient is currently NPO and will transfer for emergent surgery and further management.      POD #0 S/P Thoracic Lumbar Decompression for Evacuation and Epidural Abscess   Spinal Cord Compression  Leukocytosis  Paresthesia  Urinary Incontinence with Retention   Admit from [facility] ED with worsening low back pain with associated numbness of the bilateral hips, multiple falls, LLE paresthesia and associated urinary incontinence and retention. Imaging concerning for spinal cord compression with extensive dorsal epidural fluid collection in the thoracolumbar spine  extending down to the level of L2-3 with severe associated mass effect on the spinal cord/spinal cord compression; differentials include epidural abscess or hematoma.   -OR today with extensive fluid collection from extending upper thoracic spine down to the mid lumbar spine, most prominent with significant spinal cord compression from T12-L1; noted to have epidural pus. EBL 10 ml.  -Admit to IP  -Neurosurgery consult  -NPO--OR today for emergent thoracolumbar decompression & diet as per NS recommendations post-op  -Noted leukocytosis on labs in ED:   -BC drawn and pending   -UA not consistent with infection   -Noted spinal fluid collection of unclear etiology with elevated CRP and anion gap   -CT ABD with left retroperitoneal fluid extending along the left posterior pararenal   Space with paracolic gutter   -Noted to have epidural abscess during thoracic laminectomy today   -Cultures of epidural pus sent and pending   -Ceftriaxone and Vancomycin started on 12/18   -ID consulted   -Will obtain A1c  -PRN pain meds  -PRN anti-emetics  -BM regimen  -Dacosta catheter placed in ED on  12/18/22   -Catheter cares per protocol  -MIVF  -Therapies when indicated  -Aggressive pulmonary hygiene post-op; IS   -Monitor I&O's  -Neuro checks per NS recommendations; post-op management per NS      Hyponatremia  -Mild; 133  -Monitor lytes      VANGIE  -Baseline renal function 1-1.18  -Creatinine 1.69 today in the setting of acute illness  -IV hydration  -Avoid nephrotoxic agents  -Renally dose all meds  -Avoid NSAIDs  -BMP in AM      COVID-19 status  -COVID negative test on 12/18/22    Clinically Significant Risk Factors Present on Admission         # Hyponatremia: Lowest Na = 133 mmol/L in last 2 days, will monitor as appropriate               # Overweight: Estimated body mass index is 26.45 kg/m  as calculated from the following:    Height as of an earlier encounter on 12/18/22: 1.829 m (6').    Weight as of an earlier encounter on 12/18/22: 88.5 kg (195 lb).           DVT Prophylaxis: Pneumatic Compression Devices  Code Status: Full Code    This patient was discussed with Dr. Dick of the Hospitalist Service who agrees with current plans as outlined above.    Disposition: Anticipate >2 midnight stay for surgery with recommended therapies post.     Joseline Lowe NP  [facility]  Securely message with the [employer] Web Console (learn more here)  Text page via [facility] Paging/Directory       Primary Care Physician   Physician No Ref-Primary    Chief Complaint   Spinal Cord Compression    History is obtained from the patient and review of the EMR.     History of Present Illness   Ian Moncada is a 57 year old male with no significant past medical history and no prescribed medications who presented to the ED at [facility] with LLE paresthesias, weakness, urinary incontinence and urinary retention for the past 2-3 days. Patient believed that he may have pulled his back out 6 days ago and was experiencing low back pain as well. He cristobal to  and was prescribed pain medication and muscle relaxers that did  improve his overall pain. Over the past 2-3 days, he began to notice paresthesia that radiated into the left groin down to his toes with associated weakness. He denies any paresthesia in the RLE. He also has been experiencing urinary retention and incontinence with movement. He has not previously experienced these symptoms. He has had multiple falls due to bilateral hip weakness as well.     Work-up in the ED demonstrated mild hyponatremia with a sodium level of 133, VANGIE with creatinine of 1.69, elevated anion gap, normal lactate at 0.9 and CRP of 295.69. Noted leukocytosis 14.8 with elevated neutrophil count. UA not consistent with infection. COVID negative. Blood cultures were drawn and are pending. COVID negative. CT ABD/Pelvis demonstrated a small amount of ill defined simple left retroperitoneal fluid extending along the left posterior pararenal space and paracolic gutter into the pelvis. No hydronephrosis or evidence of urinary leak. MRI of the lumbar and thoracis spine showed longitudinally extensive dorsal epidural fluid collection in the thoracolumbar spine extending down to the level of L2-3 with severe associated mass effect on the spinal cord/spinal cord compression; differentials include epidural abscess or hematoma. Partial visualization of a nonspecific fluid collection with peripheral enhancement in the left retroperitoneal space lateral to the left psoas muscle measuring up to 62 mm; may represent abscess or hematoma. Neurosurgery was consulted by the ED provider. Dr. Courtney requested emergent transfer to [facility] to undergo a thoracolumbar decompression. It was recommended to NOT start antibiotics prior to transfer. The patient is currently NPO and will transfer for emergent surgery and further management.    Patient is resting in bed with his wife at the bedside. He now has feeling in the LLE and the numbness has significantly improved. He is able to now lift his leg off the bed. No hip numbness  and he states that he can feel the urinary catheter in the bladder. He does endorse constipation for the past week. Reports that he has had no injuries or trauma. He works from home. No recent illnesses. No known IV drug use. No prescribed medications. Denies further complaints.    PAST MEDICAL HISTORY  Past Medical History:   Diagnosis Date     External hemorrhoids with other complication 7/25/2008    Banding 2007.     Hyperlipidemia LDL goal <130 8/12/2010     Hypertriglyceridemia 8/19/2010     Impaired fasting glucose 12/7/2009       PAST SURGICAL HISTORY  Past Surgical History:   Procedure Laterality Date     TONSILLECTOMY  1970s       HOME MEDICATIONS  Prior to Admission medications    Medication Sig Last Dose Taking? Auth Provider Long Term End Date   fish oil-omega-3 fatty acids (OMEGA 3) 1000 MG capsule Take 2 capsules by mouth daily.   Alfredo Fink MD Yes    MULTIVITAMIN TABS   OR 1 TABLET DAILY   Reported, Patient         ALLERGIES  Allergies   Allergen Reactions     Walnuts [Nuts]        SOCIAL HISTORY   reports that he has never smoked. He has never used smokeless tobacco. He reports current alcohol use of about 7.0 standard drinks per week. He reports that he does not use drugs.    FAMILY HISTORY  family history includes Cancer - colorectal in his maternal grandmother; Heart Disease in his maternal aunt, maternal grandfather, maternal uncle, mother, and paternal grandfather; Neurologic Disorder in his father.    REVIEW OF SYSTEMS  A 10 point ROS was negative other than the symptoms noted above in the HPI.    Physical Exam   Nursing Notes Reviewed.  There were no vitals taken for this visit.   General:  Appears stated age in no acute distress.  Skin:  Warm, dry. No rashes or lesions on exposed skin.  HEENT:  Normocephalic, atraumatic; EOMs grossly intact.  Neck:  Supple.  Chest:  Breath sounds CTA and no increased work of breathing.  Cardiovascular:  RRR, no rub or murmur. No peripheral  edema.  Abdomen:  Soft, non-tender, non-distended.  : Dacosta catheter draining clear, yellow urine  Musculoskeletal:  Moves all four extremities.  Neurological:  CN 2-12 grossly intact. Improved sensation in the LLE. Strength equal throughout.     Data   Data reviewed today:  I personally reviewed   Recent Labs   Lab 12/18/22  0921   WBC 14.8*   HGB 12.9*   *      *   POTASSIUM 3.9   CHLORIDE 94*   CO2 23   BUN 17.5   CR 1.69*   ANIONGAP 16*   LINA 9.6   *       Imaging:  Recent Results (from the past 24 hour(s))   MR Thoracic Spine w/o & w Contrast   Result Value    Radiologist flags Acute spinal cord compression/injury (AA)    Narrative    EXAM: MR THORACIC SPINE W/O and W CONTRAST  LOCATION: [facility]  DATE/TIME: 12/18/2022 11:05 AM    INDICATION: Back pain, neuro deficit  COMPARISON: None.  CONTRAST: 9 mL Gadavist  TECHNIQUE: Routine Thoracic Spine MRI without and with IV contrast.    FINDINGS: There is moderate patchy nonspecific edema-like marrow signal change involving the T7 and T12 vertebral bodies with suggestion of possible subtle transversely oriented hypointense signal abnormality in the mid aspect of the T7 and T12 vertebral   bodies. There is mild to moderate T7 and T12 vertebral body height loss. Recent T7 and T12 compression fractures cannot be excluded and are possible. Mild retropulsion of the posterior superior endplate of T12 along the ventral aspect of the spinal   canal. No significant fracture fragment retropulsion at the T7 level. Mild to moderate T5 vertebral body height loss, mild to moderate T9 vertebral height loss, and mild multilevel vertebral height loss elsewhere, presumably representing older   compression deformities with superimposed multilevel Schmorl's nodes.    Sagittal alignment appears normal. Scattered presumed Modic type I degenerative endplate signal changes seen in the mid to lower thoracic spine. Scattered Modic type II  degenerative endplate signal changes are also noted. Mild scattered patchy edema   centered about the facet joints at multiple levels, nonspecific, but potentially degenerative in nature.    There is a large longitudinally extensive dorsal epidural fluid collection that extends from the level of T4-T5 into the lumbar region, measuring up to approximately 11-12 mm in anterior-posterior thickness at the level of T12-L1. There is significant   associated mass effect upon and flattening of the spinal cord particularly from the level of T6-T7 into the visualized upper lumbar region, with areas of moderate to severe and severe spinal canal stenosis and spinal cord compression. Correlating with   lumbar spine MRI of same day, the dorsal epidural collection appears to extend down to the level of L2-L3. This lesion demonstrates prominent peripheral enhancement with some areas of enhancing internal material/septation. There is also diffuse dural   thickening and enhancement elsewhere throughout much of the thoracic and visualized upper lumbar spine.    Mild/moderate multilevel degenerative disc height loss is present. Multilevel disc bulges are seen with marginal endplate osteophytes. Multilevel degenerative facet disease, most pronounced in the mid to lower thoracic spine and at least moderate or   moderate to severe in degree at multiple levels. Mild/moderate multilevel neural foraminal narrowing is present on a degenerative basis.      Impression    IMPRESSION:  1.  Large longitudinally extensive dorsal epidural fluid collection extending from T4-T5 down to L2-L3 with prominent peripheral enhancement, resulting in diffuse moderate to severe/severe spinal canal stenosis and spinal cord compression. Primary   differential considerations would include epidural hematoma or epidural abscess. Recommend urgent spine surgery consultation.  2.  Patchy nonspecific edema-like marrow signal change and mild to moderate height loss  involving the T7 and T12 vertebral bodies. Recent compression fractures are not excluded. Older mild/mild to moderate thoracic compression deformities elsewhere.  3.  Multilevel degenerative changes.    Findings were discussed with Dr. Matute by myself at approximately 11:32 AM on 12/18/2022      [Critical Result: Acute spinal cord compression/injury]    Finding was identified on 12/18/2022 11:19 AM.     Dr. Matute was contacted by me on 12/18/2022 11:32 AM and verbalized understanding of the critical result.    MR Lumbar Spine w/o & w Contrast    Narrative    EXAM: MR LUMBAR SPINE WITHOUT AND WITH CONTRAST  LOCATION: [facility]  DATE/TIME: 12/18/2022, 11:08 AM    INDICATION: Back pain, suspected cauda equina syndrome.  COMPARISON: Thoracic spine MRI same day.  CONTRAST: 9 mL Gadavist.  TECHNIQUE: Routine Lumbar Spine MRI without and with IV contrast.    FINDINGS: Nomenclature is based on five lumbar vertebral bodies. Age-indeterminate T12 compression deformity with mild to moderate height loss and patchy edema-like marrow signal change in the T12 vertebral body with slight retropulsion of the   posterosuperior endplate along the ventral epidural space. Probable chronic mild to moderate L4 superior endplate compression deformity/Schmorl's node. Otherwise normal lumbar vertebral body heights. Sagittal alignment is normal.    Correlating with thoracic spine MRI of same day, there is a large longitudinally extensive dorsal epidural fluid collection that extends from approximately the level of T4-T5 down to the level of L2-L3. This is most pronounced in the lumbar region at the   level of T12-L1 where it measures up to 11-12 mm in anterior-posterior thickness. There is severe associated mass effect on the distal spinal cord with evidence for cord compression from the lower thoracic region down to the level of L1-L2.    Scattered areas of disc desiccation are noted. Minimal disc height loss at  L3-L4. The other lumbar intervertebral discs are relatively maintained in height. There is mild to moderate multilevel degenerative facet arthropathy. As stated above, severe   spinal canal stenosis at T11-T12, T12-L1, and L1-L2. Mild spinal canal stenosis at L2-L3, L3-L4 and L4-L5. No high-grade neural foraminal stenosis identified.    There appears to be a fluid collection in the left retroperitoneal space that is only partially imaged along the lateral aspect of the left psoas muscle that measures at least 62 mm x 26 mm in the axial plane with irregular peripheral enhancement. There   is abnormal patchy enhancement marginating the left psoas muscle and surrounding the fluid collection in the left retroperitoneal space.      Impression    IMPRESSION:  1.  Longitudinally extensive dorsal epidural fluid collection in the thoracolumbar spine extending down to the level of L2-L3 with severe associated mass effect on the spinal cord/spinal cord compression, as described. Primary differential considerations   would include epidural abscess or epidural hematoma. Recommend clinical correlation and urgent neurosurgery consultation.  2.  Partial visualization of a nonspecific fluid collection with peripheral enhancement in the left retroperitoneal space lateral to the left psoas muscle measuring up to 62 mm in the axial plane, as described. This also may represent an abscess or   hematoma. Consider CT of the abdomen and pelvis with contrast for further evaluation.  3.  Degenerative changes, as described.    Preliminary findings regarding the thoracolumbar epidural fluid collection were discussed with Dr. Matute by myself at approximately 11:32 AM on 12/18/2022. Additional findings including a prominent left retroperitoneal fluid collection was discussed   with Dr. Matute at approximately 11:49 AM on 12/18/2022.     CT Abdomen Pelvis w Contrast    Narrative    EXAM: CT ABDOMEN PELVIS W CONTRAST  LOCATION: [facility]  DATE/TIME: 12/18/2022 12:51 PM    INDICATION: Evaluate fluid collection seen on MRI.  COMPARISON: Spinal MRI 12/18/2022.  TECHNIQUE: CT scan of the abdomen and pelvis was performed following injection of IV contrast. Multiplanar reformats were obtained. Dose reduction techniques were used.  CONTRAST: 95mL Isovue 370    FINDINGS:   LOWER CHEST: Lingular and lower lobe atelectasis. Questionable trace left pleural effusion. Small hiatal hernia.    HEPATOBILIARY: Normal.    PANCREAS: Normal.    SPLEEN: Normal.    ADRENAL GLANDS: Normal.    KIDNEYS/BLADDER: Normal kidneys. No hydronephrosis. Excreted contrast from the recent MRI is present throughout the collecting system as well as in the urinary bladder. Urinary bladder contains a Dacosta catheter.    BOWEL: No obstruction or inflammation. Appendix is not visualized.    LYMPH NODES: No enlarged lymph nodes.    VASCULATURE: Retroaortic left renal vein. Patent portal, splenic, and superior mesenteric veins. No abdominal aortic aneurysm.     PERITONEUM/RETROPERITONEUM: Small amount of ill-defined left retroperitoneal fluid extending along the left posterior pararenal space and paracolic gutter into the pelvis. Trace pelvic fluid.     PELVIC ORGANS: Normal.    MUSCULOSKELETAL: Mild compression deformity of the T12 vertebral body. L3 vertebral body hemangioma. Multilevel degenerative changes of the spine with multiple Schmorl's nodes. Known epidural fluid collection is not well visualized. Small left femoral   bone island.      Impression    IMPRESSION:     1.  Small amount of ill-defined simple left retroperitoneal fluid extending along the left posterior pararenal space and paracolic gutter into the pelvis, of uncertain etiology. Sequela of a previously ruptured calyx may be a possibility if there was   previous hydronephrosis from the reported urinary retention. No current hydronephrosis or evidence of urine leak.

## 2022-12-18 NOTE — ED PROVIDER NOTES
History   Chief Complaint:  Back Pain and Fall       The history is provided by the patient.      Ian Moncada is a 57 year old male who presents via EMS with a fall and back pain. Patient reports that he has been experiencing lower back pain that started about 6 days ago after he pulled a muscle while stretching. He states that he developed painful spasms the next day that persisted until Wednesday. Symptoms subsided for two days allowing him to be able to move and use the bathroom on his own. He reports going to Urgent Care and being prescribed with pain medications and Robaxin. His pain progressively worsened yesterday, where he was not able to move or sit up. Patient has also developed tingling and numbness sensations in his legs that started 2 days ago. This has made him weaker and more prone to falls, he reports 6 falls yesterday. He also reports urinary retention and incontinence and no bowel movements in the last 2-3 days. He has not taken any of the prescribed hydrocodone.  His bladder feels full at all time, associated with abdominal pain that he rates a 2/10. Denies fever, cough, congestion, sore throat, and chest pain. He has been drinking a lot of fluids but has no appetite and no food since Tuesday. No history of back surgery. No hx of IVDU.      Review of Systems   Constitutional: Positive for appetite change. Negative for fever.   HENT: Negative for congestion and sore throat.    Respiratory: Negative for cough.    Cardiovascular: Negative for chest pain.   Gastrointestinal: Positive for abdominal pain.   Genitourinary: Positive for difficulty urinating.   Musculoskeletal: Positive for back pain.   Neurological: Positive for weakness and numbness.   All other systems reviewed and are negative.    Allergies:  Walnuts [Nuts]    Medications:  Methocarbamol  Hydrocodone-acetaminophen    Past Medical History:     External hemorrhoids  Impaired fasting glucose  Hypertriglyceridemia  malnocytic nevus of  trunk      Past Surgical History:    Tonsillectomy      Family History:    Mother - heart disease  Father - neurologic disorder    Social History:  Presents with wife.  Presents via EMS.   PCP: Alfredo Fink     Physical Exam     Patient Vitals for the past 24 hrs:   BP Temp Temp src Pulse Resp SpO2 Height Weight   12/18/22 1325 (!) 170/96 -- -- -- -- -- -- --   12/18/22 1300 (!) 170/96 -- -- 84 -- 99 % -- --   12/18/22 1209 (!) 144/116 -- -- 83 -- 99 % -- --   12/18/22 1200 -- -- -- -- -- 99 % -- --   12/18/22 1130 -- -- -- -- -- 98 % -- --   12/18/22 1115 -- -- -- -- -- 97 % -- --   12/18/22 1109 (!) 158/105 -- -- -- -- 97 % -- --   12/18/22 0900 (!) 165/106 -- -- 82 -- 99 % -- --   12/18/22 0845 -- -- -- -- -- 97 % -- --   12/18/22 0830 (!) 174/105 -- -- 78 -- 99 % -- --   12/18/22 0815 (!) 176/109 98.4  F (36.9  C) Oral 77 18 100 % 1.829 m (6') 88.5 kg (195 lb)       Physical Exam  Vitals and nursing note reviewed.   Constitutional:       General: He is not in acute distress.     Appearance: He is not ill-appearing.   HENT:      Head: Normocephalic and atraumatic.      Right Ear: External ear normal.      Left Ear: External ear normal.      Nose: Nose normal.   Eyes:      Conjunctiva/sclera: Conjunctivae normal.   Cardiovascular:      Rate and Rhythm: Normal rate and regular rhythm.      Heart sounds: No murmur heard.  Pulmonary:      Effort: Pulmonary effort is normal. No respiratory distress.      Breath sounds: No wheezing, rhonchi or rales.   Abdominal:      General: Abdomen is flat. There is distension (suprapubic).      Palpations: Abdomen is soft.      Tenderness: There is abdominal tenderness (suprapubic). There is no guarding or rebound.   Musculoskeletal:         General: No swelling or deformity.      Cervical back: Normal range of motion and neck supple.   Skin:     General: Skin is warm and dry.      Findings: No rash.   Neurological:      Mental Status: He is alert and oriented to person, place,  and time.      Sensory: Sensory deficit (decreased sensation to LT throughout LLE and proximal RLE) present.      Motor: Weakness (2/5 strength with bilateral hip flexion; intact 5/5 strength with plantar/dorsiflexion bilaterally) present.   Psychiatric:         Behavior: Behavior normal.           Emergency Department Course   ECG  ECG taken at 1128, ECG read at 1130  Normal sinus rhythm  Possible Left atrial enlargement  Nonspecific T wave abnormality  Abnormal ECG   No significant change as compared to prior, dated 11/01/19.  Rate 81 bpm. AL interval 136 ms. QRS duration 80 ms. QT/QTc 376/436 ms. P-R-T axes 55 31 18.     Imaging:  CT Abdomen Pelvis w Contrast   Final Result   IMPRESSION:       1.  Small amount of ill-defined simple left retroperitoneal fluid extending along the left posterior pararenal space and paracolic gutter into the pelvis, of uncertain etiology. Sequela of a previously ruptured calyx may be a possibility if there was    previous hydronephrosis from the reported urinary retention. No current hydronephrosis or evidence of urine leak.      MR Lumbar Spine w/o & w Contrast   Final Result   IMPRESSION:   1.  Longitudinally extensive dorsal epidural fluid collection in the thoracolumbar spine extending down to the level of L2-L3 with severe associated mass effect on the spinal cord/spinal cord compression, as described. Primary differential considerations    would include epidural abscess or epidural hematoma. Recommend clinical correlation and urgent neurosurgery consultation.   2.  Partial visualization of a nonspecific fluid collection with peripheral enhancement in the left retroperitoneal space lateral to the left psoas muscle measuring up to 62 mm in the axial plane, as described. This also may represent an abscess or    hematoma. Consider CT of the abdomen and pelvis with contrast for further evaluation.   3.  Degenerative changes, as described.      Preliminary findings regarding the  thoracolumbar epidural fluid collection were discussed with Dr. Matute by myself at approximately 11:32 AM on 12/18/2022. Additional findings including a prominent left retroperitoneal fluid collection was discussed    with Dr. Matute at approximately 11:49 AM on 12/18/2022.         MR Thoracic Spine w/o & w Contrast   Final Result   Abnormal   IMPRESSION:   1.  Large longitudinally extensive dorsal epidural fluid collection extending from T4-T5 down to L2-L3 with prominent peripheral enhancement, resulting in diffuse moderate to severe/severe spinal canal stenosis and spinal cord compression. Primary    differential considerations would include epidural hematoma or epidural abscess. Recommend urgent spine surgery consultation.   2.  Patchy nonspecific edema-like marrow signal change and mild to moderate height loss involving the T7 and T12 vertebral bodies. Recent compression fractures are not excluded. Older mild/mild to moderate thoracic compression deformities elsewhere.   3.  Multilevel degenerative changes.      Findings were discussed with Dr. Matute by myself at approximately 11:32 AM on 12/18/2022         [Critical Result: Acute spinal cord compression/injury]      Finding was identified on 12/18/2022 11:19 AM.       Dr. Matute was contacted by me on 12/18/2022 11:32 AM and verbalized understanding of the critical result.         Report per radiology    Laboratory:  Labs Ordered and Resulted from Time of ED Arrival to Time of ED Departure   BASIC METABOLIC PANEL - Abnormal       Result Value    Sodium 133 (*)     Potassium 3.9      Chloride 94 (*)     Carbon Dioxide (CO2) 23      Anion Gap 16 (*)     Urea Nitrogen 17.5      Creatinine 1.69 (*)     Calcium 9.6      Glucose 115 (*)     GFR Estimate 46 (*)    ROUTINE UA WITH MICROSCOPIC REFLEX TO CULTURE - Abnormal    Color Urine Yellow      Appearance Urine Clear      Glucose Urine Negative      Bilirubin Urine Negative      Ketones Urine 20 (*)      Specific Gravity Urine 1.013      Blood Urine Negative      pH Urine 5.5      Protein Albumin Urine Negative      Urobilinogen Urine Normal      Nitrite Urine Negative      Leukocyte Esterase Urine Negative      Mucus Urine Present (*)     RBC Urine 2      WBC Urine 4      Hyaline Casts Urine 1     CBC WITH PLATELETS AND DIFFERENTIAL - Abnormal    WBC Count 14.8 (*)     RBC Count 3.91 (*)     Hemoglobin 12.9 (*)     Hematocrit 39.5 (*)      (*)     MCH 33.0      MCHC 32.7      RDW 12.9      Platelet Count 213      % Neutrophils 86      % Lymphocytes 5      % Monocytes 8      % Eosinophils 0      % Basophils 0      % Immature Granulocytes 1      NRBCs per 100 WBC 0      Absolute Neutrophils 12.8 (*)     Absolute Lymphocytes 0.7 (*)     Absolute Monocytes 1.2      Absolute Eosinophils 0.0      Absolute Basophils 0.0      Absolute Immature Granulocytes 0.1      Absolute NRBCs 0.0     ERYTHROCYTE SEDIMENTATION RATE AUTO - Abnormal    Erythrocyte Sedimentation Rate 45 (*)    CRP INFLAMMATION - Abnormal    CRP Inflammation 295.69 (*)    COVID-19 VIRUS (CORONAVIRUS) BY PCR - Normal    SARS CoV2 PCR Negative     LACTIC ACID WHOLE BLOOD - Normal    Lactic Acid 0.9     TYPE AND SCREEN, ADULT    ABO/RH(D) A POS      Antibody Screen Negative      SPECIMEN EXPIRATION DATE [id number]     BLOOD CULTURE   BLOOD CULTURE   ABO/RH TYPE AND SCREEN        Emergency Department Course:     Reviewed:  I reviewed nursing notes, vitals, past medical history and Care Everywhere    Assessments:  0852 I obtained history and examined the patient as noted above.   1114 I rechecked the patient and explained findings.     Consults:  0896 I consulted with Dr. Kassandra Hartman, Neurosurgery, about patient's history and current condition.   1112 I spoke to Dr. Kassandra Hartman about course of treatment for patient. Patient will be transferred to [facility] for surgery.   1202 I consulted with Dr. Jenkins, [facility] hospitalist, who accepts admission.      Interventions:  Medications   Tdap (tetanus-diphtheria-acell pertussis) (ADACEL) injection 0.5 mL (0.5 mLs Intramuscular Given 22 0923)   lidocaine (XYLOCAINE) 2 % external gel 6 mL (6 mLs Urethral Given 22 0943)   gadobutrol (GADAVIST) injection 9 mL (9 mLs Intravenous Given 22 1107)   sodium chloride for CT scan flush use (64 mLs Intravenous Given 22 1241)   iopamidol (ISOVUE-370) solution 500 mL (95 mLs Intravenous Given 22 1241)       Disposition:  The patient was transferred to [facility] via EMS. Dr. Jenkins accepted the patient for transfer.     Impression & Plan   Medical Decision Makin-year-old male with worsening low back pain, now associated with numbness of bilateral lower extremities, weakness and falls, urinary and stool retention.  Presentation is highly concerning for spinal cord compression or cauda equina syndrome.  Bladder scan at bedside showed greater than a liter of fluid in the bladder, so D[place]osta catheter was placed.  We will keep patient n.p.o. and plan for labs and stat MRI.  We will contact neurosurgery and update them about patient's status.    1112 patient's MRI confirms a large epidural fluid collection which is compressing the spinal cord and causing patient's symptoms.  Neurosurgery is requesting patient urgently be transferred to [facility] for operative intervention.  They request that we hold off on antibiotics at this point.  Radiology did also note the patient has a fluid collection in the retroperitoneum so recommended a CT of the abdomen pelvis to further evaluate this.    1202 discussed with Dr. Jenkins with hospitalist service at [facility].  Patient was accepted for transfer to their service.    Critical Care Time: was 60 minutes for this patient excluding procedures    Diagnosis:    ICD-10-CM    1. Spinal cord compression (H)  G95.20 Case Request: THORACOLUMBAR DECOMPRESSION, LEVELS TO BE DETERMINED     Case Request: THORACOLUMBAR  DECOMPRESSION, LEVELS TO BE DETERMINED      2. Bilateral leg weakness  R29.898 Case Request: THORACOLUMBAR DECOMPRESSION, LEVELS TO BE DETERMINED     Case Request: THORACOLUMBAR DECOMPRESSION, LEVELS TO BE DETERMINED      3. Acute urinary retention  R33.8           Discharge Medications:  Discharge Medication List as of 12/18/2022  1:26 PM          Scribe Disclosure:  Jason RODRÍGUEZ, am serving as a scribe at 8:52 AM on 12/18/2022 to document services personally performed by Elijah Matute MD based on my observations and the provider's statements to me.          Elijah Matute MD  12/18/22 0411

## 2022-12-18 NOTE — ED TRIAGE NOTES
Pt arrives via EMS with c/o ongoing low back pain that radiates into left leg with associated numbness and tingling. Pt reports the pain started on Monday, he was seen at  and then started on pain meds and robaxin. Pt reports that on Thursday he started having some numbness and tingling to left lower extremity, CMS intact. Pt reports 6 falls yesterday d/t his legs giving out, states he hit his head once and has wound to right elbow. Pt denies any blood thinners. Pt reports no BM since Monday and no food since Tuesday. ABCs intact.     Triage Assessment     Row Name 12/18/22 0801       Triage Assessment (Adult)    Airway WDL WDL       Respiratory WDL    Respiratory WDL WDL       Skin Circulation/Temperature WDL    Skin Circulation/Temperature WDL WDL       Cardiac WDL    Cardiac WDL WDL       Peripheral/Neurovascular WDL    Peripheral Neurovascular WDL X;neurovascular assessment lower       LLE Neurovascular Assessment    Sensation LLE tingling present       Cognitive/Neuro/Behavioral WDL    Cognitive/Neuro/Behavioral WDL WDL

## 2022-12-19 ENCOUNTER — APPOINTMENT (OUTPATIENT)
Dept: PHYSICAL THERAPY | Facility: CLINIC | Age: 57
DRG: 029 | End: 2022-12-19
Attending: NEUROLOGICAL SURGERY
Payer: COMMERCIAL

## 2022-12-19 ENCOUNTER — APPOINTMENT (OUTPATIENT)
Dept: PHYSICAL THERAPY | Facility: CLINIC | Age: 57
DRG: 029 | End: 2022-12-19
Attending: PHYSICIAN ASSISTANT
Payer: COMMERCIAL

## 2022-12-19 LAB
ANION GAP SERPL CALCULATED.3IONS-SCNC: 9 MMOL/L (ref 3–14)
ATRIAL RATE - MUSE: 81 BPM
BASOPHILS # BLD AUTO: 0 10E3/UL (ref 0–0.2)
BASOPHILS NFR BLD AUTO: 0 %
BUN SERPL-MCNC: 13 MG/DL (ref 7–30)
CALCIUM SERPL-MCNC: 8.5 MG/DL (ref 8.5–10.1)
CHLORIDE BLD-SCNC: 101 MMOL/L (ref 94–109)
CO2 SERPL-SCNC: 22 MMOL/L (ref 20–32)
CREAT SERPL-MCNC: 0.85 MG/DL (ref 0.66–1.25)
DIASTOLIC BLOOD PRESSURE - MUSE: NORMAL MMHG
EOSINOPHIL # BLD AUTO: 0 10E3/UL (ref 0–0.7)
EOSINOPHIL NFR BLD AUTO: 0 %
ERYTHROCYTE [DISTWIDTH] IN BLOOD BY AUTOMATED COUNT: 13.6 % (ref 10–15)
GFR SERPL CREATININE-BSD FRML MDRD: >90 ML/MIN/1.73M2
GLUCOSE BLD-MCNC: 91 MG/DL (ref 70–99)
GLUCOSE BLD-MCNC: 91 MG/DL (ref 70–99)
GLUCOSE BLDC GLUCOMTR-MCNC: 105 MG/DL (ref 70–99)
GLUCOSE BLDC GLUCOMTR-MCNC: 107 MG/DL (ref 70–99)
GLUCOSE BLDC GLUCOMTR-MCNC: 93 MG/DL (ref 70–99)
HBA1C MFR BLD: 4.8 % (ref 0–5.6)
HCT VFR BLD AUTO: 34.1 % (ref 40–53)
HGB BLD-MCNC: 11.6 G/DL (ref 13.3–17.7)
IMM GRANULOCYTES # BLD: 0.2 10E3/UL
IMM GRANULOCYTES NFR BLD: 1 %
INTERPRETATION ECG - MUSE: NORMAL
LYMPHOCYTES # BLD AUTO: 1.1 10E3/UL (ref 0.8–5.3)
LYMPHOCYTES NFR BLD AUTO: 8 %
MCH RBC QN AUTO: 33.7 PG (ref 26.5–33)
MCHC RBC AUTO-ENTMCNC: 34 G/DL (ref 31.5–36.5)
MCV RBC AUTO: 99 FL (ref 78–100)
MONOCYTES # BLD AUTO: 1.5 10E3/UL (ref 0–1.3)
MONOCYTES NFR BLD AUTO: 11 %
NEUTROPHILS # BLD AUTO: 10.7 10E3/UL (ref 1.6–8.3)
NEUTROPHILS NFR BLD AUTO: 80 %
NRBC # BLD AUTO: 0 10E3/UL
NRBC BLD AUTO-RTO: 0 /100
P AXIS - MUSE: 55 DEGREES
PLATELET # BLD AUTO: 261 10E3/UL (ref 150–450)
POTASSIUM BLD-SCNC: 3.9 MMOL/L (ref 3.4–5.3)
PR INTERVAL - MUSE: 136 MS
QRS DURATION - MUSE: 80 MS
QT - MUSE: 376 MS
QTC - MUSE: 436 MS
R AXIS - MUSE: 31 DEGREES
RBC # BLD AUTO: 3.44 10E6/UL (ref 4.4–5.9)
SODIUM SERPL-SCNC: 132 MMOL/L (ref 133–144)
SYSTOLIC BLOOD PRESSURE - MUSE: NORMAL MMHG
T AXIS - MUSE: 18 DEGREES
VENTRICULAR RATE- MUSE: 81 BPM
WBC # BLD AUTO: 13.4 10E3/UL (ref 4–11)

## 2022-12-19 PROCEDURE — 36415 COLL VENOUS BLD VENIPUNCTURE: CPT | Performed by: NURSE PRACTITIONER

## 2022-12-19 PROCEDURE — 97530 THERAPEUTIC ACTIVITIES: CPT | Mod: GP | Performed by: PHYSICAL THERAPIST

## 2022-12-19 PROCEDURE — 120N000001 HC R&B MED SURG/OB

## 2022-12-19 PROCEDURE — 250N000011 HC RX IP 250 OP 636: Performed by: PHYSICIAN ASSISTANT

## 2022-12-19 PROCEDURE — 97110 THERAPEUTIC EXERCISES: CPT | Mod: GP | Performed by: PHYSICAL THERAPIST

## 2022-12-19 PROCEDURE — 97161 PT EVAL LOW COMPLEX 20 MIN: CPT | Mod: GP | Performed by: PHYSICAL THERAPIST

## 2022-12-19 PROCEDURE — 97116 GAIT TRAINING THERAPY: CPT | Mod: GP | Performed by: PHYSICAL THERAPIST

## 2022-12-19 PROCEDURE — 250N000011 HC RX IP 250 OP 636: Performed by: INTERNAL MEDICINE

## 2022-12-19 PROCEDURE — 99233 SBSQ HOSP IP/OBS HIGH 50: CPT | Performed by: INTERNAL MEDICINE

## 2022-12-19 PROCEDURE — 250N000013 HC RX MED GY IP 250 OP 250 PS 637: Performed by: PHYSICIAN ASSISTANT

## 2022-12-19 PROCEDURE — 250N000011 HC RX IP 250 OP 636: Performed by: SPECIALIST

## 2022-12-19 PROCEDURE — 258N000003 HC RX IP 258 OP 636: Performed by: PHYSICIAN ASSISTANT

## 2022-12-19 PROCEDURE — 99255 IP/OBS CONSLTJ NEW/EST HI 80: CPT | Performed by: SPECIALIST

## 2022-12-19 PROCEDURE — 83036 HEMOGLOBIN GLYCOSYLATED A1C: CPT | Performed by: NURSE PRACTITIONER

## 2022-12-19 PROCEDURE — 80048 BASIC METABOLIC PNL TOTAL CA: CPT | Performed by: NURSE PRACTITIONER

## 2022-12-19 PROCEDURE — 85014 HEMATOCRIT: CPT | Performed by: NURSE PRACTITIONER

## 2022-12-19 RX ORDER — CEFTRIAXONE 2 G/1
2 INJECTION, POWDER, FOR SOLUTION INTRAMUSCULAR; INTRAVENOUS EVERY 12 HOURS
Status: DISCONTINUED | OUTPATIENT
Start: 2022-12-19 | End: 2022-12-20

## 2022-12-19 RX ADMIN — PIPERACILLIN AND TAZOBACTAM 3.38 G: 3; .375 INJECTION, POWDER, FOR SOLUTION INTRAVENOUS at 05:26

## 2022-12-19 RX ADMIN — CEFTRIAXONE SODIUM 2 G: 2 INJECTION, POWDER, FOR SOLUTION INTRAMUSCULAR; INTRAVENOUS at 13:48

## 2022-12-19 RX ADMIN — SODIUM CHLORIDE: 9 INJECTION, SOLUTION INTRAVENOUS at 16:32

## 2022-12-19 RX ADMIN — VANCOMYCIN HYDROCHLORIDE 1750 MG: 10 INJECTION, POWDER, LYOPHILIZED, FOR SOLUTION INTRAVENOUS at 20:49

## 2022-12-19 RX ADMIN — ACETAMINOPHEN 975 MG: 325 TABLET, FILM COATED ORAL at 13:48

## 2022-12-19 RX ADMIN — ACETAMINOPHEN 975 MG: 325 TABLET, FILM COATED ORAL at 21:51

## 2022-12-19 RX ADMIN — SENNOSIDES AND DOCUSATE SODIUM 1 TABLET: 50; 8.6 TABLET ORAL at 08:59

## 2022-12-19 RX ADMIN — SENNOSIDES AND DOCUSATE SODIUM 1 TABLET: 50; 8.6 TABLET ORAL at 20:49

## 2022-12-19 RX ADMIN — PIPERACILLIN AND TAZOBACTAM 3.38 G: 3; .375 INJECTION, POWDER, FOR SOLUTION INTRAVENOUS at 11:50

## 2022-12-19 RX ADMIN — POLYETHYLENE GLYCOL 3350 17 G: 17 POWDER, FOR SOLUTION ORAL at 08:59

## 2022-12-19 RX ADMIN — VANCOMYCIN HYDROCHLORIDE 1750 MG: 10 INJECTION, POWDER, LYOPHILIZED, FOR SOLUTION INTRAVENOUS at 08:57

## 2022-12-19 RX ADMIN — ACETAMINOPHEN 975 MG: 325 TABLET, FILM COATED ORAL at 05:31

## 2022-12-19 ASSESSMENT — ACTIVITIES OF DAILY LIVING (ADL)
ADLS_ACUITY_SCORE: 37
ADLS_ACUITY_SCORE: 37
ADLS_ACUITY_SCORE: 36
ADLS_ACUITY_SCORE: 36
ADLS_ACUITY_SCORE: 37
ADLS_ACUITY_SCORE: 37
ADLS_ACUITY_SCORE: 36
ADLS_ACUITY_SCORE: 37
ADLS_ACUITY_SCORE: 36
ADLS_ACUITY_SCORE: 37

## 2022-12-19 NOTE — PHARMACY-VANCOMYCIN DOSING SERVICE
Pharmacy Vancomycin Initial Note  Date of Service 2022  Patient's  1965  57 year old, male    Indication: Epidural Abscess and spinal cord compression    Current estimated CrCl = Estimated Creatinine Clearance: 59.6 mL/min (A) (based on SCr of 1.69 mg/dL (H)).    Creatinine for last 3 days  2022:  9:21 AM Creatinine 1.69 mg/dL    Recent Vancomycin Level(s) for last 3 days  No results found for requested labs within last 72 hours.      Vancomycin IV Administrations (past 72 hours)      No vancomycin orders with administrations in past 72 hours.                Nephrotoxins and other renal medications (From now, onward)    Start     Dose/Rate Route Frequency Ordered Stop    22  vancomycin (VANCOCIN) 1,750 mg in 0.9% NaCl 500 mL intermittent infusion         1,750 mg  over 2 Hours Intravenous EVERY 12 HOURS 22 183            Contrast Orders - past 72 hours (72h ago, onward)    None           Plan:  1. Start vancomycin  1750 mg IV q12h.   2. Vancomycin monitoring method: Trough (Method 1 = dosing nomogram)  3. Vancomycin therapeutic monitoring goal: 15-20 mg/L  4. Pharmacy will check vancomycin levels as appropriate in 1-3 Days.    5. Serum creatinine levels will be ordered daily for the first week of therapy and at least twice weekly for subsequent weeks.      Jasmine Munson [facility]

## 2022-12-19 NOTE — PROGRESS NOTES
MD Notification    Notified Person: MD    Notified Person Name: Poppy Blank PA     Notification Date/Time:  2130    Notification Interaction: [employer]    Purpose of Notification: Clarification of activity, gamez and diet orders    Orders Received: Strict bedrest order discontinued, leave gamez in during the night. Diet orders were ordered.     Comments:

## 2022-12-19 NOTE — PROGRESS NOTES
[facility]  Hospitalist Progress Note    Date of Admission: 12/18/2022     Interval history   Patient in the room with his wife.  He is now able to move his lower extremities which he was not able to do on admission.  Reports that he is feeling better.  Trying to get therapy going and move around more.  Discussion regarding his findings.    Brief Hospital Course:   57-year-old gentleman with no significant past medical history on no medications presented with left lower extremity paresthesias, weakness, urinary incontinence and urinary retention for 2 to 3 days.  Initially presented to [facility]  MRI of the spine showing extensive dorsal epidural fluid collection extending from thoracolumbar spine down to L2-L3 with mass-effect on the spinal cord/spinal cord compression  Transferred emergently to [facility]      Assessment & Plan   Epidural fluid collection from upper thoracic spine down to the mid lumbar spine with resulting spinal cord compression from T12-L1    Reported progressive 6-day complaints of back pain with progressive difficulty with ambulation, falls and weakness/sensory changes of his lower extremities    MRI showing a large dorsal epidural fluid collection from mid thoracic spine down to the mid lumbar spine with substantial compression at the thoracolumbar junction of the terminal spinal cord (majority between T11-L2)     Initially presented to [facility] [place] and transferred urgently to [facility]    12/18: Emergent surgery: Extensive epidural fluid collection extending from the upper thoracic spine down to the mid lumbar spine, most prominent with significant spinal cord compression from T12-L1:  (Per operative report description of cloudy, slightly thickened pus) culture and sent for Gram stain    ID consult:     On Vanco and Zosyn    12/18 cultures with gram-positive cocci    12/18 blood cultures x2 no growth to date    Urinary Retention:     Urinary retention likely  secondary to above    1.7 liters output after Dacosta placement    Still has Dacosta catheter in place    Acute kidney injury:     Large amount of urinary retention on admission    Resolved with fluids and Dacosta    Suspected secondary to urinary retention    Hyponatremia:     Still mild sodium 132. Will monitor       Diet: Regular Diet Adult  Dacosta Catheter: PRESENT, indication: Retention  DVT Prophylaxis: Sequentials and ambulate given large back surgery  Code Status: Full Code       Expected Discharge Date: 12/20/2022               HOWARD NAGEL MD,   Hospitalist Service  [facility]  ______________________________________________________________________    -Data reviewed today: I reviewed all new labs and imaging results over the last 24 hours. I personally reviewed no images or EKG's today.    Physical Exam   Temp: 98.9  F (37.2  C) Temp src: Oral BP: 128/79 Pulse: 84   Resp: 17 SpO2: 96 % O2 Device: None (Room air) Oxygen Delivery: 1 LPM  Vitals:    12/19/22 0630   Weight: 89 kg (196 lb 3.4 oz)   Constitutional: Appears stated age, no acute distress.  Respiratory: Breath sounds CTA. No increased work of breathing.  Cardiovascular: RRR, no rub or murmur. No peripheral edema.  GI: Soft, non-tender, non-distended.  Skin: Warm, dry, no rashes or lesions.  Other: Patient is able to lift his legs off the bed.  Has Dacosta catheter in place    Medications     sodium chloride         acetaminophen  975 mg Oral Q8H     piperacillin-tazobactam  3.375 g Intravenous Q6H     polyethylene glycol  17 g Oral Daily     senna-docusate  1 tablet Oral BID     sodium chloride (PF)  3 mL Intracatheter Q8H     sodium chloride (PF)  3 mL Intracatheter Q8H     vancomycin  1,750 mg Intravenous Q12H       Data   Recent Labs   Lab 12/19/22  0816 12/19/22  0804 12/18/22  0921   WBC  --  13.4* 14.8*   HGB  --  11.6* 12.9*   MCV  --  99 101*   PLT  --  261 213   NA  --  132* 133*   POTASSIUM  --  3.9 3.9   CHLORIDE  --   101 94*   CO2  --  22 23   BUN  --  13 17.5   CR  --  0.85 1.69*   ANIONGAP  --  9 16*   LINA  --  8.5 9.6   GLC 93 91  91 115*       Imaging:  Recent Results (from the past 24 hour(s))   MR Thoracic Spine w/o & w Contrast   Result Value    Radiologist flags Acute spinal cord compression/injury (AA)    Narrative    EXAM: MR THORACIC SPINE W/O and W CONTRAST  LOCATION: [facility]  DATE/TIME: 12/18/2022 11:05 AM    INDICATION: Back pain, neuro deficit  COMPARISON: None.  CONTRAST: 9 mL Gadavist  TECHNIQUE: Routine Thoracic Spine MRI without and with IV contrast.    FINDINGS: There is moderate patchy nonspecific edema-like marrow signal change involving the T7 and T12 vertebral bodies with suggestion of possible subtle transversely oriented hypointense signal abnormality in the mid aspect of the T7 and T12 vertebral   bodies. There is mild to moderate T7 and T12 vertebral body height loss. Recent T7 and T12 compression fractures cannot be excluded and are possible. Mild retropulsion of the posterior superior endplate of T12 along the ventral aspect of the spinal   canal. No significant fracture fragment retropulsion at the T7 level. Mild to moderate T5 vertebral body height loss, mild to moderate T9 vertebral height loss, and mild multilevel vertebral height loss elsewhere, presumably representing older   compression deformities with superimposed multilevel Schmorl's nodes.    Sagittal alignment appears normal. Scattered presumed Modic type I degenerative endplate signal changes seen in the mid to lower thoracic spine. Scattered Modic type II degenerative endplate signal changes are also noted. Mild scattered patchy edema   centered about the facet joints at multiple levels, nonspecific, but potentially degenerative in nature.    There is a large longitudinally extensive dorsal epidural fluid collection that extends from the level of T4-T5 into the lumbar region, measuring up to approximately  11-12 mm in anterior-posterior thickness at the level of T12-L1. There is significant   associated mass effect upon and flattening of the spinal cord particularly from the level of T6-T7 into the visualized upper lumbar region, with areas of moderate to severe and severe spinal canal stenosis and spinal cord compression. Correlating with   lumbar spine MRI of same day, the dorsal epidural collection appears to extend down to the level of L2-L3. This lesion demonstrates prominent peripheral enhancement with some areas of enhancing internal material/septation. There is also diffuse dural   thickening and enhancement elsewhere throughout much of the thoracic and visualized upper lumbar spine.    Mild/moderate multilevel degenerative disc height loss is present. Multilevel disc bulges are seen with marginal endplate osteophytes. Multilevel degenerative facet disease, most pronounced in the mid to lower thoracic spine and at least moderate or   moderate to severe in degree at multiple levels. Mild/moderate multilevel neural foraminal narrowing is present on a degenerative basis.      Impression    IMPRESSION:  1.  Large longitudinally extensive dorsal epidural fluid collection extending from T4-T5 down to L2-L3 with prominent peripheral enhancement, resulting in diffuse moderate to severe/severe spinal canal stenosis and spinal cord compression. Primary   differential considerations would include epidural hematoma or epidural abscess. Recommend urgent spine surgery consultation.  2.  Patchy nonspecific edema-like marrow signal change and mild to moderate height loss involving the T7 and T12 vertebral bodies. Recent compression fractures are not excluded. Older mild/mild to moderate thoracic compression deformities elsewhere.  3.  Multilevel degenerative changes.    Findings were discussed with Dr. Matute by myself at approximately 11:32 AM on 12/18/2022      [Critical Result: Acute spinal cord  compression/injury]    Finding was identified on 12/18/2022 11:19 AM.     Dr. Matute was contacted by me on 12/18/2022 11:32 AM and verbalized understanding of the critical result.    MR Lumbar Spine w/o & w Contrast    Narrative    EXAM: MR LUMBAR SPINE WITHOUT AND WITH CONTRAST  LOCATION: [facility]  DATE/TIME: 12/18/2022, 11:08 AM    INDICATION: Back pain, suspected cauda equina syndrome.  COMPARISON: Thoracic spine MRI same day.  CONTRAST: 9 mL Gadavist.  TECHNIQUE: Routine Lumbar Spine MRI without and with IV contrast.    FINDINGS: Nomenclature is based on five lumbar vertebral bodies. Age-indeterminate T12 compression deformity with mild to moderate height loss and patchy edema-like marrow signal change in the T12 vertebral body with slight retropulsion of the   posterosuperior endplate along the ventral epidural space. Probable chronic mild to moderate L4 superior endplate compression deformity/Schmorl's node. Otherwise normal lumbar vertebral body heights. Sagittal alignment is normal.    Correlating with thoracic spine MRI of same day, there is a large longitudinally extensive dorsal epidural fluid collection that extends from approximately the level of T4-T5 down to the level of L2-L3. This is most pronounced in the lumbar region at the   level of T12-L1 where it measures up to 11-12 mm in anterior-posterior thickness. There is severe associated mass effect on the distal spinal cord with evidence for cord compression from the lower thoracic region down to the level of L1-L2.    Scattered areas of disc desiccation are noted. Minimal disc height loss at L3-L4. The other lumbar intervertebral discs are relatively maintained in height. There is mild to moderate multilevel degenerative facet arthropathy. As stated above, severe   spinal canal stenosis at T11-T12, T12-L1, and L1-L2. Mild spinal canal stenosis at L2-L3, L3-L4 and L4-L5. No high-grade neural foraminal stenosis  identified.    There appears to be a fluid collection in the left retroperitoneal space that is only partially imaged along the lateral aspect of the left psoas muscle that measures at least 62 mm x 26 mm in the axial plane with irregular peripheral enhancement. There   is abnormal patchy enhancement marginating the left psoas muscle and surrounding the fluid collection in the left retroperitoneal space.      Impression    IMPRESSION:  1.  Longitudinally extensive dorsal epidural fluid collection in the thoracolumbar spine extending down to the level of L2-L3 with severe associated mass effect on the spinal cord/spinal cord compression, as described. Primary differential considerations   would include epidural abscess or epidural hematoma. Recommend clinical correlation and urgent neurosurgery consultation.  2.  Partial visualization of a nonspecific fluid collection with peripheral enhancement in the left retroperitoneal space lateral to the left psoas muscle measuring up to 62 mm in the axial plane, as described. This also may represent an abscess or   hematoma. Consider CT of the abdomen and pelvis with contrast for further evaluation.  3.  Degenerative changes, as described.    Preliminary findings regarding the thoracolumbar epidural fluid collection were discussed with Dr. Matute by myself at approximately 11:32 AM on 12/18/2022. Additional findings including a prominent left retroperitoneal fluid collection was discussed   with Dr. Matute at approximately 11:49 AM on 12/18/2022.     CT Abdomen Pelvis w Contrast    Narrative    EXAM: CT ABDOMEN PELVIS W CONTRAST  LOCATION: [facility]  DATE/TIME: 12/18/2022 12:51 PM    INDICATION: Evaluate fluid collection seen on MRI.  COMPARISON: Spinal MRI 12/18/2022.  TECHNIQUE: CT scan of the abdomen and pelvis was performed following injection of IV contrast. Multiplanar reformats were obtained. Dose reduction techniques were used.  CONTRAST: 95mL  Isovue 370    FINDINGS:   LOWER CHEST: Lingular and lower lobe atelectasis. Questionable trace left pleural effusion. Small hiatal hernia.    HEPATOBILIARY: Normal.    PANCREAS: Normal.    SPLEEN: Normal.    ADRENAL GLANDS: Normal.    KIDNEYS/BLADDER: Normal kidneys. No hydronephrosis. Excreted contrast from the recent MRI is present throughout the collecting system as well as in the urinary bladder. Urinary bladder contains a Dacosta catheter.    BOWEL: No obstruction or inflammation. Appendix is not visualized.    LYMPH NODES: No enlarged lymph nodes.    VASCULATURE: Retroaortic left renal vein. Patent portal, splenic, and superior mesenteric veins. No abdominal aortic aneurysm.     PERITONEUM/RETROPERITONEUM: Small amount of ill-defined left retroperitoneal fluid extending along the left posterior pararenal space and paracolic gutter into the pelvis. Trace pelvic fluid.     PELVIC ORGANS: Normal.    MUSCULOSKELETAL: Mild compression deformity of the T12 vertebral body. L3 vertebral body hemangioma. Multilevel degenerative changes of the spine with multiple Schmorl's nodes. Known epidural fluid collection is not well visualized. Small left femoral   bone island.      Impression    IMPRESSION:     1.  Small amount of ill-defined simple left retroperitoneal fluid extending along the left posterior pararenal space and paracolic gutter into the pelvis, of uncertain etiology. Sequela of a previously ruptured calyx may be a possibility if there was   previous hydronephrosis from the reported urinary retention. No current hydronephrosis or evidence of urine leak.   XR Surgery ALEXIS L/T 5 Min Fluoro w Stills    Narrative    EXAM: XR SURGERY ALEXIS FLUORO LESS THAN 5 MIN W STILLS  LOCATION: [facility]  DATE/TIME: 12/18/2022 5:02 PM    INDICATION: thoracic lumbar decompression, fluoro time 37 seconds, 3 images  COMPARISON: None.  TECHNIQUE: Exam performed by surgeon.    FINDINGS:    FLUOROSCOPIC TIME:  .6  NUMBER OF IMAGES: 3      Impression    IMPRESSION:  1.  Fluoroscopy utilized for thoracic lumbar decompression, please see operative note.

## 2022-12-19 NOTE — PROGRESS NOTES
12/19/22 1100   Appointment Info   Signing Clinician's Name / Credentials (PT) Doris Saavedra PT   Living Environment   People in Home spouse   Current Living Arrangements house   Home Accessibility stairs to enter home;stairs within home   Number of Stairs, Main Entrance 1   Number of Stairs, Within Home, Primary seven   Living Environment Comments split level home   Self-Care   Usual Activity Tolerance good   Current Activity Tolerance fair   Equipment Currently Used at Home none   Fall history within last six months no   General Information   Onset of Illness/Injury or Date of Surgery 12/18/22   Referring Physician Vero Adams PA-C   Pertinent History of Current Problem (include personal factors and/or comorbidities that impact the POC) 57 year old male who was admitted on 12/18/2022. Ian Moncada is a 57 year old male with no significant PMHx presented with 2-3 days of LLE paresthesias, weakness, urinary incontinence, urinary retention   Existing Precautions/Restrictions fall;spinal   Cognition   Affect/Mental Status (Cognition) WNL   Orientation Status (Cognition) oriented x 4   Follows Commands (Cognition) WNL   Posture    Posture Forward head position;Protracted shoulders   Range of Motion (ROM)   ROM Comment Trunk ROM limited post op; LE ROM WFL   Strength (Manual Muscle Testing)   Strength (Manual Muscle Testing) Deficits observed during functional mobility   Strength Comments B LE HF KE 4-/5   Bed Mobility   Comment, (Bed Mobility) able to roll to R and press up into sitting w CGA   Transfers   Comment, (Transfers) sit to stand w/ WW  and min A   Gait/Stairs (Locomotion)   Comment, (Gait/Stairs) CGA/min A w ambualting w WW dec step length and slow pace   Balance   Balance Comments good in sitting, fair in standing req AD, slight knee buckling   Clinical Impression   Criteria for Skilled Therapeutic Intervention Yes, treatment indicated   PT Diagnosis (PT) s/p spine surgery   Influenced by the  following impairments impaired gait, dec indep transfers pain   Functional limitations due to impairments impaired mobility   Clinical Presentation (PT Evaluation Complexity) Evolving/Changing   Clinical Presentation Rationale clinical assesment   Clinical Decision Making (Complexity) low complexity   Planned Therapy Interventions (PT) bed mobility training;gait training;stair training;strengthening;transfer training   Anticipated Equipment Needs at Discharge (PT) walker, rolling   Risk & Benefits of therapy have been explained evaluation/treatment results reviewed;care plan/treatment goals reviewed;risks/benefits reviewed   PT Discharge Planning   PT Discharge Recommendation (DC Rec) Transitional Care Facility   PT Rationale for DC Rec Pt below baseline for mobility and ADLS req min A for most mobility, limited by weakness, lives in split level home previously indep,  will  benefit from PT/OT during stay and cont PT in rehab setting.

## 2022-12-19 NOTE — PHARMACY-ADMISSION MEDICATION HISTORY
Pharmacy Medication History  Admission medication history interview status for the 12/18/2022  admission is complete. See EPIC admission navigator for prior to admission medications     Location of Interview: Patient room  Medication history sources: Patient and Surescripts    Significant changes made to the medication list:  Discontinued: methocarbamol  Added: Norco    In the past week, patient estimated taking medication this percent of the time: greater than 90%    Additional medication history information:   none    Medication reconciliation completed by provider prior to medication history? No    Time spent in this activity: 10 minutes    Prior to Admission medications    Medication Sig Last Dose Taking? Auth Provider Long Term End Date   fish oil-omega-3 fatty acids 1000 MG capsule Take 2 capsules by mouth daily. 12/17/2022 Yes Alfredo Fink MD Yes    HYDROcodone-acetaminophen (NORCO) 5-325 MG tablet Take 1-2 tablets by mouth every 4 hours as needed for severe pain (7-10)  Yes Unknown, Entered By History     MULTIVITAMIN TABS   OR 1 TABLET DAILY 12/17/2022 Yes Reported, Patient         The information provided in this note is only as accurate as the sources available at the time of update(s)

## 2022-12-19 NOTE — CONSULTS
[facility]    Infectious Disease Consultation     Date of Admission:  12/18/2022  Date of Consult : 12/19/22    Assessment:  57 YM , previously healthy who has been admitted with extensive multi level thoracolumbar spinal epidural abscess from T4-T5 down to L2-L3 with spinal chord compression, LLE weakness and urinary retention. S/p debridement with GPC on stain, cxs pending. Unclear source for this infection, no documented bacteremia, no recent trauma, suspect transient bacteremia with hematogenous spread.         Recommendations:  1. Follow operative cxs/sensitivties  2. Discontinue Zosyn  3. Maintain on Vancomycin and Ceftriaxone empirically for now. Further antibiotic modification based on culture data  4. Plan PICC by tomorrow if blood cxs remain negative in anticipation of long term IV antibiotic therapy  ID will continue to follow      Elyse Valadez MD    Reason for Consult    I was asked to evaluate this patient for treatment recommendations for spinal epidural abscess    Primary Care Physician   Physician No Ref-Primary    Chief Complaint   Back pain and lower extremity weakness    History is obtained from the patient and medical records    History of Present Illness   Ian Moncada is a 57 year old male , previously healthy who has been hospitalized since 12/18 with back pain for about a week and new onset LLE paresthesias, weakness, urinary incontinence and urinary retention for about 2-3 days prior to presentation and was fount to have extensive spinal infection requiring surgical management.Gram stain is positive and ID has been asked to assist with antibiotic management,,      Patient denies history of fever, chills or urinary symptoms prior to current illness. No trauma, injections , recent surgery or dental work. Back pain started about a week ago with progressive symptoms of RLE weakness and baldder/bowel dysfunction. He was seen at [facility] and work up with MRI of the  thoracic and lumbar spines was significant for extensive dorsal epidural fluid collection in the thoracolumbar spine extending down to the level of L2-3 with severe associated mass effect on the spinal cord/spinal cord compression. He had leukocytosis of 14.8K with CRP elevation of 295. He was emergently transferred to [facility] and is s/p Minimal thoracic laminectomy for drainage of thoracolumbar epidural abscess on . Operative cxs are pending, gram stain shows GPC, blood cxs are no growth so far.    Patient is maintained on Vancomycin and zosyn and ID has been asked to assist with antibiotic management.    He has no further back pain, noted neck stiffness today, has a urinary catheter in placed, has not moved bowels yet, denies pain in any other joint, left leg still weak     Past Medical History   I have reviewed this patient's medical history and updated it with pertinent information if needed.   Past Medical History:   Diagnosis Date     External hemorrhoids with other complication 2008    Banding .     Hyperlipidemia LDL goal <130 2010     Hypertriglyceridemia 2010     Impaired fasting glucose 2009       Past Surgical History   I have reviewed this patient's surgical history and updated it with pertinent information if needed.  Past Surgical History:   Procedure Laterality Date     DECOMPRESSION LUMBAR ONE LEVEL N/A 2022    Procedure: THORACIC LUMBAR DECOMPRESSION FOR EVACUATION AND EPIDURAL ABSCESS VERSUS HEMATOMA;  Surgeon: Evans Courtney MD;  Location: [facility] OR     TONSILLECTOMY  1970s       Prior to Admission Medications   Prior to Admission Medications   Prescriptions Last Dose Informant Patient Reported? Taking?   HYDROcodone-acetaminophen (NORCO) 5-325 MG tablet   Yes Yes   Sig: Take 1-2 tablets by mouth every 4 hours as needed for severe pain (7-10)   MULTIVITAMIN TABS   OR 2022  Yes Yes   Si TABLET DAILY   fish oil-omega-3 fatty acids 1000 MG  capsule 12/17/2022  Yes Yes   Sig: Take 2 capsules by mouth daily.      Facility-Administered Medications: None     Allergies   Allergies   Allergen Reactions     Walnuts [Nuts]        Immunization History   Immunization History   Administered Date(s) Administered     TD (ADULT, 7+) 01/01/2002     TDAP Vaccine (Adacel) 07/10/2012     Tdap (Adacel,Boostrix) 12/18/2022       Social History   I have reviewed this patient's social history and updated it with pertinent information if needed. Ian Moncada  reports that he has never smoked. He has never used smokeless tobacco. He reports current alcohol use of about 7.0 standard drinks per week. He reports that he does not use drugs.    Family History   I have reviewed this patient's family history and updated it with pertinent information if needed.   Family History   Problem Relation Age of Onset     Heart Disease Paternal Grandfather      Neurologic Disorder Father         Guillaine Charlotte     Heart Disease Mother         quad bipass     Heart Disease Maternal Grandfather      Heart Disease Maternal Uncle      Cancer - colorectal Maternal Grandmother      Heart Disease Maternal Aunt        Review of Systems   The 10 point Review of Systems is as per HPI    Physical Exam   Temp: 98.9  F (37.2  C) Temp src: Oral BP: 128/79 Pulse: 84   Resp: 17 SpO2: 96 % O2 Device: None (Room air) Oxygen Delivery: 1 LPM  Vital Signs with Ranges  Temp:  [97.9  F (36.6  C)-99.3  F (37.4  C)] 98.9  F (37.2  C)  Pulse:  [] 84  Resp:  [11-20] 17  BP: (127-170)/() 128/79  SpO2:  [95 %-99 %] 96 %  196 lbs 3.35 oz  Body mass index is 26.61 kg/m .    GENERAL APPEARANCE:  awake  EYES: Eyes grossly normal to inspection  NECK: no adenopathy  RESP: lungs clear   CV: regular rates and rhythm  LYMPHATICS: normal ant/post cervical and supraclavicular nodes  ABDOMEN: soft, nontender  MS: extremities normal  SKIN: no suspicious lesions or rashes        Data   All laboratory data  reviewed  Component      Latest Ref Rng & Units 12/19/2022   Hemoglobin A1C      0.0 - 5.6 % 4.8     Component      Latest Ref Rng & Units 12/19/2022   Sodium      133 - 144 mmol/L 132 (L)   Potassium      3.4 - 5.3 mmol/L 3.9   Chloride      94 - 109 mmol/L 101   Carbon Dioxide      20 - 32 mmol/L 22   Anion Gap      3 - 14 mmol/L 9   Urea Nitrogen      7 - 30 mg/dL 13   Creatinine      0.66 - 1.25 mg/dL 0.85   Calcium      8.5 - 10.1 mg/dL 8.5   Glucose      70 - 99 mg/dL 91     Component      Latest Ref Rng & Units 12/19/2022   WBC      4.0 - 11.0 10e3/uL 13.4 (H)   RBC Count      4.40 - 5.90 10e6/uL 3.44 (L)   Hemoglobin      13.3 - 17.7 g/dL 11.6 (L)   Hematocrit      40.0 - 53.0 % 34.1 (L)   MCV      78 - 100 fL 99   MCH      26.5 - 33.0 pg 33.7 (H)   MCHC      31.5 - 36.5 g/dL 34.0   RDW      10.0 - 15.0 % 13.6   Platelet Count      150 - 450 10e3/uL 261     Microbiology  12/18 blood cx pending  12/18 Thoracic spine abscess cx pending   Spine, Thoracic; Abscess    0 Result Notes  Gram Stain Result   Abnormal   2+ Gram positive cocci      3+ WBC seen   Predominantly PMNs           Imaging  12/18 Mri thoracic spine  EXAM: MR THORACIC SPINE W/O and W CONTRAST  LOCATION: [facility]  DATE/TIME: 12/18/2022 11:05 AM     INDICATION: Back pain, neuro deficit  COMPARISON: None.  CONTRAST: 9 mL Gadavist  TECHNIQUE: Routine Thoracic Spine MRI without and with IV contrast.     FINDINGS: There is moderate patchy nonspecific edema-like marrow signal change involving the T7 and T12 vertebral bodies with suggestion of possible subtle transversely oriented hypointense signal abnormality in the mid aspect of the T7 and T12 vertebral   bodies. There is mild to moderate T7 and T12 vertebral body height loss. Recent T7 and T12 compression fractures cannot be excluded and are possible. Mild retropulsion of the posterior superior endplate of T12 along the ventral aspect of the spinal   canal. No significant  fracture fragment retropulsion at the T7 level. Mild to moderate T5 vertebral body height loss, mild to moderate T9 vertebral height loss, and mild multilevel vertebral height loss elsewhere, presumably representing older   compression deformities with superimposed multilevel Schmorl's nodes.     Sagittal alignment appears normal. Scattered presumed Modic type I degenerative endplate signal changes seen in the mid to lower thoracic spine. Scattered Modic type II degenerative endplate signal changes are also noted. Mild scattered patchy edema   centered about the facet joints at multiple levels, nonspecific, but potentially degenerative in nature.     There is a large longitudinally extensive dorsal epidural fluid collection that extends from the level of T4-T5 into the lumbar region, measuring up to approximately 11-12 mm in anterior-posterior thickness at the level of T12-L1. There is significant   associated mass effect upon and flattening of the spinal cord particularly from the level of T6-T7 into the visualized upper lumbar region, with areas of moderate to severe and severe spinal canal stenosis and spinal cord compression. Correlating with   lumbar spine MRI of same day, the dorsal epidural collection appears to extend down to the level of L2-L3. This lesion demonstrates prominent peripheral enhancement with some areas of enhancing internal material/septation. There is also diffuse dural   thickening and enhancement elsewhere throughout much of the thoracic and visualized upper lumbar spine.     Mild/moderate multilevel degenerative disc height loss is present. Multilevel disc bulges are seen with marginal endplate osteophytes. Multilevel degenerative facet disease, most pronounced in the mid to lower thoracic spine and at least moderate or   moderate to severe in degree at multiple levels. Mild/moderate multilevel neural foraminal narrowing is present on a degenerative basis.                                                                       IMPRESSION:  1.  Large longitudinally extensive dorsal epidural fluid collection extending from T4-T5 down to L2-L3 with prominent peripheral enhancement, resulting in diffuse moderate to severe/severe spinal canal stenosis and spinal cord compression. Primary   differential considerations would include epidural hematoma or epidural abscess. Recommend urgent spine surgery consultation.  2.  Patchy nonspecific edema-like marrow signal change and mild to moderate height loss involving the T7 and T12 vertebral bodies. Recent compression fractures are not excluded. Older mild/mild to moderate thoracic compression deformities elsewhere.  3.  Multilevel degenerative changes.       12/18 MRI lumbar spine  EXAM: MR LUMBAR SPINE WITHOUT AND WITH CONTRAST  LOCATION: [facility]  DATE/TIME: 12/18/2022, 11:08 AM     INDICATION: Back pain, suspected cauda equina syndrome.  COMPARISON: Thoracic spine MRI same day.  CONTRAST: 9 mL Gadavist.  TECHNIQUE: Routine Lumbar Spine MRI without and with IV contrast.     FINDINGS: Nomenclature is based on five lumbar vertebral bodies. Age-indeterminate T12 compression deformity with mild to moderate height loss and patchy edema-like marrow signal change in the T12 vertebral body with slight retropulsion of the   posterosuperior endplate along the ventral epidural space. Probable chronic mild to moderate L4 superior endplate compression deformity/Schmorl's node. Otherwise normal lumbar vertebral body heights. Sagittal alignment is normal.     Correlating with thoracic spine MRI of same day, there is a large longitudinally extensive dorsal epidural fluid collection that extends from approximately the level of T4-T5 down to the level of L2-L3. This is most pronounced in the lumbar region at the   level of T12-L1 where it measures up to 11-12 mm in anterior-posterior thickness. There is severe associated mass effect on the distal spinal  cord with evidence for cord compression from the lower thoracic region down to the level of L1-L2.     Scattered areas of disc desiccation are noted. Minimal disc height loss at L3-L4. The other lumbar intervertebral discs are relatively maintained in height. There is mild to moderate multilevel degenerative facet arthropathy. As stated above, severe   spinal canal stenosis at T11-T12, T12-L1, and L1-L2. Mild spinal canal stenosis at L2-L3, L3-L4 and L4-L5. No high-grade neural foraminal stenosis identified.     There appears to be a fluid collection in the left retroperitoneal space that is only partially imaged along the lateral aspect of the left psoas muscle that measures at least 62 mm x 26 mm in the axial plane with irregular peripheral enhancement. There   is abnormal patchy enhancement marginating the left psoas muscle and surrounding the fluid collection in the left retroperitoneal space.                                                                      IMPRESSION:  1.  Longitudinally extensive dorsal epidural fluid collection in the thoracolumbar spine extending down to the level of L2-L3 with severe associated mass effect on the spinal cord/spinal cord compression, as described. Primary differential considerations   would include epidural abscess or epidural hematoma. Recommend clinical correlation and urgent neurosurgery consultation.  2.  Partial visualization of a nonspecific fluid collection with peripheral enhancement in the left retroperitoneal space lateral to the left psoas muscle measuring up to 62 mm in the axial plane, as described. This also may represent an abscess or   hematoma. Consider CT of the abdomen and pelvis with contrast for further evaluation.  3.  Degenerative changes, as described.       12/18 CT abdomen pelvis  EXAM: CT ABDOMEN PELVIS W CONTRAST  LOCATION: [facility]  DATE/TIME: 12/18/2022 12:51 PM     INDICATION: Evaluate fluid collection seen on  MRI.  COMPARISON: Spinal MRI 12/18/2022.  TECHNIQUE: CT scan of the abdomen and pelvis was performed following injection of IV contrast. Multiplanar reformats were obtained. Dose reduction techniques were used.  CONTRAST: 95mL Isovue 370     FINDINGS:   LOWER CHEST: Lingular and lower lobe atelectasis. Questionable trace left pleural effusion. Small hiatal hernia.     HEPATOBILIARY: Normal.     PANCREAS: Normal.     SPLEEN: Normal.     ADRENAL GLANDS: Normal.     KIDNEYS/BLADDER: Normal kidneys. No hydronephrosis. Excreted contrast from the recent MRI is present throughout the collecting system as well as in the urinary bladder. Urinary bladder contains a Dacosta catheter.     BOWEL: No obstruction or inflammation. Appendix is not visualized.     LYMPH NODES: No enlarged lymph nodes.     VASCULATURE: Retroaortic left renal vein. Patent portal, splenic, and superior mesenteric veins. No abdominal aortic aneurysm.      PERITONEUM/RETROPERITONEUM: Small amount of ill-defined left retroperitoneal fluid extending along the left posterior pararenal space and paracolic gutter into the pelvis. Trace pelvic fluid.      PELVIC ORGANS: Normal.     MUSCULOSKELETAL: Mild compression deformity of the T12 vertebral body. L3 vertebral body hemangioma. Multilevel degenerative changes of the spine with multiple Schmorl's nodes. Known epidural fluid collection is not well visualized. Small left femoral   bone island.                                                                      IMPRESSION:      1.  Small amount of ill-defined simple left retroperitoneal fluid extending along the left posterior pararenal space and paracolic gutter into the pelvis, of uncertain etiology. Sequela of a previously ruptured calyx may be a possibility if there was   previous hydronephrosis from the reported urinary retention. No current hydronephrosis or evidence of urine leak.

## 2022-12-19 NOTE — PROGRESS NOTES
[facility]    Neurosurgery Progress Note    Date of Service (when I saw the patient): 12/19/2022     Assessment & Plan     Procedure(s):  THORACIC LUMBAR DECOMPRESSION FOR EVACUATION AND EPIDURAL ABSCESS VERSUS HEMATOMA   -1 Day Post-Op  Doing well. Reports incisional pain. He also describes bilateral thigh soreness. He states this is where he had numbness prior to surgery. Continues to have difficulty with quad strength. Distal strength intact. GPC, cultures pending. ID following.    Plan:  -Activity as tolerated  -Pain management as needed  -Therapies  -Routine wound care  -Appreciate ID    I have discussed the following assessment and plan Dr. Courtney who is in agreement with initial plan and will follow up with further consultation recommendations.    Dominga Brody CNP  [facility] Neurosurgery  [facility]  [address] [address]    Tel [phone]    Interval History   Stable.     Physical Exam   Temp: 98.9  F (37.2  C) Temp src: Oral BP: 128/79 Pulse: 84   Resp: 17 SpO2: 96 % O2 Device: None (Room air) Oxygen Delivery: 1 LPM  Vitals:    12/19/22 0630   Weight: 196 lb 3.4 oz (89 kg)     Vital Signs with Ranges  Temp:  [97.9  F (36.6  C)-99.3  F (37.4  C)] 98.9  F (37.2  C)  Pulse:  [] 84  Resp:  [11-20] 17  BP: (127-165)/() 128/79  SpO2:  [95 %-98 %] 96 %  I/O last 3 completed shifts:  In: 600 [P.O.:600]  Out: 2425 [Urine:2425]     , Blood pressure 128/79, pulse 84, temperature 98.9  F (37.2  C), temperature source Oral, resp. rate 17, weight 196 lb 3.4 oz (89 kg), SpO2 96 %.  196 lbs 3.35 oz  HEENT:  Normocephalic.  PERRLA.   Heart:  No peripheral edema  Lungs:  No SOB  Skin:  Warm and dry, good capillary refill.  Extremities:  Good radial and dorsalis pedis pulses bilaterally, no edema, cyanosis or clubbing.    NEUROLOGICAL EXAMINATION:   Mental status:  Alert and Oriented x 3, speech is fluent.  Cranial nerves:   II-XII intact.   Motor:    Hip Flexor:                Right: 3/5  Left:  3/5  Hip Adductor:             Right:  5/5  Left:  5/5  Hip Abductor:             Right:  5/5  Left:  5/5  Gastroc Soleus:        Right:  5/5  Left:  4/5  Tib/Ant:                      Right:  5/5  Left:  4/5  EHL:                     Right:  5/5  Left:  5/5  Sensation:  intact    Medications     sodium chloride         acetaminophen  975 mg Oral Q8H     cefTRIAXone  2 g Intravenous Q12H     polyethylene glycol  17 g Oral Daily     senna-docusate  1 tablet Oral BID     sodium chloride (PF)  3 mL Intracatheter Q8H     sodium chloride (PF)  3 mL Intracatheter Q8H     vancomycin  1,750 mg Intravenous Q12H       Data     CBC RESULTS:   Recent Labs   Lab Test 12/19/22  0804   WBC 13.4*   RBC 3.44*   HGB 11.6*   HCT 34.1*   MCV 99   MCH 33.7*   MCHC 34.0   RDW 13.6        Basic Metabolic Panel:  Lab Results   Component Value Date     12/19/2022     02/02/2021      Lab Results   Component Value Date    POTASSIUM 3.9 12/19/2022    POTASSIUM 4.2 02/02/2021     Lab Results   Component Value Date    CHLORIDE 101 12/19/2022    CHLORIDE 106 02/02/2021     Lab Results   Component Value Date    LINA 8.5 12/19/2022    LINA 9.5 02/02/2021     Lab Results   Component Value Date    CO2 22 12/19/2022    CO2 28 02/02/2021     Lab Results   Component Value Date    BUN 13 12/19/2022    BUN 14 02/02/2021     Lab Results   Component Value Date    CR 0.85 12/19/2022    CR 1.04 02/02/2021     Lab Results   Component Value Date     12/19/2022    GLC 91 12/19/2022    GLC 91 12/19/2022    GLC 88 02/02/2021     INR:  No results found for: INR

## 2022-12-19 NOTE — PROGRESS NOTES
Patient vital signs are at baseline: Yes  Patient able to ambulate as they were prior to admission or with assist devices provided by therapies during their stay:  No,  Reason:  Pt very unsteady when up.  Patient MUST void prior to discharge:  No,  Reason:  Dacosta to be left in place during the night.   Patient able to tolerate oral intake:  Yes  Pain has adequate pain control using Oral analgesics:  Yes  Does patient have an identified :  Yes  Has goal D/C date and time been discussed with patient:  No,  Reason:  Discharge TBD.    Pt is A&O x4. VSS on room air. Spinal dressing CDI. CMS intact. Denies numbness or tingling.  Pt has bilateral lower leg weakness. Pt stood at the side of the bed and took a few steps with assistance of 2 with use of gait belt and the walker. Pt was very unsteady. Dacosta to be left in place during the night, recheck in am. Denies pain. Refused ice packs.  Meplex to abrasion of the R elbow. Drinking fluids adequately, pt refused dinner. NS infusing at 75 ml/hr.

## 2022-12-20 ENCOUNTER — APPOINTMENT (OUTPATIENT)
Dept: OCCUPATIONAL THERAPY | Facility: CLINIC | Age: 57
DRG: 029 | End: 2022-12-20
Attending: PHYSICIAN ASSISTANT
Payer: COMMERCIAL

## 2022-12-20 ENCOUNTER — APPOINTMENT (OUTPATIENT)
Dept: CARDIOLOGY | Facility: CLINIC | Age: 57
DRG: 029 | End: 2022-12-20
Attending: SPECIALIST
Payer: COMMERCIAL

## 2022-12-20 LAB
ANION GAP SERPL CALCULATED.3IONS-SCNC: 10 MMOL/L (ref 3–14)
BUN SERPL-MCNC: 10 MG/DL (ref 7–30)
CALCIUM SERPL-MCNC: 8.1 MG/DL (ref 8.5–10.1)
CHLORIDE BLD-SCNC: 102 MMOL/L (ref 94–109)
CO2 SERPL-SCNC: 23 MMOL/L (ref 20–32)
CREAT SERPL-MCNC: 0.66 MG/DL (ref 0.66–1.25)
CREAT SERPL-MCNC: 0.67 MG/DL (ref 0.66–1.25)
ENTEROCOCCUS FAECALIS: NOT DETECTED
ENTEROCOCCUS FAECIUM: NOT DETECTED
GFR SERPL CREATININE-BSD FRML MDRD: >90 ML/MIN/1.73M2
GFR SERPL CREATININE-BSD FRML MDRD: >90 ML/MIN/1.73M2
GLUCOSE BLD-MCNC: 107 MG/DL (ref 70–99)
GLUCOSE BLDC GLUCOMTR-MCNC: 89 MG/DL (ref 70–99)
LISTERIA SPECIES (DETECTED/NOT DETECTED): NOT DETECTED
LVEF ECHO: NORMAL
POTASSIUM BLD-SCNC: 3.8 MMOL/L (ref 3.4–5.3)
POTASSIUM BLD-SCNC: 3.9 MMOL/L (ref 3.4–5.3)
SODIUM SERPL-SCNC: 135 MMOL/L (ref 133–144)
STAPHYLOCOCCUS AUREUS: DETECTED
STAPHYLOCOCCUS EPIDERMIDIS: NOT DETECTED
STAPHYLOCOCCUS LUGDUNENSIS: NOT DETECTED
STREPTOCOCCUS AGALACTIAE: NOT DETECTED
STREPTOCOCCUS ANGINOSUS GROUP: NOT DETECTED
STREPTOCOCCUS PNEUMONIAE: NOT DETECTED
STREPTOCOCCUS PYOGENES: NOT DETECTED
STREPTOCOCCUS SPECIES: NOT DETECTED
VANCOMYCIN SERPL-MCNC: 9.9 MG/L

## 2022-12-20 PROCEDURE — 99232 SBSQ HOSP IP/OBS MODERATE 35: CPT | Performed by: INTERNAL MEDICINE

## 2022-12-20 PROCEDURE — 258N000003 HC RX IP 258 OP 636: Performed by: PHYSICIAN ASSISTANT

## 2022-12-20 PROCEDURE — 250N000011 HC RX IP 250 OP 636: Performed by: PHYSICIAN ASSISTANT

## 2022-12-20 PROCEDURE — 250N000011 HC RX IP 250 OP 636: Performed by: SPECIALIST

## 2022-12-20 PROCEDURE — 250N000013 HC RX MED GY IP 250 OP 250 PS 637: Performed by: PHYSICIAN ASSISTANT

## 2022-12-20 PROCEDURE — 99233 SBSQ HOSP IP/OBS HIGH 50: CPT | Performed by: SPECIALIST

## 2022-12-20 PROCEDURE — 999N000208 ECHOCARDIOGRAM COMPLETE

## 2022-12-20 PROCEDURE — 80048 BASIC METABOLIC PNL TOTAL CA: CPT | Performed by: INTERNAL MEDICINE

## 2022-12-20 PROCEDURE — 93306 TTE W/DOPPLER COMPLETE: CPT | Mod: 26 | Performed by: INTERNAL MEDICINE

## 2022-12-20 PROCEDURE — 82565 ASSAY OF CREATININE: CPT | Performed by: NURSE PRACTITIONER

## 2022-12-20 PROCEDURE — 250N000013 HC RX MED GY IP 250 OP 250 PS 637: Performed by: NURSE PRACTITIONER

## 2022-12-20 PROCEDURE — 84132 ASSAY OF SERUM POTASSIUM: CPT | Performed by: INTERNAL MEDICINE

## 2022-12-20 PROCEDURE — 97535 SELF CARE MNGMENT TRAINING: CPT | Mod: GO | Performed by: OCCUPATIONAL THERAPIST

## 2022-12-20 PROCEDURE — 255N000002 HC RX 255 OP 636: Performed by: INTERNAL MEDICINE

## 2022-12-20 PROCEDURE — 120N000001 HC R&B MED SURG/OB

## 2022-12-20 PROCEDURE — 36415 COLL VENOUS BLD VENIPUNCTURE: CPT | Performed by: INTERNAL MEDICINE

## 2022-12-20 PROCEDURE — 87040 BLOOD CULTURE FOR BACTERIA: CPT | Performed by: INTERNAL MEDICINE

## 2022-12-20 PROCEDURE — 80202 ASSAY OF VANCOMYCIN: CPT | Performed by: INTERNAL MEDICINE

## 2022-12-20 PROCEDURE — 250N000009 HC RX 250: Performed by: SPECIALIST

## 2022-12-20 PROCEDURE — 97166 OT EVAL MOD COMPLEX 45 MIN: CPT | Mod: GO | Performed by: OCCUPATIONAL THERAPIST

## 2022-12-20 RX ORDER — NAFCILLIN SODIUM 2 G/8ML
2 INJECTION, POWDER, FOR SOLUTION INTRAMUSCULAR; INTRAVENOUS EVERY 4 HOURS
Status: DISCONTINUED | OUTPATIENT
Start: 2022-12-20 | End: 2022-12-27 | Stop reason: HOSPADM

## 2022-12-20 RX ADMIN — NAFCILLIN 2 G: 2 POWDER, FOR SOLUTION INTRAMUSCULAR; INTRAVENOUS at 18:02

## 2022-12-20 RX ADMIN — NAFCILLIN 2 G: 2 POWDER, FOR SOLUTION INTRAMUSCULAR; INTRAVENOUS at 10:13

## 2022-12-20 RX ADMIN — POLYETHYLENE GLYCOL 3350 17 G: 17 POWDER, FOR SOLUTION ORAL at 09:15

## 2022-12-20 RX ADMIN — BISACODYL 10 MG: 10 SUPPOSITORY RECTAL at 11:07

## 2022-12-20 RX ADMIN — CEFTRIAXONE SODIUM 2 G: 2 INJECTION, POWDER, FOR SOLUTION INTRAMUSCULAR; INTRAVENOUS at 01:12

## 2022-12-20 RX ADMIN — HYDRALAZINE HYDROCHLORIDE 20 MG: 20 INJECTION, SOLUTION INTRAMUSCULAR; INTRAVENOUS at 06:19

## 2022-12-20 RX ADMIN — HYDRALAZINE HYDROCHLORIDE 20 MG: 20 INJECTION, SOLUTION INTRAMUSCULAR; INTRAVENOUS at 17:25

## 2022-12-20 RX ADMIN — NAFCILLIN 2 G: 2 POWDER, FOR SOLUTION INTRAMUSCULAR; INTRAVENOUS at 14:21

## 2022-12-20 RX ADMIN — SENNOSIDES AND DOCUSATE SODIUM 1 TABLET: 50; 8.6 TABLET ORAL at 09:15

## 2022-12-20 RX ADMIN — NAFCILLIN 2 G: 2 POWDER, FOR SOLUTION INTRAMUSCULAR; INTRAVENOUS at 21:29

## 2022-12-20 RX ADMIN — OXYCODONE HYDROCHLORIDE 5 MG: 5 TABLET ORAL at 11:05

## 2022-12-20 RX ADMIN — ACETAMINOPHEN 975 MG: 325 TABLET, FILM COATED ORAL at 21:28

## 2022-12-20 RX ADMIN — ACETAMINOPHEN 975 MG: 325 TABLET, FILM COATED ORAL at 14:17

## 2022-12-20 RX ADMIN — SODIUM CHLORIDE: 9 INJECTION, SOLUTION INTRAVENOUS at 09:18

## 2022-12-20 RX ADMIN — SENNOSIDES AND DOCUSATE SODIUM 1 TABLET: 50; 8.6 TABLET ORAL at 21:29

## 2022-12-20 RX ADMIN — HUMAN ALBUMIN MICROSPHERES AND PERFLUTREN 9 ML: 10; .22 INJECTION, SOLUTION INTRAVENOUS at 15:22

## 2022-12-20 RX ADMIN — ACETAMINOPHEN 975 MG: 325 TABLET, FILM COATED ORAL at 06:16

## 2022-12-20 RX ADMIN — HYDRALAZINE HYDROCHLORIDE 20 MG: 20 INJECTION, SOLUTION INTRAMUSCULAR; INTRAVENOUS at 16:36

## 2022-12-20 ASSESSMENT — ACTIVITIES OF DAILY LIVING (ADL)
ADLS_ACUITY_SCORE: 37
ADLS_ACUITY_SCORE: 37
ADLS_ACUITY_SCORE: 33
ADLS_ACUITY_SCORE: 37
ADLS_ACUITY_SCORE: 37
ADLS_ACUITY_SCORE: 41
DEPENDENT_IADLS:: INDEPENDENT
ADLS_ACUITY_SCORE: 37
ADLS_ACUITY_SCORE: 33
ADLS_ACUITY_SCORE: 37
ADLS_ACUITY_SCORE: 33

## 2022-12-20 NOTE — PROGRESS NOTES
Patient vital signs are at baseline: Yes  Patient able to ambulate as they were prior to admission or with assist devices provided by therapies during their stay:  Yes  Patient MUST void prior to discharge:  No Dacosta cath in place for retention with adequate output.   Patient able to tolerate oral intake:  Yes  Pain has adequate pain control using Oral analgesics:  Yes  Does patient have an identified :  Yes  Has goal D/C date and time been discussed with patient:  Yes in progress    A&O x's 4. VSS ex elevated /94 10 mg Hydralazine given , rechecked BP after 30 minutes 153/87 another 10 mg of Hydralazine given, rechecked after 30 minutes 147/81 . Incisions - back incision, Pain manage with schedule Tylenol and PRN oxycodone. Lab called for critical lab value staphylococcus Aureus, PIV infusing NS at 75 mL/hr, no New intermittent abx ( Nafcillin). BM during shift. Suppository given, effective x2 loose BM. On regular diet. Assist x1 GB/W.  Will continue to monitor.

## 2022-12-20 NOTE — CONSULTS
Care Management Initial Consult    General Information  Assessment completed with: Patient, Patient  Type of CM/SW Visit: Initial Assessment    Primary Care Provider verified and updated as needed: No   Readmission within the last 30 days:        Reason for Consult: discharge planning  Advance Care Planning: Advance Care Planning Reviewed: no concerns identified          Communication Assessment  Patient's communication style: spoken language (English or Bilingual)    Hearing Difficulty or Deaf: no        Cognitive  Cognitive/Neuro/Behavioral: WDL  Level of Consciousness: alert  Arousal Level: opens eyes spontaneously  Orientation: oriented x 4  Mood/Behavior: calm, cooperative, behavior appropriate to situation  Best Language: 0 - No aphasia  Speech: clear, spontaneous, logical    Living Environment:   People in home: spouse  Anita  Current living Arrangements: house      Able to return to prior arrangements: no       Family/Social Support:  Care provided by: self  Provides care for: no one  Marital Status:   Wife  Anita       Description of Support System: Supportive, Involved    Support Assessment: Adequate family and caregiver support, Adequate social supports    Current Resources:   Patient receiving home care services: No     Community Resources: None  Equipment currently used at home: shower chair  Supplies currently used at home:      Employment/Financial:  Employment Status:          Financial Concerns: No concerns identified   Referral to Financial Worker: No       Lifestyle & Psychosocial Needs:  Social Determinants of Health     Tobacco Use: Low Risk      Smoking Tobacco Use: Never     Smokeless Tobacco Use: Never     Passive Exposure: Not on file   Alcohol Use: Not on file   Financial Resource Strain: Not on file   Food Insecurity: Not on file   Transportation Needs: Not on file   Physical Activity: Not on file   Stress: Not on file   Social Connections: Not on file   Intimate Partner Violence: Not  on file   Depression: Not at risk     PHQ-2 Score: 0   Housing Stability: Not on file       Functional Status:  Prior to admission patient needed assistance:   Dependent ADLs:: Independent  Dependent IADLs:: Independent       Mental Health Status:  Mental Health Status: No Current Concerns       Chemical Dependency Status:  Chemical Dependency Status: No Current Concerns             Values/Beliefs:  Spiritual, Cultural Beliefs, Christian Practices, Values that affect care: no               Additional Information:  Per Care Management/social work consult for discharge planning patient admitted on 12/18 due to spinal cord compression. SW reviewed chart and spoke with patient. Per Patient report he resides in a split level house with his wife. There is 1 ALTAGRACIA and patient has a shower chair at home. At baseline patient is independent with all ADL's/IADL's. SW informed wife is a good support but unable to provide the amount of support patient is needing at this time. SW discussed TCU recommendation and patient is in agreement and would like referrals near his zip code. Patient would like a private room and would like [facility] transport at discharge. TANO sent referrals via DOD.    ROSE Santoro    [facility]

## 2022-12-20 NOTE — PROGRESS NOTES
[facility]    Infectious Disease Progress Note    Date of Service : 12/20/2022     Assessment:  57 YM , previously healthy who has been admitted with S.aureus bacteremia associated with extensive multi level thoracolumbar spinal epidural abscess from T4-T5 down to L2-L3 with spinal chord compression.  S/p debridement with S.aureus in cx. Blood cs also positive from 12/18 . Source remains unclear     Recommendations  1. Repeat blood cxs X 2 sets  2. Discontinue Vancomycin and Ceftriaxone  3. Treat with Nafcillin 2 grams Q4H  4. Will need PICC once blood cxs are negative for 48 hours and IV antibiotic treatment for 6 weeks  5. Care integration consultation  6. Echocardiogram    Elyse Valadez MD    Interval History   Feels better, lower extremity weakness is improving but has developed LE edema. Tolerating antibiotics without side effects, no new complaints    Physical Exam   Temp: 99  F (37.2  C) Temp src: Oral BP: (!) 158/98 Pulse: 97   Resp: 16 SpO2: 96 % O2 Device: None (Room air)    Vitals:    12/19/22 0630 12/20/22 0657   Weight: 89 kg (196 lb 3.4 oz) 89.9 kg (198 lb 3.1 oz)     Vital Signs with Ranges  Temp:  [98.5  F (36.9  C)-99  F (37.2  C)] 99  F (37.2  C)  Pulse:  [85-98] 97  Resp:  [16] 16  BP: (139-159)/(89-99) 158/98  SpO2:  [94 %-97 %] 96 %    Constitutional: Awake, alert, cooperative, no apparent distress  Lungs: Clear to auscultation bilaterally, no crackles or wheezing  Cardiovascular: Regular rate and rhythm, normal S1 and S2, and no murmur noted  Abdomen: Normal bowel sounds, soft, non-distended, non-tender  Skin: No rash  MS : LE edema    Other:    Medications     sodium chloride 75 mL/hr at 12/19/22 1632       acetaminophen  975 mg Oral Q8H     nafcillin  2 g Intravenous Q4H     polyethylene glycol  17 g Oral Daily     senna-docusate  1 tablet Oral BID     sodium chloride (PF)  3 mL Intracatheter Q8H     sodium chloride (PF)  3 mL Intracatheter Q8H       Data   All  microbiology laboratory data reviewed.  Recent Labs   Lab Test 12/19/22  0804 12/18/22  0921 10/27/21  1204   WBC 13.4* 14.8* 4.4   HGB 11.6* 12.9* 13.8   HCT 34.1* 39.5* 41.7   MCV 99 101* 103*    213 227     Recent Labs   Lab Test 12/19/22  0804 12/18/22  0921 10/27/21  1204   CR 0.85 1.69* 0.98     Recent Labs   Lab Test 12/18/22  0921   SED 45*       Microbiology  12/18 blood cx 1 set 1/4 bottles positive for MSSA by verigene  12/18 thoracic spine abscess  Spine, Thoracic; Abscess    0 Result Notes  Culture Culture in progress       4+ Staphylococcus aureus         Imaging  12/18 Mri thoracic spine  EXAM: MR THORACIC SPINE W/O and W CONTRAST  LOCATION: [facility]  DATE/TIME: 12/18/2022 11:05 AM     INDICATION: Back pain, neuro deficit  COMPARISON: None.  CONTRAST: 9 mL Gadavist  TECHNIQUE: Routine Thoracic Spine MRI without and with IV contrast.     FINDINGS: There is moderate patchy nonspecific edema-like marrow signal change involving the T7 and T12 vertebral bodies with suggestion of possible subtle transversely oriented hypointense signal abnormality in the mid aspect of the T7 and T12 vertebral   bodies. There is mild to moderate T7 and T12 vertebral body height loss. Recent T7 and T12 compression fractures cannot be excluded and are possible. Mild retropulsion of the posterior superior endplate of T12 along the ventral aspect of the spinal   canal. No significant fracture fragment retropulsion at the T7 level. Mild to moderate T5 vertebral body height loss, mild to moderate T9 vertebral height loss, and mild multilevel vertebral height loss elsewhere, presumably representing older   compression deformities with superimposed multilevel Schmorl's nodes.     Sagittal alignment appears normal. Scattered presumed Modic type I degenerative endplate signal changes seen in the mid to lower thoracic spine. Scattered Modic type II degenerative endplate signal changes are also noted.  Mild scattered patchy edema   centered about the facet joints at multiple levels, nonspecific, but potentially degenerative in nature.     There is a large longitudinally extensive dorsal epidural fluid collection that extends from the level of T4-T5 into the lumbar region, measuring up to approximately 11-12 mm in anterior-posterior thickness at the level of T12-L1. There is significant   associated mass effect upon and flattening of the spinal cord particularly from the level of T6-T7 into the visualized upper lumbar region, with areas of moderate to severe and severe spinal canal stenosis and spinal cord compression. Correlating with   lumbar spine MRI of same day, the dorsal epidural collection appears to extend down to the level of L2-L3. This lesion demonstrates prominent peripheral enhancement with some areas of enhancing internal material/septation. There is also diffuse dural   thickening and enhancement elsewhere throughout much of the thoracic and visualized upper lumbar spine.     Mild/moderate multilevel degenerative disc height loss is present. Multilevel disc bulges are seen with marginal endplate osteophytes. Multilevel degenerative facet disease, most pronounced in the mid to lower thoracic spine and at least moderate or   moderate to severe in degree at multiple levels. Mild/moderate multilevel neural foraminal narrowing is present on a degenerative basis.                                                                      IMPRESSION:  1.  Large longitudinally extensive dorsal epidural fluid collection extending from T4-T5 down to L2-L3 with prominent peripheral enhancement, resulting in diffuse moderate to severe/severe spinal canal stenosis and spinal cord compression. Primary   differential considerations would include epidural hematoma or epidural abscess. Recommend urgent spine surgery consultation.  2.  Patchy nonspecific edema-like marrow signal change and mild to moderate height loss  involving the T7 and T12 vertebral bodies. Recent compression fractures are not excluded. Older mild/mild to moderate thoracic compression deformities elsewhere.  3.  Multilevel degenerative changes.        12/18 MRI lumbar spine  EXAM: MR LUMBAR SPINE WITHOUT AND WITH CONTRAST  LOCATION: [facility]  DATE/TIME: 12/18/2022, 11:08 AM     INDICATION: Back pain, suspected cauda equina syndrome.  COMPARISON: Thoracic spine MRI same day.  CONTRAST: 9 mL Gadavist.  TECHNIQUE: Routine Lumbar Spine MRI without and with IV contrast.     FINDINGS: Nomenclature is based on five lumbar vertebral bodies. Age-indeterminate T12 compression deformity with mild to moderate height loss and patchy edema-like marrow signal change in the T12 vertebral body with slight retropulsion of the   posterosuperior endplate along the ventral epidural space. Probable chronic mild to moderate L4 superior endplate compression deformity/Schmorl's node. Otherwise normal lumbar vertebral body heights. Sagittal alignment is normal.     Correlating with thoracic spine MRI of same day, there is a large longitudinally extensive dorsal epidural fluid collection that extends from approximately the level of T4-T5 down to the level of L2-L3. This is most pronounced in the lumbar region at the   level of T12-L1 where it measures up to 11-12 mm in anterior-posterior thickness. There is severe associated mass effect on the distal spinal cord with evidence for cord compression from the lower thoracic region down to the level of L1-L2.     Scattered areas of disc desiccation are noted. Minimal disc height loss at L3-L4. The other lumbar intervertebral discs are relatively maintained in height. There is mild to moderate multilevel degenerative facet arthropathy. As stated above, severe   spinal canal stenosis at T11-T12, T12-L1, and L1-L2. Mild spinal canal stenosis at L2-L3, L3-L4 and L4-L5. No high-grade neural foraminal stenosis  identified.     There appears to be a fluid collection in the left retroperitoneal space that is only partially imaged along the lateral aspect of the left psoas muscle that measures at least 62 mm x 26 mm in the axial plane with irregular peripheral enhancement. There   is abnormal patchy enhancement marginating the left psoas muscle and surrounding the fluid collection in the left retroperitoneal space.                                                                      IMPRESSION:  1.  Longitudinally extensive dorsal epidural fluid collection in the thoracolumbar spine extending down to the level of L2-L3 with severe associated mass effect on the spinal cord/spinal cord compression, as described. Primary differential considerations   would include epidural abscess or epidural hematoma. Recommend clinical correlation and urgent neurosurgery consultation.  2.  Partial visualization of a nonspecific fluid collection with peripheral enhancement in the left retroperitoneal space lateral to the left psoas muscle measuring up to 62 mm in the axial plane, as described. This also may represent an abscess or   hematoma. Consider CT of the abdomen and pelvis with contrast for further evaluation.  3.  Degenerative changes, as described.        12/18 CT abdomen pelvis  EXAM: CT ABDOMEN PELVIS W CONTRAST  LOCATION: [facility]  DATE/TIME: 12/18/2022 12:51 PM     INDICATION: Evaluate fluid collection seen on MRI.  COMPARISON: Spinal MRI 12/18/2022.  TECHNIQUE: CT scan of the abdomen and pelvis was performed following injection of IV contrast. Multiplanar reformats were obtained. Dose reduction techniques were used.  CONTRAST: 95mL Isovue 370     FINDINGS:   LOWER CHEST: Lingular and lower lobe atelectasis. Questionable trace left pleural effusion. Small hiatal hernia.     HEPATOBILIARY: Normal.     PANCREAS: Normal.     SPLEEN: Normal.     ADRENAL GLANDS: Normal.     KIDNEYS/BLADDER: Normal kidneys. No  hydronephrosis. Excreted contrast from the recent MRI is present throughout the collecting system as well as in the urinary bladder. Urinary bladder contains a Dacosta catheter.     BOWEL: No obstruction or inflammation. Appendix is not visualized.     LYMPH NODES: No enlarged lymph nodes.     VASCULATURE: Retroaortic left renal vein. Patent portal, splenic, and superior mesenteric veins. No abdominal aortic aneurysm.      PERITONEUM/RETROPERITONEUM: Small amount of ill-defined left retroperitoneal fluid extending along the left posterior pararenal space and paracolic gutter into the pelvis. Trace pelvic fluid.      PELVIC ORGANS: Normal.     MUSCULOSKELETAL: Mild compression deformity of the T12 vertebral body. L3 vertebral body hemangioma. Multilevel degenerative changes of the spine with multiple Schmorl's nodes. Known epidural fluid collection is not well visualized. Small left femoral   bone island.                                                                      IMPRESSION:      1.  Small amount of ill-defined simple left retroperitoneal fluid extending along the left posterior pararenal space and paracolic gutter into the pelvis, of uncertain etiology. Sequela of a previously ruptured calyx may be a possibility if there was   previous hydronephrosis from the reported urinary retention. No current hydronephrosis or evidence of urine leak.

## 2022-12-20 NOTE — PLAN OF CARE
Goal Outcome Evaluation:    A&Ox4. VSS. On RA. CMS intact. Dressing on back CDI. Up w/ Ax1 GB/W. No BM yet. Passing flatus. PIV SL w/ intermittent IV abx. Dacosta out. Bladder scan w/ 850 ML & 150 ML. Straight cath x1 w/ 900 ML output. Blood cultures positive. New order for a 2nd blood culture in. Will continue to monitor.

## 2022-12-20 NOTE — PROGRESS NOTES
[facility]    Neurosurgery  Daily Post-Op Note    Assessment & Plan   Procedure(s):  THORACIC LUMBAR DECOMPRESSION FOR EVACUATION AND EPIDURAL ABSCESS VERSUS HEMATOMA   2 Days Post-Op  Doing well.  Normal healing wound.  No immediate surgical complications identified.  Pain well-controlled.    Plan:  -Advance activity as tolerated  -Continue supportive and symptomatic treatment  -Start or continue physical therapy      Marco Licona PA-C    Interval History   Stable.  Doing well.  Improving slowly.  Pain is reasonably controlled.  No fevers.    Physical Exam   Temp: 99  F (37.2  C) Temp src: Oral BP: (!) 158/98 Pulse: 97   Resp: 16 SpO2: 96 % O2 Device: None (Room air)    Vitals:    12/19/22 0630 12/20/22 0657   Weight: 89 kg (196 lb 3.4 oz) 89.9 kg (198 lb 3.1 oz)     Vital Signs with Ranges  Temp:  [98.5  F (36.9  C)-99  F (37.2  C)] 99  F (37.2  C)  Pulse:  [85-98] 97  Resp:  [16] 16  BP: (139-159)/(89-99) 158/98  SpO2:  [94 %-97 %] 96 %  I/O last 3 completed shifts:  In: 1603 [P.O.:240; I.V.:1363]  Out: 1850 [Urine:1850]    Alert and oriented.  Moves all extremities equally.      Incision: CDI      Medications     sodium chloride 75 mL/hr at 12/20/22 0918        acetaminophen  975 mg Oral Q8H     nafcillin  2 g Intravenous Q4H     polyethylene glycol  17 g Oral Daily     senna-docusate  1 tablet Oral BID     sodium chloride (PF)  3 mL Intracatheter Q8H           Marco Licona PA-C  [facility]  [facility]  [address]  [address]  [place] [address]    Tel [phone]  Pager [phone]

## 2022-12-20 NOTE — CONSULTS
[facility]  Urology Consult Note  Name: Ian Moncada    MRN: [id number]  YOB: 1965    Age: 57 year old  Date of admission: 12/18/2022  Primary care provider: No Ref-Primary, Physician     Requesting Physician:  Marco Licona PA-C  Reason for consult:  Postop urinary retention           History of Present Illness:   Ian Moncada is a 57 year old male, seen at the request of Marco Licona PA-C, who presented to [facility] with back pain and progressive difficulty with ambulation, repeated mechanical falls, and weakness and sensory changes of the lower extremities.  He is now s/p thoracic laminectomy for drainage of thoracolumbar epidural abscess with neurosurgery on 12/18/22.    No past urologic history on chart review.  Straight cathed overnight for 900cc and today for 1100cc.  He denies issues with urinary retention prior to this.  He has some lower abdominal discomfort, but does not feel urge to void at all.  Feels constipated (last BM >2 days ago per his nurse).  Denies fever, chills.  Cr 0.66 today.  UA from 12/18 with 4 WBC, 2 RBC, neg nitrite, neg leuk est.              Past Medical History:     Past Medical History:   Diagnosis Date     External hemorrhoids with other complication 07/25/2008    Banding 2007.     Hyperlipidemia LDL goal <130 08/12/2010     Hypertriglyceridemia 08/19/2010     Impaired fasting glucose 12/07/2009             Past Surgical History:     Past Surgical History:   Procedure Laterality Date     DECOMPRESSION LUMBAR ONE LEVEL N/A 12/18/2022    Procedure: THORACIC LUMBAR DECOMPRESSION FOR EVACUATION AND EPIDURAL ABSCESS VERSUS HEMATOMA;  Surgeon: Evans Courtney MD;  Location:  OR     TONSILLECTOMY  1970s               Social History:     Social History     Tobacco Use     Smoking status: Never     Smokeless tobacco: Never   Substance Use Topics     Alcohol use: Yes     Alcohol/week: 7.0 standard drinks     Types: 7 Standard drinks or equivalent  per week     Comment: pn the weekends             Family History:     Family History   Problem Relation Age of Onset     Heart Disease Paternal Grandfather      Neurologic Disorder Father         [place]     Heart Disease Mother         quad bipass     Heart Disease Maternal Grandfather      Heart Disease Maternal Uncle      Cancer - colorectal Maternal Grandmother      Heart Disease Maternal Aunt              Allergies:     Allergies   Allergen Reactions     Walnuts [Nuts]              Medications:       acetaminophen  975 mg Oral Q8H     nafcillin  2 g Intravenous Q4H     polyethylene glycol  17 g Oral Daily     senna-docusate  1 tablet Oral BID     sodium chloride (PF)  3 mL Intracatheter Q8H             Review of Systems:   A comprehensive greater than 10 system review of systems was carried out.  Pertinent positives and negatives are noted above.  Otherwise negative for contributory info.            Physical Exam:     Blood pressure (!) 158/98, pulse 97, temperature 99  F (37.2  C), temperature source Oral, resp. rate 16, weight 89.9 kg (198 lb 3.1 oz), SpO2 96 %.    Intake/Output Summary (Last 24 hours) at 12/20/2022 1211  Last data filed at 12/20/2022 1149  Gross per 24 hour   Intake 1263 ml   Output 2975 ml   Net -1712 ml     Exam:  General - Awake alert and oriented, appears stated age, ambulating from bed to restroom  Pulm - Non-labored breathing with normal respiratory effort  CVS - reg rate and rhythm, no peripheral edema   - no gamez at this time  Neuro - CN II-XII grossly intact  Musculoskeletal - extremities with no clubbing, cyanosis or edema  Psych - responsive, alert, cooperative; oriented x3; appropriate mood and affect  External/skin - inspection reveals no rashes, lesions or ulcers, normal coloring           Data Reviewed:     Recent Results (from the past 48 hour(s))   CT Abdomen Pelvis w Contrast    Narrative    EXAM: CT ABDOMEN PELVIS W CONTRAST  LOCATION: [facility]  DATE/TIME: 12/18/2022 12:51 PM    INDICATION: Evaluate fluid collection seen on MRI.  COMPARISON: Spinal MRI 12/18/2022.  TECHNIQUE: CT scan of the abdomen and pelvis was performed following injection of IV contrast. Multiplanar reformats were obtained. Dose reduction techniques were used.  CONTRAST: 95mL Isovue 370    FINDINGS:   LOWER CHEST: Lingular and lower lobe atelectasis. Questionable trace left pleural effusion. Small hiatal hernia.    HEPATOBILIARY: Normal.    PANCREAS: Normal.    SPLEEN: Normal.    ADRENAL GLANDS: Normal.    KIDNEYS/BLADDER: Normal kidneys. No hydronephrosis. Excreted contrast from the recent MRI is present throughout the collecting system as well as in the urinary bladder. Urinary bladder contains a Dacosta catheter.    BOWEL: No obstruction or inflammation. Appendix is not visualized.    LYMPH NODES: No enlarged lymph nodes.    VASCULATURE: Retroaortic left renal vein. Patent portal, splenic, and superior mesenteric veins. No abdominal aortic aneurysm.     PERITONEUM/RETROPERITONEUM: Small amount of ill-defined left retroperitoneal fluid extending along the left posterior pararenal space and paracolic gutter into the pelvis. Trace pelvic fluid.     PELVIC ORGANS: Normal.    MUSCULOSKELETAL: Mild compression deformity of the T12 vertebral body. L3 vertebral body hemangioma. Multilevel degenerative changes of the spine with multiple Schmorl's nodes. Known epidural fluid collection is not well visualized. Small left femoral   bone island.      Impression    IMPRESSION:     1.  Small amount of ill-defined simple left retroperitoneal fluid extending along the left posterior pararenal space and paracolic gutter into the pelvis, of uncertain etiology. Sequela of a previously ruptured calyx may be a possibility if there was   previous hydronephrosis from the reported urinary retention. No current hydronephrosis or evidence of urine leak.   XR Surgery ALEXIS L/T 5 Min Fluoro w Stills     Narrative    EXAM: XR SURGERY ALEXIS FLUORO LESS THAN 5 MIN W STILLS  LOCATION: [facility]  DATE/TIME: 12/18/2022 5:02 PM    INDICATION: thoracic lumbar decompression, fluoro time 37 seconds, 3 images  COMPARISON: None.  TECHNIQUE: Exam performed by surgeon.    FINDINGS:    FLUOROSCOPIC TIME: .6  NUMBER OF IMAGES: 3      Impression    IMPRESSION:  1.  Fluoroscopy utilized for thoracic lumbar decompression, please see operative note.       Recent Labs   Lab 12/19/22  0804 12/18/22  0921   WBC 13.4* 14.8*   HGB 11.6* 12.9*   HCT 34.1* 39.5*   MCV 99 101*    213     Recent Labs   Lab 12/20/22  0835 12/20/22  0547 12/19/22  2106 12/19/22  1232 12/19/22  0816 12/19/22  0804 12/18/22  0921   NA  --   --   --   --   --  132* 133*   POTASSIUM 3.8  --   --   --   --  3.9 3.9   CHLORIDE  --   --   --   --   --  101 94*   CO2  --   --   --   --   --  22 23   ANIONGAP  --   --   --   --   --  9 16*   GLC  --  89 105* 107*   < > 91  91 115*   BUN  --   --   --   --   --  13 17.5   CR 0.66  --   --   --   --  0.85 1.69*   GFRESTIMATED >90  --   --   --   --  >90 46*   LINA  --   --   --   --   --  8.5 9.6    < > = values in this interval not displayed.     Recent Labs   Lab 12/18/22  0942   COLOR Yellow   APPEARANCE Clear   URINEGLC Negative   URINEBILI Negative   URINEKETONE 20*   SG 1.013   UBLD Negative   URINEPH 5.5   PROTEIN Negative   NITRITE Negative   LEUKEST Negative   RBCU 2   WBCU 4         Assessment and Plan:   Ian Moncada is a 57 year old male who is s/p thoracic laminectomy for drainage of thoracolumbar epidural abscess with neurosurgery on 12/18/22.  Urology consulted for postop urinary retention.  Straight caths 900cc and 1100cc.  Negative UA on 12/18.    Plan:  - Increase ambulation and minimize narcotic use as inactivity and narcotics can further contribute to retention  - Needs aggressive bowel regimen as his constipation may be contributing to retention as well  - Unless  contraindicated, start Flomax 0.4 mg PO daily  - Recommended gamez placement today to allow bladder rest and decompression.  He was in agreement with this.  Nursing to place 16Fr gamez today.  Continue gamez x5-7 days.         - If patient remains admitted after 5-7 days, can perform TOV in hospital by removing Gamez at 0600.  Monitor and record PVRs x2. If PVRs >350, replace gamez and follow-up with urology outpatient.         - If patient discharged to TCU/SNF before gamez has been in place 5-7 days, he can either have TOV at his discharge facility or at urology clinic.      Plan will be discussed with Dr. Phelps. Urology will sign off at this time.  Please call office at [phone] to have the appropriate on-call provider paged if further questions arise.       Yulisa Luther PA-C  [facility], a division of [facility]  Phone: [phone]

## 2022-12-20 NOTE — PROGRESS NOTES
12/20/22 1037   Appointment Info   Signing Clinician's Name / Credentials (OT) Rebecca Cervantes OTR/L   Rehab Comments (OT) initial Eval   Living Environment   People in Home spouse   Current Living Arrangements house   Home Accessibility stairs to enter home;stairs within home   Number of Stairs, Main Entrance 1   Number of Stairs, Within Home, Primary seven   Transportation Anticipated car, drives self   Living Environment Comments split level home. has a shower chair from his wife   Self-Care   Usual Activity Tolerance good   Regular Exercise Yes   Activity/Exercise Type strength training   Equipment Currently Used at Home none   Instrumental Activities of Daily Living (IADL)   Previous Responsibilities housekeeping;laundry;yardwork;finances;driving;work   General Information   Onset of Illness/Injury or Date of Surgery 12/18/22   Referring Physician Vero Adams PA-C   Patient/Family Therapy Goal Statement (OT) hoping to go home   Additional Occupational Profile Info/Pertinent History of Current Problem Ian Moncada is a 57 year old male , previously healthy who has been hospitalized since 12/18 with back pain for about a week and new onset LLE paresthesias, weakness, urinary incontinence and urinary retention for about 2-3 days prior to presentation and was fount to have extensive spinal infection requiring surgical management.Gram stain is positive and ID has been asked to assist with antibiotic management,,   Performance Patterns (Routines, Roles, Habits) was Ind with ADLs and IADLs and working prior to admission   Existing Precautions/Restrictions fall;spinal   General Observations and Info pt supine in bed, agreeable to OT   Cognitive Status Examination   Orientation Status orientation to person, place and time   Visual Perception   Visual Impairment/Limitations WNL   Sensory   Sensory Quick Adds sensation intact   Pain Assessment   Patient Currently in Pain Yes, see Vital Sign flowsheet   Range of  Motion Comprehensive   General Range of Motion no range of motion deficits identified   Strength Comprehensive (MMT)   Comment, General Manual Muscle Testing (MMT) Assessment B UE overall grossly intact, did not formally test due to spinal precautions   Muscle Tone Assessment   Muscle Tone Quick Adds No deficits were identified   Coordination   Upper Extremity Coordination No deficits were identified   Bed Mobility   Comment (Bed Mobility) SBA, but increased effort   Transfers   Transfer Comments needing CGA-min A for mobility with FWW   Activities of Daily Living   BADL Assessment/Intervention lower body dressing   Lower Body Dressing Assessment/Training   Position (Lower Body Dressing) edge of bed sitting   Comment, (Lower Body Dressing) he was able to get to figure four position, but unable to lean forward to reach her feet. due to very tight hamstrings   [place] Level (Lower Body Dressing) moderate assist (50% patient effort)   Clinical Impression   Criteria for Skilled Therapeutic Interventions Met (OT) Yes, treatment indicated   OT Diagnosis decreased I with ADLs and functional mobility   OT Problem List-Impairments impacting ADL problems related to;activity tolerance impaired;balance;flexibility;mobility;strength;post-surgical precautions;pain   Assessment of Occupational Performance 3-5 Performance Deficits   Identified Performance Deficits decreased I with dressing, home mgmt, BR transfers   Clinical Decision Making Complexity (OT) moderate complexity   Risk & Benefits of therapy have been explained evaluation/treatment results reviewed;care plan/treatment goals reviewed;risks/benefits reviewed;current/potential barriers reviewed;participants voiced agreement with care plan;participants included;patient   OT Total Evaluation Time   OT Doug, Moderate Complexity Minutes (42588) 10   OT Goals   Therapy Frequency (OT) Daily   OT Predicted Duration/Target Date for Goal Attainment 12/27/22   OT Goals  Hygiene/Grooming;Lower Body Dressing;Toilet Transfer/Toileting   OT: Hygiene/Grooming modified independent;from wheelchair   OT: Lower Body Dressing Modified independent;using adaptive equipment;including set-up/clothing retrieval   OT: Toilet Transfer/Toileting Modified independent;toilet transfer;cleaning and garment management;using adaptive equipment   Interventions   Interventions Quick Adds Self-Care/Home Management   Self-Care/Home Management   Self-Care/Home Mgmt/ADL, Compensatory, Meal Prep Minutes (01724) 23   Symptoms Noted During/After Treatment (Meal Preparation/Planning Training) fatigue;increased pain   Treatment Detail/Skilled Intervention eval completed and treatment initiated. Pt educated on spinal precautions with ADLs. transfer to EOB with verbal cues and increased effort. once sitting EOB he was able to get his LE into figure four position, but was not able to reach his feet due to LE muscle tightness. verbally educated on reacher and sock aid. completed sit to stand from bed with mod A due to LE weakness, difficutly pushing up into full standing without assist. used FWW, heavy UE support and min-CGA for ambulation 10' in room.  one instance of B knee buckling and needing mid A to correct. stood several min while chair is set up. pt positioned in chair all needs in reach. educated on discharge recommendation, pt asking good questions.   OT Discharge Planning   OT Plan BR transfers, grooming at sink, LE dressing with AE as needed.   OT Discharge Recommendation (DC Rec) Transitional Care Facility   OT Rationale for DC Rec pt is signficantly below baseline. would benefit from TCU as he lives in a split level home, needs to do 8 stairs to get to main level and wife is at work during the day. currently mod A to stand from bed, needs CGA for mobility with episode of knees buckling. however, with continued daily therapy he may be able to progress to home with A   OT Brief overview of current status mod  A sit to stand, mod A to don LE clothing   Total Session Time   Timed Code Treatment Minutes 23   Total Session Time (sum of timed and untimed services) 33

## 2022-12-20 NOTE — PLAN OF CARE
Goal Outcome Evaluation:       A&O x's 4. VSS - on R/A. Lungs sounds - clear/diminished bilaterally. Bowel Sounds - hyperactive, did not have a   BM during shift.  Urine Output - gamez cath, adequate output. Incisions - back incision, LORENZO. Ambulation - assist of 1 with walker and gait belt.  Diet -regular. Pain controlled by - denied pain during shift.

## 2022-12-20 NOTE — PROVIDER NOTIFICATION
MD Notification    Notified Person: MD    Notified Person Name: Joan Davis MD    Notification Date/Time: 0811 am 12/20/22    Notification Interaction: Vocera message    Purpose of Notification: critical lab values Positive Staphylococcus aureuses     Orders Received: Message indicated read    Comments: To page ID

## 2022-12-20 NOTE — PROVIDER NOTIFICATION
Brief update:    Paged re: positive blood culture (GPCs), 1 of 2 so far.    With Staph aureus on fluid culture, will assume this is true culture positivity. Added blood cultures for this AM.    Await 2nd BC, verigene.     ID following. On ceftriaxone and vancomycin.    May need to delay PICC.    If verigene is discordant w/ surgical cultures (not staph aureus), likely contaminant and will defer PICC timing to ID if this is the case.    Harinder Marquis MD  5:54 AM

## 2022-12-20 NOTE — PROGRESS NOTES
[facility]  Hospitalist Progress Note    Date of Admission: 12/18/2022     Interval history   Patient moving around slightly better with some lower extremity edema.  Blood cultures 12/18 positive in addition to bacterial cultures from back identified as staph aureus  Continues with therapies      Brief Hospital Course:   57-year-old gentleman with no significant past medical history on no medications presented with left lower extremity paresthesias, weakness, urinary incontinence and urinary retention for 2 to 3 days.  Initially presented to [facility]  MRI of the spine showing extensive dorsal epidural fluid collection extending from thoracolumbar spine down to L2-L3 with mass-effect on the spinal cord/spinal cord compression  Transferred emergently to [facility]      Assessment & Plan   Epidural fluid collection from upper thoracic spine down to the mid lumbar spine with resulting spinal cord compression from T12-L1    Reported progressive 6-day complaints of back pain with progressive difficulty with ambulation, falls and weakness/sensory changes of his lower extremities    MRI showing a large dorsal epidural fluid collection from mid thoracic spine down to the mid lumbar spine with substantial compression at the thoracolumbar junction of the terminal spinal cord (majority between T11-L2)     Initially presented to [facility] and transferred urgently to [facility]    12/18: Emergent surgery: Extensive epidural fluid collection extending from the upper thoracic spine down to the mid lumbar spine, most prominent with significant spinal cord compression from T12-L1:  (Per operative report description of cloudy, slightly thickened pus) culture and sent for Gram stain    ID consult:     On Vanco and Zosyn on admission>> Changed to nafcillin on 12/20 12/18 cultures with gram-positive cocci>> 12/18 and very gene confirmed staph aureus (MSSA)     12/18 cultures from thoracic and lumbar spine  both positive for staph aureus    12/20 ECHO with normal EF 70%, no wall motion abnormality, hyperdynamic right ventricle    Urinary Retention:     Urinary retention likely secondary to above    1.7 liters output after Dacosta placement    Still has Dacosta catheter in place    Acute kidney injury:     Large amount of urinary retention on admission    Resolved with fluids and Dacosta    Suspected secondary to urinary retention    Creatinine 0.66     Hyponatremia:     Still mild sodium 132. Will monitor     Recheck sodium level       Diet: Regular Diet Adult  Dacosta Catheter: PRESENT, indication: Retention, Retention  DVT Prophylaxis: Sequentials and ambulate given large back surgery  Code Status: Full Code       Expected Discharge Date: 12/21/2022      Destination: other (comment) (TCU)  Discharge Comments: PICC placement       HOWARD NAGEL MD,   Hospitalist Service  [facility]  ______________________________________________________________________    -Data reviewed today: I reviewed all new labs and imaging results over the last 24 hours. I personally reviewed no images or EKG's today.    Physical Exam   Temp: 99.6  F (37.6  C) Temp src: Oral BP: (!) 153/87 Pulse: 100   Resp: 16 SpO2: 96 % O2 Device: None (Room air)    Vitals:    12/19/22 0630 12/20/22 0657   Weight: 89 kg (196 lb 3.4 oz) 89.9 kg (198 lb 3.1 oz)   Constitutional: Appears stated age, no acute distress.  Respiratory: Breath sounds CTA. No increased work of breathing.  Cardiovascular: RRR, no rub or murmur. No peripheral edema.  GI: Soft, non-tender, non-distended.  Skin: Warm, dry, no rashes or lesions.  Other: Patient is able to lift his legs off the bed.  1+ bilateral lower extremity edema  Has Dacosta catheter in place    Medications       acetaminophen  975 mg Oral Q8H     nafcillin  2 g Intravenous Q4H     polyethylene glycol  17 g Oral Daily     senna-docusate  1 tablet Oral BID     sodium chloride (PF)  3 mL Intracatheter  Q8       Data   Recent Labs   Lab 22  0835 22  0547 22  2106 22  1232 22  0816 22  0804 22  0921   WBC  --   --   --   --   --  13.4* 14.8*   HGB  --   --   --   --   --  11.6* 12.9*   MCV  --   --   --   --   --  99 101*   PLT  --   --   --   --   --  261 213   NA  --   --   --   --   --  132* 133*   POTASSIUM 3.8  --   --   --   --  3.9 3.9   CHLORIDE  --   --   --   --   --  101 94*   CO2  --   --   --   --   --  22 23   BUN  --   --   --   --   --  13 17.5   CR 0.66  --   --   --   --  0.85 1.69*   ANIONGAP  --   --   --   --   --  9 16*   LINA  --   --   --   --   --  8.5 9.6   GLC  --  89 105* 107*   < > 91  91 115*    < > = values in this interval not displayed.       Imaging:  Recent Results (from the past 24 hour(s))   Echocardiogram Complete   Result Value    LVEF  >70%    [facility]    [id number]  [id number]  [id number]  [id number]^MARGARITO^REEMA     [facility]  Echocardiography Laboratory  [address] [place], [place] [address]     Name: FLOR MEYER  MRN: [id number]  : 1965  Study Date: 2022 03:01 PM  Age: 57 yrs  Gender: Male  Patient Location: [facility]  Reason For Study: Endocarditis  Ordering Physician: REEMA PIMENTEL  Referring Physician: BROOKE LUU  Performed By: Keya Dahl     BSA: 2.1 m2  Height: 72 in  Weight: 198 lb  HR: 98  BP: 158/98 mmHg  ______________________________________________________________________________  Procedure  Complete Portable Echo Adult. Optison (NDC #[id number]) given intravenously.  ______________________________________________________________________________  Interpretation Summary     There is normal left ventricular wall thickness.  Hyperdynamic left ventricular function  The visual ejection fraction is >70%.  Left ventricular diastolic function is normal.  No regional wall motion abnormalities noted.  Hyperdynamic right ventricular function  No significant valve disease.     There is no  comparison study available.  ______________________________________________________________________________  Left Ventricle  The left ventricle is normal in size. There is normal left ventricular wall  thickness. Hyperdynamic left ventricular function. The visual ejection  fraction is >70%. Left ventricular diastolic function is normal. No regional  wall motion abnormalities noted.     Right Ventricle  The right ventricle is normal size. Hyperdynamic right ventricular function.     Atria  Normal left atrial size. Right atrial size is normal. There is no color  Doppler evidence of an atrial shunt.     Mitral Valve  The mitral valve leaflets appear normal. There is no evidence of stenosis,  fluttering, or prolapse. There is trace mitral regurgitation. There is no  mitral valve stenosis.     Tricuspid Valve  Normal tricuspid valve. There is trace tricuspid regurgitation. Right  ventricular systolic pressure could not be approximated due to inadequate  tricuspid regurgitation.     Aortic Valve  There is mild trileaflet aortic sclerosis. No aortic regurgitation is present.  No aortic stenosis is present.     Pulmonic Valve  The pulmonic valve is not well visualized. There is trace pulmonic valvular  regurgitation.     Vessels  The aortic root is normal size. Normal size ascending aorta. The inferior vena  cava was normal in size with preserved respiratory variability.     Pericardium  There is no pericardial effusion.     Rhythm  Sinus rhythm was noted.  ______________________________________________________________________________  MMode/2D Measurements & Calculations  IVSd: 0.89 cm     LVIDd: 4.5 cm  LVIDs: 2.4 cm  LVPWd: 0.94 cm  LVPWs: 0.64 cm  FS: 47.6 %  LV mass(C)d: 137.8 grams  LV mass(C)dI: 64.9 grams/m2  Ao root diam: 3.1 cm  LA dimension: 3.2 cm  asc Aorta Diam: 3.3 cm  LA/Ao: 1.0  LVOT diam: 2.0 cm  LVOT area: 3.3 cm2  LA Volume (BP): 51.1 ml  LA Volume Index (BP): 24.1 ml/m2  RWT: 0.42     Doppler  Measurements & Calculations  MV E max seth: 91.3 cm/sec  MV A max seth: 94.7 cm/sec  MV E/A: 0.96  MV dec time: 0.18 sec  LV V1 max PG: 10.2 mmHg  LV V1 max: 160.0 cm/sec  LV V1 VTI: 25.3 cm  SV(LVOT): 82.3 ml  SI(LVOT): 38.8 ml/m2  PA acc time: 0.16 sec  E/E' av.1  Lateral E/e': 6.9  Medial E/e': 9.2     ______________________________________________________________________________  Report approved by: Dr Ashlee Nielsen 2022 04:35 PM

## 2022-12-21 ENCOUNTER — APPOINTMENT (OUTPATIENT)
Dept: PHYSICAL THERAPY | Facility: CLINIC | Age: 57
DRG: 029 | End: 2022-12-21
Attending: NEUROLOGICAL SURGERY
Payer: COMMERCIAL

## 2022-12-21 ENCOUNTER — APPOINTMENT (OUTPATIENT)
Dept: OCCUPATIONAL THERAPY | Facility: CLINIC | Age: 57
DRG: 029 | End: 2022-12-21
Attending: NEUROLOGICAL SURGERY
Payer: COMMERCIAL

## 2022-12-21 ENCOUNTER — APPOINTMENT (OUTPATIENT)
Dept: ULTRASOUND IMAGING | Facility: CLINIC | Age: 57
DRG: 029 | End: 2022-12-21
Attending: INTERNAL MEDICINE
Payer: COMMERCIAL

## 2022-12-21 LAB — BACTERIA ABSC ANAEROBE+AEROBE CULT: ABNORMAL

## 2022-12-21 PROCEDURE — 97110 THERAPEUTIC EXERCISES: CPT | Mod: GP

## 2022-12-21 PROCEDURE — 250N000013 HC RX MED GY IP 250 OP 250 PS 637: Performed by: PHYSICIAN ASSISTANT

## 2022-12-21 PROCEDURE — 93970 EXTREMITY STUDY: CPT

## 2022-12-21 PROCEDURE — 250N000009 HC RX 250: Performed by: SPECIALIST

## 2022-12-21 PROCEDURE — 99232 SBSQ HOSP IP/OBS MODERATE 35: CPT | Performed by: INTERNAL MEDICINE

## 2022-12-21 PROCEDURE — 97535 SELF CARE MNGMENT TRAINING: CPT | Mod: GO | Performed by: OCCUPATIONAL THERAPIST

## 2022-12-21 PROCEDURE — 97116 GAIT TRAINING THERAPY: CPT | Mod: GP

## 2022-12-21 PROCEDURE — 120N000001 HC R&B MED SURG/OB

## 2022-12-21 PROCEDURE — 97530 THERAPEUTIC ACTIVITIES: CPT | Mod: GP

## 2022-12-21 RX ADMIN — NAFCILLIN 2 G: 2 POWDER, FOR SOLUTION INTRAMUSCULAR; INTRAVENOUS at 01:26

## 2022-12-21 RX ADMIN — NAFCILLIN 2 G: 2 POWDER, FOR SOLUTION INTRAMUSCULAR; INTRAVENOUS at 17:50

## 2022-12-21 RX ADMIN — NAFCILLIN 2 G: 2 POWDER, FOR SOLUTION INTRAMUSCULAR; INTRAVENOUS at 07:07

## 2022-12-21 RX ADMIN — NAFCILLIN 2 G: 2 POWDER, FOR SOLUTION INTRAMUSCULAR; INTRAVENOUS at 21:27

## 2022-12-21 RX ADMIN — NAFCILLIN 2 G: 2 POWDER, FOR SOLUTION INTRAMUSCULAR; INTRAVENOUS at 14:47

## 2022-12-21 RX ADMIN — ACETAMINOPHEN 975 MG: 325 TABLET, FILM COATED ORAL at 14:46

## 2022-12-21 RX ADMIN — OXYCODONE HYDROCHLORIDE 5 MG: 5 TABLET ORAL at 22:40

## 2022-12-21 RX ADMIN — ACETAMINOPHEN 975 MG: 325 TABLET, FILM COATED ORAL at 07:07

## 2022-12-21 RX ADMIN — NAFCILLIN 2 G: 2 POWDER, FOR SOLUTION INTRAMUSCULAR; INTRAVENOUS at 09:46

## 2022-12-21 ASSESSMENT — ACTIVITIES OF DAILY LIVING (ADL)
ADLS_ACUITY_SCORE: 41
ADLS_ACUITY_SCORE: 41
ADLS_ACUITY_SCORE: 37
ADLS_ACUITY_SCORE: 41
ADLS_ACUITY_SCORE: 41
ADLS_ACUITY_SCORE: 37
ADLS_ACUITY_SCORE: 41
ADLS_ACUITY_SCORE: 41
ADLS_ACUITY_SCORE: 37
ADLS_ACUITY_SCORE: 41
ADLS_ACUITY_SCORE: 41
ADLS_ACUITY_SCORE: 37

## 2022-12-21 NOTE — PROGRESS NOTES
A/O x4. PIV SL. Up with 1/wlk/gb to JACKY Dacosta in for retention. Watery BM x1. Staff check with pt before giving stool softener. Mid back dressing CDI. Denies N/T. Discharge pending to TCU vs Home.

## 2022-12-21 NOTE — PROGRESS NOTES
Care Management Follow Up    Length of Stay (days): 3    Expected Discharge Date: 12/22/2022     Concerns to be Addressed:       Patient plan of care discussed at interdisciplinary rounds: Yes    Anticipated Discharge Disposition: Transitional Care     Anticipated Discharge Services: None  Anticipated Discharge DME: None    Patient/family educated on Medicare website which has current facility and service quality ratings: yes  Education Provided on the Discharge Plan:    Patient/Family in Agreement with the Plan: yes    Referrals Placed by CM/SW:    Private pay costs discussed: Not applicable    Additional Information:  TANO informed [facility] can accept patient pending auth. SW will continue to follow.    SW informed patient would like private room at $40 a day. TANO let [facility] know patient would like private room.      ROSE Santoro    [facility]

## 2022-12-21 NOTE — PROGRESS NOTES
Neurosurgery progress note    Postoperative day 3 status post thoracic 12-lumbar 1 decompression for evacuation of epidural abscess.    Patient states he is improving.  He has no further numbness in his lower extremities.  He feels like his strength is getting better.  He still notes some weakness in his ankle dorsiflexion bilaterally.  He has been getting up and walking with physical therapy and has had been able to do stairs.    Neurology was consulted yesterday for urinary retention, Dacosta catheter was placed, plan is for keeping this in 5 to 7 days with voiding trial at that time.    Social work is working on placement with TCU.    Infectious disease is repeating blood cultures, treating with nafcillin, and awaiting PICC placement.    Exam    Alert and oriented no acute distress, sitting comfortably in bed  Incision clean dry and intact  Bilateral lower extremities with 5 out of 5 strength with hip flexion, knee flexion and extension.    4-5 strength with ankle dorsiflexion bilaterally  4 out of 5 strength with dorsiflexion bilaterally  3+ out of 5 strength with extensor houses longus bilaterally    Plan    Continue PT and OT, appreciate ID and neurology consultations  Continue to monitor neurologic improvement.  Discharge to TCU when medically ready

## 2022-12-21 NOTE — PROGRESS NOTES
Notified provider about indwelling gamez catheter discussed removal or continued need.    Did provider choose to remove indwelling gamez catheter? No    Provider's gamez indication for keeping indwelling gamez catheter: Retention.    Is there an order for indwelling gamez catheter? Yes    *If there is a plan to keep gamez catheter in place at discharge daily notification with provider is not necessary.

## 2022-12-21 NOTE — PLAN OF CARE
A&Ox4, VSS on RA, Ax1 w/ GB&W, gamez in place for rentention, pt having loose stools please hold all stool softners. Mid back dressing CDI, CMS intact, continue w/ care plan orders

## 2022-12-22 ENCOUNTER — APPOINTMENT (OUTPATIENT)
Dept: MRI IMAGING | Facility: CLINIC | Age: 57
DRG: 029 | End: 2022-12-22
Attending: PHYSICIAN ASSISTANT
Payer: COMMERCIAL

## 2022-12-22 ENCOUNTER — APPOINTMENT (OUTPATIENT)
Dept: PHYSICAL THERAPY | Facility: CLINIC | Age: 57
DRG: 029 | End: 2022-12-22
Attending: NEUROLOGICAL SURGERY
Payer: COMMERCIAL

## 2022-12-22 ENCOUNTER — APPOINTMENT (OUTPATIENT)
Dept: GENERAL RADIOLOGY | Facility: CLINIC | Age: 57
DRG: 029 | End: 2022-12-22
Attending: PHYSICIAN ASSISTANT
Payer: COMMERCIAL

## 2022-12-22 ENCOUNTER — APPOINTMENT (OUTPATIENT)
Dept: OCCUPATIONAL THERAPY | Facility: CLINIC | Age: 57
DRG: 029 | End: 2022-12-22
Attending: NEUROLOGICAL SURGERY
Payer: COMMERCIAL

## 2022-12-22 LAB
ANION GAP SERPL CALCULATED.3IONS-SCNC: 9 MMOL/L (ref 3–14)
BACTERIA BLD CULT: ABNORMAL
BACTERIA BLD CULT: ABNORMAL
BUN SERPL-MCNC: 10 MG/DL (ref 7–30)
C DIFF TOX B STL QL: NEGATIVE
CALCIUM SERPL-MCNC: 8.4 MG/DL (ref 8.5–10.1)
CHLORIDE BLD-SCNC: 101 MMOL/L (ref 94–109)
CO2 SERPL-SCNC: 26 MMOL/L (ref 20–32)
CREAT SERPL-MCNC: 0.99 MG/DL (ref 0.66–1.25)
CRP SERPL-MCNC: 171 MG/L (ref 0–8)
ERYTHROCYTE [DISTWIDTH] IN BLOOD BY AUTOMATED COUNT: 14 % (ref 10–15)
GFR SERPL CREATININE-BSD FRML MDRD: 88 ML/MIN/1.73M2
GLUCOSE BLD-MCNC: 123 MG/DL (ref 70–99)
HCT VFR BLD AUTO: 30.5 % (ref 40–53)
HGB BLD-MCNC: 10.2 G/DL (ref 13.3–17.7)
MCH RBC QN AUTO: 33 PG (ref 26.5–33)
MCHC RBC AUTO-ENTMCNC: 33.4 G/DL (ref 31.5–36.5)
MCV RBC AUTO: 99 FL (ref 78–100)
PLATELET # BLD AUTO: 350 10E3/UL (ref 150–450)
POTASSIUM BLD-SCNC: 3 MMOL/L (ref 3.4–5.3)
POTASSIUM BLD-SCNC: 3.4 MMOL/L (ref 3.4–5.3)
RBC # BLD AUTO: 3.09 10E6/UL (ref 4.4–5.9)
SODIUM SERPL-SCNC: 136 MMOL/L (ref 133–144)
URATE SERPL-MCNC: 5.7 MG/DL (ref 3.5–7.2)
WBC # BLD AUTO: 8.8 10E3/UL (ref 4–11)

## 2022-12-22 PROCEDURE — 97116 GAIT TRAINING THERAPY: CPT | Mod: GP | Performed by: PHYSICAL THERAPIST

## 2022-12-22 PROCEDURE — 255N000002 HC RX 255 OP 636: Performed by: INTERNAL MEDICINE

## 2022-12-22 PROCEDURE — 99232 SBSQ HOSP IP/OBS MODERATE 35: CPT | Performed by: INTERNAL MEDICINE

## 2022-12-22 PROCEDURE — 97530 THERAPEUTIC ACTIVITIES: CPT | Mod: GO

## 2022-12-22 PROCEDURE — 250N000013 HC RX MED GY IP 250 OP 250 PS 637: Performed by: INTERNAL MEDICINE

## 2022-12-22 PROCEDURE — 73562 X-RAY EXAM OF KNEE 3: CPT | Mod: LT

## 2022-12-22 PROCEDURE — 84550 ASSAY OF BLOOD/URIC ACID: CPT | Performed by: SPECIALIST

## 2022-12-22 PROCEDURE — 97535 SELF CARE MNGMENT TRAINING: CPT | Mod: GO

## 2022-12-22 PROCEDURE — 97110 THERAPEUTIC EXERCISES: CPT | Mod: GP | Performed by: PHYSICAL THERAPIST

## 2022-12-22 PROCEDURE — 36415 COLL VENOUS BLD VENIPUNCTURE: CPT | Performed by: INTERNAL MEDICINE

## 2022-12-22 PROCEDURE — 72157 MRI CHEST SPINE W/O & W/DYE: CPT

## 2022-12-22 PROCEDURE — 84132 ASSAY OF SERUM POTASSIUM: CPT | Performed by: INTERNAL MEDICINE

## 2022-12-22 PROCEDURE — 999N000040 HC STATISTIC CONSULT NO CHARGE VASC ACCESS

## 2022-12-22 PROCEDURE — 250N000013 HC RX MED GY IP 250 OP 250 PS 637: Performed by: NURSE PRACTITIONER

## 2022-12-22 PROCEDURE — 86140 C-REACTIVE PROTEIN: CPT | Performed by: PHYSICIAN ASSISTANT

## 2022-12-22 PROCEDURE — 272N000450 HC KIT 4FR POWER PICC SINGLE LUMEN

## 2022-12-22 PROCEDURE — 250N000013 HC RX MED GY IP 250 OP 250 PS 637: Performed by: PHYSICIAN ASSISTANT

## 2022-12-22 PROCEDURE — 80048 BASIC METABOLIC PNL TOTAL CA: CPT | Performed by: INTERNAL MEDICINE

## 2022-12-22 PROCEDURE — 87493 C DIFF AMPLIFIED PROBE: CPT | Performed by: SPECIALIST

## 2022-12-22 PROCEDURE — 120N000001 HC R&B MED SURG/OB

## 2022-12-22 PROCEDURE — 36569 INSJ PICC 5 YR+ W/O IMAGING: CPT

## 2022-12-22 PROCEDURE — 73610 X-RAY EXAM OF ANKLE: CPT | Mod: 50

## 2022-12-22 PROCEDURE — 97530 THERAPEUTIC ACTIVITIES: CPT | Mod: GP | Performed by: PHYSICAL THERAPIST

## 2022-12-22 PROCEDURE — 250N000009 HC RX 250: Performed by: SPECIALIST

## 2022-12-22 PROCEDURE — 99233 SBSQ HOSP IP/OBS HIGH 50: CPT | Performed by: SPECIALIST

## 2022-12-22 PROCEDURE — A9585 GADOBUTROL INJECTION: HCPCS | Performed by: INTERNAL MEDICINE

## 2022-12-22 PROCEDURE — 85027 COMPLETE CBC AUTOMATED: CPT | Performed by: INTERNAL MEDICINE

## 2022-12-22 RX ORDER — POTASSIUM CHLORIDE 1500 MG/1
40 TABLET, EXTENDED RELEASE ORAL ONCE
Status: COMPLETED | OUTPATIENT
Start: 2022-12-22 | End: 2022-12-22

## 2022-12-22 RX ORDER — GADOBUTROL 604.72 MG/ML
9 INJECTION INTRAVENOUS ONCE
Status: DISCONTINUED | OUTPATIENT
Start: 2022-12-22 | End: 2022-12-27 | Stop reason: HOSPADM

## 2022-12-22 RX ORDER — GADOBUTROL 604.72 MG/ML
9 INJECTION INTRAVENOUS ONCE
Status: COMPLETED | OUTPATIENT
Start: 2022-12-22 | End: 2022-12-22

## 2022-12-22 RX ORDER — LOPERAMIDE HCL 2 MG
2 CAPSULE ORAL 4 TIMES DAILY PRN
Status: DISCONTINUED | OUTPATIENT
Start: 2022-12-22 | End: 2022-12-27 | Stop reason: HOSPADM

## 2022-12-22 RX ORDER — POTASSIUM CHLORIDE 1500 MG/1
20 TABLET, EXTENDED RELEASE ORAL ONCE
Status: COMPLETED | OUTPATIENT
Start: 2022-12-22 | End: 2022-12-22

## 2022-12-22 RX ADMIN — NAFCILLIN 2 G: 2 POWDER, FOR SOLUTION INTRAMUSCULAR; INTRAVENOUS at 10:13

## 2022-12-22 RX ADMIN — GADOBUTROL 9 ML: 604.72 INJECTION INTRAVENOUS at 22:20

## 2022-12-22 RX ADMIN — ACETAMINOPHEN 650 MG: 325 TABLET, FILM COATED ORAL at 21:25

## 2022-12-22 RX ADMIN — NAFCILLIN 2 G: 2 POWDER, FOR SOLUTION INTRAMUSCULAR; INTRAVENOUS at 01:57

## 2022-12-22 RX ADMIN — NAFCILLIN 2 G: 2 POWDER, FOR SOLUTION INTRAMUSCULAR; INTRAVENOUS at 22:59

## 2022-12-22 RX ADMIN — LOPERAMIDE HYDROCHLORIDE 2 MG: 2 CAPSULE ORAL at 21:25

## 2022-12-22 RX ADMIN — LIDOCAINE HYDROCHLORIDE 1 ML: 10 INJECTION, SOLUTION INFILTRATION; PERINEURAL at 14:11

## 2022-12-22 RX ADMIN — NAFCILLIN 2 G: 2 POWDER, FOR SOLUTION INTRAMUSCULAR; INTRAVENOUS at 14:36

## 2022-12-22 RX ADMIN — NAFCILLIN 2 G: 2 POWDER, FOR SOLUTION INTRAMUSCULAR; INTRAVENOUS at 06:31

## 2022-12-22 RX ADMIN — NAFCILLIN 2 G: 2 POWDER, FOR SOLUTION INTRAMUSCULAR; INTRAVENOUS at 17:38

## 2022-12-22 RX ADMIN — ACETAMINOPHEN 650 MG: 325 TABLET, FILM COATED ORAL at 06:43

## 2022-12-22 RX ADMIN — POTASSIUM CHLORIDE 40 MEQ: 1500 TABLET, EXTENDED RELEASE ORAL at 21:25

## 2022-12-22 RX ADMIN — POTASSIUM CHLORIDE 20 MEQ: 1500 TABLET, EXTENDED RELEASE ORAL at 12:16

## 2022-12-22 RX ADMIN — POTASSIUM CHLORIDE 40 MEQ: 1500 TABLET, EXTENDED RELEASE ORAL at 10:21

## 2022-12-22 ASSESSMENT — ACTIVITIES OF DAILY LIVING (ADL)
ADLS_ACUITY_SCORE: 41
ADLS_ACUITY_SCORE: 39
ADLS_ACUITY_SCORE: 41
ADLS_ACUITY_SCORE: 39
ADLS_ACUITY_SCORE: 41
ADLS_ACUITY_SCORE: 39
ADLS_ACUITY_SCORE: 39
ADLS_ACUITY_SCORE: 41
ADLS_ACUITY_SCORE: 39
ADLS_ACUITY_SCORE: 39

## 2022-12-22 NOTE — PROVIDER NOTIFICATION
MD Notification    Notified Person: MD    Notified Person Name: Joan Davis MD    Notification Date/Time: 0938 12/22/22    Notification Interaction: vocera message     Purpose of Notification: Lower K+ 3.0, plus bloody loose stool    Orders Received: MD did read the message and writer to notify ID, awaiting call back from ID

## 2022-12-22 NOTE — PROGRESS NOTES
[facility]  Hospitalist Progress Note    Date of Admission: 12/18/2022     Interval history   Patient moving around slightly better with some lower extremity edema.  Blood cultures 12/18 positive in addition to bacterial cultures from back identified as staph aureus  Continues with therapies and plans to go to TCU on discharge.       Brief Hospital Course:   57-year-old gentleman with no significant past medical history on no medications presented with left lower extremity paresthesias, weakness, urinary incontinence and urinary retention for 2 to 3 days.  Initially presented to [facility]  MRI of the spine showing extensive dorsal epidural fluid collection extending from thoracolumbar spine down to L2-L3 with mass-effect on the spinal cord/spinal cord compression  Transferred emergently to [facility]      Assessment & Plan   Epidural fluid collection from upper thoracic spine down to the mid lumbar spine with resulting spinal cord compression from T12-L1    Reported progressive 6-day complaints of back pain with progressive difficulty with ambulation, falls and weakness/sensory changes of his lower extremities    MRI showing a large dorsal epidural fluid collection from mid thoracic spine down to the mid lumbar spine with substantial compression at the thoracolumbar junction of the terminal spinal cord (majority between T11-L2)     Initially presented to [facility] and transferred urgently to [facility]    12/18: Emergent surgery: Extensive epidural fluid collection extending from the upper thoracic spine down to the mid lumbar spine, most prominent with significant spinal cord compression from T12-L1:  (Per operative report description of cloudy, slightly thickened pus) culture and sent for Gram stain    ID consult:     On Vanco and Zosyn on admission>> Changed to nafcillin on 12/20 12/18 cultures with gram-positive cocci>> 12/18 and very gene confirmed staph aureus (MSSA)     12/18  cultures from thoracic and lumbar spine both positive for staph aureus (MSSA)     12/20 ECHO with normal EF 70%, no wall motion abnormality, hyperdynamic right ventricle    Plans for TCU     Urinary Retention:     Urinary retention likely secondary to above    1.7 liters output after Dacosta placement    Still has Dacosta catheter in place    Voiding trial in 5-7 days     Acute kidney injury:     Large amount of urinary retention on admission    Resolved with fluids and Dacosta    Suspected secondary to urinary retention    Creatinine 0.66     Hyponatremia:     Still mild sodium 132. Will monitor     Recheck sodium level >> 135    LE Edema:     ? Potentially from fluid retention    Check venous dopplers.     If ++ would need to discuss with surgery as back surgery extensive     Would potentially try low dose of diuretic       Diet: Regular Diet Adult  Dacosta Catheter: PRESENT, indication: Retention, Retention  DVT Prophylaxis: Sequentials and ambulate given large back surgery  Code Status: Full Code       Expected Discharge Date: 12/22/2022      Destination: other (comment) (TCU)  Discharge Comments: PICC ABX       HOWARD NAGEL MD,   Hospitalist Service  [facility]  ______________________________________________________________________    -Data reviewed today: I reviewed all new labs and imaging results over the last 24 hours. I personally reviewed no images or EKG's today.    Physical Exam   Temp: 99.4  F (37.4  C) Temp src: Oral BP: 136/87 Pulse: 90   Resp: 16 SpO2: 95 % O2 Device: None (Room air)    Vitals:    12/19/22 0630 12/20/22 0657 12/21/22 0707   Weight: 89 kg (196 lb 3.4 oz) 89.9 kg (198 lb 3.1 oz) 89 kg (196 lb 3.4 oz)   Constitutional: Appears stated age, no acute distress.  Respiratory: Breath sounds CTA. No increased work of breathing.  Cardiovascular: RRR, no rub or murmur. No peripheral edema.  GI: Soft, non-tender, non-distended.  Skin: Warm, dry, no rashes or  lesions.  Other: Patient is able to lift his legs off the bed.  1+ bilateral lower extremity edema  Has Dacosta catheter in place    Medications       nafcillin  2 g Intravenous Q4H     polyethylene glycol  17 g Oral Daily     senna-docusate  1 tablet Oral BID     sodium chloride (PF)  3 mL Intracatheter Q8H       Data   Recent Labs   Lab 12/20/22  0835 12/20/22  0547 12/19/22  2106 12/19/22  0816 12/19/22  0804 12/18/22  0921   WBC  --   --   --   --  13.4* 14.8*   HGB  --   --   --   --  11.6* 12.9*   MCV  --   --   --   --  99 101*   PLT  --   --   --   --  261 213     --   --   --  132* 133*   POTASSIUM 3.9  3.8  --   --   --  3.9 3.9   CHLORIDE 102  --   --   --  101 94*   CO2 23  --   --   --  22 23   BUN 10  --   --   --  13 17.5   CR 0.67  0.66  --   --   --  0.85 1.69*   ANIONGAP 10  --   --   --  9 16*   LINA 8.1*  --   --   --  8.5 9.6   * 89 105*   < > 91  91 115*    < > = values in this interval not displayed.       Imaging:  No results found for this or any previous visit (from the past 24 hour(s)).

## 2022-12-22 NOTE — PROGRESS NOTES
[facility]    Infectious Disease Progress Note    Date of Service : 12/22/2022     Assessment:  57 YM , previously healthy who has been admitted with S.aureus bacteremia associated with extensive multi level thoracolumbar spinal epidural abscess from T4-T5 down to L2-L3 with spinal chord compression.  S/p debridement with S.aureus in cx. Blood cs also positive from 12/18.Today complains of left knee pain and bilateral ankle pain with warmth which is worrisome  For secondary seeding    -MSSA bacteremia  -Extensive spinal  thoracolumbar spinal epidural abscess from T4-T5 down to L2-L3  with spinal chord compression.  S/p debridement  -New left knee pain and bilateral ankle pain concerning for secondary seeding    Recommendations  1. PICC  2. Treat with Nafcillin 2 grams Q4H  3. Check stool for C.diff, if negative can receive antidiarrheal agents  4. Orthopedic consultation to assess left knee and bilateral ankles for infection - synovial fluid for cell cell count, crystals and cx  5.check uric acid    lEyse Valadez MD    Interval History   Resting, having a lot of diarrhea. Also complains of left knee pain with difficulty weight bearing and bilateral ankle pain and warmth, has ice packs on    Physical Exam   Temp: 99.4  F (37.4  C) Temp src: Oral BP: (!) 152/83 Pulse: 92   Resp: 16 SpO2: 96 % O2 Device: None (Room air)    Vitals:    12/19/22 0630 12/20/22 0657 12/21/22 0707   Weight: 89 kg (196 lb 3.4 oz) 89.9 kg (198 lb 3.1 oz) 89 kg (196 lb 3.4 oz)     Vital Signs with Ranges  Temp:  [99.4  F (37.4  C)-100.1  F (37.8  C)] 99.4  F (37.4  C)  Pulse:  [90-99] 92  Resp:  [16] 16  BP: (136-159)/(83-90) 152/83  SpO2:  [96 %] 96 %    Constitutional: Awake, alert, cooperative, no apparent distress  Lungs: Clear to auscultation bilaterally, no crackles or wheezing  Cardiovascular: Regular rate and rhythm, normal S1 and S2, and no murmur noted  Abdomen: Normal bowel sounds, soft, non-distended,  non-tender  Skin: No rashes, no cyanosis, no edema  MS: L knee with slight warmth , both ankles with warmth and some swelling    Other:    Medications       nafcillin  2 g Intravenous Q4H     polyethylene glycol  17 g Oral Daily     potassium chloride  20 mEq Oral Once     potassium chloride  40 mEq Oral Once     senna-docusate  1 tablet Oral BID     sodium chloride (PF)  3 mL Intracatheter Q8H       Data   All microbiology laboratory data reviewed.  Recent Labs   Lab Test 12/22/22  0727 12/19/22  0804 12/18/22  0921   WBC 8.8 13.4* 14.8*   HGB 10.2* 11.6* 12.9*   HCT 30.5* 34.1* 39.5*   MCV 99 99 101*    261 213     Recent Labs   Lab Test 12/22/22  0727 12/20/22  0835 12/19/22  0804   CR 0.99 0.67  0.66 0.85     Recent Labs   Lab Test 12/18/22  0921   SED 45*     Microbiology  12/18 blood cx  Arm, Right; Blood    1 Result Note  Culture Positive on the 1st day of incubation Abnormal        Staphylococcus aureus Panic     1 of 2 bottles        Resulting Agency: [facility]     Susceptibility     Staphylococcus aureus     EUGENIA     Clindamycin <=0.25 ug/mL Susceptible     Erythromycin <=0.25 ug/mL Susceptible     Gentamicin <=0.5 ug/mL Susceptible     Oxacillin <=0.25 ug/mL Susceptible 1     Tetracycline <=1 ug/mL Susceptible     Trimethoprim/Sulfamethoxazole <=0.5/9.5 u... Susceptible     Vancomycin 1 ug/mL Susceptible                   Imaging  12/18 TTE  Interpretation Summary     There is normal left ventricular wall thickness.  Hyperdynamic left ventricular function  The visual ejection fraction is >70%.  Left ventricular diastolic function is normal.  No regional wall motion abnormalities noted.  Hyperdynamic right ventricular function  No significant valve disease.     There is no comparison study available.

## 2022-12-22 NOTE — PROGRESS NOTES
Care Management Follow Up    Length of Stay (days): 4    Expected Discharge Date: 12/23/2022     Concerns to be Addressed:       Patient plan of care discussed at interdisciplinary rounds: Yes    Anticipated Discharge Disposition: Transitional Care     Anticipated Discharge Services: None  Anticipated Discharge DME: None    Patient/family educated on Medicare website which has current facility and service quality ratings: yes  Education Provided on the Discharge Plan:    Patient/Family in Agreement with the Plan: yes    Referrals Placed by CM/SW:    Private pay costs discussed: Not applicable    Additional Information:  Writer received notice that auth has been completed for patient at [facility]. Writer paged Dr. Davis regarding whether patient is medically ready. Writer will continue to follow and update if patient is ready for discharge. Patient will need transport scheduled at discharge.       ROSE Rich

## 2022-12-22 NOTE — TREATMENT PLAN
Orthopedic Treatment Plan    Left knee and bilateral ankle pain and swelling, likely gout.  Being treated with IV nafcillin for possible spine abscess, bacteremia.  X-rays of left knee and bilateral ankles ordered.  IR aspiration of the most problematic joint, the left knee, ordered. Will follow results.  NPO at midnight just in case.    ADDENDUM:   Dr. Pitts evaluated the patient and recommends deferring aspirations at this time. Will continue to monitor symptoms. Please contact orthopedics with any changes or concerns. Patient okay to have regular diet per ortho standpoint.    Kassie Padron PA-C  [facility]

## 2022-12-22 NOTE — CONSULTS
[facility]    Orthopedic Consultation    Ian Moncada MRN# [id number]   Age: 57 year old YOB: 1965     Date of Admission: 12/18/2022    Reason for consult: Left knee and bilateral ankle pain and swelling       Requesting physician: Joan Davis MD       Level of consult: Consult, follow and place orders           Assessment and Plan:   Assessment:   1. Left knee and bilateral ankle swelling and pain, likely secondary to gout  2. Spine intraoperative culture positive for Staph aureus      Plan:   The patient's history and clinical/diagnostic findings were reviewed with the on-call orthopedic trauma surgeon, Dr. Francisco Pitts, who also independently evaluated the patient. Patient reports left knee and bilateral ankle pain for the past 3-4 days, which seemingly started after a spine epidural abscess evacuation procedure. Culture from this surgery was significant for Staph aureus. The patient is on nafcillin via PICC per ID. They are concerned for seeing in the affected joints. Clinically, the patient has mild to moderate swelling of the left knee and bilateral ankles. There is no current erythema or warmth. He has no increased pain with passive motion in all planes of the left knee and bilateral ankles. The patient has been ambulatory. Uric acid 5.7, WBC 8.8 today (13.4 and 14.8 previous two days), and blood culture from 12/20/22 demonstrates no growth to date. Patient has history of gout and given reassuring exam, assume this is the most likely diagnosis based off his current presentation. At this time, recommendations are as follows:    -Left knee and bilateral ankle x-rays ordered. Will follow results.  -Per Dr. Pitts, will defer left knee and bilateral ankle IR aspirations. If symptoms persist or worsen, will reorder synovial fluid analysis.  -Continue pain regimen, including NSAID use if cleared by neurosurgery and hospitalist.  -Mobilize with PT/OT.   -WBAT  BLE.  -Consider Ace wrap for left knee swelling if needed.  -Continue with cold compresses.  -Follow up outpatient with Dr. Francisco Pitts as needed if any ongoing symptoms.   -Please contact orthopedic trauma team if any questions or concerns arise.           Chief Complaint:   Left knee and bilateral ankle swelling and pain         History of Present Illness:   Ian Moncada is a 57 year old male with past medical history of gout who was admitted for left lower extremity paresthesias, weakness, and changes in bladder function. He is 4 days s/p thoracic and lumbar decompression for evacuation epidural abscess versus hematoma with neurosurgery. Patient reports that prior to being taken by EMS to the ED 5 days ago, he did fall due to weakness of the left lower extremity. He felt that maybe his left knee was injured in the process, but has been able to weight bear since. Over the past 2-3 days, the patient has noticed a gradual onset of swelling and discomfort affecting the left knee and bilateral ankles. He has been able to weight bear since then as well. He states that he has been able to move the affected joints without pain, though they seem to be quite stiff. He notes having previous redness of the joints. The patient notes a history of gout, usually affecting the left great toe MTP joint, which resolves after consistent use of ibuprofen and drinking daily cherry juice. Denies prior known history of MRSA. Previously had fever and chills. Tmax in the past 24 hours was 101.2 F. The patient is on IV nafcillin per ID due to positive culture growing S. aureus. Denies prior injuries or surgeries to his left knee or bilateral ankles.       Past Medical History:     Past Medical History:   Diagnosis Date     External hemorrhoids with other complication 07/25/2008    Banding 2007.     Hyperlipidemia LDL goal <130 08/12/2010     Hypertriglyceridemia 08/19/2010     Impaired fasting glucose 12/07/2009             Past Surgical  History:     Past Surgical History:   Procedure Laterality Date     DECOMPRESSION LUMBAR ONE LEVEL N/A 12/18/2022    Procedure: THORACIC LUMBAR DECOMPRESSION FOR EVACUATION AND EPIDURAL ABSCESS VERSUS HEMATOMA;  Surgeon: Evans Courtney MD;  Location: [facility] OR     TONSILLECTOMY  1970s             Social History:     Social History     Tobacco Use     Smoking status: Never     Smokeless tobacco: Never   Substance Use Topics     Alcohol use: Yes     Alcohol/week: 7.0 standard drinks     Types: 7 Standard drinks or equivalent per week     Comment: pn the weekends             Family History:     Family History   Problem Relation Age of Onset     Heart Disease Paternal Grandfather      Neurologic Disorder Father         Guillaine Devils Elbow     Heart Disease Mother         quad bipass     Heart Disease Maternal Grandfather      Heart Disease Maternal Uncle      Cancer - colorectal Maternal Grandmother      Heart Disease Maternal Aunt              Immunizations:     VACCINE/DOSE   Diptheria   DPT   DTAP   HBIG   Hepatitis A   Hepatitis B   HIB   Influenza   Measles   Meningococcal   MMR   Mumps   Pneumococcal   Polio   Rubella   Small Pox   TDAP   Varicella   Zoster             Allergies:     Allergies   Allergen Reactions     Walnuts [Nuts]              Medications:     Current Facility-Administered Medications   Medication     acetaminophen (TYLENOL) tablet 650 mg     bisacodyl (DULCOLAX) suppository 10 mg     hydrALAZINE (APRESOLINE) injection 10-20 mg     HYDROmorphone (DILAUDID) injection 0.2 mg    Or     HYDROmorphone (DILAUDID) injection 0.4 mg     labetalol (NORMODYNE/TRANDATE) injection 10-40 mg     lidocaine (LMX4) cream     lidocaine 1 % 0.1-1 mL     lidocaine 1 % 0.1-5 mL     magnesium hydroxide (MILK OF MAGNESIA) suspension 30 mL     melatonin tablet 1 mg     nafcillin IV 2 g vial to attach to  ml bag     naloxone (NARCAN) injection 0.2 mg    Or     naloxone (NARCAN) injection 0.4 mg    Or      naloxone (NARCAN) injection 0.2 mg    Or     naloxone (NARCAN) injection 0.4 mg     ondansetron (ZOFRAN ODT) ODT tab 4 mg    Or     ondansetron (ZOFRAN) injection 4 mg     oxyCODONE (ROXICODONE) tablet 5 mg    Or     oxyCODONE (ROXICODONE) tablet 10 mg     polyethylene glycol (MIRALAX) Packet 17 g     prochlorperazine (COMPAZINE) injection 10 mg    Or     prochlorperazine (COMPAZINE) tablet 10 mg     senna-docusate (SENOKOT-S/PERICOLACE) 8.6-50 MG per tablet 1 tablet    Or     senna-docusate (SENOKOT-S/PERICOLACE) 8.6-50 MG per tablet 2 tablet     senna-docusate (SENOKOT-S/PERICOLACE) 8.6-50 MG per tablet 1 tablet     sodium chloride (PF) 0.9% PF flush 10-20 mL     sodium chloride (PF) 0.9% PF flush 10-40 mL     sodium chloride (PF) 0.9% PF flush 10-40 mL     sodium chloride (PF) 0.9% PF flush 10-40 mL     sodium chloride (PF) 0.9% PF flush 3 mL     sodium chloride (PF) 0.9% PF flush 3 mL     sodium chloride (PF) 0.9% PF flush 3 mL             Review of Systems:   CV: NEGATIVE for chest pain, palpitations or peripheral edema  C: NEGATIVE for fever, chills, change in weight  E/M: NEGATIVE for ear, mouth and throat problems  R: NEGATIVE for significant cough or SOB          Physical Exam:   All vitals have been reviewed  Patient Vitals for the past 24 hrs:   BP Temp Temp src Pulse Resp SpO2   12/22/22 1201 129/84 99.1  F (37.3  C) Oral 82 16 96 %   12/22/22 0744 (!) 152/83 99.4  F (37.4  C) Oral 92 16 96 %   12/22/22 0628 -- -- -- -- 16 --   12/22/22 0145 (!) 159/90 100.1  F (37.8  C) Oral 99 16 --       Intake/Output Summary (Last 24 hours) at 12/22/2022 1617  Last data filed at 12/22/2022 1418  Gross per 24 hour   Intake 330 ml   Output 2000 ml   Net -1670 ml       Constitutional: Pleasant, alert, appropriate, following commands.  HEENT: Head atraumatic normocephalic. Pupils equal round and reactive.  Respiratory: Unlabored breathing no audible wheeze  Cardiovascular: Regular rate and rhythm per pulses.  GI: Abdomen  is non-distended.  Lymph/Hematologic: No lymphadenopathy in areas examined.  Skin: No rashes, no cyanosis, no edema.  Musculoskeletal: Bilateral lower extremities: Skin intact to left knee and bilateral ankles. Mild to moderate swelling/effusion of the left knee and bilateral ankles. No erythema or increased warmth. No ecchymosis. Tender over the anterolateral left knee joint line, otherwise nontender throughout the knee. Bilateral ankles have mild tenderness over the medial and lateral aspects, none over the anterior joint line. Nontender about the feet, but some radiating pain with palpation of the ankles. Able to passively range the left knee from 0-100 degrees without increased pain. Full passive range of motion of the bilateral ankles without increased pain. Ankle dorsiflexion and plantar flexion intact against resistance, bilaterally. Left knee is stable to varus and valgus stress. DP pulse palpable, bilaterally. Sensation intact to light touch throughout.  Neurologic: normal without focal findings, seech normal, alert and oriented x 3          Data:   All laboratory data reviewed  Results for orders placed or performed during the hospital encounter of 12/18/22   Single Lumen PICC Placement     Status: None    Narrative    Jalil Couch RN     12/22/2022  2:08 PM  [facility]    Single Lumen PICC Placement    Date/Time: 12/22/2022 1:49 PM  Performed by: Jalil Couch RN  Authorized by: Elyse Valadez MD   Indications: vascular access      UNIVERSAL PROTOCOL   Site Marked: Yes  Prior Images Obtained and Reviewed:  Yes  Required items: Required blood products, implants, devices and special   equipment available    Patient identity confirmed:  Verbally with patient and arm band  NA - No sedation, light sedation, or local anesthesia  Confirmation Checklist:  Patient's identity using two indicators  Time out: Immediately prior to the procedure a time out was called    Universal Protocol:  the Joint Commission Universal Protocol was followed    Preparation: Patient was prepped and draped in usual sterile fashion    ESBL (mL):  1     ANESTHESIA    Anesthesia: See MAR for details  Local Anesthetic:  Lidocaine 1% without epinephrine  Anesthetic Total (mL):  1      SEDATION    Patient Sedated: No        Preparation: skin prepped with ChloraPrep and skin prepped with 2%   chlorhexidine  Skin prep agent: skin prep agent completely dried prior to procedure  Sterile barriers: maximum sterile barriers were used: cap, mask, sterile   gown, sterile gloves, and large sterile sheet  Hand hygiene: hand hygiene performed prior to central venous catheter   insertion  Type of line used: PICC  Catheter type: single lumen  Lumen type: valved and power PICC  Catheter size: 4 Fr  Brand: [employer]  Lot number: [id number]  Placement method: ultrasound  Number of attempts: 1  Difficulty threading catheter: no  Successful placement: yes  Orientation: right    Location: basilic vein  Tip Location: SVC  Extremity circumference: 29.5  Visible catheter length: 8  Total catheter length: 52  Internal Lumen Volume: 44 mL    Dressing and securement: site cleansed, securement device, transparent   dressing, chlorhexidine disc applied and sterile dressing applied  Post procedure assessment: blood return through all ports  PROCEDURE   Patient Tolerance:  Patient tolerated the procedure well with no immediate   complicationsDescribe Procedure: Successful PICC placement on right upper   arm. Education complete. Pt had no further questions and written consent   obtained. Wire thread without difficulty and good blood return noted. Okay   to use and bedside RN notified.    XR Surgery ALEXIS L/T 5 Min Fluoro w Stills     Status: None    Narrative    EXAM: XR SURGERY ALEXIS FLUORO LESS THAN 5 MIN W STILLS  LOCATION: [facility]  DATE/TIME: 12/18/2022 5:02 PM    INDICATION: thoracic lumbar decompression, fluoro time 37 seconds, 3  images  COMPARISON: None.  TECHNIQUE: Exam performed by surgeon.    FINDINGS:    FLUOROSCOPIC TIME: .6  NUMBER OF IMAGES: 3      Impression    IMPRESSION:  1.  Fluoroscopy utilized for thoracic lumbar decompression, please see operative note.   US Lower Extremity Venous Duplex Bilateral     Status: None    Narrative    EXAM: US LOWER EXTREMITY VENOUS DUPLEX BILATERAL  LOCATION: [facility]  DATE/TIME: 12/21/2022 7:49 PM    INDICATION: Bilateral leg swelling  COMPARISON: None.  TECHNIQUE: Venous Duplex ultrasound of bilateral lower extremities with and without compression, augmentation and duplex. Color flow and spectral Doppler with waveform analysis performed.    FINDINGS: Exam includes the common femoral, femoral, popliteal veins as well as segmentally visualized deep calf veins and greater saphenous vein.     RIGHT: No deep vein thrombosis. No superficial thrombophlebitis. No popliteal cyst.    LEFT: No deep vein thrombosis. No superficial thrombophlebitis. Mildly complicated popliteal fossa cyst measuring 4.3 x 2.0 x 1.0 cm      Impression    IMPRESSION:  1.  No deep venous thrombosis in the bilateral lower extremities.   Basic metabolic panel     Status: Abnormal   Result Value Ref Range    Sodium 132 (L) 133 - 144 mmol/L    Potassium 3.9 3.4 - 5.3 mmol/L    Chloride 101 94 - 109 mmol/L    Carbon Dioxide (CO2) 22 20 - 32 mmol/L    Anion Gap 9 3 - 14 mmol/L    Urea Nitrogen 13 7 - 30 mg/dL    Creatinine 0.85 0.66 - 1.25 mg/dL    Calcium 8.5 8.5 - 10.1 mg/dL    Glucose 91 70 - 99 mg/dL    GFR Estimate >90 >60 mL/min/1.73m2   Hemoglobin A1c     Status: Normal   Result Value Ref Range    Hemoglobin A1C 4.8 0.0 - 5.6 %   Glucose     Status: Normal   Result Value Ref Range    Glucose 91 70 - 99 mg/dL   CBC with platelets and differential     Status: Abnormal   Result Value Ref Range    WBC Count 13.4 (H) 4.0 - 11.0 10e3/uL    RBC Count 3.44 (L) 4.40 - 5.90 10e6/uL    Hemoglobin 11.6 (L) 13.3  - 17.7 g/dL    Hematocrit 34.1 (L) 40.0 - 53.0 %    MCV 99 78 - 100 fL    MCH 33.7 (H) 26.5 - 33.0 pg    MCHC 34.0 31.5 - 36.5 g/dL    RDW 13.6 10.0 - 15.0 %    Platelet Count 261 150 - 450 10e3/uL    % Neutrophils 80 %    % Lymphocytes 8 %    % Monocytes 11 %    % Eosinophils 0 %    % Basophils 0 %    % Immature Granulocytes 1 %    NRBCs per 100 WBC 0 <1 /100    Absolute Neutrophils 10.7 (H) 1.6 - 8.3 10e3/uL    Absolute Lymphocytes 1.1 0.8 - 5.3 10e3/uL    Absolute Monocytes 1.5 (H) 0.0 - 1.3 10e3/uL    Absolute Eosinophils 0.0 0.0 - 0.7 10e3/uL    Absolute Basophils 0.0 0.0 - 0.2 10e3/uL    Absolute Immature Granulocytes 0.2 <=0.4 10e3/uL    Absolute NRBCs 0.0 10e3/uL   Glucose by meter     Status: Normal   Result Value Ref Range    GLUCOSE BY METER POCT 93 70 - 99 mg/dL   Glucose by meter     Status: Abnormal   Result Value Ref Range    GLUCOSE BY METER POCT 107 (H) 70 - 99 mg/dL   Glucose by meter     Status: Abnormal   Result Value Ref Range    GLUCOSE BY METER POCT 105 (H) 70 - 99 mg/dL   Creatinine     Status: Normal   Result Value Ref Range    Creatinine 0.66 0.66 - 1.25 mg/dL    GFR Estimate >90 >60 mL/min/1.73m2   Vancomycin level     Status: Normal   Result Value Ref Range    Vancomycin 9.9   mg/L   Potassium     Status: Normal   Result Value Ref Range    Potassium 3.8 3.4 - 5.3 mmol/L   Glucose by meter     Status: Normal   Result Value Ref Range    GLUCOSE BY METER POCT 89 70 - 99 mg/dL   Basic metabolic panel     Status: Abnormal   Result Value Ref Range    Sodium 135 133 - 144 mmol/L    Potassium 3.9 3.4 - 5.3 mmol/L    Chloride 102 94 - 109 mmol/L    Carbon Dioxide (CO2) 23 20 - 32 mmol/L    Anion Gap 10 3 - 14 mmol/L    Urea Nitrogen 10 7 - 30 mg/dL    Creatinine 0.67 0.66 - 1.25 mg/dL    Calcium 8.1 (L) 8.5 - 10.1 mg/dL    Glucose 107 (H) 70 - 99 mg/dL    GFR Estimate >90 >60 mL/min/1.73m2   Basic metabolic panel     Status: Abnormal   Result Value Ref Range    Sodium 136 133 - 144 mmol/L     Potassium 3.0 (L) 3.4 - 5.3 mmol/L    Chloride 101 94 - 109 mmol/L    Carbon Dioxide (CO2) 26 20 - 32 mmol/L    Anion Gap 9 3 - 14 mmol/L    Urea Nitrogen 10 7 - 30 mg/dL    Creatinine 0.99 0.66 - 1.25 mg/dL    Calcium 8.4 (L) 8.5 - 10.1 mg/dL    Glucose 123 (H) 70 - 99 mg/dL    GFR Estimate 88 >60 mL/min/1.73m2   CBC with platelets     Status: Abnormal   Result Value Ref Range    WBC Count 8.8 4.0 - 11.0 10e3/uL    RBC Count 3.09 (L) 4.40 - 5.90 10e6/uL    Hemoglobin 10.2 (L) 13.3 - 17.7 g/dL    Hematocrit 30.5 (L) 40.0 - 53.0 %    MCV 99 78 - 100 fL    MCH 33.0 26.5 - 33.0 pg    MCHC 33.4 31.5 - 36.5 g/dL    RDW 14.0 10.0 - 15.0 %    Platelet Count 350 150 - 450 10e3/uL   Uric acid     Status: Normal   Result Value Ref Range    Uric Acid 5.7 3.5 - 7.2 mg/dL   Echocardiogram Complete     Status: None   Result Value Ref Range    LVEF  >70%     Narrative    [id number]  [id number]  [id number]^MARGARITO^REEMA     [facility]  Echocardiography Laboratory  [address] [place] [address]     Name: FLOR MEYER  MRN: [id number]  : 1965  Study Date: 2022 03:01 PM  Age: 57 yrs  Gender: Male  Patient Location: [facility]  Reason For Study: Endocarditis  Ordering Physician: REEMA PIMENTEL  Referring Physician: BROOKE LUU  Performed By: Keya Dahl     BSA: 2.1 m2  Height: 72 in  Weight: 198 lb  HR: 98  BP: 158/98 mmHg  ______________________________________________________________________________  Procedure  Complete Portable Echo Adult. Optison (NDC #[id number]) given intravenously.  ______________________________________________________________________________  Interpretation Summary     There is normal left ventricular wall thickness.  Hyperdynamic left ventricular function  The visual ejection fraction is >70%.  Left ventricular diastolic function is normal.  No regional wall motion abnormalities noted.  Hyperdynamic right ventricular function  No significant valve disease.      There is no comparison study available.  ______________________________________________________________________________  Left Ventricle  The left ventricle is normal in size. There is normal left ventricular wall  thickness. Hyperdynamic left ventricular function. The visual ejection  fraction is >70%. Left ventricular diastolic function is normal. No regional  wall motion abnormalities noted.     Right Ventricle  The right ventricle is normal size. Hyperdynamic right ventricular function.     Atria  Normal left atrial size. Right atrial size is normal. There is no color  Doppler evidence of an atrial shunt.     Mitral Valve  The mitral valve leaflets appear normal. There is no evidence of stenosis,  fluttering, or prolapse. There is trace mitral regurgitation. There is no  mitral valve stenosis.     Tricuspid Valve  Normal tricuspid valve. There is trace tricuspid regurgitation. Right  ventricular systolic pressure could not be approximated due to inadequate  tricuspid regurgitation.     Aortic Valve  There is mild trileaflet aortic sclerosis. No aortic regurgitation is present.  No aortic stenosis is present.     Pulmonic Valve  The pulmonic valve is not well visualized. There is trace pulmonic valvular  regurgitation.     Vessels  The aortic root is normal size. Normal size ascending aorta. The inferior vena  cava was normal in size with preserved respiratory variability.     Pericardium  There is no pericardial effusion.     Rhythm  Sinus rhythm was noted.  ______________________________________________________________________________  MMode/2D Measurements & Calculations  IVSd: 0.89 cm     LVIDd: 4.5 cm  LVIDs: 2.4 cm  LVPWd: 0.94 cm  LVPWs: 0.64 cm  FS: 47.6 %  LV mass(C)d: 137.8 grams  LV mass(C)dI: 64.9 grams/m2  Ao root diam: 3.1 cm  LA dimension: 3.2 cm  asc Aorta Diam: 3.3 cm  LA/Ao: 1.0  LVOT diam: 2.0 cm  LVOT area: 3.3 cm2  LA Volume (BP): 51.1 ml  LA Volume Index (BP): 24.1 ml/m2  RWT: 0.42      Doppler Measurements & Calculations  MV E max seth: 91.3 cm/sec  MV A max seth: 94.7 cm/sec  MV E/A: 0.96  MV dec time: 0.18 sec  LV V1 max PG: 10.2 mmHg  LV V1 max: 160.0 cm/sec  LV V1 VTI: 25.3 cm  SV(LVOT): 82.3 ml  SI(LVOT): 38.8 ml/m2  PA acc time: 0.16 sec  E/E' av.1  Lateral E/e': 6.9  Medial E/e': 9.2     ______________________________________________________________________________  Report approved by: Dr Ashlee Nielsen 2022 04:35 PM         Anaerobic Bacterial Culture Routine     Status: None (Preliminary result)    Specimen: Spine, Thoracic; Abscess   Result Value Ref Range    Culture No anaerobic organisms isolated after 3 days    Gram Stain     Status: Abnormal    Specimen: Spine, Thoracic; Abscess   Result Value Ref Range    Gram Stain Result 2+ Gram positive cocci (A)     Gram Stain Result 3+ WBC seen (A)    Abscess Aerobic Bacterial Culture Routine     Status: Abnormal    Specimen: Spine, Thoracic; Abscess   Result Value Ref Range    Culture 4+ Staphylococcus aureus (A)        Susceptibility    Staphylococcus aureus - EUGENIA     Oxacillin* <=0.25 Susceptible ug/mL      * Oxacillin susceptible isolates are susceptible to cephalosporins (example: cefazolin and cephalexin) and beta lactam combination agents. Oxacillin resistant isolates are resistant to these agents.     Gentamicin <=0.5 Susceptible ug/mL     Erythromycin <=0.25 Susceptible ug/mL     Clindamycin <=0.25 Susceptible ug/mL     Vancomycin 1 Susceptible ug/mL     Tetracycline <=1 Susceptible ug/mL     Trimethoprim/Sulfamethoxazole <=0.5/9.5 Susceptible ug/mL   Blood Culture Arm, Right     Status: Normal (Preliminary result)    Specimen: Arm, Right; Blood   Result Value Ref Range    Culture No growth after 2 days    Blood Culture Hand, Right     Status: Normal (Preliminary result)    Specimen: Hand, Right; Blood   Result Value Ref Range    Culture No growth after 2 days    CBC with platelets differential     Status: Abnormal     Narrative    The following orders were created for panel order CBC with platelets differential.  Procedure                               Abnormality         Status                     ---------                               -----------         ------                     CBC with platelets and d...[[id number]]  Abnormal            Final result                 Please view results for these tests on the individual orders.          Attestation:  I have reviewed today's vital signs, notes, medications, labs and imaging with Dr. Francisco Pitts.  Amount of time performed on this consult: 40 minutes.    Pricilla Padron PA-C  [facility]

## 2022-12-22 NOTE — PLAN OF CARE
Goal Outcome Evaluation:             Alert and oriented x4. Up with staff assist of one and walker/gait belt.  Temp 100.1, other vitals stable.  Peripheral IV SL with intermittent antibiotics. Dressing CDI to back. Complains of back and leg pain, oxycodone and Tylenol PRN. Lung sounds diminished. Dacosta patent. X 1 large loose stool this morning.

## 2022-12-22 NOTE — PROGRESS NOTES
[facility]    Neurosurgery  Daily Post-Op Note    Assessment & Plan   Procedure(s):  THORACIC LUMBAR DECOMPRESSION FOR EVACUATION AND EPIDURAL ABSCESS VERSUS HEMATOMA   4 Days Post-Op  Doing well.  Pain well-controlled.  Tolerating physical therapy and rehabilitation well.    Plan:  -Advance activity as tolerated  -Continue supportive and symptomatic treatment  -Start or continue physical therapy  -re: need for DVT prophylaxis-would like to see thoracic MRI w/wo contrast today prior to making recs regarding lovenox or heparin.     Marco Licona PA-C    Interval History   Stable.  Doing well.  Improving slowly.  Pain is reasonably controlled.  No fevers.     Physical Exam   Temp: 99.1  F (37.3  C) Temp src: Oral BP: 129/84 Pulse: 82   Resp: 16 SpO2: 96 % O2 Device: None (Room air)    Vitals:    12/19/22 0630 12/20/22 0657 12/21/22 0707   Weight: 89 kg (196 lb 3.4 oz) 89.9 kg (198 lb 3.1 oz) 89 kg (196 lb 3.4 oz)     Vital Signs with Ranges  Temp:  [99.1  F (37.3  C)-100.1  F (37.8  C)] 99.1  F (37.3  C)  Pulse:  [82-99] 82  Resp:  [16] 16  BP: (129-159)/(83-90) 129/84  SpO2:  [96 %] 96 %  I/O last 3 completed shifts:  In: 330 [P.O.:330]  Out: 3850 [Urine:3850]    Alert and oriented.    Incision: CDI      Medications        nafcillin  2 g Intravenous Q4H     polyethylene glycol  17 g Oral Daily     senna-docusate  1 tablet Oral BID     sodium chloride (PF)  10-40 mL Intracatheter Q7 Days     sodium chloride (PF)  3 mL Intracatheter Q8H           Marco Licona PA-C  [facility] Neurosurgery  [address]  [address] [place] [id number]    Tel [phone]  Pager [phone]

## 2022-12-22 NOTE — PROGRESS NOTES
[facility]  Hospitalist Progress Note    Date of Admission: 12/18/2022     Interval history   Patient appears to be relatively comfortable.  Discussion regarding ultrasound of his legs.  He has had multiple stools today which are watery.  Initial plans for potential TCU low-grade temps and watery stools so we will hold on discharge  Also complained of knee pain to ID which raise concerns of potential joint infection    Brief Hospital Course:   57-year-old gentleman with no significant past medical history on no medications presented with left lower extremity paresthesias, weakness, urinary incontinence and urinary retention for 2 to 3 days.  Initially presented to [facility]  MRI of the spine showing extensive dorsal epidural fluid collection extending from thoracolumbar spine down to L2-L3 with mass-effect on the spinal cord/spinal cord compression  Transferred emergently to [facility]      Assessment & Plan   Epidural fluid collection from upper thoracic spine down to the mid lumbar spine with resulting spinal cord compression from T12-L1    Reported progressive 6-day complaints of back pain with progressive difficulty with ambulation, falls and weakness/sensory changes of his lower extremities    MRI showing a large dorsal epidural fluid collection from mid thoracic spine down to the mid lumbar spine with substantial compression at the thoracolumbar junction of the terminal spinal cord (majority between T11-L2)     Initially presented to [facility] and transferred urgently to [facility]    12/18: Emergent surgery: Extensive epidural fluid collection extending from the upper thoracic spine down to the mid lumbar spine, most prominent with significant spinal cord compression from T12-L1:  (Per operative report description of cloudy, slightly thickened pus) culture and sent for Gram stain    ID consult:     On Vanco and Zosyn on admission>> Changed to nafcillin on 12/20 12/18 cultures  with gram-positive cocci>> 12/18 and very gene confirmed staph aureus (MSSA)     12/18 cultures from thoracic and lumbar spine both positive for staph aureus (MSSA)     12/20 ECHO with normal EF 70%, no wall motion abnormality, hyperdynamic right ventricle    Patient with pain in his knees today and Ortho consult to exclude infectious spread to joint    Left knee and bilateral ankle pain    Patient was seen in consultation by orthopedics.    Patient having symptoms for the last 3 to 4 days which started after the spinal abscess evacuation.    On exam has mild to moderate swelling of the left knee and bilateral ankles.  No erythema or warmth.    WBC normal 8.8.    By report patient has a history of gout and given reassuring exam assume more likely diagnosis.    X-ray ordered of bilateral ankles and left knee.    Discussion with Dr. Pitts    Holding on aspiration at this point.      Diarrhea    Most likely secondary to nafcillin    Order C. difficile prior to getting Imodium     Urinary Retention:     Urinary retention likely secondary to above    1.7 liters output after Dacosta placement    Still has Dacosta catheter in place    Voiding trial in 5-7 days     Acute kidney injury:     Large amount of urinary retention on admission    Resolved with fluids and Dacosta    Suspected secondary to urinary retention    Creatinine 0.66 >> 0.99    Hyponatremia:     Still mild sodium 132. Will monitor     Recheck sodium level >> 135    LE Edema:     ? Potentially from fluid retention    Check venous dopplers.  Negative on 12/21 with the exception of a left Baker's cyst    If ++ would need to discuss with surgery as back surgery extensive     Would potentially try low dose of diuretic     Hypokalemia.      Potassium of 3    Replace per protocol      Diet: Regular Diet Adult  Dacosta Catheter: PRESENT, indication: Retention, Retention  DVT Prophylaxis: Sequentials and ambulate given large back surgery discussion with neurosurgery.   Requested input regarding Lovenox or heparin for DVT prevention specifically if patient remains in the hospital for a while further  Code Status: Full Code       Expected Discharge Date: 12/23/2022      Destination: other (comment) (TCU)  Discharge Comments: PICC ABX       HOWARD NAGEL MD,   Hospitalist Service  [facility]  ______________________________________________________________________    -Data reviewed today: I reviewed all new labs and imaging results over the last 24 hours. I personally reviewed no images or EKG's today.    Physical Exam   Temp: 99.1  F (37.3  C) Temp src: Oral BP: 129/84 Pulse: 82   Resp: 16 SpO2: 96 % O2 Device: None (Room air)    Vitals:    12/19/22 0630 12/20/22 0657 12/21/22 0707   Weight: 89 kg (196 lb 3.4 oz) 89.9 kg (198 lb 3.1 oz) 89 kg (196 lb 3.4 oz)   Constitutional: Appears stated age, no acute distress.  Respiratory: Breath sounds CTA. No increased work of breathing.  Cardiovascular: RRR, no rub or murmur. No peripheral edema.  GI: Soft, non-tender, non-distended.  Skin: Warm, dry, no rashes or lesions.  Other: Patient is able to lift his legs off the bed.  1+ bilateral lower extremity edema  Has Dacosta catheter in place    Medications       nafcillin  2 g Intravenous Q4H     polyethylene glycol  17 g Oral Daily     senna-docusate  1 tablet Oral BID     sodium chloride (PF)  10-40 mL Intracatheter Q7 Days     sodium chloride (PF)  3 mL Intracatheter Q8H       Data   Recent Labs   Lab 12/22/22  0727 12/20/22  0835 12/20/22  0547 12/19/22  0816 12/19/22  0804 12/18/22  0921   WBC 8.8  --   --   --  13.4* 14.8*   HGB 10.2*  --   --   --  11.6* 12.9*   MCV 99  --   --   --  99 101*     --   --   --  261 213    135  --   --  132* 133*   POTASSIUM 3.0* 3.9  3.8  --   --  3.9 3.9   CHLORIDE 101 102  --   --  101 94*   CO2 26 23  --   --  22 23   BUN 10 10  --   --  13 17.5   CR 0.99 0.67  0.66  --   --  0.85 1.69*   ANIONGAP 9 10  --   --   9 16*   LINA 8.4* 8.1*  --   --  8.5 9.6   * 107* 89   < > 91  91 115*    < > = values in this interval not displayed.       Imaging:  Recent Results (from the past 24 hour(s))   US Lower Extremity Venous Duplex Bilateral    Narrative    EXAM: US LOWER EXTREMITY VENOUS DUPLEX BILATERAL  LOCATION: [facility]  DATE/TIME: 12/21/2022 7:49 PM    INDICATION: Bilateral leg swelling  COMPARISON: None.  TECHNIQUE: Venous Duplex ultrasound of bilateral lower extremities with and without compression, augmentation and duplex. Color flow and spectral Doppler with waveform analysis performed.    FINDINGS: Exam includes the common femoral, femoral, popliteal veins as well as segmentally visualized deep calf veins and greater saphenous vein.     RIGHT: No deep vein thrombosis. No superficial thrombophlebitis. No popliteal cyst.    LEFT: No deep vein thrombosis. No superficial thrombophlebitis. Mildly complicated popliteal fossa cyst measuring 4.3 x 2.0 x 1.0 cm      Impression    IMPRESSION:  1.  No deep venous thrombosis in the bilateral lower extremities.   XR Ankle Bilateral G/E 3 Views    Narrative    ANKLE BILATERAL THREE OR MORE VIEWS  12/22/2022 3:59 PM     HISTORY:  Bilateral ankle pain and swelling. WBing if possible.    COMPARISON: None.      Impression    IMPRESSION: Soft tissue swelling about the ankles. No fracture or  talar dome osteochondral lesion. Calcaneal enthesophytes incidentally  noted. Otherwise negative.    JJ ARANGO MD         SYSTEM ID:  [id number]   XR Knee Left 3 Views    Narrative    LEFT KNEE THREE VIEWS   12/22/2022 4:00 PM     HISTORY:  Left knee pain and swelling    COMPARISON: None.      Impression    IMPRESSION: Minimal patellofemoral osteoarthrosis. There is a moderate  amount of calcification anterior to the patella, likely from old  trauma or long-standing inflammation. This appears well marginated and  likely chronic.    JJ ARANGO MD         SYSTEM  ID:  [id number]

## 2022-12-22 NOTE — PROCEDURES
[facility]    Single Lumen PICC Placement    Date/Time: 12/22/2022 1:49 PM  Performed by: Jalil Couch RN  Authorized by: Elyse Valadez MD   Indications: vascular access      UNIVERSAL PROTOCOL   Site Marked: Yes  Prior Images Obtained and Reviewed:  Yes  Required items: Required blood products, implants, devices and special equipment available    Patient identity confirmed:  Verbally with patient and arm band  NA - No sedation, light sedation, or local anesthesia  Confirmation Checklist:  Patient's identity using two indicators  Time out: Immediately prior to the procedure a time out was called    Universal Protocol: the Joint Commission Universal Protocol was followed    Preparation: Patient was prepped and draped in usual sterile fashion    ESBL (mL):  1     ANESTHESIA    Anesthesia: See MAR for details  Local Anesthetic:  Lidocaine 1% without epinephrine  Anesthetic Total (mL):  1      SEDATION    Patient Sedated: No        Preparation: skin prepped with ChloraPrep and skin prepped with 2% chlorhexidine  Skin prep agent: skin prep agent completely dried prior to procedure  Sterile barriers: maximum sterile barriers were used: cap, mask, sterile gown, sterile gloves, and large sterile sheet  Hand hygiene: hand hygiene performed prior to central venous catheter insertion  Type of line used: PICC  Catheter type: single lumen  Lumen type: valved and power PICC  Catheter size: 4 Fr  Brand: Bard  Lot number: [id number]  Placement method: ultrasound  Number of attempts: 1  Difficulty threading catheter: no  Successful placement: yes  Orientation: right    Location: basilic vein  Tip Location: SVC  Extremity circumference: 29.5  Visible catheter length: 8  Total catheter length: 52  Internal Lumen Volume: 44 mL    Dressing and securement: site cleansed, securement device, transparent dressing, chlorhexidine disc applied and sterile dressing applied  Post procedure assessment: blood return  through all ports  PROCEDURE   Patient Tolerance:  Patient tolerated the procedure well with no immediate complicationsDescribe Procedure: Successful PICC placement on right upper arm. Education complete. Pt had no further questions and written consent obtained. Wire thread without difficulty and good blood return noted. Okay to use and bedside RN notified.

## 2022-12-23 ENCOUNTER — APPOINTMENT (OUTPATIENT)
Dept: PHYSICAL THERAPY | Facility: CLINIC | Age: 57
DRG: 029 | End: 2022-12-23
Attending: NEUROLOGICAL SURGERY
Payer: COMMERCIAL

## 2022-12-23 ENCOUNTER — APPOINTMENT (OUTPATIENT)
Dept: MRI IMAGING | Facility: CLINIC | Age: 57
DRG: 029 | End: 2022-12-23
Attending: INTERNAL MEDICINE
Payer: COMMERCIAL

## 2022-12-23 ENCOUNTER — HOSPITAL ENCOUNTER (INPATIENT)
Facility: CLINIC | Age: 57
End: 2022-12-23
Payer: COMMERCIAL

## 2022-12-23 LAB
BACTERIA BLD CULT: NO GROWTH
CRP SERPL-MCNC: 137 MG/L (ref 0–8)
POTASSIUM BLD-SCNC: 3.5 MMOL/L (ref 3.4–5.3)

## 2022-12-23 PROCEDURE — 72158 MRI LUMBAR SPINE W/O & W/DYE: CPT

## 2022-12-23 PROCEDURE — 120N000001 HC R&B MED SURG/OB

## 2022-12-23 PROCEDURE — 84132 ASSAY OF SERUM POTASSIUM: CPT | Performed by: INTERNAL MEDICINE

## 2022-12-23 PROCEDURE — 250N000013 HC RX MED GY IP 250 OP 250 PS 637: Performed by: PHYSICIAN ASSISTANT

## 2022-12-23 PROCEDURE — 250N000009 HC RX 250: Performed by: SPECIALIST

## 2022-12-23 PROCEDURE — 86140 C-REACTIVE PROTEIN: CPT | Performed by: PHYSICIAN ASSISTANT

## 2022-12-23 PROCEDURE — 72156 MRI NECK SPINE W/O & W/DYE: CPT

## 2022-12-23 PROCEDURE — 250N000013 HC RX MED GY IP 250 OP 250 PS 637: Performed by: NURSE PRACTITIONER

## 2022-12-23 PROCEDURE — 97116 GAIT TRAINING THERAPY: CPT | Mod: GP

## 2022-12-23 PROCEDURE — 99232 SBSQ HOSP IP/OBS MODERATE 35: CPT | Performed by: INTERNAL MEDICINE

## 2022-12-23 PROCEDURE — 255N000002 HC RX 255 OP 636: Performed by: INTERNAL MEDICINE

## 2022-12-23 PROCEDURE — 99233 SBSQ HOSP IP/OBS HIGH 50: CPT | Performed by: SPECIALIST

## 2022-12-23 PROCEDURE — A9585 GADOBUTROL INJECTION: HCPCS | Performed by: INTERNAL MEDICINE

## 2022-12-23 RX ORDER — GADOBUTROL 604.72 MG/ML
9 INJECTION INTRAVENOUS ONCE
Status: COMPLETED | OUTPATIENT
Start: 2022-12-23 | End: 2022-12-23

## 2022-12-23 RX ADMIN — NAFCILLIN 2 G: 2 POWDER, FOR SOLUTION INTRAMUSCULAR; INTRAVENOUS at 02:44

## 2022-12-23 RX ADMIN — NAFCILLIN 2 G: 2 POWDER, FOR SOLUTION INTRAMUSCULAR; INTRAVENOUS at 23:22

## 2022-12-23 RX ADMIN — NAFCILLIN 2 G: 2 POWDER, FOR SOLUTION INTRAMUSCULAR; INTRAVENOUS at 09:44

## 2022-12-23 RX ADMIN — NAFCILLIN 2 G: 2 POWDER, FOR SOLUTION INTRAMUSCULAR; INTRAVENOUS at 06:31

## 2022-12-23 RX ADMIN — LOPERAMIDE HYDROCHLORIDE 2 MG: 2 CAPSULE ORAL at 09:43

## 2022-12-23 RX ADMIN — NAFCILLIN 2 G: 2 POWDER, FOR SOLUTION INTRAMUSCULAR; INTRAVENOUS at 14:30

## 2022-12-23 RX ADMIN — LOPERAMIDE HYDROCHLORIDE 2 MG: 2 CAPSULE ORAL at 04:04

## 2022-12-23 RX ADMIN — GADOBUTROL 9 ML: 604.72 INJECTION INTRAVENOUS at 22:43

## 2022-12-23 RX ADMIN — ACETAMINOPHEN 650 MG: 325 TABLET, FILM COATED ORAL at 18:40

## 2022-12-23 RX ADMIN — NAFCILLIN 2 G: 2 POWDER, FOR SOLUTION INTRAMUSCULAR; INTRAVENOUS at 18:33

## 2022-12-23 ASSESSMENT — ACTIVITIES OF DAILY LIVING (ADL)
ADLS_ACUITY_SCORE: 35
ADLS_ACUITY_SCORE: 39
ADLS_ACUITY_SCORE: 35
ADLS_ACUITY_SCORE: 39
ADLS_ACUITY_SCORE: 39
ADLS_ACUITY_SCORE: 35
ADLS_ACUITY_SCORE: 39
ADLS_ACUITY_SCORE: 35
ADLS_ACUITY_SCORE: 39
ADLS_ACUITY_SCORE: 35

## 2022-12-23 NOTE — PROGRESS NOTES
"[facility]  Hospitalist Progress Note    Date of Admission: 12/18/2022     Interval history   Patient still feeling that his legs are doing well.  He can get up and move around.  Knees are still stiff had orthopedic assessment yesterday.  Thoracic MRI follow-up showing redemonstration of large dorsal epidural abscess.(Decreased along the inferior extent but increased in its superior extent) subsequent MRIs ordered including lumbar and cervical however on hold as limited by amount of contrast exposure  Patient does not really feel any differently.  He did state when he laid down in the MRI however that he had some pain in his upper back that he had to \" crack\" and now feels better.  Seen by neurosurgery today with additional MRI findings.      Brief Hospital Course:   57-year-old gentleman with no significant past medical history on no medications presented with left lower extremity paresthesias, weakness, urinary incontinence and urinary retention for 2 to 3 days.  Initially presented to [facility]   MRI of the spine showing extensive dorsal epidural fluid collection extending from thoracolumbar spine down to L2-L3 with mass-effect on the spinal cord/spinal cord compression concerning for abscess.   Transferred emergently to [facility].   12/18/2022 with emergent surgery for drainage of spinal cord compression from epidural fluid collection which caused compression and weakness in his bilateral lower extremities      Assessment & Plan   Epidural fluid collection from upper thoracic spine down to the mid lumbar spine with resulting spinal cord compression from T12-L1  + MSSA infection (+ culture in surgical fluid and blood)      Reported progressive 6-day complaints of back pain with progressive difficulty with ambulation, falls and weakness/sensory changes of his lower extremities    MRI showing a large dorsal epidural fluid collection from mid thoracic spine down to the mid lumbar " spine with substantial compression at the thoracolumbar junction of the terminal spinal cord (majority between T11-L2)     Initially presented to [facility] and transferred urgently to [facility]    12/18: Emergent surgery: Extensive epidural fluid collection extending from the upper thoracic spine down to the mid lumbar spine, most prominent with significant spinal cord compression from T12-L1:  (Per operative report description of cloudy, slightly thickened pus) culture and sent for Gram stain    ID Following: MSSA infection     On Vanco and Zosyn on admission>> Changed to nafcillin on 12/20 12/18 blood cultures 1 of 2 positive staph aureus (MSSA)     12/18 epidural space culture staph aureus (MSSA)     12/20 ECHO with normal EF 70%, no wall motion abnormality, hyperdynamic right ventricle    MRI 12/22 thoracic spine: Redemonstration of large dorsal epidural abscess.  Decreased thickness on the inferior extent but increased in his superior extent measuring up to 9 mm in maximal thickness at T3-T4 (entire collection not included)     12/22 emergent MRI ordered of the cervical and lumbar spine unable to do reportedly given the amount of contrast exposure and MRI exposure already done 12/22: Need to delay until later today    Updated neurosurgery this a.m. on 12/23 regarding inability to get MRI    CRP  295 >171>> 137     Left knee and bilateral ankle pain    12/22 orthopedic consult    symptoms for the last 3 to 4 days     mild to moderate swelling of the left knee and bilateral ankles.  No erythema or warmth.    WBC normal 8.8.    By report patient has a history of gout and given reassuring exam assume more likely diagnosis.    12/22 x-ray bilateral ankles showing soft tissue swelling about the ankles.  No fracture or talar dome osteochondral lesion, Calcaneal enthesophytes incidentally noted. Otherwise negative.    12/22 x-ray left knee: Minimal patellofemoral osteoarthrosis. There is a moderate amount of  calcification anterior to the patella, likely from old trauma or long-standing inflammation. This appears well marginated and likely chronic.    Orthopedic surgery: no aspiration needed. ? Gout       Diarrhea    Most likely secondary to nafcillin    12/22 C dificile negative     Getting immodium but still with some diarrhea.     Urinary Retention: ongoing     Urinary retention likely secondary to above    1.7 liters output after Gamez placement    Still has Gamez catheter in place    Voiding trial in 5-7 days > >just started on flomax     Urology consult this hospital admit     Recommended flomax: started on 12/23     Plans to continue gamez    Plan was to start flomax and eval PVR after 5-7 days.     Starting flomax today so will need to likely reassess 5 days from now.     If not in hospital would need void trial after discharge.     If PVR > 350 replace gamez per urology and follow up outpatient.     Acute kidney injury: Resolved     Large amount of urinary retention on admission    Resolved with fluids and Gamez    Suspected secondary to urinary retention    Creatinine 0.66 >> 0.99    Hyponatremia:   Resolved     Lowest 132     Sodium now 136     LE Edema:     ? Potentially from fluid retention    Venous dopplers negative on 12/21 with the exception of a left Baker's cyst    If ++ would need to discuss with surgery as back surgery extensive     Hypokalemia.      Potassium of 3 >>12/22     Replace per protocol    Disposition:   Plans for TCU but with new fluid collection unsure on timing and further plans for fluid evaluation .     ADDENDUM: spoke with neurosurgery 12/23 and given need for additional imaging will not give recommendations yet on heparin or Lovenox for DVT prevention  Holding for right now today.   Will get back to [facility]  regarding potential of heparin or Lovenox    Diet: Regular Diet Adult  Gamez Catheter: PRESENT, indication: Retention, Retention  DVT Prophylaxis: Sequentials and ambulate given  large back surgery discussion with neurosurgery.  Requested input regarding Lovenox or heparin for DVT prevention specifically if patient remains in the hospital for a while further  Code Status: Full Code       Expected Discharge Date: 12/24/2022      Destination: other (comment) (TCU)  Discharge Comments: PICC ABX       HOWARD NAGEL MD,   Hospitalist Service  [facility]  ______________________________________________________________________    -Data reviewed today: I reviewed all new labs and imaging results over the last 24 hours. I personally reviewed no images or EKG's today.    Physical Exam   Temp: 99.8  F (37.7  C) Temp src: Oral BP: (!) 141/97 Pulse: 76   Resp: 16 SpO2: 97 % O2 Device: None (Room air)    Vitals:    12/19/22 0630 12/20/22 0657 12/21/22 0707   Weight: 89 kg (196 lb 3.4 oz) 89.9 kg (198 lb 3.1 oz) 89 kg (196 lb 3.4 oz)   Constitutional: Appears stated age, no acute distress.  Respiratory: Breath sounds CTA. No increased work of breathing.  Cardiovascular: RRR, no rub or murmur..  GI: Soft, non-tender, non-distended.  Skin: Warm, dry, no rashes or lesions.  Extremities with trace edema   Other: Patient is able to lift his legs off the bed.  1+ bilateral lower extremity edema  Has Dacosta catheter in place    Medications       gadobutrol  9 mL Intravenous Once     nafcillin  2 g Intravenous Q4H     polyethylene glycol  17 g Oral Daily     senna-docusate  1 tablet Oral BID     sodium chloride (PF)  10-40 mL Intracatheter Q7 Days     sodium chloride (PF)  3 mL Intracatheter Q8H       Data   Recent Labs   Lab 12/23/22  0621 12/22/22  1747 12/22/22  0727 12/20/22  0835 12/20/22  0547 12/19/22  0816 12/19/22  0804 12/18/22  0921 12/18/22  0921   WBC  --   --  8.8  --   --   --  13.4*  --  14.8*   HGB  --   --  10.2*  --   --   --  11.6*  --  12.9*   MCV  --   --  99  --   --   --  99  --  101*   PLT  --   --  350  --   --   --  261  --  213   NA  --   --  136 135  --   --   132*  --  133*   POTASSIUM 3.5 3.4 3.0* 3.9  3.8  --   --  3.9   < > 3.9   CHLORIDE  --   --  101 102  --   --  101  --  94*   CO2  --   --  26 23  --   --  22  --  23   BUN  --   --  10 10  --   --  13  --  17.5   CR  --   --  0.99 0.67  0.66  --   --  0.85  --  1.69*   ANIONGAP  --   --  9 10  --   --  9  --  16*   LINA  --   --  8.4* 8.1*  --   --  8.5  --  9.6   GLC  --   --  123* 107* 89   < > 91  91  --  115*    < > = values in this interval not displayed.       Imaging:  Recent Results (from the past 24 hour(s))   XR Ankle Bilateral G/E 3 Views    Narrative    ANKLE BILATERAL THREE OR MORE VIEWS  12/22/2022 3:59 PM     HISTORY:  Bilateral ankle pain and swelling. WBing if possible.    COMPARISON: None.      Impression    IMPRESSION: Soft tissue swelling about the ankles. No fracture or  talar dome osteochondral lesion. Calcaneal enthesophytes incidentally  noted. Otherwise negative.    JJ ARANGO MD         SYSTEM ID:  [id number]   XR Knee Left 3 Views    Narrative    LEFT KNEE THREE VIEWS   12/22/2022 4:00 PM     HISTORY:  Left knee pain and swelling    COMPARISON: None.      Impression    IMPRESSION: Minimal patellofemoral osteoarthrosis. There is a moderate  amount of calcification anterior to the patella, likely from old  trauma or long-standing inflammation. This appears well marginated and  likely chronic.    JJ ARANGO MD         SYSTEM ID:  [id number]   MR Thoracic Spine w/o & w Contrast    Narrative    EXAM: MR THORACIC SPINE W/O and W CONTRAST  LOCATION: [facility]  DATE/TIME: 12/22/2022 10:23 PM    INDICATION: POD 4 thoracic laminectomy for epidural abscess washout.  COMPARISON: 12/18/2022  CONTRAST: 9 mL GADAVIST  TECHNIQUE: Routine Thoracic Spine MRI without and with IV contrast.    FINDINGS:   Stable alignment of vertebral body heights. Redemonstrated likely recent compression deformities/Schmorl's nodes at T7 and T12 with associated edema. There is marked  edema of the left T9-T10 facet joint with associated surrounding enhancement, with mild   edema of the right T9-T10 facet joint. Findings are suspicious for septic facet arthropathy. Since the prior exam, the large dorsal epidural collection has decreased inferiorly, but has increased in its superior extent. The collection extends from at   least C5-C6 through T12-L1, measuring up to 9 mm in maximal thickness at T3-T4. Inferiorly the collection measures up to 8 mm in thickness at the level of T9, previously 11 mm in maximal thickness. The entirety of the collection is not included in the   field of view. No definite cord signal abnormality. Stable degenerative changes.    Left-sided dependent atelectasis/consolidation. Bilateral pleural effusions.      Impression    IMPRESSION:  1.  Redemonstrated large dorsal epidural abscess, which has decreased in thickness along the inferior extent, but increased in its superior extent, measuring up to 9 mm in maximal thickness at T3-T4. The entirety of the collection is not included in the   field of view. Consider further evaluation with dedicated cervical and lumbar spine MRI with and without IV contrast.  2.  Edema and enhancement of the left greater than right T9-T10 facet joints, potentially reflecting septic facet arthritis.  3.  No definite cord signal abnormality in the thoracic spine.    Findings were discussed with Dr. Chatman at 2304 hours on 12/22/2022.

## 2022-12-23 NOTE — SIGNIFICANT EVENT
Significant Event Note    Time of event: 11:10 PM December 22, 2022    Description of event:  I was notified of the below MRI results by the radiologist:    IMPRESSION:  1.  Redemonstrated large dorsal epidural abscess, which has decreased in thickness along the inferior extent but increased in its superior extent, measuring up to 9 mm in maximal thickness at T3-T4. The entirety of the collection is not included in the   field of view. Consider further evaluation with dedicated cervical and lumbar spine MRI with and without IV contrast.  2.  Edema and enhancement of the left greater than right T9-T10 facet joints, potentially reflecting septic facet arthritis.  3.  No definite cord signal abnormality in the thoracic spine.    Plan:  -STAT MRI cervical and lumbar spine were ordered.  -Continue current antibiotic treatment.     Carlos Enrique Villavicencio MD

## 2022-12-23 NOTE — PROGRESS NOTES
Spoke with neurosurgery and team will address DVT prevention.  Currently on sequentials.  Has additional imaging scheduled for today.  Following evaluation of updated imaging neurosurgery will comment on whether he could go on Lovenox and/or heparin subcu for DVT prevention.  Not today    Dr. Krystal Davis MD

## 2022-12-23 NOTE — PROGRESS NOTES
CROSS COVER    Paged by RN that C-Diff testing came back negative with request to remove isolation precautions and order anti-diarrheal medication.     C Difficile Toxin B by PCR Negative Negative      PLAN:  -Will remove enteric precautions  -Imodium, PRN  -Continue to monitor stool, especially if nursing noting blood mixed   -Monitor Hgb  -Defer to attending to follow-up in AM    Joseline Lowe NP  [facility]  Securely message with the [employer] Web Console (learn more here)  Text page via [employer] Paging/Directory

## 2022-12-23 NOTE — PROVIDER NOTIFICATION
"Paged on call on behalf of RN: \"[address] [place] [address] Radiology would like to speak you ASAP, can call them at [phone]  Thank you  Ameena CARDOZA *[id number]\"  "

## 2022-12-23 NOTE — PROGRESS NOTES
Dr. Davis paged neurosurgery 7:30 AM  about MRI findings on evening shift. Unable to do MRI of cervical or lumbar spine.   Will contact neurosurgery rounding today ASAP regarding MRI results. Paged neurosurgery.       Dr. Krystal Davis MD

## 2022-12-23 NOTE — PLAN OF CARE
Goal Outcome Evaluation:    A&Ox4. VSS. On RA. CMS intact. Dressing on back CDI. Up w/ Ax1 GB/W. PICC line intact w/ intermittent IV abx. Dacosta in place w/ adequate output. K+ replaced & lab ordered. PRN Imodium given x 2 d/t loose stool. STAT order for Lumbar & cervical MRI could not be done now because according to MRI staff patient had a thoracic MRI w/ contrast done @ 2230 last night and the contrast need to wear off before they can do another MRI. Hospitalist was notified last night. Will continue to monitor.

## 2022-12-23 NOTE — PROGRESS NOTES
Care Management Follow Up    Length of Stay (days): 5    Expected Discharge Date: 12/24/2022     Concerns to be Addressed:       Patient plan of care discussed at interdisciplinary rounds: Yes    Anticipated Discharge Disposition: Transitional Care     Anticipated Discharge Services: None  Anticipated Discharge DME: None    Patient/family educated on Medicare website which has current facility and service quality ratings: yes  Education Provided on the Discharge Plan:    Patient/Family in Agreement with the Plan: yes    Referrals Placed by CM/SW:    Private pay costs discussed: Not applicable    Additional Information:  Informed by [facility] liaison who reports they have received pt's referral and there does not look to be a bed until Monday.      ROSE Olmstead

## 2022-12-23 NOTE — PROGRESS NOTES
Care Management Follow Up    Length of Stay (days): 5    Expected Discharge Date: 12/24/2022     Concerns to be Addressed:       Patient plan of care discussed at interdisciplinary rounds: Yes    Anticipated Discharge Disposition: Transitional Care     Anticipated Discharge Services: None  Anticipated Discharge DME: None    Patient/family educated on Medicare website which has current facility and service quality ratings: yes  Education Provided on the Discharge Plan:    Patient/Family in Agreement with the Plan: yes    Referrals Placed by CM/SW:    Private pay costs discussed: Not applicable    Additional Information:    Per chart review original therapy recommendations were TCU, however, therapy recommendations have now switched to ARU.  [facility] had accepted and received ins auth.  Lucy connected with ARU liaison, Harinder, regarding pt's case and per Harinder pt does look appropriate for ARU but would need to submit for insurance auth.  Harinder also inquired into pt IV abx plan.    Sw met with pt to discuss ARU recommendation and pt is agreeable to [facility].  Sw updated  [facility] on pt's acceptance of this discharge plan/facility.  Pt's IV abx plan is pending repeat MRI.  Lucy called [facility] to provide update on possible ARU placement instead.  TCU will not pull auth, however, in case pt's insurance declines ARU.  [facility] will continue to follow pt given that pt is not medically ready for discharge at this time.        Pushpa Butler, AMORSW

## 2022-12-23 NOTE — PROGRESS NOTES
[facility]    Infectious Disease Progress Note    Date of Service: 12/23/2022     Assessment:  57 YM , previously healthy who has been admitted with S.aureus bacteremia associated with extensive multi level thoracolumbar spinal epidural abscess from T4-T5 down to L2-L3 with spinal chord compression.  S/p debridement with S.aureus in cx. Blood cs also positive from 12/18. Has bilateral ankle and left knee pain and has been evaluated by ortho with low concern for infection. MRI thoracic spine 12/22 shows re demonstration of large dorsal epidural abscess. Additional imaging is planned     -MSSA bacteremia. Blood cxs negative from 12/20  -Extensive spinal  thoracolumbar spinal epidural abscess from T4-T5 down to L2-L3  with spinal chord compression.  S/p debridement  -New left knee pain and bilateral ankle pain without clear evidence of secondary infection     Recommendations  1. Appreciate Neurosurgical recommendations, additional imaging is planned MRI cervical and lumbar spine  2. Continue Nafcillin  3. Follow clinical status and labs  4. PICC    Elyse Valadez MD    Interval History   Sitting out in a chair, feels ok, ankles and left knee feel ok, still icing them. Was seen by ortho who felt low suspicion for infection. MRI thoracic spine noted.   Diarrhea improved on immodium    Physical Exam   Temp: 99.8  F (37.7  C) Temp src: Oral BP: (!) 141/97 Pulse: 76   Resp: 16 SpO2: 97 % O2 Device: None (Room air)    Vitals:    12/19/22 0630 12/20/22 0657 12/21/22 0707   Weight: 89 kg (196 lb 3.4 oz) 89.9 kg (198 lb 3.1 oz) 89 kg (196 lb 3.4 oz)     Vital Signs with Ranges  Temp:  [99.1  F (37.3  C)-99.9  F (37.7  C)] 99.8  F (37.7  C)  Pulse:  [76-82] 76  Resp:  [16] 16  BP: (129-156)/(84-97) 141/97  SpO2:  [96 %-98 %] 97 %    Constitutional: Awake, alert, cooperative, no apparent distress  Lungs: Clear to auscultation bilaterally, no crackles or wheezing  Cardiovascular: Regular rate and rhythm,  normal S1 and S2, and no murmur noted  Abdomen: Normal bowel sounds, soft, non-distended, non-tender  Skin: No rash  MS : bilateral ankle and left knee mild warmth    Other:    Medications       gadobutrol  9 mL Intravenous Once     nafcillin  2 g Intravenous Q4H     polyethylene glycol  17 g Oral Daily     senna-docusate  1 tablet Oral BID     sodium chloride (PF)  10-40 mL Intracatheter Q7 Days     sodium chloride (PF)  3 mL Intracatheter Q8H       Data   All microbiology laboratory data reviewed.  Recent Labs   Lab Test 12/22/22 0727 12/19/22  0804 12/18/22  0921   WBC 8.8 13.4* 14.8*   HGB 10.2* 11.6* 12.9*   HCT 30.5* 34.1* 39.5*   MCV 99 99 101*    261 213     Recent Labs   Lab Test 12/22/22 0727 12/20/22  0835 12/19/22  0804   CR 0.99 0.67  0.66 0.85     Recent Labs   Lab Test 12/18/22 0921   SED 45*     Microbiology  12/18 blood cx 1 set 1/4 bottles positive, 12/20 blood cxs negative  Arm, Right; Blood    1 Result Note  Culture Positive on the 1st day of incubation Abnormal         Staphylococcus aureus Panic     1 of 2 bottles         Resulting Agency: [facility]      Susceptibility              Staphylococcus aureus       EUGENIA       Clindamycin <=0.25 ug/mL Susceptible       Erythromycin <=0.25 ug/mL Susceptible       Gentamicin <=0.5 ug/mL Susceptible       Oxacillin <=0.25 ug/mL Susceptible 1       Tetracycline <=1 ug/mL Susceptible       Trimethoprim/Sulfamethoxazole <=0.5/9.5 u... Susceptible       Vancomycin 1 ug/mL Susceptible               12/18 thoracic spine abscess  Spine, Thoracic; Abscess    0 Result Notes  Culture 4+ Staphylococcus aureus Abnormal             Resulting Agency: [facility]     Susceptibility     Staphylococcus aureus     EUGENIA     Clindamycin <=0.25 ug/mL Susceptible     Erythromycin <=0.25 ug/mL Susceptible     Gentamicin <=0.5 ug/mL Susceptible     Oxacillin <=0.25 ug/mL Susceptible 1     Tetracycline <=1 ug/mL Susceptible     Trimethoprim/Sulfamethoxazole <=0.5/9.5 u...  Susceptible     Vancomycin 1 ug/mL Susceptible                          Imaging  12/22 MRI thoracic spine  EXAM: MR THORACIC SPINE W/O and W CONTRAST  LOCATION: [facility]  DATE/TIME: 12/22/2022 10:23 PM     INDICATION: POD 4 thoracic laminectomy for epidural abscess washout.  COMPARISON: 12/18/2022  CONTRAST: 9 mL GADAVIST  TECHNIQUE: Routine Thoracic Spine MRI without and with IV contrast.     FINDINGS:   Stable alignment of vertebral body heights. Redemonstrated likely recent compression deformities/Schmorl's nodes at T7 and T12 with associated edema. There is marked edema of the left T9-T10 facet joint with associated surrounding enhancement, with mild   edema of the right T9-T10 facet joint. Findings are suspicious for septic facet arthropathy. Since the prior exam, the large dorsal epidural collection has decreased inferiorly, but has increased in its superior extent. The collection extends from at   least C5-C6 through T12-L1, measuring up to 9 mm in maximal thickness at T3-T4. Inferiorly the collection measures up to 8 mm in thickness at the level of T9, previously 11 mm in maximal thickness. The entirety of the collection is not included in the   field of view. No definite cord signal abnormality. Stable degenerative changes.     Left-sided dependent atelectasis/consolidation. Bilateral pleural effusions.                                                                      IMPRESSION:  1.  Redemonstrated large dorsal epidural abscess, which has decreased in thickness along the inferior extent, but increased in its superior extent, measuring up to 9 mm in maximal thickness at T3-T4. The entirety of the collection is not included in the   field of view. Consider further evaluation with dedicated cervical and lumbar spine MRI with and without IV contrast.  2.  Edema and enhancement of the left greater than right T9-T10 facet joints, potentially reflecting septic facet arthritis.  3.   No definite cord signal abnormality in the thoracic spine.

## 2022-12-23 NOTE — PROGRESS NOTES
Neurosurgery progress note    Postoperative day 4 status post thoracic 12-lumbar 1 decompression for evacuation of epidural abscess.    Interval history    Patient underwent thoracic MRI yesterday which demonstrated concerning findings for increased size of the collection in the superior extent of the abscess in the thoracic region.    Today patient states he is feeling well, he is having no symptoms in his upper extremities, denies significant neck pain, has some mild tightness in his intrascapular region.  He is reporting improved strength in his lower extremities.  Continues have a Dacosta catheter in place.    Labs    CRP is trending downward, 137 today, 171 yesterday, 295.695 days ago        Exam    Alert and oriented no acute distress, sitting comfortably in chair  Incision clean dry and intact  Bilateral lower extremities with 5 out of 5 strength bilaterally with the exception of 4+ out of 5 strength with ankle dorsiflexion and extensor houses longus in the left foot.    Negative Delgado's, negative ankle clonus bilaterally  No hyperreflexia in the upper or lower extremities  Bilateral upper extremities with 5/5 strength    Plan    Lumbar and cervical MRI with and without contrast ordered, and will be performed when able.    Okay for regular diet at this time    Continue daily CRP    Continue PT and OT, appreciate ID and urology consultations  Continue to monitor neurologic improvement.  Discharge to TCU when medically ready    Hold DVT prophylaxis at this time, will reevaluate after further imaging.     Discussed with Dr. Courtney.

## 2022-12-23 NOTE — PLAN OF CARE
Goal Outcome Evaluation:    Patient A&Ox4. VSS. Paient did have multiple loose stool with blood mixed, MD jay, C-diff stool culture was ordered and collected, results came back negative at around 1900, expected removal of Enteric precaution. PICC line on the R upper arm with good blood returned. Patient on K+ protocol , replacement given for K+ 3.0, lab rechecked 3.4 expected replacement on the next shift. Assist of 1 GB/W.

## 2022-12-24 LAB — CRP SERPL-MCNC: 96.7 MG/L (ref 0–8)

## 2022-12-24 PROCEDURE — 99233 SBSQ HOSP IP/OBS HIGH 50: CPT | Performed by: HOSPITALIST

## 2022-12-24 PROCEDURE — 120N000001 HC R&B MED SURG/OB

## 2022-12-24 PROCEDURE — 250N000011 HC RX IP 250 OP 636: Performed by: PHYSICIAN ASSISTANT

## 2022-12-24 PROCEDURE — 250N000013 HC RX MED GY IP 250 OP 250 PS 637: Performed by: NURSE PRACTITIONER

## 2022-12-24 PROCEDURE — 86140 C-REACTIVE PROTEIN: CPT | Performed by: PHYSICIAN ASSISTANT

## 2022-12-24 PROCEDURE — 250N000013 HC RX MED GY IP 250 OP 250 PS 637: Performed by: PHYSICIAN ASSISTANT

## 2022-12-24 PROCEDURE — 99233 SBSQ HOSP IP/OBS HIGH 50: CPT | Performed by: SPECIALIST

## 2022-12-24 PROCEDURE — 250N000009 HC RX 250: Performed by: SPECIALIST

## 2022-12-24 RX ORDER — IBUPROFEN 400 MG/1
400 TABLET, FILM COATED ORAL EVERY 6 HOURS PRN
Status: DISCONTINUED | OUTPATIENT
Start: 2022-12-24 | End: 2022-12-27 | Stop reason: HOSPADM

## 2022-12-24 RX ORDER — NAFCILLIN SODIUM 2 G/8ML
2 INJECTION, POWDER, FOR SOLUTION INTRAMUSCULAR; INTRAVENOUS EVERY 4 HOURS
Qty: 1824 ML | Refills: 0 | DISCHARGE
Start: 2022-12-24 | End: 2023-01-31

## 2022-12-24 RX ADMIN — NAFCILLIN 2 G: 2 POWDER, FOR SOLUTION INTRAMUSCULAR; INTRAVENOUS at 03:18

## 2022-12-24 RX ADMIN — NAFCILLIN 2 G: 2 POWDER, FOR SOLUTION INTRAMUSCULAR; INTRAVENOUS at 15:39

## 2022-12-24 RX ADMIN — NAFCILLIN 2 G: 2 POWDER, FOR SOLUTION INTRAMUSCULAR; INTRAVENOUS at 23:16

## 2022-12-24 RX ADMIN — LOPERAMIDE HYDROCHLORIDE 2 MG: 2 CAPSULE ORAL at 11:38

## 2022-12-24 RX ADMIN — NAFCILLIN 2 G: 2 POWDER, FOR SOLUTION INTRAMUSCULAR; INTRAVENOUS at 06:59

## 2022-12-24 RX ADMIN — ACETAMINOPHEN 650 MG: 325 TABLET, FILM COATED ORAL at 00:33

## 2022-12-24 RX ADMIN — ACETAMINOPHEN 650 MG: 325 TABLET, FILM COATED ORAL at 12:26

## 2022-12-24 RX ADMIN — NAFCILLIN 2 G: 2 POWDER, FOR SOLUTION INTRAMUSCULAR; INTRAVENOUS at 11:38

## 2022-12-24 RX ADMIN — HYDRALAZINE HYDROCHLORIDE 10 MG: 20 INJECTION, SOLUTION INTRAMUSCULAR; INTRAVENOUS at 19:56

## 2022-12-24 RX ADMIN — NAFCILLIN 2 G: 2 POWDER, FOR SOLUTION INTRAMUSCULAR; INTRAVENOUS at 19:43

## 2022-12-24 RX ADMIN — ACETAMINOPHEN 650 MG: 325 TABLET, FILM COATED ORAL at 19:43

## 2022-12-24 ASSESSMENT — ACTIVITIES OF DAILY LIVING (ADL)
ADLS_ACUITY_SCORE: 36
ADLS_ACUITY_SCORE: 36
ADLS_ACUITY_SCORE: 35
ADLS_ACUITY_SCORE: 35
ADLS_ACUITY_SCORE: 36
ADLS_ACUITY_SCORE: 35
ADLS_ACUITY_SCORE: 36
ADLS_ACUITY_SCORE: 35
ADLS_ACUITY_SCORE: 36
ADLS_ACUITY_SCORE: 35

## 2022-12-24 NOTE — PLAN OF CARE
Goal Outcome Evaluation:    Patient A&Ox4. VSS. Back incision sterile strip in place DCI. PICC in place with good blood returned, intermittent abx. Patient on K+ protocol 3.5. C/o of Left leg discomfort, PRN Tylenol given x1, PRN Imodium given x1. Expected Cervical and Thoracic MRI to be done today. Dacosta in place for retention and patent. Assist of 1 GB/W. Will continue to monitor.

## 2022-12-24 NOTE — PROGRESS NOTES
Orthopedic Surgery  Ian Moncada  12/23/2022     Admit Date:  12/18/2022    1. Left knee and bilateral ankle swelling and pain, likely secondary to gout  2. Spine intraoperative culture positive for Staph aureus    Patient resting comfortably in chair.  Notes that symptoms are slightly improved from yesterday.  He feels his bilateral ankle swelling has decreased, though his left ankle remains more swollen than his right.  No redness.     Pain controlled.  Tolerating oral intake.    Denies nausea or vomiting.  Denies chest pain or shortness of breath.  No acute events overnight.    Temp:  [99.8  F (37.7  C)-99.9  F (37.7  C)] 99.8  F (37.7  C)  Pulse:  [76] 76  Resp:  [16] 16  BP: (141-156)/(90-97) 141/97  SpO2:  [97 %-98 %] 97 %    Alert and oriented.   Skin intact to left knee and bilateral ankleral ankles.   Well-healed scar to left anterior knee.  Mild swelling/effusion of the left knee and bilateral ankles.   No erythema or increased warmth.   Tender over the anterolateral left knee joint line, otherwise nontender throughout the knee.   Bilateral ankles are nontender throughout.  Able to passively range the left knee from 0-100 degrees without increased pain.   Full passive range of motion of the bilateral ankles.  DP pulses palpable.  Sensation intact to light touch.    Labs:  Recent Labs   Lab Test 12/22/22  0727 12/19/22  0804 12/18/22  0921   WBC 8.8 13.4* 14.8*   HGB 10.2* 11.6* 12.9*    261 213     No lab results found.  Recent Labs   Lab Test 12/23/22  0621 12/22/22  0727 12/18/22  0921   .0* 171.0* 295.69*     Left knee radiographs dated 12/22/22:  IMPRESSION: Minimal patellofemoral osteoarthrosis. There is a moderate amount of calcification anterior to the patella, likely from old  trauma or long-standing inflammation. This appears well marginated and likely chronic.    Bilateral ankle radiographs dated 12/22/22:  IMPRESSION: Soft tissue swelling about the ankles. No fracture or  talar dome  osteochondral lesion. Calcaneal enthesophytes incidentally noted. Otherwise negative.    A/P    1. Left knee and bilateral ankle swelling and pain, likely secondary to gout   Suspicion remains low for septic left knee and bilateral ankles given consistently reassuring exam.    Reviewed radiographs with the patient. Noted chronic appearing calcification anterior to the left patella, likely consistent with old trauma. The patient notes having minor injuries to his left knee over the years. No point tenderness at this aspect.    Continue with IV nafcillin per ID.   Mobilize with PT/OT    WBAT BLE.     Continue current pain regimen, including scheduled use of ibuprofen if approved by hospitalist and neurosurgery. We discussed that for future gout flares, once his infection is cleared, the patient may respond well to period prednisone tapers or other anti-inflammatory medications.   Follow-up with Dr. Francisco Pitts as needed. Recommend follow up with PCP outpatient due to history of gout.     2. Disposition   Anticipate d/c to TCU versus home when medically cleared and progressing in PT. Ortho stable.    Kassie Padron PA-C  [facility]

## 2022-12-24 NOTE — PROGRESS NOTES
Care Management Follow Up    Length of Stay (days): 6    Expected Discharge Date: 12/26/2022     Concerns to be Addressed:     Discharge planning  Patient plan of care discussed at interdisciplinary rounds: Yes    Anticipated Discharge Disposition: TBD     Anticipated Discharge Services: Outpatient IV ABX vs home IV ABX and outpatient therapy vs home therapy  Anticipated Discharge DME: None    Patient/family educated on Medicare website which has current facility and service quality ratings: no  Education Provided on the Discharge Plan:  no  Patient/Family in Agreement with the Plan: no    Referrals Placed by CM/SW:  no  Private pay costs discussed: Not applicable    Additional Information:  Received a call back from [place] at  ARU.  Therapy is no longer recommending ARU as patient is walking 300 feet.  We will follow up regarding the outpatient ABX plan and likely homecare therapy on discharge.  Unsure if [facility] will still accept given the therapy recommendation.    Will continue to follow.      ANITA García, [facility]    [phone]  [facility]

## 2022-12-24 NOTE — PROGRESS NOTES
"[facility]    Medicine History and Physical - Hospitalist Service       Date of Admission:  12/18/2022    Interval History   First visit with Ian Moncada today.  Patient discussed with Dr. Davis today.  Neurosurgery follow-up today is pending.  ID follow-up today.  Comfortable, lying in bed, feeling \"good\" without report of pain.  No fever, chills, or sweats.    Nursing notes reviewed, pain controlled, transfers to bathroom with one staff assist.    Physical Exam   Temp: 99.1  F (37.3  C) Temp src: Oral BP: (!) 145/86 Pulse: 79   Resp: 16 SpO2: 96 % O2 Device: None (Room air)    Vitals:    12/19/22 0630 12/20/22 0657 12/21/22 0707   Weight: 89 kg (196 lb 3.4 oz) 89.9 kg (198 lb 3.1 oz) 89 kg (196 lb 3.4 oz)     Exam  GENERAL awake and alert, lying in bed, appearing comfortable  HEENT no conjunctival injection or jaundice  LUNGS moderate inspiratory effort, no wheezes or crackles  HEART S1, S2 regular rate and rhythm; no rubs or gallops  ABDOMEN soft, nontender to palpation; no guarding, rebound, or rigidity  MUSCULOSKELETAL extremities without edema; no neck pain reported with good range of motion  SKIN warm and dry  NEUROLOGIC moves upper and lower extremities spontaneously and to command; mild weakness with resistance to left foot dorsiflexion, not new per patient, otherwise exam equal and symmetric, upper and lower extremities  MENTAL STATUS answering questions and following simple commands, normal    Assessment & Plan   57-year-old gentleman with no significant past medical history on no medications presented with left lower extremity paresthesias, weakness, urinary incontinence and urinary retention for 2 to 3 days.  Initially presented to [facility].  MRI of the spine showing extensive dorsal epidural fluid collection extending from thoracolumbar spine down to L2-L3 with mass-effect on the spinal cord/spinal cord compression concerning for abscess.   Transferred emergently to " [facility].  12/18/2022 with emergent surgery for drainage of spinal cord compression from epidural fluid collection which caused compression and weakness in his bilateral lower extremities     Epidural fluid collection from upper thoracic spine down to the mid lumbar spine with resulting spinal cord compression from T12-L1  + MSSA infection (+ culture in surgical fluid and blood)    Reported progressive 6-day complaints of back pain with progressive difficulty with ambulation, falls and weakness/sensory changes of his lower extremities  MRI showing a large dorsal epidural fluid collection from mid thoracic spine down to the mid lumbar spine with substantial compression at the thoracolumbar junction of the terminal spinal cord (majority between T11-L2)   Initially presented to [facility] and transferred urgently to [facility]  12/18: Emergent surgery: Extensive epidural fluid collection extending from the upper thoracic spine down to the mid lumbar spine, most prominent with significant spinal cord compression from T12-L1:  (Per operative report description of cloudy, slightly thickened pus) culture and sent for Gram stain  ID consulted, MSSA infection.  On Vanco and Zosyn on admission>> Changed to nafcillin; on 12/18 blood cultures 1 of 2 positive staph aureus (MSSA); on 12/18 epidural space culture Staph aureus (MSSA)   12/20 ECHO with normal EF 70%, no wall motion abnormality, hyperdynamic right ventricle; MRI 12/22 thoracic spine: Redemonstration of large dorsal epidural abscess.  Decreased thickness on the inferior extent but increased in his superior extent measuring up to 9 mm in maximal thickness at T3-T4 (entire collection not included); follow-up MRI C-spine, L-spine 12/23/22, see report, reviewed by neurosurgery, see note 12/24.    ID consult f/u    Neurosurgery consult f/u; review MRI results of 12/23    PICC line    Continue IV nafcillin, as per ID    Advance activity as tolerated    Continue  supportive and symptomatic treatment, pain control    PT, OT consulted    AM labs     Left knee and bilateral ankle pain, possibly due to acute gout  12/22 orthopedics consulted, symptoms for 3 to 4 days; mild to moderate swelling of the left knee and bilateral ankles.  No erythema or warmth.  WBC normal.  By report patient has a history of gout.  12/22 x-ray bilateral ankles showing soft tissue swelling about the ankles.  No fracture or talar dome osteochondral lesion, Calcaneal enthesophytes incidentally noted. Otherwise negative.  12/22 x-ray left knee: Minimal patellofemoral osteoarthrosis. There is a moderate amount of calcification anterior to the patella, likely from old trauma or long-standing inflammation. This appears well marginated and likely chronic.  Orthopedic surgery: no aspiration needed, possible gout  - monitor     Diarrhea  Most likely secondary to nafcillin  12/22 C dificile negative   - Imodium prn     Urinary Retention: ongoing   Gamez catheter    Urinary retention likely secondary to above    Voiding trial in 5-7 days > >just started on flomax     Urology consult f/u    Continue Flomax: started on 12/23     Plans to continue Gamez for now**    Recheck PVR within one week     Starting flomax today so will need to likely reassess 5 days from now.     If not in hospital would need void trial after discharge**    If PVR > 350 replace gamez per urology and follow up outpatient.      Acute kidney injury, VANGIE, Resolved   Large amount of urinary retention on admission  Resolved with fluids and Gamez  Suspected secondary to urinary retention  Creatinine 0.66 >> 0.99  - monitor     Hyponatremia, resolved   - monitor     LE Edema:   Potentially from fluid retention  Venous dopplers negative on 12/21 with the exception of a left Baker's cyst     Hypokalemia.    - monitor, replace prn     DVT prophylaxis  - neurosurgery to review, consider Lovenox if okay with neurosurgery  - sequentials in  hospital    Disposition  Estimated length of stay 2 to 3 days pending neurosurgery, ID plans and f/u  Anticipated discharge to TCU vs home w/ help; social work consulted    Care plan discussed with patient at the bedside    Total time spent caring for patient today thus far is 35 minutes.  Total time spent reviewing medical records, interviewing and examining patient, ordering and reviewing test results, reviewing subspecialty consult recommendations (neurosurgery, ID), communicating with nursing staff, implementing clinical decision making and coordinating care, documentation, and reviewing care plan with patient at the bedside and speaking earlier w/ Dr. Davis.    Clinically Significant Risk Factors        # Hypokalemia: Lowest K = 3 mmol/L in last 2 days, will replace as needed                 # Overweight: Estimated body mass index is 26.61 kg/m  as calculated from the following:    Height as of an earlier encounter on 12/18/22: 1.829 m (6').    Weight as of this encounter: 89 kg (196 lb 3.4 oz).          Diet: Regular Diet Adult    Dacosta Catheter: PRESENT, indication: Retention, Retention  DVT Prophylaxis: Pneumatic Compression Devices  Code Status: Full Code      Expected Discharge Date: 12/24/2022      Destination: other (comment) (TCU)  Discharge Comments: PICC ABX, repeat MRI 12/23 evening, possible ARU at discharge       Nate Beckett MD FACP  Hospitalist Service  [facility]    ______________________________________________________________________    Medications       gadobutrol  9 mL Intravenous Once     nafcillin  2 g Intravenous Q4H     polyethylene glycol  17 g Oral Daily     senna-docusate  1 tablet Oral BID     sodium chloride (PF)  10-40 mL Intracatheter Q7 Days     sodium chloride (PF)  3 mL Intracatheter Q8H       Data reviewed today: I reviewed all new labs and imaging results over the last 24 hours.    Data   Recent Labs   Lab 12/23/22  0621 12/22/22  2137  12/22/22  0727 12/20/22  0835 12/20/22  0547 12/19/22  0816 12/19/22  0804 12/18/22  0921 12/18/22  0921   WBC  --   --  8.8  --   --   --  13.4*  --  14.8*   HGB  --   --  10.2*  --   --   --  11.6*  --  12.9*   MCV  --   --  99  --   --   --  99  --  101*   PLT  --   --  350  --   --   --  261  --  213   NA  --   --  136 135  --   --  132*  --  133*   POTASSIUM 3.5 3.4 3.0* 3.9  3.8  --   --  3.9   < > 3.9   CHLORIDE  --   --  101 102  --   --  101  --  94*   CO2  --   --  26 23  --   --  22  --  23   BUN  --   --  10 10  --   --  13  --  17.5   CR  --   --  0.99 0.67  0.66  --   --  0.85  --  1.69*   ANIONGAP  --   --  9 10  --   --  9  --  16*   LINA  --   --  8.4* 8.1*  --   --  8.5  --  9.6   GLC  --   --  123* 107* 89   < > 91  91  --  115*    < > = values in this interval not displayed.       Imaging  Recent Results (from the past 24 hour(s))   MR Cervical Spine w/o & w Contrast    Narrative    For Patients: As a result of the 21st Century Cures Act, medical imaging exams and procedure reports are released immediately into your electronic medical record. You may view this report before your referring provider. If you have questions, please   contact your health care provider.    EXAM: MR CERVICAL SPINE W/O and W CONTRAST, MR LUMBAR SPINE W/O and W CONTRAST  LOCATION: [facility]  DATE/TIME: 12/23/2022 10:44 PM    INDICATION: Neck pain; hx Spine surgery; history of sepsis. Discitis osteomyelitis.  COMPARISON: MRI of thoracic spine 12/22/2022  CONTRAST: 9mL Gadavist   TECHNIQUE:   1) Routine Cervical Spine MRI without and with IV contrast.  2) Routine Lumbar Spine MRI without and with IV contrast.    FINDINGS:  CERVICAL SPINE:   Alignment: Normal.   Discitis osteomyelitis: No evidence of discitis osteomyelitis. Extensive longitudinal posterior epidural abscess extending superiorly from the thoracic spine up to upper C4.  Vertebral height: Preserved.   Marrow signal: Normal.  Spinal  cord: No abnormal signal.  Extraspinal: No extraspinal abnormality.     Craniovertebral junction: Normal.    C1-2: Normal    C2-3: Normal height of disc. Desiccated disc. Small central disc osteophyte. No uncovertebral hypertrophy. Normal facet joints. No central spinal stenosis, no right foramen stenosis, no left foramen stenosis.    C3-4: Slight loss of disc height. Desiccated disc. Shallow disc osteophyte complex. No uncovertebral hypertrophy. Mild bilateral facet hypertrophy. No central spinal stenosis, no right foramen stenosis, no left foramen stenosis.    C4-5:  Normal height of disc. Desiccated disc. No disc herniation. No uncovertebral hypertrophy. Mild bilateral facet hypertrophy. No central spinal stenosis, no right foramen stenosis, no left foramen stenosis.    C5-6: Slight loss of disc height. Desiccated disc. Broad based disc osteophyte complex. No uncovertebral hypertrophy. Mild bilateral facet hypertrophy. Severe central spinal stenosis, moderate right foramen stenosis, moderate left foramen stenosis.    C6-7: Moderate loss of disc height. Desiccated disc. Broad based disc osteophyte complex. No uncovertebral hypertrophy. Mild bilateral facet hypertrophy. Moderate central spinal stenosis, moderate right foramen stenosis, mild left foramen stenosis.    C7-T1: Normal height of disc. Desiccated disc. No disc herniation. No uncovertebral hypertrophy. Normal facet joints. No central spinal stenosis, no right foramen stenosis, no left foramen stenosis.    LUMBAR SPINE:   Nomenclature based on 5 lumbar type vertebral bodies.   Status post left laminectomy at L1 with posterior decompression and drainage of a dorsal epidural abscess initially seen on the MRI of 12/18/2022. There is a 2 x 1.4 x 2 cm (AP, TR, CC) fluid collection with rim enhancement in the laminectomy bed that   presumably represents a small abscess. It communicates with a more superficial rim-enhancing collection within the subcutaneous fat  at this level that measures 1.5 x 2 cm. There is a thin residual dorsal epidural abscess from mid L1 to upper L2 measuring   3 x 10 mm (AP, TR) with craniocaudal extent of 2.5 cm. The endosteal epidural abscess visualized posterior to T12 that ascends into the thoracic spine, reported separately on MRI of thoracic spine 12/22/2022.  Normal coronal and sagittal alignment.   Moderate compression deformity of superior endplate T12 and L4 secondary to prominent Schmorl's node. Otherwise vertebral body height is preserved.  Mild bone marrow edema and fatty reactive changes within T12 around the compression deformity, similar to prior. Prominent intraosseous hemangioma at L3. Smaller intraosseous hemangioma at L5.  Normal distal spinal cord and cauda equina with conus medullaris at mid L2.   No extra spinal abnormalities. Unremarkable visualized bony pelvis.    T12-L1: Normal height of disc. Mild disc desiccation without herniation. Normal facet joints. No recess stenosis. Mild central spinal stenosis secondary to dorsal epidural abscess, no right foramen stenosis, no left foramen stenosis.    L1-L2: Normal height of disc. Mild disc desiccation without herniation. Normal facet joints. No recess stenosis. Mild central spinal stenosis secondary to dorsal epidural abscess, no right foramen stenosis, no left foramen stenosis.    L2-L3:  Normal height of disc. Normal disc signal without herniation. Mild bilateral facet hypertrophy. No recess stenosis. No central spinal stenosis, no right foramen stenosis, no left foramen stenosis.    L3-L4: Slight loss of disc height. Desiccated disc with shallow disc bulge. Mild bilateral facet hypertrophy. No recess stenosis. No central spinal stenosis, no right foramen stenosis, no left foramen stenosis.    L4-L5: Normal height of disc. Mild disc desiccation with mild circumferential disc bulge. Mild bilateral facet hypertrophy. No recess stenosis. No central spinal stenosis, no right  foramen stenosis, no left foramen stenosis.    L5-S1: Normal height of disc. Desiccated disc without herniation. Normal facet joints. No recess stenosis. No central spinal stenosis, no right foramen stenosis, no left foramen stenosis.      Impression    IMPRESSION:  CERVICAL SPINE MRI:  1.  There is a longitudinally extensive dorsal epidural abscess extending into the cervical spine from the thoracic spine. Superior extent is upper C4.  2.  At C5-C6 there is severe central spinal stenosis.  3.  At C4-C5 there is moderate central spinal stenosis.    LUMBAR SPINE MRI:  1.  Status post drainage of a large dorsal epidural collection at L1, with a thin (3 mm AP dimension) residual dorsal epidural abscess at L1-L2. It measures 25 mm craniocaudally.  2.  There is a 2 x 2 cm abscess in the laminectomy bed at L1. It communicates with a subcutaneous abscess at this level measuring 2 x 1.5 cm.     MR Lumbar Spine w/o & w Contrast    Narrative    For Patients: As a result of the 21st Century Cures Act, medical imaging exams and procedure reports are released immediately into your electronic medical record. You may view this report before your referring provider. If you have questions, please   contact your health care provider.    EXAM: MR CERVICAL SPINE W/O and W CONTRAST, MR LUMBAR SPINE W/O and W CONTRAST  LOCATION: [facility]  DATE/TIME: 12/23/2022 10:44 PM    INDICATION: Neck pain; hx Spine surgery; history of sepsis. Discitis osteomyelitis.  COMPARISON: MRI of thoracic spine 12/22/2022  CONTRAST: 9mL Gadavist   TECHNIQUE:   1) Routine Cervical Spine MRI without and with IV contrast.  2) Routine Lumbar Spine MRI without and with IV contrast.    FINDINGS:  CERVICAL SPINE:   Alignment: Normal.   Discitis osteomyelitis: No evidence of discitis osteomyelitis. Extensive longitudinal posterior epidural abscess extending superiorly from the thoracic spine up to upper C4.  Vertebral height: Preserved.    Marrow signal: Normal.  Spinal cord: No abnormal signal.  Extraspinal: No extraspinal abnormality.     Craniovertebral junction: Normal.    C1-2: Normal    C2-3: Normal height of disc. Desiccated disc. Small central disc osteophyte. No uncovertebral hypertrophy. Normal facet joints. No central spinal stenosis, no right foramen stenosis, no left foramen stenosis.    C3-4: Slight loss of disc height. Desiccated disc. Shallow disc osteophyte complex. No uncovertebral hypertrophy. Mild bilateral facet hypertrophy. No central spinal stenosis, no right foramen stenosis, no left foramen stenosis.    C4-5:  Normal height of disc. Desiccated disc. No disc herniation. No uncovertebral hypertrophy. Mild bilateral facet hypertrophy. No central spinal stenosis, no right foramen stenosis, no left foramen stenosis.    C5-6: Slight loss of disc height. Desiccated disc. Broad based disc osteophyte complex. No uncovertebral hypertrophy. Mild bilateral facet hypertrophy. Severe central spinal stenosis, moderate right foramen stenosis, moderate left foramen stenosis.    C6-7: Moderate loss of disc height. Desiccated disc. Broad based disc osteophyte complex. No uncovertebral hypertrophy. Mild bilateral facet hypertrophy. Moderate central spinal stenosis, moderate right foramen stenosis, mild left foramen stenosis.    C7-T1: Normal height of disc. Desiccated disc. No disc herniation. No uncovertebral hypertrophy. Normal facet joints. No central spinal stenosis, no right foramen stenosis, no left foramen stenosis.    LUMBAR SPINE:   Nomenclature based on 5 lumbar type vertebral bodies.   Status post left laminectomy at L1 with posterior decompression and drainage of a dorsal epidural abscess initially seen on the MRI of 12/18/2022. There is a 2 x 1.4 x 2 cm (AP, TR, CC) fluid collection with rim enhancement in the laminectomy bed that   presumably represents a small abscess. It communicates with a more superficial rim-enhancing  collection within the subcutaneous fat at this level that measures 1.5 x 2 cm. There is a thin residual dorsal epidural abscess from mid L1 to upper L2 measuring   3 x 10 mm (AP, TR) with craniocaudal extent of 2.5 cm. The endosteal epidural abscess visualized posterior to T12 that ascends into the thoracic spine, reported separately on MRI of thoracic spine 12/22/2022.  Normal coronal and sagittal alignment.   Moderate compression deformity of superior endplate T12 and L4 secondary to prominent Schmorl's node. Otherwise vertebral body height is preserved.  Mild bone marrow edema and fatty reactive changes within T12 around the compression deformity, similar to prior. Prominent intraosseous hemangioma at L3. Smaller intraosseous hemangioma at L5.  Normal distal spinal cord and cauda equina with conus medullaris at mid L2.   No extra spinal abnormalities. Unremarkable visualized bony pelvis.    T12-L1: Normal height of disc. Mild disc desiccation without herniation. Normal facet joints. No recess stenosis. Mild central spinal stenosis secondary to dorsal epidural abscess, no right foramen stenosis, no left foramen stenosis.    L1-L2: Normal height of disc. Mild disc desiccation without herniation. Normal facet joints. No recess stenosis. Mild central spinal stenosis secondary to dorsal epidural abscess, no right foramen stenosis, no left foramen stenosis.    L2-L3:  Normal height of disc. Normal disc signal without herniation. Mild bilateral facet hypertrophy. No recess stenosis. No central spinal stenosis, no right foramen stenosis, no left foramen stenosis.    L3-L4: Slight loss of disc height. Desiccated disc with shallow disc bulge. Mild bilateral facet hypertrophy. No recess stenosis. No central spinal stenosis, no right foramen stenosis, no left foramen stenosis.    L4-L5: Normal height of disc. Mild disc desiccation with mild circumferential disc bulge. Mild bilateral facet hypertrophy. No recess stenosis. No  central spinal stenosis, no right foramen stenosis, no left foramen stenosis.    L5-S1: Normal height of disc. Desiccated disc without herniation. Normal facet joints. No recess stenosis. No central spinal stenosis, no right foramen stenosis, no left foramen stenosis.      Impression    IMPRESSION:  CERVICAL SPINE MRI:  1.  There is a longitudinally extensive dorsal epidural abscess extending into the cervical spine from the thoracic spine. Superior extent is upper C4.  2.  At C5-C6 there is severe central spinal stenosis.  3.  At C4-C5 there is moderate central spinal stenosis.    LUMBAR SPINE MRI:  1.  Status post drainage of a large dorsal epidural collection at L1, with a thin (3 mm AP dimension) residual dorsal epidural abscess at L1-L2. It measures 25 mm craniocaudally.  2.  There is a 2 x 2 cm abscess in the laminectomy bed at L1. It communicates with a subcutaneous abscess at this level measuring 2 x 1.5 cm.

## 2022-12-24 NOTE — PROGRESS NOTES
Mau Beckett at 11:30 am on 12/24:    Pt requesting ibuprofen for bilateral lower extremity pressure pain. Tylenol for the last 3 days have not been providing any comfort. Please advise.   Awaiting orders.

## 2022-12-24 NOTE — PROGRESS NOTES
Patient stable this shift, pain managed with 650 mg Tylenol. Lower back incision covered with steri strips, no signs of infection of drainage. Patient has intact CMS, transfers to bathroom with one staff assist. Reports one episode of medium loose stool this shift, said it did appear stool becoming formed. MRI completed this shift, result shows some concerns but no changes in patient for now. Will continue to monitor.

## 2022-12-24 NOTE — PROGRESS NOTES
[facility]    Neurosurgery  Daily Post-Op Note    Assessment & Plan   Procedure(s):  THORACIC LUMBAR DECOMPRESSION FOR EVACUATION AND EPIDURAL ABSCESS VERSUS HEMATOMA   6 Days Post-Op  Doing well.  MRIs cervical and lumbar completed. Patient has no neck or arm pain, states legs continue to feel improved each day.   Has PICC line for 6 weeks outpt abx per ID.     Hold any DVT prophylaxis  Ok for regular diet.   No emergent/urgent surgical interventions anticipated.       Plan:  -Advance activity as tolerated  -Continue supportive and symptomatic treatment  -Start or continue physical therapy    Marco Licona PA-C    Interval History   Stable.  Doing well.  Improving slowly.  Pain is reasonably controlled.  No fevers.     Physical Exam   Temp: 98.5  F (36.9  C) Temp src: Oral BP: (!) 155/98 Pulse: 71   Resp: 16 SpO2: 98 % O2 Device: None (Room air)    Vitals:    12/19/22 0630 12/20/22 0657 12/21/22 0707   Weight: 89 kg (196 lb 3.4 oz) 89.9 kg (198 lb 3.1 oz) 89 kg (196 lb 3.4 oz)     Vital Signs with Ranges  Temp:  [98.5  F (36.9  C)-99.1  F (37.3  C)] 98.5  F (36.9  C)  Pulse:  [71-79] 71  Resp:  [16] 16  BP: (141-155)/(86-98) 155/98  SpO2:  [96 %-98 %] 98 %  I/O last 3 completed shifts:  In: -   Out: 3350 [Urine:3350]    Alert and oriented.  Moves all extremities equally.      Incision: CDI      Medications        gadobutrol  9 mL Intravenous Once     nafcillin  2 g Intravenous Q4H     polyethylene glycol  17 g Oral Daily     senna-docusate  1 tablet Oral BID     sodium chloride (PF)  10-40 mL Intracatheter Q7 Days     sodium chloride (PF)  3 mL Intracatheter Q8H           Marco Licona PA-C  [facility] Neurosurgery  [address]  [address] [place] [id number]    Tel [phone]  Pager [phone]     Huddled with Emelyn. If she is feeling very ill, can offer pt a Rocephin injection in clinic instead of the oral abx. Pt was given provider's message as written. She would rather do the oral abx. She will call back if symptoms worsen or continue.    Klaudia Pickett RN  [facility]3] [place]

## 2022-12-24 NOTE — PROGRESS NOTES
Dr. Davis paged neurosurgery regarding MRI results of cervical an lumbar spines.   Dr. Krystal Davis   Spoke with Marco from Neurosurgery   Dr. Krystal Davis

## 2022-12-24 NOTE — PROGRESS NOTES
[facility]    Infectious Disease Progress Note    Date of Service : 12/24/2022     Assessment:  57 YM , previously healthy who has been admitted with S.aureus bacteremia associated with extensive multi level thoracolumbar spinal epidural abscess from T4-T5 down to L2-L3 with spinal chord compression.  S/p debridement with S.aureus in operative and blood cx.  Repeat MRI shows  dorsal epidural abscess extending into the cervical spine from the thoracic spine. Superior extent is upper C4 and a residual dorsal epidural abscess at L1-L2. No additional surgical plans at this time     -MSSA bacteremia. Blood cxs negative from 12/20  -Extensive cervical and thoracolumbar spinal epidural abscess from C4 down to T4-T5 down to L2-L3  with spinal chord compression.  S/p debridement  -New left knee pain and bilateral ankle pain without clear evidence of secondary infection     Recommendations  1. PICC in place  2. Continue Nafcillin  3. Prolonged IV antibiotic therapy is planned for at least 6 weeks until 01/21/2023, possibly longer with close monitoring of side effects and safety labs  4. No additional surgical plans at this time  5. No obvious evidence of secondary seeding or other joints  6. Will need short interval follow up MRIs to ensure resolution of abscess    OPIV antibiotic orders placed in [facility] for discharge  ID Follow up in 3 weeks    Elyse Valadez MD    Interval History   Feels better, diarrhea better on imodium, no upper extremity weakness, left leg still a little weak, no significant pain in left knee or both ankles     Physical Exam   Temp: 98.5  F (36.9  C) Temp src: Oral BP: (!) 155/98 Pulse: 71   Resp: 16 SpO2: 98 % O2 Device: None (Room air)    Vitals:    12/19/22 0630 12/20/22 0657 12/21/22 0707   Weight: 89 kg (196 lb 3.4 oz) 89.9 kg (198 lb 3.1 oz) 89 kg (196 lb 3.4 oz)     Vital Signs with Ranges  Temp:  [98.5  F (36.9  C)-99.1  F (37.3  C)] 98.5  F (36.9  C)  Pulse:  [71-79]  71  Resp:  [16] 16  BP: (141-155)/(86-98) 155/98  SpO2:  [96 %-98 %] 98 %    Constitutional: Awake, alert, cooperative, no apparent distress  Lungs: Clear to auscultation bilaterally, no crackles or wheezing  Cardiovascular: Regular rate and rhythm, normal S1 and S2, and no murmur noted  Abdomen: Normal bowel sounds, soft, non-distended, non-tender  Skin: No rash  MS : left leg slightly weak    Other:    Medications       gadobutrol  9 mL Intravenous Once     nafcillin  2 g Intravenous Q4H     polyethylene glycol  17 g Oral Daily     senna-docusate  1 tablet Oral BID     sodium chloride (PF)  10-40 mL Intracatheter Q7 Days     sodium chloride (PF)  3 mL Intracatheter Q8H       Data   All microbiology laboratory data reviewed.  Recent Labs   Lab Test 12/22/22  0727 12/19/22  0804 12/18/22  0921   WBC 8.8 13.4* 14.8*   HGB 10.2* 11.6* 12.9*   HCT 30.5* 34.1* 39.5*   MCV 99 99 101*    261 213     Recent Labs   Lab Test 12/22/22  0727 12/20/22  0835 12/19/22  0804   CR 0.99 0.67  0.66 0.85     Microbiology  12/18 blood cx 1 set 1/4 bottles positive, 12/20 blood cxs negative  Arm, Right; Blood    1 Result Note  Culture Positive on the 1st day of incubation Abnormal         Staphylococcus aureus Panic     1 of 2 bottles         Resulting Agency: [facility]      Susceptibility                   Staphylococcus aureus       EUGENIA       Clindamycin <=0.25 ug/mL Susceptible       Erythromycin <=0.25 ug/mL Susceptible       Gentamicin <=0.5 ug/mL Susceptible       Oxacillin <=0.25 ug/mL Susceptible 1       Tetracycline <=1 ug/mL Susceptible       Trimethoprim/Sulfamethoxazole <=0.5/9.5 u... Susceptible       Vancomycin 1 ug/mL Susceptible                 12/18 thoracic spine abscess  Spine, Thoracic; Abscess    0 Result Notes  Culture 4+ Staphylococcus aureus Abnormal               Resulting Agency: [facility]      Susceptibility              Staphylococcus aureus       EUGENIA       Clindamycin <=0.25 ug/mL Susceptible        Erythromycin <=0.25 ug/mL Susceptible       Gentamicin <=0.5 ug/mL Susceptible       Oxacillin <=0.25 ug/mL Susceptible 1       Tetracycline <=1 ug/mL Susceptible       Trimethoprim/Sulfamethoxazole <=0.5/9.5 u... Susceptible       Vancomycin 1 ug/mL Susceptible                             Imaging  12/23 MRI lumbar spine  EXAM: MR CERVICAL SPINE W/O and W CONTRAST, MR LUMBAR SPINE W/O and W CONTRAST  LOCATION: [facility]  DATE/TIME: 12/23/2022 10:44 PM     INDICATION: Neck pain; hx Spine surgery; history of sepsis. Discitis osteomyelitis.  COMPARISON: MRI of thoracic spine 12/22/2022  CONTRAST: 9mL Gadavist   TECHNIQUE:   1) Routine Cervical Spine MRI without and with IV contrast.  2) Routine Lumbar Spine MRI without and with IV contrast.     FINDINGS:  CERVICAL SPINE:   Alignment: Normal.   Discitis osteomyelitis: No evidence of discitis osteomyelitis. Extensive longitudinal posterior epidural abscess extending superiorly from the thoracic spine up to upper C4.  Vertebral height: Preserved.   Marrow signal: Normal.  Spinal cord: No abnormal signal.  Extraspinal: No extraspinal abnormality.      Craniovertebral junction: Normal.     C1-2: Normal     C2-3: Normal height of disc. Desiccated disc. Small central disc osteophyte. No uncovertebral hypertrophy. Normal facet joints. No central spinal stenosis, no right foramen stenosis, no left foramen stenosis.     C3-4: Slight loss of disc height. Desiccated disc. Shallow disc osteophyte complex. No uncovertebral hypertrophy. Mild bilateral facet hypertrophy. No central spinal stenosis, no right foramen stenosis, no left foramen stenosis.     C4-5:  Normal height of disc. Desiccated disc. No disc herniation. No uncovertebral hypertrophy. Mild bilateral facet hypertrophy. No central spinal stenosis, no right foramen stenosis, no left foramen stenosis.     C5-6: Slight loss of disc height. Desiccated disc. Broad based disc osteophyte complex. No  uncovertebral hypertrophy. Mild bilateral facet hypertrophy. Severe central spinal stenosis, moderate right foramen stenosis, moderate left foramen stenosis.     C6-7: Moderate loss of disc height. Desiccated disc. Broad based disc osteophyte complex. No uncovertebral hypertrophy. Mild bilateral facet hypertrophy. Moderate central spinal stenosis, moderate right foramen stenosis, mild left foramen stenosis.     C7-T1: Normal height of disc. Desiccated disc. No disc herniation. No uncovertebral hypertrophy. Normal facet joints. No central spinal stenosis, no right foramen stenosis, no left foramen stenosis.     LUMBAR SPINE:   Nomenclature based on 5 lumbar type vertebral bodies.   Status post left laminectomy at L1 with posterior decompression and drainage of a dorsal epidural abscess initially seen on the MRI of 12/18/2022. There is a 2 x 1.4 x 2 cm (AP, TR, CC) fluid collection with rim enhancement in the laminectomy bed that   presumably represents a small abscess. It communicates with a more superficial rim-enhancing collection within the subcutaneous fat at this level that measures 1.5 x 2 cm. There is a thin residual dorsal epidural abscess from mid L1 to upper L2 measuring   3 x 10 mm (AP, TR) with craniocaudal extent of 2.5 cm. The endosteal epidural abscess visualized posterior to T12 that ascends into the thoracic spine, reported separately on MRI of thoracic spine 12/22/2022.  Normal coronal and sagittal alignment.   Moderate compression deformity of superior endplate T12 and L4 secondary to prominent Schmorl's node. Otherwise vertebral body height is preserved.  Mild bone marrow edema and fatty reactive changes within T12 around the compression deformity, similar to prior. Prominent intraosseous hemangioma at L3. Smaller intraosseous hemangioma at L5.  Normal distal spinal cord and cauda equina with conus medullaris at mid L2.   No extra spinal abnormalities. Unremarkable visualized bony  pelvis.     T12-L1: Normal height of disc. Mild disc desiccation without herniation. Normal facet joints. No recess stenosis. Mild central spinal stenosis secondary to dorsal epidural abscess, no right foramen stenosis, no left foramen stenosis.     L1-L2: Normal height of disc. Mild disc desiccation without herniation. Normal facet joints. No recess stenosis. Mild central spinal stenosis secondary to dorsal epidural abscess, no right foramen stenosis, no left foramen stenosis.     L2-L3:  Normal height of disc. Normal disc signal without herniation. Mild bilateral facet hypertrophy. No recess stenosis. No central spinal stenosis, no right foramen stenosis, no left foramen stenosis.     L3-L4: Slight loss of disc height. Desiccated disc with shallow disc bulge. Mild bilateral facet hypertrophy. No recess stenosis. No central spinal stenosis, no right foramen stenosis, no left foramen stenosis.     L4-L5: Normal height of disc. Mild disc desiccation with mild circumferential disc bulge. Mild bilateral facet hypertrophy. No recess stenosis. No central spinal stenosis, no right foramen stenosis, no left foramen stenosis.     L5-S1: Normal height of disc. Desiccated disc without herniation. Normal facet joints. No recess stenosis. No central spinal stenosis, no right foramen stenosis, no left foramen stenosis.                                                                      IMPRESSION:  CERVICAL SPINE MRI:  1.  There is a longitudinally extensive dorsal epidural abscess extending into the cervical spine from the thoracic spine. Superior extent is upper C4.  2.  At C5-C6 there is severe central spinal stenosis.  3.  At C4-C5 there is moderate central spinal stenosis.     LUMBAR SPINE MRI:  1.  Status post drainage of a large dorsal epidural collection at L1, with a thin (3 mm AP dimension) residual dorsal epidural abscess at L1-L2. It measures 25 mm craniocaudally.  2.  There is a 2 x 2 cm abscess in the laminectomy  bed at L1. It communicates with a subcutaneous abscess at this level measuring 2 x 1.5 cm.

## 2022-12-25 LAB
ANION GAP SERPL CALCULATED.3IONS-SCNC: 6 MMOL/L (ref 3–14)
BACTERIA ABSC ANAEROBE+AEROBE CULT: NORMAL
BACTERIA BLD CULT: NO GROWTH
BACTERIA BLD CULT: NO GROWTH
BASOPHILS # BLD AUTO: 0 10E3/UL (ref 0–0.2)
BASOPHILS NFR BLD AUTO: 1 %
BUN SERPL-MCNC: 8 MG/DL (ref 7–30)
CALCIUM SERPL-MCNC: 8.3 MG/DL (ref 8.5–10.1)
CHLORIDE BLD-SCNC: 104 MMOL/L (ref 94–109)
CO2 SERPL-SCNC: 27 MMOL/L (ref 20–32)
CREAT SERPL-MCNC: 0.94 MG/DL (ref 0.66–1.25)
CRP SERPL-MCNC: 61 MG/L (ref 0–8)
EOSINOPHIL # BLD AUTO: 0.1 10E3/UL (ref 0–0.7)
EOSINOPHIL NFR BLD AUTO: 2 %
ERYTHROCYTE [DISTWIDTH] IN BLOOD BY AUTOMATED COUNT: 13.4 % (ref 10–15)
GFR SERPL CREATININE-BSD FRML MDRD: >90 ML/MIN/1.73M2
GLUCOSE BLD-MCNC: 96 MG/DL (ref 70–99)
HCT VFR BLD AUTO: 29.2 % (ref 40–53)
HGB BLD-MCNC: 9.5 G/DL (ref 13.3–17.7)
IMM GRANULOCYTES # BLD: 0.1 10E3/UL
IMM GRANULOCYTES NFR BLD: 1 %
LYMPHOCYTES # BLD AUTO: 1.2 10E3/UL (ref 0.8–5.3)
LYMPHOCYTES NFR BLD AUTO: 15 %
MCH RBC QN AUTO: 33 PG (ref 26.5–33)
MCHC RBC AUTO-ENTMCNC: 32.5 G/DL (ref 31.5–36.5)
MCV RBC AUTO: 101 FL (ref 78–100)
MONOCYTES # BLD AUTO: 0.7 10E3/UL (ref 0–1.3)
MONOCYTES NFR BLD AUTO: 9 %
NEUTROPHILS # BLD AUTO: 5.8 10E3/UL (ref 1.6–8.3)
NEUTROPHILS NFR BLD AUTO: 72 %
NRBC # BLD AUTO: 0 10E3/UL
NRBC BLD AUTO-RTO: 0 /100
PLATELET # BLD AUTO: 399 10E3/UL (ref 150–450)
POTASSIUM BLD-SCNC: 3.6 MMOL/L (ref 3.4–5.3)
RBC # BLD AUTO: 2.88 10E6/UL (ref 4.4–5.9)
SODIUM SERPL-SCNC: 137 MMOL/L (ref 133–144)
WBC # BLD AUTO: 7.9 10E3/UL (ref 4–11)

## 2022-12-25 PROCEDURE — 120N000001 HC R&B MED SURG/OB

## 2022-12-25 PROCEDURE — 250N000013 HC RX MED GY IP 250 OP 250 PS 637: Performed by: PHYSICIAN ASSISTANT

## 2022-12-25 PROCEDURE — 99232 SBSQ HOSP IP/OBS MODERATE 35: CPT | Performed by: HOSPITALIST

## 2022-12-25 PROCEDURE — 250N000009 HC RX 250: Performed by: SPECIALIST

## 2022-12-25 PROCEDURE — 82310 ASSAY OF CALCIUM: CPT | Performed by: HOSPITALIST

## 2022-12-25 PROCEDURE — 86140 C-REACTIVE PROTEIN: CPT | Performed by: PHYSICIAN ASSISTANT

## 2022-12-25 PROCEDURE — 250N000013 HC RX MED GY IP 250 OP 250 PS 637: Performed by: HOSPITALIST

## 2022-12-25 PROCEDURE — 85004 AUTOMATED DIFF WBC COUNT: CPT | Performed by: HOSPITALIST

## 2022-12-25 RX ORDER — TAMSULOSIN HYDROCHLORIDE 0.4 MG/1
0.4 CAPSULE ORAL DAILY
Status: DISCONTINUED | OUTPATIENT
Start: 2022-12-25 | End: 2022-12-27 | Stop reason: HOSPADM

## 2022-12-25 RX ADMIN — NAFCILLIN 2 G: 2 POWDER, FOR SOLUTION INTRAMUSCULAR; INTRAVENOUS at 15:00

## 2022-12-25 RX ADMIN — NAFCILLIN 2 G: 2 POWDER, FOR SOLUTION INTRAMUSCULAR; INTRAVENOUS at 06:55

## 2022-12-25 RX ADMIN — NAFCILLIN 2 G: 2 POWDER, FOR SOLUTION INTRAMUSCULAR; INTRAVENOUS at 19:49

## 2022-12-25 RX ADMIN — NAFCILLIN 2 G: 2 POWDER, FOR SOLUTION INTRAMUSCULAR; INTRAVENOUS at 03:14

## 2022-12-25 RX ADMIN — NAFCILLIN 2 G: 2 POWDER, FOR SOLUTION INTRAMUSCULAR; INTRAVENOUS at 11:29

## 2022-12-25 RX ADMIN — TAMSULOSIN HYDROCHLORIDE 0.4 MG: 0.4 CAPSULE ORAL at 11:29

## 2022-12-25 RX ADMIN — OXYCODONE HYDROCHLORIDE 10 MG: 5 TABLET ORAL at 22:49

## 2022-12-25 RX ADMIN — OXYCODONE HYDROCHLORIDE 10 MG: 5 TABLET ORAL at 04:27

## 2022-12-25 RX ADMIN — OXYCODONE HYDROCHLORIDE 5 MG: 5 TABLET ORAL at 00:26

## 2022-12-25 RX ADMIN — NAFCILLIN 2 G: 2 POWDER, FOR SOLUTION INTRAMUSCULAR; INTRAVENOUS at 23:19

## 2022-12-25 ASSESSMENT — ACTIVITIES OF DAILY LIVING (ADL)
ADLS_ACUITY_SCORE: 36

## 2022-12-25 NOTE — PLAN OF CARE
Goal Outcome Evaluation:  Patient vital signs are at baseline: Yes  Patient able to ambulate as they were prior to admission or with assist devices provided by therapies during their stay:  Yes  Patient MUST void prior to discharge:  Yes  Patient able to tolerate oral intake:  Yes  Pain has adequate pain control using Oral analgesics:  Yes  Does patient have an identified :  Yes  Has goal D/C date and time been discussed with patient:  Yes    PICC line for IV abx for 6 weeks. ID following patient for dx of epidural abscess.       Indwelling Dacosta will be dced for voiding trial tomorrow.

## 2022-12-25 NOTE — PROGRESS NOTES
[facility]    Neurosurgery  Daily Post-Op Note    Assessment & Plan   Procedure(s):  THORACIC LUMBAR DECOMPRESSION FOR EVACUATION AND EPIDURAL ABSCESS VERSUS HEMATOMA   7 Days Post-Op  Doing well.  Pain well-controlled.  Tolerating physical therapy and rehabilitation well.  Has PICC on the right arm.      Plan:  -Advance activity as tolerated  -Continue supportive and symptomatic treatment  -Start or continue physical therapy  Ok for discharge, plan is for Tuesday.     Marco Licona PA-C    Interval History   Stable.  Doing well.  Improving slowly.  Pain is reasonably controlled.  No fevers.     Physical Exam   Temp: 98.8  F (37.1  C) Temp src: Oral BP: 138/83 Pulse: 73   Resp: 16 SpO2: 98 % O2 Device: None (Room air)    Vitals:    12/20/22 0657 12/21/22 0707 12/25/22 0635   Weight: 89.9 kg (198 lb 3.1 oz) 89 kg (196 lb 3.4 oz) 95.1 kg (209 lb 10.5 oz)     Vital Signs with Ranges  Temp:  [98.8  F (37.1  C)-99.1  F (37.3  C)] 98.8  F (37.1  C)  Pulse:  [73-79] 73  Resp:  [16] 16  BP: (131-166)/() 138/83  SpO2:  [97 %-99 %] 98 %  I/O last 3 completed shifts:  In: 600 [P.O.:600]  Out: 2350 [Urine:2350]    Alert and oriented.  Moves all extremities equally.  Reflexes symmetrical.     Incision: CDI      Medications        gadobutrol  9 mL Intravenous Once     nafcillin  2 g Intravenous Q4H     polyethylene glycol  17 g Oral Daily     senna-docusate  1 tablet Oral BID     sodium chloride (PF)  10-40 mL Intracatheter Q7 Days     sodium chloride (PF)  3 mL Intracatheter Q8H     tamsulosin  0.4 mg Oral Daily           Marco Licona PA-C  [facility]  [address]  [address] [place] [id number]    Tel [phone]  Pager [phone]

## 2022-12-25 NOTE — PLAN OF CARE
Goal Outcome Evaluation:    Alert and oriented x4. A1 with W/GB. Right arm PICC patent, intermittent ABXs. Dressing CDI to midback. Complains of pressure pain to bilateral ankles +1-2 edema, elevated on pillows. Tylenol PRN. Dacosta remains in place due to retention, patent. X2 BM during shift, diarrhea/bloody stools, PRN Imodium, senna/mirlaax held. ID, neurosurgery following. PT consult, pt is now walking 300 + feet, may discharge home vs ARU.

## 2022-12-25 NOTE — PROGRESS NOTES
Notified provider about indwelling gamez catheter discussed removal or continued need.    Did provider choose to remove indwelling gamez catheter? No. Provider has started patient on Flomax with a plan to remove indwelling Gmaez on 12/26 for voiding trial.     Provider's gamez indication for keeping indwelling gamez catheter: Retention.    Is there an order for indwelling gamez catheter? Yes    *If there is a plan to keep gamez catheter in place at discharge daily notification with provider is not necessary.

## 2022-12-25 NOTE — PROGRESS NOTES
"[facility]    Medicine History and Physical - Hospitalist Service       Date of Admission:  12/18/2022    Interval History   First visit with Ian Moncada yesterday.  Sitting up in bed feeling \"good\".  Reports \"back spasms\" last night, since resolved.  No report of pain.  No new neurologic complaints.    Nursing notes reviewed, Right arm PICC patent, pain controlled, Dacosta remains in place due to urinary retention, loose stools.    Physical Exam   Temp: 99  F (37.2  C) Temp src: Oral BP: 131/84 Pulse: 79   Resp: 16 SpO2: 98 % O2 Device: None (Room air)    Vitals:    12/20/22 0657 12/21/22 0707 12/25/22 0635   Weight: 89.9 kg (198 lb 3.1 oz) 89 kg (196 lb 3.4 oz) 95.1 kg (209 lb 10.5 oz)     Exam  GENERAL awake and alert, lying in bed  HEENT no conjunctival injection or jaundice  LUNGS moderate inspiratory effort, no wheezes or crackles  HEART S1, S2 regular rate and rhythm; no rubs or gallops  ABDOMEN soft, nontender to palpation; no guarding, rebound, or rigidity  MUSCULOSKELETAL extremities without edema; no neck pain reported with good range of motion  SKIN warm and dry  NEUROLOGIC moves upper and lower extremities spontaneously and to command; mild weakness with resistance to left foot dorsiflexion stable**, not new per patient, otherwise exam equal and symmetric, upper and lower extremities  MENTAL STATUS answering questions and following simple commands, normal    Assessment & Plan   57-year-old gentleman with no significant past medical history on no medications presented with left lower extremity paresthesias, weakness, urinary incontinence and urinary retention for 2 to 3 days.  Initially presented to [facility].  MRI of the spine showing extensive dorsal epidural fluid collection extending from thoracolumbar spine down to L2-L3 with mass-effect on the spinal cord/spinal cord compression concerning for abscess.   Transferred emergently to [facility].  12/18/2022 with " emergent surgery for drainage of spinal cord compression from epidural fluid collection which caused compression and weakness in his bilateral lower extremities     Epidural fluid collection from upper thoracic spine down to the mid lumbar spine with resulting spinal cord compression from T12-L1  + MSSA infection (+ culture in surgical fluid and blood)    Acute blood loss anemia  Reported progressive 6-day complaints of back pain with progressive difficulty with ambulation, falls and weakness/sensory changes of his lower extremities  MRI showing a large dorsal epidural fluid collection from mid thoracic spine down to the mid lumbar spine with substantial compression at the thoracolumbar junction of the terminal spinal cord (majority between T11-L2)   Initially presented to [facility] and transferred urgently to [facility]  12/18: Emergent surgery: Extensive epidural fluid collection extending from the upper thoracic spine down to the mid lumbar spine, most prominent with significant spinal cord compression from T12-L1:  (Per operative report description of cloudy, slightly thickened pus) culture and sent for Gram stain  ID consulted, MSSA infection.  On Vanco and Zosyn on admission>> Changed to nafcillin; on 12/18 blood cultures 1 of 2 positive staph aureus (MSSA); on 12/18 epidural space culture Staph aureus (MSSA)   12/20 ECHO with normal EF 70%, no wall motion abnormality, hyperdynamic right ventricle; MRI 12/22 thoracic spine: Redemonstration of large dorsal epidural abscess.  Decreased thickness on the inferior extent but increased in his superior extent measuring up to 9 mm in maximal thickness at T3-T4 (entire collection not included); follow-up MRI C-spine, L-spine 12/23/22, see report, reviewed by neurosurgery; no plans for repeat surgery per neurosurgery as of 12/24 (see note 12/24);   6 weeks IV antibiotics planned per ID, until 01/21/2023, possibly longer w/ close monitoring; per ID, will need short interval  follow-up MRI to ensure resolution of abscess (note 12/24); ID f/u in 3 weeks, after discharge    ID consult f/u    Neurosurgery consult f/u    PICC line in place    Continue IV nafcillin, as per ID, f/u 12/24    Advance activity as tolerated    Pain control    Bowel regimen to avoid constipation    Continue supportive and symptomatic treatment    PT, OT consulted    Monitor Hb, recheck in AM**     Left knee and bilateral ankle pain, possibly due to acute gout  12/22 orthopedics consulted, symptoms for 3 to 4 days; mild to moderate swelling of the left knee and bilateral ankles.  No erythema or warmth.  WBC normal.  By report patient has a history of gout.  12/22 x-ray bilateral ankles showing soft tissue swelling about the ankles.  No fracture or talar dome osteochondral lesion, Calcaneal enthesophytes incidentally noted. Otherwise negative.  12/22 x-ray left knee: Minimal patellofemoral osteoarthrosis. There is a moderate amount of calcification anterior to the patella, likely from old trauma or long-standing inflammation. This appears well marginated and likely chronic; Orthopedic surgery: no aspiration needed, possible gout    Stable, monitor, follow-up with outpatient provider     Diarrhea  Most likely secondary to nafcillin  12/22 C.dificile negative   - Imodium prn     Urinary retention, recurrent  S/p Dacosta catheter    Urinary retention likely secondary abscess, surgery, anesthesia, pain medication, inactivity    Urology consult f/u    Flomax started 12/25    Plans to continue Dacosta for now, reassess at time of discharge**.     If not in hospital, will need voiding trial after discharge w/ urology f/u; last consult visit 12/20, see note     Acute kidney injury, VANGIE, resolved  Large amount of urinary retention on admission  Resolved with fluids and Dacosta  Suspected secondary to urinary retention  Creatinine 0.66 >> 0.99  - monitor     Hyponatremia, resolved   - monitor     LE Edema:   Potentially from fluid  retention  Venous dopplers negative on 12/21 with the exception of a left Baker's cyst     Hypokalemia.    - monitor, replace prn     DVT prophylaxis  - neurosurgery to review, avoiding Lovenox due to increased risk of bleeding and MRI findings  - sequentials in hospital, ordered    Disposition  Estimated length of stay 1 to 2 days  Anticipated discharge to home vs TCU; social work consulted    Care plan discussed with patient at the bedside    Total time=    Clinically Significant Risk Factors                        # Overweight: Estimated body mass index is 28.43 kg/m  as calculated from the following:    Height as of an earlier encounter on 12/18/22: 1.829 m (6').    Weight as of this encounter: 95.1 kg (209 lb 10.5 oz).          Diet: Regular Diet Adult    Dacosta Catheter: PRESENT, indication: Retention, Retention  DVT Prophylaxis: Pneumatic Compression Devices  Code Status: Full Code       Expected Discharge Date: 12/26/2022      Destination: other (comment) (TCU)  Discharge Comments: PICC, IV ABX till at least 1/21/23 or longer. ARU Monday 12/26/22 at 1100 or may be able to go home with IV infusion if progressing before then       Nate Beckett MD FACP  Hospitalist Service  [facility]    ______________________________________________________________________    Medications       gadobutrol  9 mL Intravenous Once     nafcillin  2 g Intravenous Q4H     polyethylene glycol  17 g Oral Daily     senna-docusate  1 tablet Oral BID     sodium chloride (PF)  10-40 mL Intracatheter Q7 Days     sodium chloride (PF)  3 mL Intracatheter Q8H       Data reviewed today: I reviewed all new labs and imaging results over the last 24 hours.    Data   Recent Labs   Lab 12/23/22  0621 12/22/22  1747 12/22/22  0727 12/20/22  0835 12/20/22  0547 12/19/22  0816 12/19/22  0804 12/18/22  0921 12/18/22  0921   WBC  --   --  8.8  --   --   --  13.4*  --  14.8*   HGB  --   --  10.2*  --   --   --  11.6*  --   12.9*   MCV  --   --  99  --   --   --  99  --  101*   PLT  --   --  350  --   --   --  261  --  213   NA  --   --  136 135  --   --  132*  --  133*   POTASSIUM 3.5 3.4 3.0* 3.9  3.8  --   --  3.9   < > 3.9   CHLORIDE  --   --  101 102  --   --  101  --  94*   CO2  --   --  26 23  --   --  22  --  23   BUN  --   --  10 10  --   --  13  --  17.5   CR  --   --  0.99 0.67  0.66  --   --  0.85  --  1.69*   ANIONGAP  --   --  9 10  --   --  9  --  16*   LINA  --   --  8.4* 8.1*  --   --  8.5  --  9.6   GLC  --   --  123* 107* 89   < > 91  91  --  115*    < > = values in this interval not displayed.       Imaging  No results found for this or any previous visit (from the past 24 hour(s)).

## 2022-12-25 NOTE — PLAN OF CARE
Pt A&Ox4; VSS on RA except elevated BP. PRN IV hydralazine given per order to control high BP; effective. Pt c/o back pain/spasm and requested oxy; PRN oxy given per order. PICC line to R arm intact; on intermittent abx. Dacosta in place; good urine output noted. Edema in bilateral feet/ankles continues. Senna was held this shift d/t loose BM reported by previous nurse. Blood drawn this morning; awaiting results. Discharge pending.

## 2022-12-26 ENCOUNTER — APPOINTMENT (OUTPATIENT)
Dept: PHYSICAL THERAPY | Facility: CLINIC | Age: 57
DRG: 029 | End: 2022-12-26
Attending: NEUROLOGICAL SURGERY
Payer: COMMERCIAL

## 2022-12-26 ENCOUNTER — HOME INFUSION (PRE-WILLOW HOME INFUSION) (OUTPATIENT)
Dept: PHARMACY | Facility: CLINIC | Age: 57
End: 2022-12-26

## 2022-12-26 LAB
CRP SERPL-MCNC: 45.3 MG/L (ref 0–8)
HGB BLD-MCNC: 9.7 G/DL (ref 13.3–17.7)
POTASSIUM BLD-SCNC: 3.7 MMOL/L (ref 3.4–5.3)

## 2022-12-26 PROCEDURE — 85018 HEMOGLOBIN: CPT | Performed by: HOSPITALIST

## 2022-12-26 PROCEDURE — 250N000009 HC RX 250

## 2022-12-26 PROCEDURE — 97530 THERAPEUTIC ACTIVITIES: CPT | Mod: GP | Performed by: PHYSICAL THERAPY ASSISTANT

## 2022-12-26 PROCEDURE — 250N000009 HC RX 250: Performed by: HOSPITALIST

## 2022-12-26 PROCEDURE — 250N000013 HC RX MED GY IP 250 OP 250 PS 637: Performed by: HOSPITALIST

## 2022-12-26 PROCEDURE — 99232 SBSQ HOSP IP/OBS MODERATE 35: CPT | Performed by: SPECIALIST

## 2022-12-26 PROCEDURE — 120N000001 HC R&B MED SURG/OB

## 2022-12-26 PROCEDURE — 250N000011 HC RX IP 250 OP 636: Performed by: PHYSICIAN ASSISTANT

## 2022-12-26 PROCEDURE — 99232 SBSQ HOSP IP/OBS MODERATE 35: CPT | Performed by: HOSPITALIST

## 2022-12-26 PROCEDURE — 84132 ASSAY OF SERUM POTASSIUM: CPT | Performed by: STUDENT IN AN ORGANIZED HEALTH CARE EDUCATION/TRAINING PROGRAM

## 2022-12-26 PROCEDURE — 97116 GAIT TRAINING THERAPY: CPT | Mod: GP | Performed by: PHYSICAL THERAPY ASSISTANT

## 2022-12-26 PROCEDURE — 86140 C-REACTIVE PROTEIN: CPT | Performed by: PHYSICIAN ASSISTANT

## 2022-12-26 PROCEDURE — 250N000009 HC RX 250: Performed by: SPECIALIST

## 2022-12-26 PROCEDURE — 250N000013 HC RX MED GY IP 250 OP 250 PS 637: Performed by: PHYSICIAN ASSISTANT

## 2022-12-26 RX ORDER — LIDOCAINE HYDROCHLORIDE 20 MG/ML
JELLY TOPICAL
Status: COMPLETED
Start: 2022-12-26 | End: 2022-12-26

## 2022-12-26 RX ORDER — LIDOCAINE HYDROCHLORIDE 20 MG/ML
JELLY TOPICAL ONCE
Status: COMPLETED | OUTPATIENT
Start: 2022-12-26 | End: 2022-12-26

## 2022-12-26 RX ADMIN — NAFCILLIN 2 G: 2 POWDER, FOR SOLUTION INTRAMUSCULAR; INTRAVENOUS at 16:09

## 2022-12-26 RX ADMIN — ACETAMINOPHEN 650 MG: 325 TABLET, FILM COATED ORAL at 06:54

## 2022-12-26 RX ADMIN — NAFCILLIN 2 G: 2 POWDER, FOR SOLUTION INTRAMUSCULAR; INTRAVENOUS at 19:13

## 2022-12-26 RX ADMIN — ACETAMINOPHEN 650 MG: 325 TABLET, FILM COATED ORAL at 21:31

## 2022-12-26 RX ADMIN — ACETAMINOPHEN 650 MG: 325 TABLET, FILM COATED ORAL at 17:22

## 2022-12-26 RX ADMIN — LIDOCAINE HYDROCHLORIDE: 20 JELLY TOPICAL at 17:22

## 2022-12-26 RX ADMIN — NAFCILLIN 2 G: 2 POWDER, FOR SOLUTION INTRAMUSCULAR; INTRAVENOUS at 11:22

## 2022-12-26 RX ADMIN — NAFCILLIN 2 G: 2 POWDER, FOR SOLUTION INTRAMUSCULAR; INTRAVENOUS at 03:27

## 2022-12-26 RX ADMIN — LIDOCAINE HYDROCHLORIDE: 20 JELLY TOPICAL at 12:19

## 2022-12-26 RX ADMIN — OXYCODONE HYDROCHLORIDE 10 MG: 5 TABLET ORAL at 23:30

## 2022-12-26 RX ADMIN — NAFCILLIN 2 G: 2 POWDER, FOR SOLUTION INTRAMUSCULAR; INTRAVENOUS at 23:25

## 2022-12-26 RX ADMIN — TAMSULOSIN HYDROCHLORIDE 0.4 MG: 0.4 CAPSULE ORAL at 08:17

## 2022-12-26 RX ADMIN — LABETALOL HYDROCHLORIDE 20 MG: 5 INJECTION, SOLUTION INTRAVENOUS at 17:31

## 2022-12-26 RX ADMIN — NAFCILLIN 2 G: 2 POWDER, FOR SOLUTION INTRAMUSCULAR; INTRAVENOUS at 07:02

## 2022-12-26 ASSESSMENT — ACTIVITIES OF DAILY LIVING (ADL)
ADLS_ACUITY_SCORE: 35
ADLS_ACUITY_SCORE: 36
ADLS_ACUITY_SCORE: 35
ADLS_ACUITY_SCORE: 36
ADLS_ACUITY_SCORE: 35
ADLS_ACUITY_SCORE: 35
ADLS_ACUITY_SCORE: 36
ADLS_ACUITY_SCORE: 36
ADLS_ACUITY_SCORE: 35
ADLS_ACUITY_SCORE: 36

## 2022-12-26 NOTE — PROGRESS NOTES
Vitals stable. Small dressing on back clean and dry-no drainage. Denies pain. Up with SBA of one, gait belt, walker. CMS intact. Voided small amount urine in BR. Bladder scanned for 550cc at 1200. Straight cathed for 500cc clear suri urine returning. Up in chair most of the morning an tolerated well. Stable-progressing.

## 2022-12-26 NOTE — PROGRESS NOTES
"[facility]    Medicine History and Physical - Hospitalist Service       Date of Admission:  12/18/2022    Interval History   Stable, no new complaints, on/off \"back spasms\" at night.    Nursing notes reviewed, Dacosta intact, PICC line in place, A&Ox4, pain controlled.    Physical Exam   Temp: 99  F (37.2  C) Temp src: Oral BP: (!) 147/88 Pulse: 83   Resp: 16 SpO2: 97 % O2 Device: None (Room air)    Vitals:    12/20/22 0657 12/21/22 0707 12/25/22 0635   Weight: 89.9 kg (198 lb 3.1 oz) 89 kg (196 lb 3.4 oz) 95.1 kg (209 lb 10.5 oz)     Exam  GENERAL awake and alert, lying in bed  HEENT no conjunctival injection or jaundice  LUNGS moderate inspiratory effort, no wheezes or crackles  HEART S1, S2 regular rate and rhythm; no rubs or gallops  ABDOMEN soft, nontender to palpation; no guarding, rebound, or rigidity  MUSCULOSKELETAL extremities without edema  SKIN warm and dry  NEUROLOGIC moves upper and lower extremities spontaneously and to command; no change in left foot weakness with dorsiflexion, stable  MENTAL STATUS answering questions and following simple commands, normal    Assessment & Plan   57-year-old gentleman with no significant past medical history on no medications presented with left lower extremity paresthesias, weakness, urinary incontinence and urinary retention for 2 to 3 days.  Initially presented to [facility].  MRI of the spine showing extensive dorsal epidural fluid collection extending from thoracolumbar spine down to L2-L3 with mass-effect on the spinal cord/spinal cord compression concerning for abscess.   Transferred emergently to [facility].  12/18/2022 with emergent surgery for drainage of spinal cord compression from epidural fluid collection which caused compression and weakness in his bilateral lower extremities     Epidural fluid collection from upper thoracic spine down to the mid lumbar spine with resulting spinal cord compression from T12-L1  + MSSA " "infection (+ culture in surgical fluid and blood)    Acute blood loss anemia  Reported progressive 6-day complaints of back pain with progressive difficulty with ambulation, falls and weakness/sensory changes of his lower extremities  MRI showing a large dorsal epidural fluid collection from mid thoracic spine down to the mid lumbar spine with substantial compression at the thoracolumbar junction of the terminal spinal cord (majority between T11-L2)   Initially presented to [facility] and transferred urgently to [facility]  12/18: Emergent surgery: Extensive epidural fluid collection extending from the upper thoracic spine down to the mid lumbar spine, most prominent with significant spinal cord compression from T12-L1:  (Per operative report description of cloudy, slightly thickened pus) culture and sent for Gram stain  ID consulted, MSSA infection.  On Vanco and Zosyn on admission>> Changed to nafcillin; on 12/18 blood cultures 1 of 2 positive staph aureus (MSSA); on 12/18 epidural space culture Staph aureus (MSSA)   12/20 ECHO with normal EF 70%, no wall motion abnormality, hyperdynamic right ventricle; MRI 12/22 thoracic spine: Redemonstration of large dorsal epidural abscess.  Decreased thickness on the inferior extent but increased in his superior extent measuring up to 9 mm in maximal thickness at T3-T4 (entire collection not included); follow-up MRI C-spine, L-spine 12/23/22, see report, reviewed by neurosurgery; no plans for repeat surgery per neurosurgery as of 12/24 (see note 12/24);   6 weeks IV antibiotics planned per ID, until 01/21/2023, possibly longer w/ close monitoring; per ID, will need short interval follow-up MRI to ensure resolution of abscess (note 12/24); ID f/u in 3 weeks, after discharge    ID consult f/u    Neurosurgery consult f/u    PICC line in place    Continue IV nafcillin, as per ID, f/u 12/24    Advance activity as tolerated    Pain control, on/off \"back spasms\", neurosurgery to " review    Bowel regimen to avoid constipation    Continue supportive and symptomatic treatment    PT, OT consulted    Social work f/u for arranging outpatient IV antibiotics     Left knee and bilateral ankle pain, possibly due to acute gout  12/22 orthopedics consulted, symptoms for 3 to 4 days; mild to moderate swelling of the left knee and bilateral ankles.  No erythema or warmth.  WBC normal.  By report patient has a history of gout.  12/22 x-ray bilateral ankles showing soft tissue swelling about the ankles.  No fracture or talar dome osteochondral lesion, Calcaneal enthesophytes incidentally noted. Otherwise negative.  12/22 x-ray left knee: Minimal patellofemoral osteoarthrosis. There is a moderate amount of calcification anterior to the patella, likely from old trauma or long-standing inflammation. This appears well marginated and likely chronic; Orthopedic surgery: no aspiration needed, possible gout    Stable, monitor, follow-up with outpatient provider     Diarrhea  Most likely secondary to nafcillin  12/22 C.dificile negative   - Imodium prn     Urinary retention, recurrent  S/p Dacosta catheter    Urinary retention likely secondary abscess, surgery, anesthesia, pain medication, inactivity    Flomax started 12/25    Voiding trial 12/26, if persistent retention, will need to replace Dacosta and recheck in 5 to 7 days w/ urology consult f/u in clinic**     Acute kidney injury, VANGIE, resolved  Large amount of urinary retention on admission  Resolved with fluids and Dacosta  Suspected secondary to urinary retention  Creatinine 0.66 >> 0.99  - monitor     Hyponatremia, resolved   - monitor     LE Edema:   Potentially from fluid retention  Venous dopplers negative on 12/21 with the exception of a left Baker's cyst     Hypokalemia.    - monitor, replace prn     DVT prophylaxis  - neurosurgery to review, avoiding Lovenox due to increased risk of bleeding and MRI findings  - sequentials in hospital,  ordered    Disposition  Estimated length of stay 1 to 2 days, possible discharge Tuesday, 12/27, neurosurgery to review along w/ social work for outpatient IV antibiotics  Anticipated discharge to home    Care plan discussed with patient at the bedside    Clinically Significant Risk Factors                        # Overweight: Estimated body mass index is 28.43 kg/m  as calculated from the following:    Height as of an earlier encounter on 12/18/22: 1.829 m (6').    Weight as of this encounter: 95.1 kg (209 lb 10.5 oz).          Diet: Regular Diet Adult    Dacosta Catheter: PRESENT, indication: Retention, Retention  DVT Prophylaxis: Pneumatic Compression Devices  Code Status: Full Code       Expected Discharge Date: 12/26/2022    Discharge Delays: Other (Add Comment)  Destination: other (comment) (TCU)  Discharge Comments: PICC, IV ABX till at least 1/21/23 or longer, need to set up OP vs home IV therapy, cannot happen until 12/26/22       Nate Beckett MD [facility]  Hospitalist Service  [facility]    ______________________________________________________________________    Medications       gadobutrol  9 mL Intravenous Once     nafcillin  2 g Intravenous Q4H     polyethylene glycol  17 g Oral Daily     senna-docusate  1 tablet Oral BID     sodium chloride (PF)  10-40 mL Intracatheter Q7 Days     sodium chloride (PF)  3 mL Intracatheter Q8H     tamsulosin  0.4 mg Oral Daily       Data reviewed today: I reviewed all new labs and imaging results over the last 24 hours.    Data   Recent Labs   Lab 12/25/22  0650 12/23/22  0621 12/22/22  1747 12/22/22  0727 12/20/22  0835 12/19/22  0816 12/19/22  0804   WBC 7.9  --   --  8.8  --   --  13.4*   HGB 9.5*  --   --  10.2*  --   --  11.6*   *  --   --  99  --   --  99     --   --  350  --   --  261     --   --  136 135  --  132*   POTASSIUM 3.6 3.5 3.4 3.0* 3.9  3.8  --  3.9   CHLORIDE 104  --   --  101 102  --  101   CO2 27  --    --  26 23  --  22   BUN 8  --   --  10 10  --  13   CR 0.94  --   --  0.99 0.67  0.66  --  0.85   ANIONGAP 6  --   --  9 10  --  9   LINA 8.3*  --   --  8.4* 8.1*  --  8.5   GLC 96  --   --  123* 107*   < > 91  91    < > = values in this interval not displayed.       Imaging  No results found for this or any previous visit (from the past 24 hour(s)).

## 2022-12-26 NOTE — PLAN OF CARE
Pt A&Ox4; VSS on RA except elevated BP. Pain managed with current regimen. PICC line to R arm intact; on intermittent abx. Edema in bilateral feet/ankles continues. Blood drawn this morning via PICC line; awaiting results. Dacosta catheter removed late this shift per order; voiding trial this a.m. Plan to discharge home on Tuesday.

## 2022-12-26 NOTE — PROGRESS NOTES
[facility]    Infectious Disease Progress Note    Date of Service : 12/26/2022     Assessment:  57 YM , previously healthy who has been admitted with S.aureus bacteremia associated with extensive multi level thoracolumbar spinal epidural abscess from T4-T5 down to L2-L3 with spinal chord compression.  S/p debridement with S.aureus in operative and blood cx.  Repeat MRI shows  dorsal epidural abscess extending into the cervical spine from the thoracic spine. Superior extent is upper C4 and a residual dorsal epidural abscess at L1-L2. No additional surgical plans at this time     -MSSA bacteremia. Blood cxs negative from 12/20  -Extensive cervical and thoracolumbar spinal epidural abscess from C4 down to T4-T5 down to L2-L3  with spinal chord compression.  S/p debridement  -New left knee pain and bilateral ankle pain without clear evidence of secondary infection     Recommendations  1. PICC in place  2. Continue Nafcillin  3. Prolonged IV antibiotic therapy is planned for at least 6 weeks until 01/21/2023, possibly longer with close monitoring of side effects and safety labs  4. No additional surgical plans at this time  5. No obvious evidence of secondary seeding or other joints  6. Will need short interval follow up MRIs to ensure resolution of abscess     [facility]IV antibiotic orders placed in [facility] for discharge  ID Follow up in 3 weeks    Elyse Valadez MD    Interval History   Doing well, no new complains, tolerating antibiotics, diarrhea better. No new focal complaints.    Physical Exam   Temp: 98.4  F (36.9  C) Temp src: Oral BP: (!) 148/99 Pulse: 74   Resp: 16 SpO2: 97 % O2 Device: None (Room air)    Vitals:    12/21/22 0707 12/25/22 0635 12/26/22 0711   Weight: 89 kg (196 lb 3.4 oz) 95.1 kg (209 lb 10.5 oz) 95.5 kg (210 lb 8.6 oz)     Vital Signs with Ranges  Temp:  [98.4  F (36.9  C)-99.3  F (37.4  C)] 98.4  F (36.9  C)  Pulse:  [74-90] 74  Resp:  [16] 16  BP: (147-148)/(85-99) 148/99  SpO2:   [96 %-97 %] 97 %    Constitutional: Awake, alert, cooperative, no apparent distress  Lungs: Clear to auscultation bilaterally, no crackles or wheezing  Cardiovascular: Regular rate and rhythm, normal S1 and S2, and no murmur noted  Abdomen: Normal bowel sounds, soft, non-distended, non-tender  Skin: No rashes, no cyanosis, no edema  Other:    Medications       gadobutrol  9 mL Intravenous Once     nafcillin  2 g Intravenous Q4H     polyethylene glycol  17 g Oral Daily     senna-docusate  1 tablet Oral BID     sodium chloride (PF)  10-40 mL Intracatheter Q7 Days     sodium chloride (PF)  3 mL Intracatheter Q8H     tamsulosin  0.4 mg Oral Daily       Data   All microbiology laboratory data reviewed.  Recent Labs   Lab Test 12/26/22  0701 12/25/22  0650 12/22/22  0727 12/19/22  0804   WBC  --  7.9 8.8 13.4*   HGB 9.7* 9.5* 10.2* 11.6*   HCT  --  29.2* 30.5* 34.1*   MCV  --  101* 99 99   PLT  --  399 350 261     Recent Labs   Lab Test 12/25/22  0650 12/22/22  0727 12/20/22  0835   CR 0.94 0.99 0.67  0.66     Recent Labs   Lab Test 12/18/22  0921   SED 45*   Microbiology  12/18 blood cx 1 set 1/4 bottles positive, 12/20 blood cxs negative  Arm, Right; Blood    1 Result Note  Culture Positive on the 1st day of incubation Abnormal         Staphylococcus aureus Panic     1 of 2 bottles         Resulting Agency: [facility]      Susceptibility                   Staphylococcus aureus       EUGENIA       Clindamycin <=0.25 ug/mL Susceptible       Erythromycin <=0.25 ug/mL Susceptible       Gentamicin <=0.5 ug/mL Susceptible       Oxacillin <=0.25 ug/mL Susceptible 1       Tetracycline <=1 ug/mL Susceptible       Trimethoprim/Sulfamethoxazole <=0.5/9.5 u... Susceptible       Vancomycin 1 ug/mL Susceptible                 12/18 thoracic spine abscess  Spine, Thoracic; Abscess    0 Result Notes  Culture 4+ Staphylococcus aureus Abnormal               Resulting Agency: [facility]      Susceptibility                   Staphylococcus aureus        EUGENIA       Clindamycin <=0.25 ug/mL Susceptible       Erythromycin <=0.25 ug/mL Susceptible       Gentamicin <=0.5 ug/mL Susceptible       Oxacillin <=0.25 ug/mL Susceptible 1       Tetracycline <=1 ug/mL Susceptible       Trimethoprim/Sulfamethoxazole <=0.5/9.5 u... Susceptible       Vancomycin 1 ug/mL Susceptible                              Imaging  12/23 MRI lumbar spine  EXAM: MR CERVICAL SPINE W/O and W CONTRAST, MR LUMBAR SPINE W/O and W CONTRAST  LOCATION: [facility]  DATE/TIME: 12/23/2022 10:44 PM     INDICATION: Neck pain; hx Spine surgery; history of sepsis. Discitis osteomyelitis.  COMPARISON: MRI of thoracic spine 12/22/2022  CONTRAST: 9mL Gadavist   TECHNIQUE:   1) Routine Cervical Spine MRI without and with IV contrast.  2) Routine Lumbar Spine MRI without and with IV contrast.     FINDINGS:  CERVICAL SPINE:   Alignment: Normal.   Discitis osteomyelitis: No evidence of discitis osteomyelitis. Extensive longitudinal posterior epidural abscess extending superiorly from the thoracic spine up to upper C4.  Vertebral height: Preserved.   Marrow signal: Normal.  Spinal cord: No abnormal signal.  Extraspinal: No extraspinal abnormality.      Craniovertebral junction: Normal.     C1-2: Normal     C2-3: Normal height of disc. Desiccated disc. Small central disc osteophyte. No uncovertebral hypertrophy. Normal facet joints. No central spinal stenosis, no right foramen stenosis, no left foramen stenosis.     C3-4: Slight loss of disc height. Desiccated disc. Shallow disc osteophyte complex. No uncovertebral hypertrophy. Mild bilateral facet hypertrophy. No central spinal stenosis, no right foramen stenosis, no left foramen stenosis.     C4-5:  Normal height of disc. Desiccated disc. No disc herniation. No uncovertebral hypertrophy. Mild bilateral facet hypertrophy. No central spinal stenosis, no right foramen stenosis, no left foramen stenosis.     C5-6: Slight loss of disc height.  Desiccated disc. Broad based disc osteophyte complex. No uncovertebral hypertrophy. Mild bilateral facet hypertrophy. Severe central spinal stenosis, moderate right foramen stenosis, moderate left foramen stenosis.     C6-7: Moderate loss of disc height. Desiccated disc. Broad based disc osteophyte complex. No uncovertebral hypertrophy. Mild bilateral facet hypertrophy. Moderate central spinal stenosis, moderate right foramen stenosis, mild left foramen stenosis.     C7-T1: Normal height of disc. Desiccated disc. No disc herniation. No uncovertebral hypertrophy. Normal facet joints. No central spinal stenosis, no right foramen stenosis, no left foramen stenosis.     LUMBAR SPINE:   Nomenclature based on 5 lumbar type vertebral bodies.   Status post left laminectomy at L1 with posterior decompression and drainage of a dorsal epidural abscess initially seen on the MRI of 12/18/2022. There is a 2 x 1.4 x 2 cm (AP, TR, CC) fluid collection with rim enhancement in the laminectomy bed that   presumably represents a small abscess. It communicates with a more superficial rim-enhancing collection within the subcutaneous fat at this level that measures 1.5 x 2 cm. There is a thin residual dorsal epidural abscess from mid L1 to upper L2 measuring   3 x 10 mm (AP, TR) with craniocaudal extent of 2.5 cm. The endosteal epidural abscess visualized posterior to T12 that ascends into the thoracic spine, reported separately on MRI of thoracic spine 12/22/2022.  Normal coronal and sagittal alignment.   Moderate compression deformity of superior endplate T12 and L4 secondary to prominent Schmorl's node. Otherwise vertebral body height is preserved.  Mild bone marrow edema and fatty reactive changes within T12 around the compression deformity, similar to prior. Prominent intraosseous hemangioma at L3. Smaller intraosseous hemangioma at L5.  Normal distal spinal cord and cauda equina with conus medullaris at mid L2.   No extra spinal  abnormalities. Unremarkable visualized bony pelvis.     T12-L1: Normal height of disc. Mild disc desiccation without herniation. Normal facet joints. No recess stenosis. Mild central spinal stenosis secondary to dorsal epidural abscess, no right foramen stenosis, no left foramen stenosis.     L1-L2: Normal height of disc. Mild disc desiccation without herniation. Normal facet joints. No recess stenosis. Mild central spinal stenosis secondary to dorsal epidural abscess, no right foramen stenosis, no left foramen stenosis.     L2-L3:  Normal height of disc. Normal disc signal without herniation. Mild bilateral facet hypertrophy. No recess stenosis. No central spinal stenosis, no right foramen stenosis, no left foramen stenosis.     L3-L4: Slight loss of disc height. Desiccated disc with shallow disc bulge. Mild bilateral facet hypertrophy. No recess stenosis. No central spinal stenosis, no right foramen stenosis, no left foramen stenosis.     L4-L5: Normal height of disc. Mild disc desiccation with mild circumferential disc bulge. Mild bilateral facet hypertrophy. No recess stenosis. No central spinal stenosis, no right foramen stenosis, no left foramen stenosis.     L5-S1: Normal height of disc. Desiccated disc without herniation. Normal facet joints. No recess stenosis. No central spinal stenosis, no right foramen stenosis, no left foramen stenosis.                                                                      IMPRESSION:  CERVICAL SPINE MRI:  1.  There is a longitudinally extensive dorsal epidural abscess extending into the cervical spine from the thoracic spine. Superior extent is upper C4.  2.  At C5-C6 there is severe central spinal stenosis.  3.  At C4-C5 there is moderate central spinal stenosis.     LUMBAR SPINE MRI:  1.  Status post drainage of a large dorsal epidural collection at L1, with a thin (3 mm AP dimension) residual dorsal epidural abscess at L1-L2. It measures 25 mm craniocaudally.  2.   There is a 2 x 2 cm abscess in the laminectomy bed at L1. It communicates with a subcutaneous abscess at this level measuring 2 x 1.5 cm.

## 2022-12-26 NOTE — PROGRESS NOTES
Care Management Follow Up    Length of Stay (days): 8    Expected Discharge Date: 12/27/2022     Concerns to be Addressed:       Patient plan of care discussed at interdisciplinary rounds: Yes    Anticipated Discharge Disposition: Transitional Care     Anticipated Discharge Services: None  Anticipated Discharge DME: None    Patient/family educated on Medicare website which has current facility and service quality ratings: yes  Education Provided on the Discharge Plan:    Patient/Family in Agreement with the Plan: yes    Referrals Placed by CM/SW:    Private pay costs discussed: Per [facility] writerinformed patient his coverage for IV abx 2500 deductible and  35oo OOP max.    Additional Information:  Writer met with patient at bedside and introduced self and role in discharge planning.  Writer made [facility] referral and explained the insurance benefits indicated in [facility] e-mail.  Patient  Would like  To proceed with plan  To go home  With home  Infusion.  Patient has a wife  At home who works FT, and is supportive and  Able  To help a bit.  Pts wife able to pick  Him up tomorrow.  Writer suggested plan to discharge after his 3 PM dose giving plenty of time for MD to see him, write discharge order  And [facility] to fulfill the order.    Writer let [facility] know plan via email.  Will continue to follow for discharge plan.    Diane Ellis RN

## 2022-12-26 NOTE — PROGRESS NOTES
[facility]     Neurosurgery  Daily Post-Op Note        Assessment & Plan     Procedure(s):  THORACIC LUMBAR DECOMPRESSION FOR EVACUATION AND EPIDURAL ABSCESS VERSUS HEMATOMA   8 Days Post-Op  Doing well.  Pain well-controlled.  Tolerating physical therapy and states he feels he will be able to discharge home.  Has PICC on the right arm.  Sitting up in chair, neuro intact, incision C/D/I     Plan:  -Advance activity as tolerated  -Continue supportive and symptomatic treatment  -Start or continue physical therapy  -Ok for discharge from NS standpoint.  -Needs NS post op follow up's scheduled.    Vero Adams MPAS  [facility] Neurosurgery  [facility]  [address] [place] [id number]    Tel [phone]  Pager [phone]

## 2022-12-26 NOTE — PROGRESS NOTES
Therapy: IV ABX   Insurance: [employer]    Ded: $2500.00  Met: $0.00    Co-Insurance: 80/20  Max Out of Pocket: $3500.00  Met: $0.00  Once Ded and OOP are met in full coverage will then be 100%.    Please contact Intake with any questions, [phone] or In Basket pool, [facility] ([phone]).

## 2022-12-26 NOTE — PLAN OF CARE
Goal Outcome Evaluation:       A&OX4. VSS on room air. Up ao1 with GBW. Ambulated in hallway x1, pt tolerated well. Oxy given for back pain which pt states helped last night. Dacosta intact and patent. PICC line intact and patent. CMS and Neuro's intact. Discharge pending.

## 2022-12-27 ENCOUNTER — APPOINTMENT (OUTPATIENT)
Dept: PHYSICAL THERAPY | Facility: CLINIC | Age: 57
DRG: 029 | End: 2022-12-27
Attending: NEUROLOGICAL SURGERY
Payer: COMMERCIAL

## 2022-12-27 ENCOUNTER — APPOINTMENT (OUTPATIENT)
Dept: OCCUPATIONAL THERAPY | Facility: CLINIC | Age: 57
DRG: 029 | End: 2022-12-27
Attending: NEUROLOGICAL SURGERY
Payer: COMMERCIAL

## 2022-12-27 VITALS
BODY MASS INDEX: 28.34 KG/M2 | DIASTOLIC BLOOD PRESSURE: 109 MMHG | WEIGHT: 209 LBS | SYSTOLIC BLOOD PRESSURE: 163 MMHG | TEMPERATURE: 98.4 F | RESPIRATION RATE: 16 BRPM | OXYGEN SATURATION: 99 % | HEART RATE: 72 BPM

## 2022-12-27 LAB — CRP SERPL-MCNC: 38.4 MG/L (ref 0–8)

## 2022-12-27 PROCEDURE — 97110 THERAPEUTIC EXERCISES: CPT | Mod: GP

## 2022-12-27 PROCEDURE — 97116 GAIT TRAINING THERAPY: CPT | Mod: GP

## 2022-12-27 PROCEDURE — 86140 C-REACTIVE PROTEIN: CPT | Performed by: PHYSICIAN ASSISTANT

## 2022-12-27 PROCEDURE — 250N000009 HC RX 250: Performed by: SPECIALIST

## 2022-12-27 PROCEDURE — 250N000013 HC RX MED GY IP 250 OP 250 PS 637: Performed by: HOSPITALIST

## 2022-12-27 PROCEDURE — 97535 SELF CARE MNGMENT TRAINING: CPT | Mod: GO

## 2022-12-27 PROCEDURE — 250N000013 HC RX MED GY IP 250 OP 250 PS 637: Performed by: PHYSICIAN ASSISTANT

## 2022-12-27 PROCEDURE — 99239 HOSP IP/OBS DSCHRG MGMT >30: CPT | Performed by: HOSPITALIST

## 2022-12-27 RX ORDER — OXYCODONE HYDROCHLORIDE 5 MG/1
5-10 TABLET ORAL EVERY 6 HOURS PRN
Qty: 25 TABLET | Refills: 0 | Status: ON HOLD | OUTPATIENT
Start: 2022-12-27 | End: 2023-01-05

## 2022-12-27 RX ORDER — TAMSULOSIN HYDROCHLORIDE 0.4 MG/1
0.4 CAPSULE ORAL DAILY
Qty: 30 CAPSULE | Refills: 0 | Status: SHIPPED | OUTPATIENT
Start: 2022-12-28 | End: 2023-08-29

## 2022-12-27 RX ORDER — ACETAMINOPHEN 325 MG/1
650 TABLET ORAL EVERY 6 HOURS PRN
Status: ON HOLD
Start: 2022-12-27 | End: 2023-01-05

## 2022-12-27 RX ORDER — AMOXICILLIN 250 MG
1 CAPSULE ORAL 2 TIMES DAILY PRN
Qty: 60 TABLET | Refills: 0 | Status: SHIPPED | OUTPATIENT
Start: 2022-12-27 | End: 2023-01-06

## 2022-12-27 RX ADMIN — NAFCILLIN 2 G: 2 POWDER, FOR SOLUTION INTRAMUSCULAR; INTRAVENOUS at 06:56

## 2022-12-27 RX ADMIN — ACETAMINOPHEN 650 MG: 325 TABLET, FILM COATED ORAL at 08:16

## 2022-12-27 RX ADMIN — NAFCILLIN 2 G: 2 POWDER, FOR SOLUTION INTRAMUSCULAR; INTRAVENOUS at 14:43

## 2022-12-27 RX ADMIN — ACETAMINOPHEN 650 MG: 325 TABLET, FILM COATED ORAL at 14:43

## 2022-12-27 RX ADMIN — NAFCILLIN 2 G: 2 POWDER, FOR SOLUTION INTRAMUSCULAR; INTRAVENOUS at 03:25

## 2022-12-27 RX ADMIN — NAFCILLIN 2 G: 2 POWDER, FOR SOLUTION INTRAMUSCULAR; INTRAVENOUS at 11:06

## 2022-12-27 RX ADMIN — TAMSULOSIN HYDROCHLORIDE 0.4 MG: 0.4 CAPSULE ORAL at 08:16

## 2022-12-27 RX ADMIN — OXYCODONE HYDROCHLORIDE 10 MG: 5 TABLET ORAL at 03:31

## 2022-12-27 ASSESSMENT — ACTIVITIES OF DAILY LIVING (ADL)
ADLS_ACUITY_SCORE: 35

## 2022-12-27 NOTE — PROGRESS NOTES
[facility]    Neurosurgery  Daily Post-Op Note    Assessment & Plan   Procedure(s):  THORACIC LUMBAR DECOMPRESSION FOR EVACUATION AND EPIDURAL ABSCESS VERSUS HEMATOMA   9 Days Post-Op  Doing well.  Pain well-controlled.  Tolerating physical therapy and rehabilitation well.  Needs infusion pump and instructions and then is ok for discharge.     Plan:  -Advance activity as tolerated  -Continue supportive and symptomatic treatment  -Start or continue physical therapy  -follow-up with [facility] as instructed.     Marco Licona PA-C    Interval History   Stable.  Doing well.  Improving slowly.  Pain is reasonably controlled.  No fevers.     Physical Exam   Temp: 98.4  F (36.9  C) Temp src: Oral BP: (!) 163/109 Pulse: 72   Resp: 16 SpO2: 99 % O2 Device: None (Room air)    Vitals:    12/25/22 0635 12/26/22 0711 12/27/22 0700   Weight: 95.1 kg (209 lb 10.5 oz) 95.5 kg (210 lb 8.6 oz) 94.8 kg (208 lb 15.9 oz)     Vital Signs with Ranges  Temp:  [98.4  F (36.9  C)-98.7  F (37.1  C)] 98.4  F (36.9  C)  Pulse:  [68-74] 72  Resp:  [16] 16  BP: (141-163)/() 163/109  SpO2:  [97 %-100 %] 99 %  I/O last 3 completed shifts:  In: -   Out: 2675 [Urine:2675]    Alert and oriented.  Moves all extremities equally.  Reflexes symmetrical.     Incision: CDI      Medications        gadobutrol  9 mL Intravenous Once     nafcillin  2 g Intravenous Q4H     polyethylene glycol  17 g Oral Daily     senna-docusate  1 tablet Oral BID     sodium chloride (PF)  10-40 mL Intracatheter Q7 Days     sodium chloride (PF)  3 mL Intracatheter Q8H     tamsulosin  0.4 mg Oral Daily           Marco Licona PA-C  [facility] Neurosurgery  [address]  [address] [place] [id number]    Tel [phone]  Pager [phone]

## 2022-12-27 NOTE — PROGRESS NOTES
Pt A&Ox4; VSS on RA except elevated BP. Pain managed with current regimen. PICC line to R arm intact; on intermittent abx. Dacosta in place; good urine output noted. Edema in bilateral feet/ankles improving; BLE elevated on pillows. Refused senna this shift. Blood drawn this morning via PICC line; awaiting results. Plan to discharge home today with home infusion.    /

## 2022-12-27 NOTE — PROGRESS NOTES
[facility]    Pt will discharge to home today on nafcillin via CADD pump. Pt would like to discharge after his 1500h dose. Per pt's request, [facility] will deliver medication to his home and have a nurse do IV teach and hook up to home pump before next dose at 1900h.     Anticipate medication delivery to home by 1800h and RN visit at pt's home tonight around 1830h. Pt updated and in agreement with plan    Thank you for the referral.    Dominga Bernabe RN  [facility] Liaison  [phone] (Mon thru Fri 8am - 5pm)  [phone] Office

## 2022-12-27 NOTE — PLAN OF CARE
Occupational Therapy Discharge Summary    Reason for therapy discharge:    All goals and outcomes met, no further needs identified.    Progress towards therapy goal(s). See goals on Care Plan in [facility] electronic health record for goal details.  Goals met    Therapy recommendation(s):    Home with A in I/ADLs as needed including home management tasks, yardwork/snow removal, laundry.

## 2022-12-27 NOTE — DISCHARGE SUMMARY
[facility]  Hospitalist Discharge Summary      Date of Admission:  12/18/2022  Date of Discharge:  12/27/2022  Discharging Provider: Nate Beckett MD  Discharge Service: Hospitalist Service       Discharge Diagnoses   1. Epidural fluid collection from upper thoracic spine down to the mid lumbar spine with resulting spinal cord compression from T12-L1, s/p thoracic lumbar decompression for evacuation of abscess  2. + MSSA infection (+ culture in surgical fluid and blood)    3. Acute blood loss anemia  4. PICC line for long-term IV antibiotics (nafcillin via CADD pump)  5. Left knee and bilateral ankle pain, possibly due to acute gout, resolved  6. Diarrhea, resolved  7. Urinary retention, recurrent  8. S/p Dacosta catheter replacement 12/26, needs Urology Clinic f/u  9. Acute kidney injury, VANGIE, resolved  10. Hyponatremia, resolved   11. LE Edema, stable/resolved  12. Hypokalemia, resolved    Follow-ups Needed After Discharge   Follow-up Appointments     Follow Up ([facility])      Dacosta replaced on 12/26/22.  Outpatient follow-up at Urology Clinic in 7   to 10 days, please call [phone] to schedule appointment.     [facility] a [facility]  [address] [place] [address]. [phone]  You may call [phone] with any questions or concerns.         Follow-up and recommended labs and tests       Follow up with primary care provider, Physician No Ref-Primary, within 7   days to evaluate treatment change, to evaluate after surgery, medication   review, labs, and for hospital follow-up.    Follow-up with Neurosurgery as arranged on discharge.    Follow-up with Infectious Disease (ID) as arranged on discharge with   scheduled labs and ongoing IV antibiotics through PICC line.             Unresulted Labs Ordered in the Past 30 Days of this Admission     No orders found from 11/18/2022 to 12/19/2022.      These results will be followed up by neurosurgery, primary clinic  provider, ID.    Discharge Disposition   Discharged to home  Condition at discharge: Stable    Hospital Course   57-year-old gentleman with no significant past medical history on no medications presented with left lower extremity paresthesias, weakness, urinary incontinence and urinary retention for 2 to 3 days.  Initially presented to [facility].  MRI of the spine showing extensive dorsal epidural fluid collection extending from thoracolumbar spine down to L2-L3 with mass-effect on the spinal cord/spinal cord compression concerning for abscess.   Transferred emergently to [facility] 12/18/2022 with emergent surgery for drainage of spinal cord compression from epidural fluid collection which caused compression and weakness in his bilateral lower extremities.    Problem list addressed during hospitalization noted in Discharge Diagnoses above and outlined below:    Epidural fluid collection from upper thoracic spine down to the mid lumbar spine with resulting spinal cord compression from T12-L1  + MSSA infection (+ culture in surgical fluid and blood)    Acute blood loss anemia  PICC line  Reported progressive 6-day complaints of back pain with progressive difficulty with ambulation, falls and weakness/sensory changes of his lower extremities  MRI showing a large dorsal epidural fluid collection from mid thoracic spine down to the mid lumbar spine with substantial compression at the thoracolumbar junction of the terminal spinal cord (majority between T11-L2)   Initially presented to [facility] and transferred urgently to [facility]  12/18: Emergent surgery: Extensive epidural fluid collection extending from the upper thoracic spine down to the mid lumbar spine, most prominent with significant spinal cord compression from T12-L1:  (Per operative report description of cloudy, slightly thickened pus) culture and sent for Gram stain  ID consulted, MSSA infection.  On Vanco and Zosyn on admission>> Changed to  nafcillin; on 12/18 blood cultures 1 of 2 positive staph aureus (MSSA); on 12/18 epidural space culture Staph aureus (MSSA)   12/20 ECHO with normal EF 70%, no wall motion abnormality, hyperdynamic right ventricle; MRI 12/22 thoracic spine: Redemonstration of large dorsal epidural abscess.  Decreased thickness on the inferior extent but increased in his superior extent measuring up to 9 mm in maximal thickness at T3-T4 (entire collection not included); follow-up MRI C-spine, L-spine 12/23/22, see report, reviewed by neurosurgery; no plans for repeat surgery per neurosurgery as of 12/24 (see note 12/24);   6 weeks IV antibiotics planned per ID, until 01/21/2023, possibly longer w/ close monitoring; per ID, will need short interval follow-up MRI to ensure resolution of abscess (note 12/24); ID f/u in 3 weeks, after discharge with schedule labs as ordered; Neurosurgery clinic follow-up as arranged;      Left knee and bilateral ankle pain, possibly due to acute gout, resolved  12/22 orthopedics consulted, symptoms for 3 to 4 days; mild to moderate swelling of the left knee and bilateral ankles.  No erythema or warmth.  WBC normal.  By report patient has a history of gout.  12/22 x-ray bilateral ankles showing soft tissue swelling about the ankles.  No fracture or talar dome osteochondral lesion, Calcaneal enthesophytes incidentally noted. Otherwise negative.  12/22 x-ray left knee: Minimal patellofemoral osteoarthrosis. There is a moderate amount of calcification anterior to the patella, likely from old trauma or long-standing inflammation. This appears well marginated and likely chronic; Orthopedic surgery: no aspiration needed, possible gout    Stable, monitored, follow-up with outpatient provider if recurrent, avoid NSAID's, discussed with patient     Diarrhea, resolved  Most likely secondary to nafcillin  12/22 C.dificile negative   - Imodium prn     Urinary retention, recurrent  S/p Dacosta catheter replacement  12/26, needs Urology Clinic f/u    Urinary retention likely secondary abscess, surgery, anesthesia, pain medication, inactivity    Flomax started 12/25    Voiding trial 12/26, recurrent urinary retention, Dacosta replaced; plan for outpatient Urology Clinic f/u in ~7 to 10 days with voiding trial at that time     Acute kidney injury, VANGIE, resolved  Large amount of urinary retention on admission  Resolved with fluids and Dacosta  Suspected secondary to urinary retention  Creatinine 0.66 >> 0.99    monitored     Hyponatremia, resolved   Serum sodium 137 12/25/22    monitored     LE Edema, stable/resolved  Potentially from fluid retention  Venous dopplers negative on 12/21 with the exception of a left Baker's cyst     Hypokalemia, resolved   Potassium 3.7 12/26, normal  monitor, replace prn    Patient will call or seek medical attention if worsening pain, fever, new neurologic deficits or any worrisome signs or symptoms develop after discharge.  Patient agrees with plan as outlined and will follow up as recommended with neurosurgery infectious disease, and as outlined in Dismissal Summary.    Consultations This Hospital Stay   PHARMACY TO [facility] VANC  INFECTIOUS DISEASES IP CONSULT  PHYSICAL THERAPY ADULT IP CONSULT  OCCUPATIONAL THERAPY ADULT IP CONSULT  CARE MANAGEMENT / SOCIAL WORK IP CONSULT  UROLOGY IP CONSULT  VASCULAR ACCESS ADULT IP CONSULT  VASCULAR ACCESS ADULT IP CONSULT  ORTHOPEDIC SURGERY IP CONSULT    Code Status   Full Code    Time Spent on this Encounter   I, Nate Beckett MD, personally saw the patient today and spent greater than 30 minutes discharging this patient.       Nate Beckett MD [facility]  [address] [facility] [address]  Phone: [phone]  Fax: [phone]  ______________________________________________________________________    Physical Exam   Temp: 98.4  F (36.9  C) Temp src: Oral BP: (!) 163/109 Pulse: 72   Resp: 16 SpO2: 99  % O2 Device: None (Room air)    Vitals:    12/25/22 0635 12/26/22 0711 12/27/22 0700   Weight: 95.1 kg (209 lb 10.5 oz) 95.5 kg (210 lb 8.6 oz) 94.8 kg (208 lb 15.9 oz)       GENERAL awake and alert, appearing comfortable, sitting up, ready for discharge home  HEENT no conjunctival injection or jaundice  LUNGS moderate inspiratory effort, no wheezes or crackles  HEART S1, S2 regular rate and rhythm; no rubs or gallops  ABDOMEN soft, nontender to palpation; no guarding, rebound, or rigidity  MUSCULOSKELETAL extremities without edema  SKIN warm and dry  NEURO moves upper and lower extremities spontaneously and to command  MENTAL STATUS normal, answering questions and following simple commands    Primary Care Physician   Physician No Ref-Primary    Discharge Orders      Follow Up ([facility])    Dacosta replaced on 12/26/22.  Outpatient follow-up at Urology Clinic in 7 to 10 days, please call [phone] to schedule appointment.     [facility]  [address] [place] [address]. [phone]  You may call [phone] with any questions or concerns.     Dacosta catheter    To straight gravity drainage. Change catheter every 2 weeks and PRN for leaking or decreased urine output with signs of bladder distention. DO NOT change catheter without a specific Provider order IF diagnosis of benign prostatic hypertrophy (BPH), neurogenic bladder, or other urological conditions        Urology Clinic, please call [phone] to schedule appointment.     [facility]  [address] [place] [address]. [phone]  You may call [phone] with any questions or concerns.     Reason for your hospital stay    MSSA bacteremia (bacteria in the blood), cervical and thoracolumbar spinal epidural abscess from C4 down to T4-T5 down to L2-L3  with spinal cord compression, undergoing surgical debridement with neurosurgery     Follow-up and recommended labs and tests     Follow up with  primary care provider, Physician No Ref-Primary, within 7 days to evaluate treatment change, to evaluate after surgery, medication review, labs, and for hospital follow-up.    Follow-up with Neurosurgery as arranged on discharge.    Follow-up with Infectious Disease (ID) as arranged on discharge with scheduled labs and ongoing IV antibiotics through PICC line.     Activity    Your activity upon discharge: activity as tolerated and recommended by therapy team in hospital, advance slowly     Discharge Instructions    PICC line care per hospital routine, educate patient on discharge.  Dacosta catheter in place, educate patient on discharge and confirm Urology Clinic follow-up.  Review any needs for home care services (nursing, PT, OT) prior to discharge and contact social work if any services desired.     Diet    Follow this diet upon discharge: Orders Placed This Encounter      Regular Diet Adult       Significant Results and Procedures   Most Recent 3 CBC's:Recent Labs   Lab Test 12/26/22  0701 12/25/22  0650 12/22/22  0727 12/19/22  0804   WBC  --  7.9 8.8 13.4*   HGB 9.7* 9.5* 10.2* 11.6*   MCV  --  101* 99 99   PLT  --  399 350 261     Most Recent 3 BMP's:Recent Labs   Lab Test 12/26/22  0701 12/25/22  0650 12/23/22  0621 12/22/22  1747 12/22/22  0727 12/20/22  0835   NA  --  137  --   --  136 135   POTASSIUM 3.7 3.6 3.5   < > 3.0* 3.9  3.8   CHLORIDE  --  104  --   --  101 102   CO2  --  27  --   --  26 23   BUN  --  8  --   --  10 10   CR  --  0.94  --   --  0.99 0.67  0.66   ANIONGAP  --  6  --   --  9 10   LINA  --  8.3*  --   --  8.4* 8.1*   GLC  --  96  --   --  123* 107*    < > = values in this interval not displayed.     Most Recent 2 LFT's:Recent Labs   Lab Test 10/27/21  1204 02/02/21  1111   AST 48* 56*   ALT 72* 83*   ALKPHOS 55 69   BILITOTAL 0.8 1.1   ,   Results for orders placed or performed during the hospital encounter of 12/18/22   XR Surgery ALEXIS L/T 5 Min Fluoro w Stills    Narrative    EXAM:  XR SURGERY ALEXIS FLUORO LESS THAN 5 MIN W STILLS  LOCATION: [facility]  DATE/TIME: 12/18/2022 5:02 PM    INDICATION: thoracic lumbar decompression, fluoro time 37 seconds, 3 images  COMPARISON: None.  TECHNIQUE: Exam performed by surgeon.    FINDINGS:    FLUOROSCOPIC TIME: .6  NUMBER OF IMAGES: 3      Impression    IMPRESSION:  1.  Fluoroscopy utilized for thoracic lumbar decompression, please see operative note.   US Lower Extremity Venous Duplex Bilateral    Narrative    EXAM: US LOWER EXTREMITY VENOUS DUPLEX BILATERAL  LOCATION: [facility]  DATE/TIME: 12/21/2022 7:49 PM    INDICATION: Bilateral leg swelling  COMPARISON: None.  TECHNIQUE: Venous Duplex ultrasound of bilateral lower extremities with and without compression, augmentation and duplex. Color flow and spectral Doppler with waveform analysis performed.    FINDINGS: Exam includes the common femoral, femoral, popliteal veins as well as segmentally visualized deep calf veins and greater saphenous vein.     RIGHT: No deep vein thrombosis. No superficial thrombophlebitis. No popliteal cyst.    LEFT: No deep vein thrombosis. No superficial thrombophlebitis. Mildly complicated popliteal fossa cyst measuring 4.3 x 2.0 x 1.0 cm      Impression    IMPRESSION:  1.  No deep venous thrombosis in the bilateral lower extremities.   MR Thoracic Spine w/o & w Contrast    Narrative    EXAM: MR THORACIC SPINE W/O and W CONTRAST  LOCATION: [facility]  DATE/TIME: 12/22/2022 10:23 PM    INDICATION: POD 4 thoracic laminectomy for epidural abscess washout.  COMPARISON: 12/18/2022  CONTRAST: 9 mL GADAVIST  TECHNIQUE: Routine Thoracic Spine MRI without and with IV contrast.    FINDINGS:   Stable alignment of vertebral body heights. Redemonstrated likely recent compression deformities/Schmorl's nodes at T7 and T12 with associated edema. There is marked edema of the left T9-T10 facet joint with associated  surrounding enhancement, with mild   edema of the right T9-T10 facet joint. Findings are suspicious for septic facet arthropathy. Since the prior exam, the large dorsal epidural collection has decreased inferiorly, but has increased in its superior extent. The collection extends from at   least C5-C6 through T12-L1, measuring up to 9 mm in maximal thickness at T3-T4. Inferiorly the collection measures up to 8 mm in thickness at the level of T9, previously 11 mm in maximal thickness. The entirety of the collection is not included in the   field of view. No definite cord signal abnormality. Stable degenerative changes.    Left-sided dependent atelectasis/consolidation. Bilateral pleural effusions.      Impression    IMPRESSION:  1.  Redemonstrated large dorsal epidural abscess, which has decreased in thickness along the inferior extent, but increased in its superior extent, measuring up to 9 mm in maximal thickness at T3-T4. The entirety of the collection is not included in the   field of view. Consider further evaluation with dedicated cervical and lumbar spine MRI with and without IV contrast.  2.  Edema and enhancement of the left greater than right T9-T10 facet joints, potentially reflecting septic facet arthritis.  3.  No definite cord signal abnormality in the thoracic spine.    Findings were discussed with Dr. Chatman at 2304 hours on 12/22/2022.   XR Ankle Bilateral G/E 3 Views    Narrative    ANKLE BILATERAL THREE OR MORE VIEWS  12/22/2022 3:59 PM     HISTORY:  Bilateral ankle pain and swelling. WBing if possible.    COMPARISON: None.      Impression    IMPRESSION: Soft tissue swelling about the ankles. No fracture or  talar dome osteochondral lesion. Calcaneal enthesophytes incidentally  noted. Otherwise negative.    JJ ARANGO MD         SYSTEM ID:  [id number]   XR Knee Left 3 Views    Narrative    LEFT KNEE THREE VIEWS   12/22/2022 4:00 PM     HISTORY:  Left knee pain and swelling    COMPARISON:  None.      Impression    IMPRESSION: Minimal patellofemoral osteoarthrosis. There is a moderate  amount of calcification anterior to the patella, likely from old  trauma or long-standing inflammation. This appears well marginated and  likely chronic.    JJ ARANGO MD         SYSTEM ID:  [id number]   MR Cervical Spine w/o & w Contrast    Narrative    For Patients: As a result of the 21st Century Cures Act, medical imaging exams and procedure reports are released immediately into your electronic medical record. You may view this report before your referring provider. If you have questions, please   contact your health care provider.    EXAM: MR CERVICAL SPINE W/O and W CONTRAST, MR LUMBAR SPINE W/O and W CONTRAST  LOCATION: [facility]  DATE/TIME: 12/23/2022 10:44 PM    INDICATION: Neck pain; hx Spine surgery; history of sepsis. Discitis osteomyelitis.  COMPARISON: MRI of thoracic spine 12/22/2022  CONTRAST: 9mL Gadavist   TECHNIQUE:   1) Routine Cervical Spine MRI without and with IV contrast.  2) Routine Lumbar Spine MRI without and with IV contrast.    FINDINGS:  CERVICAL SPINE:   Alignment: Normal.   Discitis osteomyelitis: No evidence of discitis osteomyelitis. Extensive longitudinal posterior epidural abscess extending superiorly from the thoracic spine up to upper C4.  Vertebral height: Preserved.   Marrow signal: Normal.  Spinal cord: No abnormal signal.  Extraspinal: No extraspinal abnormality.     Craniovertebral junction: Normal.    C1-2: Normal    C2-3: Normal height of disc. Desiccated disc. Small central disc osteophyte. No uncovertebral hypertrophy. Normal facet joints. No central spinal stenosis, no right foramen stenosis, no left foramen stenosis.    C3-4: Slight loss of disc height. Desiccated disc. Shallow disc osteophyte complex. No uncovertebral hypertrophy. Mild bilateral facet hypertrophy. No central spinal stenosis, no right foramen stenosis, no left foramen  stenosis.    C4-5:  Normal height of disc. Desiccated disc. No disc herniation. No uncovertebral hypertrophy. Mild bilateral facet hypertrophy. No central spinal stenosis, no right foramen stenosis, no left foramen stenosis.    C5-6: Slight loss of disc height. Desiccated disc. Broad based disc osteophyte complex. No uncovertebral hypertrophy. Mild bilateral facet hypertrophy. Severe central spinal stenosis, moderate right foramen stenosis, moderate left foramen stenosis.    C6-7: Moderate loss of disc height. Desiccated disc. Broad based disc osteophyte complex. No uncovertebral hypertrophy. Mild bilateral facet hypertrophy. Moderate central spinal stenosis, moderate right foramen stenosis, mild left foramen stenosis.    C7-T1: Normal height of disc. Desiccated disc. No disc herniation. No uncovertebral hypertrophy. Normal facet joints. No central spinal stenosis, no right foramen stenosis, no left foramen stenosis.    LUMBAR SPINE:   Nomenclature based on 5 lumbar type vertebral bodies.   Status post left laminectomy at L1 with posterior decompression and drainage of a dorsal epidural abscess initially seen on the MRI of 12/18/2022. There is a 2 x 1.4 x 2 cm (AP, TR, CC) fluid collection with rim enhancement in the laminectomy bed that   presumably represents a small abscess. It communicates with a more superficial rim-enhancing collection within the subcutaneous fat at this level that measures 1.5 x 2 cm. There is a thin residual dorsal epidural abscess from mid L1 to upper L2 measuring   3 x 10 mm (AP, TR) with craniocaudal extent of 2.5 cm. The endosteal epidural abscess visualized posterior to T12 that ascends into the thoracic spine, reported separately on MRI of thoracic spine 12/22/2022.  Normal coronal and sagittal alignment.   Moderate compression deformity of superior endplate T12 and L4 secondary to prominent Schmorl's node. Otherwise vertebral body height is preserved.  Mild bone marrow edema and fatty  reactive changes within T12 around the compression deformity, similar to prior. Prominent intraosseous hemangioma at L3. Smaller intraosseous hemangioma at L5.  Normal distal spinal cord and cauda equina with conus medullaris at mid L2.   No extra spinal abnormalities. Unremarkable visualized bony pelvis.    T12-L1: Normal height of disc. Mild disc desiccation without herniation. Normal facet joints. No recess stenosis. Mild central spinal stenosis secondary to dorsal epidural abscess, no right foramen stenosis, no left foramen stenosis.    L1-L2: Normal height of disc. Mild disc desiccation without herniation. Normal facet joints. No recess stenosis. Mild central spinal stenosis secondary to dorsal epidural abscess, no right foramen stenosis, no left foramen stenosis.    L2-L3:  Normal height of disc. Normal disc signal without herniation. Mild bilateral facet hypertrophy. No recess stenosis. No central spinal stenosis, no right foramen stenosis, no left foramen stenosis.    L3-L4: Slight loss of disc height. Desiccated disc with shallow disc bulge. Mild bilateral facet hypertrophy. No recess stenosis. No central spinal stenosis, no right foramen stenosis, no left foramen stenosis.    L4-L5: Normal height of disc. Mild disc desiccation with mild circumferential disc bulge. Mild bilateral facet hypertrophy. No recess stenosis. No central spinal stenosis, no right foramen stenosis, no left foramen stenosis.    L5-S1: Normal height of disc. Desiccated disc without herniation. Normal facet joints. No recess stenosis. No central spinal stenosis, no right foramen stenosis, no left foramen stenosis.      Impression    IMPRESSION:  CERVICAL SPINE MRI:  1.  There is a longitudinally extensive dorsal epidural abscess extending into the cervical spine from the thoracic spine. Superior extent is upper C4.  2.  At C5-C6 there is severe central spinal stenosis.  3.  At C4-C5 there is moderate central spinal stenosis.    LUMBAR  SPINE MRI:  1.  Status post drainage of a large dorsal epidural collection at L1, with a thin (3 mm AP dimension) residual dorsal epidural abscess at L1-L2. It measures 25 mm craniocaudally.  2.  There is a 2 x 2 cm abscess in the laminectomy bed at L1. It communicates with a subcutaneous abscess at this level measuring 2 x 1.5 cm.     MR Lumbar Spine w/o & w Contrast    Narrative    For Patients: As a result of the 21st Century Cures Act, medical imaging exams and procedure reports are released immediately into your electronic medical record. You may view this report before your referring provider. If you have questions, please   contact your health care provider.    EXAM: MR CERVICAL SPINE W/O and W CONTRAST, MR LUMBAR SPINE W/O and W CONTRAST  LOCATION: [facility]  DATE/TIME: 12/23/2022 10:44 PM    INDICATION: Neck pain; hx Spine surgery; history of sepsis. Discitis osteomyelitis.  COMPARISON: MRI of thoracic spine 12/22/2022  CONTRAST: 9mL Gadavist   TECHNIQUE:   1) Routine Cervical Spine MRI without and with IV contrast.  2) Routine Lumbar Spine MRI without and with IV contrast.    FINDINGS:  CERVICAL SPINE:   Alignment: Normal.   Discitis osteomyelitis: No evidence of discitis osteomyelitis. Extensive longitudinal posterior epidural abscess extending superiorly from the thoracic spine up to upper C4.  Vertebral height: Preserved.   Marrow signal: Normal.  Spinal cord: No abnormal signal.  Extraspinal: No extraspinal abnormality.     Craniovertebral junction: Normal.    C1-2: Normal    C2-3: Normal height of disc. Desiccated disc. Small central disc osteophyte. No uncovertebral hypertrophy. Normal facet joints. No central spinal stenosis, no right foramen stenosis, no left foramen stenosis.    C3-4: Slight loss of disc height. Desiccated disc. Shallow disc osteophyte complex. No uncovertebral hypertrophy. Mild bilateral facet hypertrophy. No central spinal stenosis, no right foramen  stenosis, no left foramen stenosis.    C4-5:  Normal height of disc. Desiccated disc. No disc herniation. No uncovertebral hypertrophy. Mild bilateral facet hypertrophy. No central spinal stenosis, no right foramen stenosis, no left foramen stenosis.    C5-6: Slight loss of disc height. Desiccated disc. Broad based disc osteophyte complex. No uncovertebral hypertrophy. Mild bilateral facet hypertrophy. Severe central spinal stenosis, moderate right foramen stenosis, moderate left foramen stenosis.    C6-7: Moderate loss of disc height. Desiccated disc. Broad based disc osteophyte complex. No uncovertebral hypertrophy. Mild bilateral facet hypertrophy. Moderate central spinal stenosis, moderate right foramen stenosis, mild left foramen stenosis.    C7-T1: Normal height of disc. Desiccated disc. No disc herniation. No uncovertebral hypertrophy. Normal facet joints. No central spinal stenosis, no right foramen stenosis, no left foramen stenosis.    LUMBAR SPINE:   Nomenclature based on 5 lumbar type vertebral bodies.   Status post left laminectomy at L1 with posterior decompression and drainage of a dorsal epidural abscess initially seen on the MRI of 12/18/2022. There is a 2 x 1.4 x 2 cm (AP, TR, CC) fluid collection with rim enhancement in the laminectomy bed that   presumably represents a small abscess. It communicates with a more superficial rim-enhancing collection within the subcutaneous fat at this level that measures 1.5 x 2 cm. There is a thin residual dorsal epidural abscess from mid L1 to upper L2 measuring   3 x 10 mm (AP, TR) with craniocaudal extent of 2.5 cm. The endosteal epidural abscess visualized posterior to T12 that ascends into the thoracic spine, reported separately on MRI of thoracic spine 12/22/2022.  Normal coronal and sagittal alignment.   Moderate compression deformity of superior endplate T12 and L4 secondary to prominent Schmorl's node. Otherwise vertebral body height is preserved.  Mild  bone marrow edema and fatty reactive changes within T12 around the compression deformity, similar to prior. Prominent intraosseous hemangioma at L3. Smaller intraosseous hemangioma at L5.  Normal distal spinal cord and cauda equina with conus medullaris at mid L2.   No extra spinal abnormalities. Unremarkable visualized bony pelvis.    T12-L1: Normal height of disc. Mild disc desiccation without herniation. Normal facet joints. No recess stenosis. Mild central spinal stenosis secondary to dorsal epidural abscess, no right foramen stenosis, no left foramen stenosis.    L1-L2: Normal height of disc. Mild disc desiccation without herniation. Normal facet joints. No recess stenosis. Mild central spinal stenosis secondary to dorsal epidural abscess, no right foramen stenosis, no left foramen stenosis.    L2-L3:  Normal height of disc. Normal disc signal without herniation. Mild bilateral facet hypertrophy. No recess stenosis. No central spinal stenosis, no right foramen stenosis, no left foramen stenosis.    L3-L4: Slight loss of disc height. Desiccated disc with shallow disc bulge. Mild bilateral facet hypertrophy. No recess stenosis. No central spinal stenosis, no right foramen stenosis, no left foramen stenosis.    L4-L5: Normal height of disc. Mild disc desiccation with mild circumferential disc bulge. Mild bilateral facet hypertrophy. No recess stenosis. No central spinal stenosis, no right foramen stenosis, no left foramen stenosis.    L5-S1: Normal height of disc. Desiccated disc without herniation. Normal facet joints. No recess stenosis. No central spinal stenosis, no right foramen stenosis, no left foramen stenosis.      Impression    IMPRESSION:  CERVICAL SPINE MRI:  1.  There is a longitudinally extensive dorsal epidural abscess extending into the cervical spine from the thoracic spine. Superior extent is upper C4.  2.  At C5-C6 there is severe central spinal stenosis.  3.  At C4-C5 there is moderate central  spinal stenosis.    LUMBAR SPINE MRI:  1.  Status post drainage of a large dorsal epidural collection at L1, with a thin (3 mm AP dimension) residual dorsal epidural abscess at L1-L2. It measures 25 mm craniocaudally.  2.  There is a 2 x 2 cm abscess in the laminectomy bed at L1. It communicates with a subcutaneous abscess at this level measuring 2 x 1.5 cm.     Echocardiogram Complete     Value    LVEF  >70%    [facility]    [id number]  Critical access hospital  [id number]  [id number]^MARGARITO^REEMA     [facility]  Echocardiography Laboratory  [address] [place] [id number]     Name: FLOR MEYER  MRN: [id number]  : 1965  Study Date: 2022 03:01 PM  Age: 57 yrs  Gender: Male  Patient Location: [facility]  Reason For Study: Endocarditis  Ordering Physician: REEMA PIMENTEL  Referring Physician: BROOKE LUU  Performed By: Keya Dahl     BSA: 2.1 m2  Height: 72 in  Weight: 198 lb  HR: 98  BP: 158/98 mmHg  ______________________________________________________________________________  Procedure  Complete Portable Echo Adult. Optison (NDC #0404-6515) given intravenously.  ______________________________________________________________________________  Interpretation Summary     There is normal left ventricular wall thickness.  Hyperdynamic left ventricular function  The visual ejection fraction is >70%.  Left ventricular diastolic function is normal.  No regional wall motion abnormalities noted.  Hyperdynamic right ventricular function  No significant valve disease.     There is no comparison study available.  ______________________________________________________________________________  Left Ventricle  The left ventricle is normal in size. There is normal left ventricular wall  thickness. Hyperdynamic left ventricular function. The visual ejection  fraction is >70%. Left ventricular diastolic function is normal. No regional  wall motion abnormalities noted.     Right Ventricle  The right ventricle is  normal size. Hyperdynamic right ventricular function.     Atria  Normal left atrial size. Right atrial size is normal. There is no color  Doppler evidence of an atrial shunt.     Mitral Valve  The mitral valve leaflets appear normal. There is no evidence of stenosis,  fluttering, or prolapse. There is trace mitral regurgitation. There is no  mitral valve stenosis.     Tricuspid Valve  Normal tricuspid valve. There is trace tricuspid regurgitation. Right  ventricular systolic pressure could not be approximated due to inadequate  tricuspid regurgitation.     Aortic Valve  There is mild trileaflet aortic sclerosis. No aortic regurgitation is present.  No aortic stenosis is present.     Pulmonic Valve  The pulmonic valve is not well visualized. There is trace pulmonic valvular  regurgitation.     Vessels  The aortic root is normal size. Normal size ascending aorta. The inferior vena  cava was normal in size with preserved respiratory variability.     Pericardium  There is no pericardial effusion.     Rhythm  Sinus rhythm was noted.  ______________________________________________________________________________  MMode/2D Measurements & Calculations  IVSd: 0.89 cm     LVIDd: 4.5 cm  LVIDs: 2.4 cm  LVPWd: 0.94 cm  LVPWs: 0.64 cm  FS: 47.6 %  LV mass(C)d: 137.8 grams  LV mass(C)dI: 64.9 grams/m2  Ao root diam: 3.1 cm  LA dimension: 3.2 cm  asc Aorta Diam: 3.3 cm  LA/Ao: 1.0  LVOT diam: 2.0 cm  LVOT area: 3.3 cm2  LA Volume (BP): 51.1 ml  LA Volume Index (BP): 24.1 ml/m2  RWT: 0.42     Doppler Measurements & Calculations  MV E max seth: 91.3 cm/sec  MV A max seth: 94.7 cm/sec  MV E/A: 0.96  MV dec time: 0.18 sec  LV V1 max PG: 10.2 mmHg  LV V1 max: 160.0 cm/sec  LV V1 VTI: 25.3 cm  SV(LVOT): 82.3 ml  SI(LVOT): 38.8 ml/m2  PA acc time: 0.16 sec  E/E' av.1  Lateral E/e': 6.9  Medial E/e': 9.2     ______________________________________________________________________________  Report approved by: Dr Ashlee Nielsen 2022  04:35 PM               Discharge Medications   Current Discharge Medication List      START taking these medications    Details   acetaminophen (TYLENOL) 325 MG tablet Take 2 tablets (650 mg) by mouth every 6 hours as needed for other or mild pain (For optimal non-opioid multimodal pain management to improve pain control.)    Associated Diagnoses: Pain      nafcillin 2 GM vial Inject 2 g into the vein every 4 hours for 38 days  Qty: 1824 mL, Refills: 0    Comments: Check weekly CBC/diff, creatinine, AST, CRP -results to Dr Valadez @ [facility] consultants  Associated Diagnoses: Epidural abscess; Staphylococcus aureus bacteremia      oxyCODONE (ROXICODONE) 5 MG tablet Take 1-2 tablets (5-10 mg) by mouth every 6 hours as needed for moderate pain (4-6) or severe pain (7-10) (5 mg for moderate pain, 10 mg for severe pain) Future refills if needed directed to primary clinic provider or primary neurosurgeon  Qty: 25 tablet, Refills: 0    Associated Diagnoses: Pain; Spinal cord compression (H)      senna-docusate (SENOKOT-S/PERICOLACE) 8.6-50 MG tablet Take 1 tablet by mouth 2 times daily as needed for constipation  Qty: 60 tablet, Refills: 0    Associated Diagnoses: Constipation, unspecified constipation type      tamsulosin (FLOMAX) 0.4 MG capsule Take 1 capsule (0.4 mg) by mouth daily Future refills if needed directed to primary clinic provider or Urology Clinic  Qty: 30 capsule, Refills: 0    Associated Diagnoses: Urinary retention         CONTINUE these medications which have NOT CHANGED    Details   fish oil-omega-3 fatty acids 1000 MG capsule Take 2 capsules by mouth daily.  Qty: 120 capsule, Refills: 3    Associated Diagnoses: Hypertriglyceridemia      MULTIVITAMIN TABS   OR 1 TABLET DAILY         STOP taking these medications       HYDROcodone-acetaminophen (NORCO) 5-325 MG tablet Comments:   Reason for Stopping:             Allergies   Allergies   Allergen Reactions     Walnuts [Nuts]

## 2022-12-27 NOTE — PLAN OF CARE
Physical Therapy Discharge Summary    Reason for therapy discharge:    All goals and outcomes met, no further needs identified.    Progress towards therapy goal(s). See goals on Care Plan in [facility] electronic health record for goal details.  Goals met    Therapy recommendation(s):    Continued therapy is recommended.  Rationale/Recommendations:  continue w/ home PT. Pt independent ambulating w/ 4WW, needs CGA on stairs. Has family at home to support as needed.

## 2022-12-27 NOTE — PROGRESS NOTES
Afebrile. Up with SBA gait belt and walker. CMS intact. Taking tylenol for pain. Unable to void. Straight cathed x 1. 500cc out at 1230. Up to BR at 1600 attempting to void-no success. Bladder scanned for 800cc. Dacosta inserted 950 ml returned. Ice to low back. One loose stool. PICC intact. Small dressing to mid back clean and dry-no drainage.

## 2022-12-28 ENCOUNTER — PATIENT OUTREACH (OUTPATIENT)
Dept: CARE COORDINATION | Facility: CLINIC | Age: 57
End: 2022-12-28

## 2022-12-28 NOTE — PROGRESS NOTES
[facility]    Background: Transitional Care Management program identified per system criteria and reviewed by [facility] team for possible outreach.    Assessment: Upon chart review, CCRC Team member will not proceed with patient outreach related to this episode of Transitional Care Management program due to reason below:    Non-[facility] TCU: Patient is not established within [facility] Primary Care and CCRC team member noted patient discharged to TCU/ARU/LTACH.    Plan: Transitional Care Management episode addressed appropriately per reason noted above.      Eveline Turner  Community Health Worker  [facility], [facility]  [phone]      *Connected Care Resource Team does NOT follow patient ongoing. Referrals are identified based on internal discharge reports and the outreach is to ensure patient has an understanding of their discharge instructions.

## 2022-12-30 ENCOUNTER — PATIENT OUTREACH (OUTPATIENT)
Dept: PEDIATRICS | Facility: CLINIC | Age: 57
End: 2022-12-30

## 2022-12-30 NOTE — TELEPHONE ENCOUNTER
"- Patient admitted to [facility] on 12/18/2022 for spinal cord compression   - Patient discharged home on 12/27/2022   - Discharge instructions from Dr. Davis state, \"Follow up with primary care provider, Physician No Ref-Primary, within 7 days to evaluate treatment change, to evaluate after surgery, medication review, labs, and for hospital follow-up.\"     MA-TC, please assist the patient in scheduling a hospital follow-up appointment.     Christina HARRIS RN   Patient Advocate Liaison (PAL)  [facility]   [phone]   "

## 2022-12-31 ENCOUNTER — APPOINTMENT (OUTPATIENT)
Dept: MRI IMAGING | Facility: CLINIC | Age: 57
DRG: 095 | End: 2022-12-31
Attending: EMERGENCY MEDICINE
Payer: COMMERCIAL

## 2022-12-31 ENCOUNTER — HOSPITAL ENCOUNTER (INPATIENT)
Facility: CLINIC | Age: 57
LOS: 3 days | Discharge: HOME | DRG: 095 | End: 2023-01-05
Attending: EMERGENCY MEDICINE | Admitting: HOSPITALIST
Payer: COMMERCIAL

## 2022-12-31 DIAGNOSIS — I10 BENIGN ESSENTIAL HYPERTENSION: Primary | ICD-10-CM

## 2022-12-31 DIAGNOSIS — G06.1 SPINAL EPIDURAL ABSCESS: ICD-10-CM

## 2022-12-31 DIAGNOSIS — M62.838 MUSCLE SPASM: ICD-10-CM

## 2022-12-31 DIAGNOSIS — G95.20 SPINAL CORD COMPRESSION (H): ICD-10-CM

## 2022-12-31 DIAGNOSIS — M54.6 ACUTE BILATERAL THORACIC BACK PAIN: ICD-10-CM

## 2022-12-31 LAB
ALBUMIN SERPL-MCNC: 2.5 G/DL (ref 3.4–5)
ALP SERPL-CCNC: 61 U/L (ref 40–150)
ALT SERPL W P-5'-P-CCNC: 30 U/L (ref 0–70)
ANION GAP SERPL CALCULATED.3IONS-SCNC: 4 MMOL/L (ref 3–14)
APTT PPP: 33 SECONDS (ref 22–38)
AST SERPL W P-5'-P-CCNC: 30 U/L (ref 0–45)
BASOPHILS # BLD AUTO: 0 10E3/UL (ref 0–0.2)
BASOPHILS NFR BLD AUTO: 1 %
BILIRUB DIRECT SERPL-MCNC: 0.2 MG/DL (ref 0–0.2)
BILIRUB SERPL-MCNC: 1.3 MG/DL (ref 0.2–1.3)
BUN SERPL-MCNC: 9 MG/DL (ref 7–30)
CALCIUM SERPL-MCNC: 9.1 MG/DL (ref 8.5–10.1)
CHLORIDE BLD-SCNC: 107 MMOL/L (ref 94–109)
CO2 SERPL-SCNC: 29 MMOL/L (ref 20–32)
CREAT SERPL-MCNC: 1.05 MG/DL (ref 0.66–1.25)
CRP SERPL-MCNC: 34.4 MG/L (ref 0–8)
EOSINOPHIL # BLD AUTO: 0.1 10E3/UL (ref 0–0.7)
EOSINOPHIL NFR BLD AUTO: 1 %
ERYTHROCYTE [DISTWIDTH] IN BLOOD BY AUTOMATED COUNT: 13.2 % (ref 10–15)
ERYTHROCYTE [SEDIMENTATION RATE] IN BLOOD BY WESTERGREN METHOD: 75 MM/HR (ref 0–20)
GFR SERPL CREATININE-BSD FRML MDRD: 82 ML/MIN/1.73M2
GLUCOSE BLD-MCNC: 142 MG/DL (ref 70–99)
GLUCOSE BLDC GLUCOMTR-MCNC: 102 MG/DL (ref 70–99)
GLUCOSE BLDC GLUCOMTR-MCNC: 65 MG/DL (ref 70–99)
HCT VFR BLD AUTO: 28.9 % (ref 40–53)
HGB BLD-MCNC: 9.6 G/DL (ref 13.3–17.7)
IMM GRANULOCYTES # BLD: 0 10E3/UL
IMM GRANULOCYTES NFR BLD: 0 %
INR PPP: 1.08 (ref 0.85–1.15)
LYMPHOCYTES # BLD AUTO: 0.7 10E3/UL (ref 0.8–5.3)
LYMPHOCYTES NFR BLD AUTO: 17 %
MCH RBC QN AUTO: 32.9 PG (ref 26.5–33)
MCHC RBC AUTO-ENTMCNC: 33.2 G/DL (ref 31.5–36.5)
MCV RBC AUTO: 99 FL (ref 78–100)
MONOCYTES # BLD AUTO: 0.4 10E3/UL (ref 0–1.3)
MONOCYTES NFR BLD AUTO: 10 %
NEUTROPHILS # BLD AUTO: 3 10E3/UL (ref 1.6–8.3)
NEUTROPHILS NFR BLD AUTO: 71 %
NRBC # BLD AUTO: 0 10E3/UL
NRBC BLD AUTO-RTO: 0 /100
PLATELET # BLD AUTO: 424 10E3/UL (ref 150–450)
POTASSIUM BLD-SCNC: 3.4 MMOL/L (ref 3.4–5.3)
PROT SERPL-MCNC: 6.8 G/DL (ref 6.8–8.8)
RBC # BLD AUTO: 2.92 10E6/UL (ref 4.4–5.9)
SODIUM SERPL-SCNC: 140 MMOL/L (ref 133–144)
WBC # BLD AUTO: 4.3 10E3/UL (ref 4–11)

## 2022-12-31 PROCEDURE — 82962 GLUCOSE BLOOD TEST: CPT

## 2022-12-31 PROCEDURE — 250N000013 HC RX MED GY IP 250 OP 250 PS 637: Performed by: EMERGENCY MEDICINE

## 2022-12-31 PROCEDURE — 72156 MRI NECK SPINE W/O & W/DYE: CPT

## 2022-12-31 PROCEDURE — G0378 HOSPITAL OBSERVATION PER HR: HCPCS

## 2022-12-31 PROCEDURE — 250N000009 HC RX 250: Performed by: EMERGENCY MEDICINE

## 2022-12-31 PROCEDURE — 250N000013 HC RX MED GY IP 250 OP 250 PS 637: Performed by: PHYSICIAN ASSISTANT

## 2022-12-31 PROCEDURE — 96376 TX/PRO/DX INJ SAME DRUG ADON: CPT

## 2022-12-31 PROCEDURE — 82248 BILIRUBIN DIRECT: CPT | Performed by: HOSPITALIST

## 2022-12-31 PROCEDURE — 85730 THROMBOPLASTIN TIME PARTIAL: CPT | Performed by: EMERGENCY MEDICINE

## 2022-12-31 PROCEDURE — 96374 THER/PROPH/DIAG INJ IV PUSH: CPT

## 2022-12-31 PROCEDURE — 86140 C-REACTIVE PROTEIN: CPT | Performed by: EMERGENCY MEDICINE

## 2022-12-31 PROCEDURE — 36415 COLL VENOUS BLD VENIPUNCTURE: CPT | Performed by: EMERGENCY MEDICINE

## 2022-12-31 PROCEDURE — 999N000040 HC STATISTIC CONSULT NO CHARGE VASC ACCESS

## 2022-12-31 PROCEDURE — A9585 GADOBUTROL INJECTION: HCPCS | Performed by: EMERGENCY MEDICINE

## 2022-12-31 PROCEDURE — 72158 MRI LUMBAR SPINE W/O & W/DYE: CPT

## 2022-12-31 PROCEDURE — 99223 1ST HOSP IP/OBS HIGH 75: CPT | Mod: AI | Performed by: HOSPITALIST

## 2022-12-31 PROCEDURE — 250N000011 HC RX IP 250 OP 636: Performed by: EMERGENCY MEDICINE

## 2022-12-31 PROCEDURE — 96375 TX/PRO/DX INJ NEW DRUG ADDON: CPT

## 2022-12-31 PROCEDURE — 82310 ASSAY OF CALCIUM: CPT | Performed by: EMERGENCY MEDICINE

## 2022-12-31 PROCEDURE — 85610 PROTHROMBIN TIME: CPT | Performed by: EMERGENCY MEDICINE

## 2022-12-31 PROCEDURE — 250N000013 HC RX MED GY IP 250 OP 250 PS 637: Performed by: HOSPITALIST

## 2022-12-31 PROCEDURE — 99024 POSTOP FOLLOW-UP VISIT: CPT | Performed by: NURSE PRACTITIONER

## 2022-12-31 PROCEDURE — 72157 MRI CHEST SPINE W/O & W/DYE: CPT

## 2022-12-31 PROCEDURE — 250N000011 HC RX IP 250 OP 636: Performed by: HOSPITALIST

## 2022-12-31 PROCEDURE — 85025 COMPLETE CBC W/AUTO DIFF WBC: CPT | Performed by: EMERGENCY MEDICINE

## 2022-12-31 PROCEDURE — 85652 RBC SED RATE AUTOMATED: CPT | Performed by: EMERGENCY MEDICINE

## 2022-12-31 PROCEDURE — 99285 EMERGENCY DEPT VISIT HI MDM: CPT | Mod: 25

## 2022-12-31 PROCEDURE — 255N000002 HC RX 255 OP 636: Performed by: EMERGENCY MEDICINE

## 2022-12-31 RX ORDER — MULTIPLE VITAMINS W/ MINERALS TAB 9MG-400MCG
1 TAB ORAL DAILY
Status: DISCONTINUED | OUTPATIENT
Start: 2023-01-01 | End: 2023-01-05 | Stop reason: HOSPADM

## 2022-12-31 RX ORDER — NALOXONE HYDROCHLORIDE 0.4 MG/ML
0.2 INJECTION, SOLUTION INTRAMUSCULAR; INTRAVENOUS; SUBCUTANEOUS
Status: DISCONTINUED | OUTPATIENT
Start: 2022-12-31 | End: 2023-01-05 | Stop reason: HOSPADM

## 2022-12-31 RX ORDER — AMOXICILLIN 250 MG
1 CAPSULE ORAL 2 TIMES DAILY PRN
Status: DISCONTINUED | OUTPATIENT
Start: 2022-12-31 | End: 2023-01-05 | Stop reason: HOSPADM

## 2022-12-31 RX ORDER — ONDANSETRON 2 MG/ML
4 INJECTION INTRAMUSCULAR; INTRAVENOUS EVERY 6 HOURS PRN
Status: DISCONTINUED | OUTPATIENT
Start: 2022-12-31 | End: 2023-01-05 | Stop reason: HOSPADM

## 2022-12-31 RX ORDER — MULTIPLE VITAMINS W/ MINERALS TAB 9MG-400MCG
1 TAB ORAL DAILY
Status: DISCONTINUED | OUTPATIENT
Start: 2023-01-01 | End: 2022-12-31

## 2022-12-31 RX ORDER — ACETAMINOPHEN 325 MG/1
975 TABLET ORAL EVERY 6 HOURS PRN
Status: DISCONTINUED | OUTPATIENT
Start: 2022-12-31 | End: 2023-01-02

## 2022-12-31 RX ORDER — HYDRALAZINE HYDROCHLORIDE 20 MG/ML
10 INJECTION INTRAMUSCULAR; INTRAVENOUS
Status: DISCONTINUED | OUTPATIENT
Start: 2022-12-31 | End: 2023-01-03

## 2022-12-31 RX ORDER — SODIUM CHLORIDE 9 MG/ML
INJECTION, SOLUTION INTRAVENOUS CONTINUOUS
Status: DISCONTINUED | OUTPATIENT
Start: 2023-01-01 | End: 2023-01-01

## 2022-12-31 RX ORDER — HYDROMORPHONE HYDROCHLORIDE 1 MG/ML
0.5 INJECTION, SOLUTION INTRAMUSCULAR; INTRAVENOUS; SUBCUTANEOUS ONCE
Status: COMPLETED | OUTPATIENT
Start: 2022-12-31 | End: 2022-12-31

## 2022-12-31 RX ORDER — METHOCARBAMOL 500 MG/1
500 TABLET, FILM COATED ORAL 4 TIMES DAILY
Status: DISCONTINUED | OUTPATIENT
Start: 2022-12-31 | End: 2023-01-01

## 2022-12-31 RX ORDER — NALOXONE HYDROCHLORIDE 0.4 MG/ML
0.4 INJECTION, SOLUTION INTRAMUSCULAR; INTRAVENOUS; SUBCUTANEOUS
Status: DISCONTINUED | OUTPATIENT
Start: 2022-12-31 | End: 2023-01-05 | Stop reason: HOSPADM

## 2022-12-31 RX ORDER — HYDROMORPHONE HYDROCHLORIDE 1 MG/ML
0.5 INJECTION, SOLUTION INTRAMUSCULAR; INTRAVENOUS; SUBCUTANEOUS
Status: DISCONTINUED | OUTPATIENT
Start: 2022-12-31 | End: 2022-12-31

## 2022-12-31 RX ORDER — GADOBUTROL 604.72 MG/ML
9 INJECTION INTRAVENOUS ONCE
Status: COMPLETED | OUTPATIENT
Start: 2022-12-31 | End: 2022-12-31

## 2022-12-31 RX ORDER — TAMSULOSIN HYDROCHLORIDE 0.4 MG/1
0.4 CAPSULE ORAL DAILY
Status: DISCONTINUED | OUTPATIENT
Start: 2022-12-31 | End: 2023-01-05 | Stop reason: HOSPADM

## 2022-12-31 RX ORDER — NAFCILLIN SODIUM 2 G/8ML
2 INJECTION, POWDER, FOR SOLUTION INTRAMUSCULAR; INTRAVENOUS EVERY 4 HOURS
Status: DISCONTINUED | OUTPATIENT
Start: 2022-12-31 | End: 2023-01-05 | Stop reason: HOSPADM

## 2022-12-31 RX ORDER — LIDOCAINE 4 G/G
2 PATCH TOPICAL
Status: DISCONTINUED | OUTPATIENT
Start: 2022-12-31 | End: 2023-01-05 | Stop reason: HOSPADM

## 2022-12-31 RX ORDER — METHOCARBAMOL 100 MG/ML
500 INJECTION, SOLUTION INTRAMUSCULAR; INTRAVENOUS ONCE
Status: DISCONTINUED | OUTPATIENT
Start: 2022-12-31 | End: 2022-12-31

## 2022-12-31 RX ORDER — ONDANSETRON 4 MG/1
4 TABLET, ORALLY DISINTEGRATING ORAL EVERY 6 HOURS PRN
Status: DISCONTINUED | OUTPATIENT
Start: 2022-12-31 | End: 2023-01-05 | Stop reason: HOSPADM

## 2022-12-31 RX ORDER — HYDROMORPHONE HCL IN WATER/PF 6 MG/30 ML
0.4 PATIENT CONTROLLED ANALGESIA SYRINGE INTRAVENOUS
Status: DISCONTINUED | OUTPATIENT
Start: 2022-12-31 | End: 2023-01-05 | Stop reason: HOSPADM

## 2022-12-31 RX ORDER — NAFCILLIN SODIUM 2 G/8ML
2 INJECTION, POWDER, FOR SOLUTION INTRAMUSCULAR; INTRAVENOUS EVERY 4 HOURS
Status: COMPLETED | OUTPATIENT
Start: 2022-12-31 | End: 2022-12-31

## 2022-12-31 RX ORDER — OXYCODONE HYDROCHLORIDE 5 MG/1
5-10 TABLET ORAL EVERY 6 HOURS PRN
Status: DISCONTINUED | OUTPATIENT
Start: 2022-12-31 | End: 2023-01-05

## 2022-12-31 RX ADMIN — HYDROMORPHONE HYDROCHLORIDE 0.5 MG: 1 INJECTION, SOLUTION INTRAMUSCULAR; INTRAVENOUS; SUBCUTANEOUS at 14:52

## 2022-12-31 RX ADMIN — TAMSULOSIN HYDROCHLORIDE 0.4 MG: 0.4 CAPSULE ORAL at 20:22

## 2022-12-31 RX ADMIN — HYDROMORPHONE HYDROCHLORIDE 0.5 MG: 1 INJECTION, SOLUTION INTRAMUSCULAR; INTRAVENOUS; SUBCUTANEOUS at 18:29

## 2022-12-31 RX ADMIN — GADOBUTROL 9 ML: 604.72 INJECTION INTRAVENOUS at 14:46

## 2022-12-31 RX ADMIN — HYDROMORPHONE HYDROCHLORIDE 0.5 MG: 1 INJECTION, SOLUTION INTRAMUSCULAR; INTRAVENOUS; SUBCUTANEOUS at 11:58

## 2022-12-31 RX ADMIN — LIDOCAINE 2 PATCH: 560 PATCH PERCUTANEOUS; TOPICAL; TRANSDERMAL at 11:30

## 2022-12-31 RX ADMIN — HYDRALAZINE HYDROCHLORIDE 10 MG: 20 INJECTION, SOLUTION INTRAMUSCULAR; INTRAVENOUS at 21:06

## 2022-12-31 RX ADMIN — NAFCILLIN 2 G: 2 POWDER, FOR SOLUTION INTRAMUSCULAR; INTRAVENOUS at 17:14

## 2022-12-31 RX ADMIN — HYDROMORPHONE HYDROCHLORIDE 0.4 MG: 0.2 INJECTION, SOLUTION INTRAMUSCULAR; INTRAVENOUS; SUBCUTANEOUS at 21:05

## 2022-12-31 RX ADMIN — OXYCODONE HYDROCHLORIDE 10 MG: 5 TABLET ORAL at 20:21

## 2022-12-31 RX ADMIN — ACETAMINOPHEN 975 MG: 325 TABLET, FILM COATED ORAL at 20:22

## 2022-12-31 RX ADMIN — METHOCARBAMOL 500 MG: 500 TABLET ORAL at 21:45

## 2022-12-31 ASSESSMENT — ACTIVITIES OF DAILY LIVING (ADL)
ADLS_ACUITY_SCORE: 36
ADLS_ACUITY_SCORE: 36
ADLS_ACUITY_SCORE: 37
ADLS_ACUITY_SCORE: 35
ADLS_ACUITY_SCORE: 35
ADLS_ACUITY_SCORE: 36
ADLS_ACUITY_SCORE: 37

## 2022-12-31 ASSESSMENT — ENCOUNTER SYMPTOMS
DIARRHEA: 1
ABDOMINAL PAIN: 0
FEVER: 0
BACK PAIN: 1

## 2022-12-31 NOTE — LETTER
Transition Communication Hand-off for Care Transitions to Next Level of Care Provider    Name: Ian Moncada  : 1965  MRN #: [id number]  Primary Care Provider: Alfredo Fink     Primary Clinic: [address] [place] [address] [address] [place] [address]     Reason for Hospitalization:  Spinal epidural abscess [G06.1]  Acute bilateral thoracic back pain [M54.6]  Admit Date/Time: 2022 10:54 Am  Discharge Date: 2023  Payor Source: Payor: MEDICA / Plan: [facility] EMPLOYEE / Product Type: PPO /      Pt discharging to home with continuation of home infusion antibiotics through [facility].    Floresita Shahid RN    AVS/Discharge Summary is the source of truth; this is a helpful guide for improved communication of patient story

## 2022-12-31 NOTE — ED PROVIDER NOTES
History     Chief Complaint:  Post-op Problem and Back Pain       The history is provided by the patient and the spouse.      Ian Moncada is a 57 year old male with history of recent epidural abscess/hospital discharge on current antibiotic therapy who presents with back/flank cramping. The patient states that for the past week, he has been experiencing back/side cramping on and off. For the past day, however, the cramping has been constant, and he cannot support himself.  Notes area of pain is located in his bilateral mid thoracic back.  The cramps occur when moving, but he feels okay when standing still. He notes that he last took oxycodone at around 4 AM, but his legs are getting better. He has some mild diarrhea, and denies fevers, abdominal pain, and muscle relaxants.  Currently on nafcillin via PICC line.    Independent Historian: Yes       ROS:  Review of Systems   Constitutional: Negative for fever.   Gastrointestinal: Positive for diarrhea. Negative for abdominal pain.   Musculoskeletal: Positive for back pain.   All other systems reviewed and are negative.    Allergies:  Walnuts    Medications:    Nafcillin  Oxycodone  Senokot  Flomax    Past Medical History:    Hemorrhoids  Hyperlipidemia  Hypertriglyceridemia  Impaired fasting glucose    Past Surgical History:     Decompression lumbar one level  Tonsillectomy    Family History:    Mother: Heart Disease  Father: Neurologic Disorder    Social History:  Reports that he has never smoked. He has never used smokeless tobacco. He reports current alcohol use of about 7.0 standard drinks per week. He reports that he does not use drugs.  The patient presents to the ED with his wife.    Physical Exam     Patient Vitals for the past 24 hrs:   BP Pulse Resp SpO2   12/31/22 1623 -- -- -- 99 %   12/31/22 1502 -- -- -- 98 %   12/31/22 1500 (!) 170/110 78 -- 97 %   12/31/22 1248 -- -- -- 98 %   12/31/22 1158 (!) 169/112 -- -- 100 %   12/31/22 1103 (!) 163/103 80 18  99 %        Physical Exam  General: Alert and cooperative with exam. Patient in mild distress. Normal mentation.  Head:  Scalp is NC/AT  Eyes:  No scleral icterus, PERRL  ENT:  The external nose and ears are normal. The oropharynx is normal and without erythema; mucus membranes are moist. Uvula midline, no evidence of deep space infection.  Neck:  Normal range of motion without rigidity.  CV:  Regular rate and rhythm    No pathologic murmur   Resp:  Breath sounds are clear bilaterally    Non-labored, no retractions or accessory muscle use  GI:  Abdomen is soft, no distension, no tenderness. No peritoneal signs  :  Dacosta Catheter present  MS:  Trace lower extremity edema.   Skin:  Warm and dry, No rash or lesions noted.  Neuro: Oriented x 3. No gross motor deficits.    Emergency Department Course     Imaging:  MR Cervical Spine w/o & w Contrast   Final Result   IMPRESSION:      MRI CERVICAL SPINE:   1.  Essentially resolved dorsal epidural fluid collection within the mid to lower cervical spinal canal with mild residual dural thickening/enhancement.   2.  No marrow or soft tissue edema in the cervical spine. Normal cervical spinal cord.   3.  Improved spinal canal stenosis at C5-C6 and C6-C7 due to the diminished dorsal epidural fluid collection with residual degenerative spinal canal narrowing.      MRI THORACIC SPINE:   1.  Dorsal epidural collection in the upper and lower thoracic spine is improved versus 12/22/2022.   2.  In the midthoracic spine, there is a persistent dorsal epidural phlegmon/abscess that continues to measure 7 mm in thickness at the T9-T10 level and cause moderate spinal cord displacement and spinal canal narrowing. No spinal cord signal    abnormality.   3.  Slightly progressed edema associated with the left greater than right T9-T10 facet joints.      MRI LUMBAR SPINE:   1.  Dorsal epidural abscess centered in the lower L1 level is slightly diminished in volume.   2.  Peripherally enhancing  fluid collections within the left L1 laminectomy defect and overlying surgical tract are stable.   3.  No definite progressive infection in the lumbar spine.         MR Thoracic Spine w/o & w Contrast   Final Result   IMPRESSION:      MRI CERVICAL SPINE:   1.  Essentially resolved dorsal epidural fluid collection within the mid to lower cervical spinal canal with mild residual dural thickening/enhancement.   2.  No marrow or soft tissue edema in the cervical spine. Normal cervical spinal cord.   3.  Improved spinal canal stenosis at C5-C6 and C6-C7 due to the diminished dorsal epidural fluid collection with residual degenerative spinal canal narrowing.      MRI THORACIC SPINE:   1.  Dorsal epidural collection in the upper and lower thoracic spine is improved versus 12/22/2022.   2.  In the midthoracic spine, there is a persistent dorsal epidural phlegmon/abscess that continues to measure 7 mm in thickness at the T9-T10 level and cause moderate spinal cord displacement and spinal canal narrowing. No spinal cord signal    abnormality.   3.  Slightly progressed edema associated with the left greater than right T9-T10 facet joints.      MRI LUMBAR SPINE:   1.  Dorsal epidural abscess centered in the lower L1 level is slightly diminished in volume.   2.  Peripherally enhancing fluid collections within the left L1 laminectomy defect and overlying surgical tract are stable.   3.  No definite progressive infection in the lumbar spine.         Lumbar spine MRI w & w/o contrast - surgery <10yrs   Final Result   IMPRESSION:      MRI CERVICAL SPINE:   1.  Essentially resolved dorsal epidural fluid collection within the mid to lower cervical spinal canal with mild residual dural thickening/enhancement.   2.  No marrow or soft tissue edema in the cervical spine. Normal cervical spinal cord.   3.  Improved spinal canal stenosis at C5-C6 and C6-C7 due to the diminished dorsal epidural fluid collection with residual degenerative  spinal canal narrowing.      MRI THORACIC SPINE:   1.  Dorsal epidural collection in the upper and lower thoracic spine is improved versus 12/22/2022.   2.  In the midthoracic spine, there is a persistent dorsal epidural phlegmon/abscess that continues to measure 7 mm in thickness at the T9-T10 level and cause moderate spinal cord displacement and spinal canal narrowing. No spinal cord signal    abnormality.   3.  Slightly progressed edema associated with the left greater than right T9-T10 facet joints.      MRI LUMBAR SPINE:   1.  Dorsal epidural abscess centered in the lower L1 level is slightly diminished in volume.   2.  Peripherally enhancing fluid collections within the left L1 laminectomy defect and overlying surgical tract are stable.   3.  No definite progressive infection in the lumbar spine.            Report per radiology    Laboratory:  Labs Ordered and Resulted from Time of ED Arrival to Time of ED Departure   BASIC METABOLIC PANEL - Abnormal       Result Value    Sodium 140      Potassium 3.4      Chloride 107      Carbon Dioxide (CO2) 29      Anion Gap 4      Urea Nitrogen 9      Creatinine 1.05      Calcium 9.1      Glucose 142 (*)     GFR Estimate 82     CBC WITH PLATELETS AND DIFFERENTIAL - Abnormal    WBC Count 4.3      RBC Count 2.92 (*)     Hemoglobin 9.6 (*)     Hematocrit 28.9 (*)     MCV 99      MCH 32.9      MCHC 33.2      RDW 13.2      Platelet Count 424      % Neutrophils 71      % Lymphocytes 17      % Monocytes 10      % Eosinophils 1      % Basophils 1      % Immature Granulocytes 0      NRBCs per 100 WBC 0      Absolute Neutrophils 3.0      Absolute Lymphocytes 0.7 (*)     Absolute Monocytes 0.4      Absolute Eosinophils 0.1      Absolute Basophils 0.0      Absolute Immature Granulocytes 0.0      Absolute NRBCs 0.0     CRP INFLAMMATION - Abnormal    CRP Inflammation 34.4 (*)    ERYTHROCYTE SEDIMENTATION RATE AUTO - Abnormal    Erythrocyte Sedimentation Rate 75 (*)    INR - Normal     INR 1.08     PARTIAL THROMBOPLASTIN TIME - Normal    aPTT 33            Emergency Department Course & Assessments:     Interventions:  Medications   Lidocaine (LIDOCARE) 4 % Patch 2 patch (2 patches Transdermal Patch/Med Applied 12/31/22 1130)   lidocaine patch in PLACE ( Transdermal Patch in Place 12/31/22 1400)   HYDROmorphone (PF) (DILAUDID) injection 0.5 mg (0.5 mg Intravenous Given 12/31/22 1452)   nafcillin IV 2 g vial to attach to  ml bag (2 g Intravenous New Bag 12/31/22 1714)   HYDROmorphone (PF) (DILAUDID) injection 0.5 mg (0.5 mg Intravenous Given 12/31/22 1158)   gadobutrol (GADAVIST) injection 9 mL (9 mLs Intravenous Given 12/31/22 1446)      Consultations/Discussion of Management or Tests:  1142 I spoke with Indigo Aguirre, Neurosurgery, regarding the patient  1655 I spoke with Indigo Aguirre Neurosurgery, regarding the patient's condition  1749 I spoke with Dr. Butler, Infectious disease, regarding the patient.  1755 I spoke with Indigo Aguirre Neurosurgery regarding the consult with ID  I consulted with Dr. Neil of the hospitalist service and discussed patient admission. They accepted care of the patient.       Disposition:  The patient was admitted to the observation unit under the care of Dr. Neil.     Impression & Plan    Medical Decision Making:  Patient is a 57-year-old male with history of recent extensive epidural abscess status post decompression on current IV antibiotic therapy who presents with increasing midthoracic back pain.  Patient's medical history and records were reviewed.  On evaluation patient is afebrile and nontoxic in appearance.  Labs obtained and demonstrate improvement in CRP, normal white count, and mildly increased ESR.  Overall patient notes neurologic improvement since recent surgery.  MRI thoracic, cervical, and lumbar spine was obtained and demonstrates overall decrease in epidural abscess though persistent abscess at the T9/T10 level as indicated above.   Neurosurgery consulted and additionally requested infectious disease input.  I did speak with Dr. Butler of infectious disease who recommends continued treatment with nafcillin.  Patient was provided repeated doses of Dilaudid for pain control as well as lidocaine patches.  At this time there is no indication for emergent neurosurgical intervention.  Given ongoing pain, will admit to observation with the hospitalist service for continued evaluation and care.      Diagnosis:    ICD-10-CM    1. Acute bilateral thoracic back pain  M54.6       2. Spinal epidural abscess  G06.1              Scribe Disclosure:  MARCOS, Benito Hollingsworth, am serving as a scribe at 11:05 AM on 12/31/2022 to document services personally performed by Claudio Lazcano DO based on my observations and the provider's statements to me.          Claudio Lazcano DO  12/31/22 3135

## 2022-12-31 NOTE — ED TRIAGE NOTES
"Pt arrives via triage with c/o increased pain following a spinal fluid suction performed at [facility] approximately two weeks ago. Pt states he has a home prescription of oxycodone he takes at night, but doesn't take during the day for pain control because it makes him \"feel unsteady when I walk\". Pt states he was told to come back to [facility] if pain was uncontrolled or increasing.      "

## 2022-12-31 NOTE — CONSULTS
[facility]    Neurosurgery Consultation     Date of Admission:  12/31/2022  Date of Consult (When I saw the patient): 12/31/22    Assessment & Plan   Ian Moncada is a 57 year old male with history of T12-L1 decompression for evacuation of epidural abscess on 12/18/22 with Dr. Courtney. He was discharged home on 12/27/22 with IV antibiotics per ID. He was admitted on 12/31/2022 with increased back/flank cramping pain over the past few days. Per patient, neuro exam has improved compared to recent hospital admission. He reports pain is mostly located at mid back area near incision and radiates side to side. Patient denies radicular leg pain, numbness, weakness, incision concerns, or fevers. He continues to use a gamez catheter and was scheduled for an outpatient voiding trial with Urology.      Plan:  - Recommend ID consult   - Cervical/thoracic/lumbar spine MRIs pending   - Recommend INR, PTT, CRP, and ESR   - Continue to monitor neuro exam  - Pain control measures   - NPO at midnight   - NSG will continue to follow and provide further recommendations pending ID consult     I have discussed the following assessment and plan with Dr. Sanabria.     ADDENDUM:  Cervical/thoracic/lumbar spine MRIs reviewed with Dr. Sanabria. Imaging shows persistent epidural abscess at T9-10 with moderate spinal canal narrowing. CRP 34.4 and ESR 75. NSG will provide further recommendations pending ID consult.     Giselle Zaragoza, CNP  [facility] Neurosurgery  [address] [address]  Tel [phone]  Pager [phone]    Code Status    Prior    Reason for Consult   Reason for consult: I was asked by Dr. Lazcano to evaluate this patient for back pain, s/p recent surgery for epidural abscess     Primary Care Physician   Physician No Ref-Primary    Chief Complaint   Back pain     History is obtained from the patient, electronic health record and emergency  department physician    History of Present Illness   Ian Moncada is a 57 year old male with history of T12-L1 decompression for evacuation of epidural abscess on 12/18/22 with Dr. Courtney. He was discharged home on 12/27/22 with IV antibiotics per ID. He was admitted on 12/31/2022 with increased back/flank cramping pain over the past few days. Per patient, neuro exam has improved compared to recent hospital admission. He reports pain is mostly located at mid back area near incision and radiates side to side. Patient denies radicular leg pain, numbness, weakness, incision concerns, or fevers. He continues to use a gamez catheter and was scheduled for an outpatient voiding trial with Urology.      EXAM: MR LUMBAR SPINE WITHOUT AND WITH CONTRAST, MR CERVICAL SPINE WITHOUT AND WITH CONTRAST, MR THORACIC SPINE WITHOUT AND WITH CONTRAST  LOCATION: [facility]  DATE/TIME: 12/31/2022, 2:46 PM  IMPRESSION:  MRI CERVICAL SPINE:  1.  Essentially resolved dorsal epidural fluid collection within the mid to lower cervical spinal canal with mild residual dural thickening/enhancement.  2.  No marrow or soft tissue edema in the cervical spine. Normal cervical spinal cord.  3.  Improved spinal canal stenosis at C5-C6 and C6-C7 due to the diminished dorsal epidural fluid collection with residual degenerative spinal canal narrowing.    MRI THORACIC SPINE:  1.  Dorsal epidural collection in the upper and lower thoracic spine is improved versus 12/22/2022.  2.  In the midthoracic spine, there is a persistent dorsal epidural phlegmon/abscess that continues to measure 7 mm in thickness at the T9-T10 level and cause moderate spinal cord displacement and spinal canal narrowing. No spinal cord signal   abnormality.  3.  Slightly progressed edema associated with the left greater than right T9-T10 facet joints.     MRI LUMBAR SPINE:  1.  Dorsal epidural abscess centered in the lower L1 level is slightly diminished in  volume.  2.  Peripherally enhancing fluid collections within the left L1 laminectomy defect and overlying surgical tract are stable.  3.  No definite progressive infection in the lumbar spine.    Past Medical History   I have reviewed this patient's medical history and updated it with pertinent information if needed.   Past Medical History:   Diagnosis Date     External hemorrhoids with other complication 2008    Banding 2007.     Hyperlipidemia LDL goal <130 2010     Hypertriglyceridemia 2010     Impaired fasting glucose 2009       Past Surgical History   I have reviewed this patient's surgical history and updated it with pertinent information if needed.  Past Surgical History:   Procedure Laterality Date     DECOMPRESSION LUMBAR ONE LEVEL N/A 2022    Procedure: THORACIC LUMBAR DECOMPRESSION FOR EVACUATION AND EPIDURAL ABSCESS VERSUS HEMATOMA;  Surgeon: Evans Courtney MD;  Location: [facility] OR     TONSILLECTOMY  1970s       Prior to Admission Medications   Prior to Admission Medications   Prescriptions Last Dose Informant Patient Reported? Taking?   MULTIVITAMIN TABS   OR   Yes No   Si TABLET DAILY   acetaminophen (TYLENOL) 325 MG tablet   No No   Sig: Take 2 tablets (650 mg) by mouth every 6 hours as needed for other or mild pain (For optimal non-opioid multimodal pain management to improve pain control.)   fish oil-omega-3 fatty acids 1000 MG capsule   Yes No   Sig: Take 2 capsules by mouth daily.   nafcillin 2 GM vial   No No   Sig: Inject 2 g into the vein every 4 hours for 38 days   oxyCODONE (ROXICODONE) 5 MG tablet   No No   Sig: Take 1-2 tablets (5-10 mg) by mouth every 6 hours as needed for moderate pain (4-6) or severe pain (7-10) (5 mg for moderate pain, 10 mg for severe pain) Future refills if needed directed to primary clinic provider or primary neurosurgeon   senna-docusate (SENOKOT-S/PERICOLACE) 8.6-50 MG tablet   No No   Sig: Take 1 tablet by mouth 2 times  daily as needed for constipation   tamsulosin (FLOMAX) 0.4 MG capsule   No No   Sig: Take 1 capsule (0.4 mg) by mouth daily Future refills if needed directed to primary clinic provider or Urology Clinic      Facility-Administered Medications: None     Allergies   Allergies   Allergen Reactions     Walnuts [Nuts]        Social History   I have reviewed this patient's social history and updated it with pertinent information if needed. Ian Moncada  reports that he has never smoked. He has never used smokeless tobacco. He reports current alcohol use of about 7.0 standard drinks per week. He reports that he does not use drugs.    Family History   I have reviewed this patient's family history and updated it with pertinent information if needed.   Family History   Problem Relation Age of Onset     Heart Disease Paternal Grandfather      Neurologic Disorder Father         Guillaine Piedmont     Heart Disease Mother         quad bipass     Heart Disease Maternal Grandfather      Heart Disease Maternal Uncle      Cancer - colorectal Maternal Grandmother      Heart Disease Maternal Aunt        Review of Systems   10 point ROS negative other than symptoms noted in HPI.    Physical Exam       BP: (!) 163/103 Pulse: 80   Resp: 18 SpO2: 99 % O2 Device: None (Room air)    Vital Signs with Ranges  Pulse:  [80] 80  Resp:  [18] 18  BP: (163)/(103) 163/103  SpO2:  [99 %] 99 %  0 lbs 0 oz     , Blood pressure (!) 163/103, pulse 80, resp. rate 18, SpO2 99 %.  0 lbs 0 oz  HEENT:  Normocephalic, atraumatic  Heart:  No peripheral edema  Lungs:  No SOB  Skin:  Warm and dry, good capillary refill.    NEUROLOGICAL EXAMINATION:   Mental status:  Alert and Oriented x 3, speech is fluent.  Cranial nerves:  II-XII intact.   Motor:  Strength is 5/5 throughout the upper and lower extremities  Shoulder Abduction:  Right:  5/5   Left:  5/5  Biceps:                      Right:  5/5   Left:  5/5  Triceps:                     Right:  5/5   Left:  5/5  Wrist  Extensors:       Right:  5/5   Left:  5/5  Wrist Flexors:           Right:  5/5   Left:  5/5  Interosseus :             Right:  5/5   Left:  5/5  Hip Flexor:                Right: 5/5  Left:  5/5  Hip Adductor:            Right:  5/5  Left:  5/5  Hip Abductor:            Right:  5/5  Left:  5/5  Gastroc Soleus:        Right:  5/5  Left:  5/5  Tib/Ant:                     Right:  5/5  Left:  5/5  EHL:                         Right:  5/5  Left:  5/5  Sensation:  Intact  Reflexes:   Negative Babinski.  Negative Clonus.  Negative Delgado's sign.     Moderate tenderness to palpation of mid thoracic spine   Incision: CDI     Data     CBC RESULTS:   Recent Labs   Lab Test 12/26/22  0701 12/25/22  0650   WBC  --  7.9   RBC  --  2.88*   HGB 9.7* 9.5*   HCT  --  29.2*   MCV  --  101*   MCH  --  33.0   MCHC  --  32.5   RDW  --  13.4   PLT  --  399     Basic Metabolic Panel:  Lab Results   Component Value Date     12/25/2022     02/02/2021      Lab Results   Component Value Date    POTASSIUM 3.7 12/26/2022    POTASSIUM 4.2 02/02/2021     Lab Results   Component Value Date    CHLORIDE 104 12/25/2022    CHLORIDE 106 02/02/2021     Lab Results   Component Value Date    LINA 8.3 12/25/2022    LINA 9.5 02/02/2021     Lab Results   Component Value Date    CO2 27 12/25/2022    CO2 28 02/02/2021     Lab Results   Component Value Date    BUN 8 12/25/2022    BUN 14 02/02/2021     Lab Results   Component Value Date    CR 0.94 12/25/2022    CR 1.04 02/02/2021     Lab Results   Component Value Date    GLC 96 12/25/2022    GLC 88 02/02/2021     INR:  No results found for: INR

## 2023-01-01 ENCOUNTER — APPOINTMENT (OUTPATIENT)
Dept: PHYSICAL THERAPY | Facility: CLINIC | Age: 58
DRG: 095 | End: 2023-01-01
Attending: HOSPITALIST
Payer: COMMERCIAL

## 2023-01-01 LAB
ALBUMIN SERPL-MCNC: 2.4 G/DL (ref 3.4–5)
ALP SERPL-CCNC: 57 U/L (ref 40–150)
ALT SERPL W P-5'-P-CCNC: 30 U/L (ref 0–70)
ANION GAP SERPL CALCULATED.3IONS-SCNC: 6 MMOL/L (ref 3–14)
AST SERPL W P-5'-P-CCNC: 28 U/L (ref 0–45)
BASOPHILS # BLD AUTO: 0 10E3/UL (ref 0–0.2)
BASOPHILS NFR BLD AUTO: 1 %
BILIRUB SERPL-MCNC: 1 MG/DL (ref 0.2–1.3)
BUN SERPL-MCNC: 8 MG/DL (ref 7–30)
CALCIUM SERPL-MCNC: 8.6 MG/DL (ref 8.5–10.1)
CHLORIDE BLD-SCNC: 104 MMOL/L (ref 94–109)
CO2 SERPL-SCNC: 28 MMOL/L (ref 20–32)
CREAT SERPL-MCNC: 1.01 MG/DL (ref 0.66–1.25)
EOSINOPHIL # BLD AUTO: 0.1 10E3/UL (ref 0–0.7)
EOSINOPHIL NFR BLD AUTO: 1 %
ERYTHROCYTE [DISTWIDTH] IN BLOOD BY AUTOMATED COUNT: 13.1 % (ref 10–15)
GFR SERPL CREATININE-BSD FRML MDRD: 86 ML/MIN/1.73M2
GLUCOSE BLD-MCNC: 86 MG/DL (ref 70–99)
HCT VFR BLD AUTO: 29.6 % (ref 40–53)
HGB BLD-MCNC: 9.6 G/DL (ref 13.3–17.7)
IMM GRANULOCYTES # BLD: 0 10E3/UL
IMM GRANULOCYTES NFR BLD: 0 %
LYMPHOCYTES # BLD AUTO: 0.8 10E3/UL (ref 0.8–5.3)
LYMPHOCYTES NFR BLD AUTO: 14 %
MCH RBC QN AUTO: 32.8 PG (ref 26.5–33)
MCHC RBC AUTO-ENTMCNC: 32.4 G/DL (ref 31.5–36.5)
MCV RBC AUTO: 101 FL (ref 78–100)
MONOCYTES # BLD AUTO: 0.5 10E3/UL (ref 0–1.3)
MONOCYTES NFR BLD AUTO: 10 %
NEUTROPHILS # BLD AUTO: 4.2 10E3/UL (ref 1.6–8.3)
NEUTROPHILS NFR BLD AUTO: 74 %
NRBC # BLD AUTO: 0 10E3/UL
NRBC BLD AUTO-RTO: 0 /100
PLATELET # BLD AUTO: 388 10E3/UL (ref 150–450)
POTASSIUM BLD-SCNC: 3.1 MMOL/L (ref 3.4–5.3)
PROT SERPL-MCNC: 6.5 G/DL (ref 6.8–8.8)
RBC # BLD AUTO: 2.93 10E6/UL (ref 4.4–5.9)
SODIUM SERPL-SCNC: 138 MMOL/L (ref 133–144)
WBC # BLD AUTO: 5.6 10E3/UL (ref 4–11)

## 2023-01-01 PROCEDURE — 250N000013 HC RX MED GY IP 250 OP 250 PS 637: Performed by: NURSE PRACTITIONER

## 2023-01-01 PROCEDURE — 96361 HYDRATE IV INFUSION ADD-ON: CPT

## 2023-01-01 PROCEDURE — 250N000013 HC RX MED GY IP 250 OP 250 PS 637: Performed by: HOSPITALIST

## 2023-01-01 PROCEDURE — 258N000003 HC RX IP 258 OP 636: Performed by: HOSPITALIST

## 2023-01-01 PROCEDURE — 250N000009 HC RX 250: Performed by: HOSPITALIST

## 2023-01-01 PROCEDURE — G0378 HOSPITAL OBSERVATION PER HR: HCPCS

## 2023-01-01 PROCEDURE — 80053 COMPREHEN METABOLIC PANEL: CPT | Performed by: HOSPITALIST

## 2023-01-01 PROCEDURE — 99254 IP/OBS CNSLTJ NEW/EST MOD 60: CPT | Performed by: INTERNAL MEDICINE

## 2023-01-01 PROCEDURE — 99233 SBSQ HOSP IP/OBS HIGH 50: CPT | Performed by: INTERNAL MEDICINE

## 2023-01-01 PROCEDURE — 96376 TX/PRO/DX INJ SAME DRUG ADON: CPT

## 2023-01-01 PROCEDURE — 250N000013 HC RX MED GY IP 250 OP 250 PS 637: Performed by: INTERNAL MEDICINE

## 2023-01-01 PROCEDURE — 97530 THERAPEUTIC ACTIVITIES: CPT | Mod: GP | Performed by: PHYSICAL THERAPIST

## 2023-01-01 PROCEDURE — 250N000013 HC RX MED GY IP 250 OP 250 PS 637: Performed by: PHYSICIAN ASSISTANT

## 2023-01-01 PROCEDURE — 85025 COMPLETE CBC W/AUTO DIFF WBC: CPT | Performed by: HOSPITALIST

## 2023-01-01 PROCEDURE — 97161 PT EVAL LOW COMPLEX 20 MIN: CPT | Mod: GP | Performed by: PHYSICAL THERAPIST

## 2023-01-01 PROCEDURE — 36415 COLL VENOUS BLD VENIPUNCTURE: CPT | Performed by: HOSPITALIST

## 2023-01-01 PROCEDURE — 250N000011 HC RX IP 250 OP 636: Performed by: HOSPITALIST

## 2023-01-01 RX ORDER — METHOCARBAMOL 750 MG/1
750 TABLET, FILM COATED ORAL 4 TIMES DAILY
Status: DISCONTINUED | OUTPATIENT
Start: 2023-01-01 | End: 2023-01-05 | Stop reason: HOSPADM

## 2023-01-01 RX ORDER — HYDRALAZINE HYDROCHLORIDE 10 MG/1
10 TABLET, FILM COATED ORAL EVERY 6 HOURS PRN
Status: DISCONTINUED | OUTPATIENT
Start: 2023-01-01 | End: 2023-01-03

## 2023-01-01 RX ORDER — POTASSIUM CHLORIDE 1.5 G/1.58G
20 POWDER, FOR SOLUTION ORAL ONCE
Status: COMPLETED | OUTPATIENT
Start: 2023-01-01 | End: 2023-01-01

## 2023-01-01 RX ADMIN — POTASSIUM CHLORIDE 20 MEQ: 1.5 POWDER, FOR SOLUTION ORAL at 14:00

## 2023-01-01 RX ADMIN — METHOCARBAMOL 750 MG: 750 TABLET ORAL at 16:04

## 2023-01-01 RX ADMIN — HYDROMORPHONE HYDROCHLORIDE 0.4 MG: 0.2 INJECTION, SOLUTION INTRAMUSCULAR; INTRAVENOUS; SUBCUTANEOUS at 07:41

## 2023-01-01 RX ADMIN — METHOCARBAMOL 750 MG: 750 TABLET ORAL at 22:56

## 2023-01-01 RX ADMIN — METHOCARBAMOL 500 MG: 500 TABLET ORAL at 10:31

## 2023-01-01 RX ADMIN — HYDROMORPHONE HYDROCHLORIDE 0.4 MG: 0.2 INJECTION, SOLUTION INTRAMUSCULAR; INTRAVENOUS; SUBCUTANEOUS at 00:23

## 2023-01-01 RX ADMIN — LIDOCAINE 2 PATCH: 560 PATCH PERCUTANEOUS; TOPICAL; TRANSDERMAL at 08:27

## 2023-01-01 RX ADMIN — NAFCILLIN SODIUM 2 G: 2 INJECTION, POWDER, LYOPHILIZED, FOR SOLUTION INTRAMUSCULAR; INTRAVENOUS at 16:05

## 2023-01-01 RX ADMIN — NAFCILLIN SODIUM 2 G: 2 INJECTION, POWDER, LYOPHILIZED, FOR SOLUTION INTRAMUSCULAR; INTRAVENOUS at 22:56

## 2023-01-01 RX ADMIN — NAFCILLIN SODIUM 2 G: 2 INJECTION, POWDER, LYOPHILIZED, FOR SOLUTION INTRAMUSCULAR; INTRAVENOUS at 04:30

## 2023-01-01 RX ADMIN — TAMSULOSIN HYDROCHLORIDE 0.4 MG: 0.4 CAPSULE ORAL at 08:27

## 2023-01-01 RX ADMIN — SODIUM CHLORIDE: 9 INJECTION, SOLUTION INTRAVENOUS at 18:49

## 2023-01-01 RX ADMIN — NAFCILLIN SODIUM 2 G: 2 INJECTION, POWDER, LYOPHILIZED, FOR SOLUTION INTRAMUSCULAR; INTRAVENOUS at 19:02

## 2023-01-01 RX ADMIN — OXYCODONE HYDROCHLORIDE 10 MG: 5 TABLET ORAL at 20:12

## 2023-01-01 RX ADMIN — NAFCILLIN SODIUM 2 G: 2 INJECTION, POWDER, LYOPHILIZED, FOR SOLUTION INTRAMUSCULAR; INTRAVENOUS at 10:31

## 2023-01-01 RX ADMIN — ACETAMINOPHEN 975 MG: 325 TABLET, FILM COATED ORAL at 14:05

## 2023-01-01 RX ADMIN — NAFCILLIN SODIUM 2 G: 2 INJECTION, POWDER, LYOPHILIZED, FOR SOLUTION INTRAMUSCULAR; INTRAVENOUS at 06:47

## 2023-01-01 RX ADMIN — NAFCILLIN SODIUM 2 G: 2 INJECTION, POWDER, LYOPHILIZED, FOR SOLUTION INTRAMUSCULAR; INTRAVENOUS at 00:06

## 2023-01-01 RX ADMIN — ACETAMINOPHEN 975 MG: 325 TABLET, FILM COATED ORAL at 02:33

## 2023-01-01 RX ADMIN — METHOCARBAMOL 500 MG: 500 TABLET ORAL at 04:50

## 2023-01-01 RX ADMIN — OXYCODONE HYDROCHLORIDE 5 MG: 5 TABLET ORAL at 02:33

## 2023-01-01 RX ADMIN — HYDROMORPHONE HYDROCHLORIDE 0.4 MG: 0.2 INJECTION, SOLUTION INTRAMUSCULAR; INTRAVENOUS; SUBCUTANEOUS at 04:39

## 2023-01-01 RX ADMIN — HYDRALAZINE HYDROCHLORIDE 10 MG: 20 INJECTION, SOLUTION INTRAMUSCULAR; INTRAVENOUS at 19:02

## 2023-01-01 RX ADMIN — MULTIPLE VITAMINS W/ MINERALS TAB 1 TABLET: TAB at 08:27

## 2023-01-01 RX ADMIN — ACETAMINOPHEN 975 MG: 325 TABLET, FILM COATED ORAL at 20:12

## 2023-01-01 RX ADMIN — HYDROMORPHONE HYDROCHLORIDE 0.4 MG: 0.2 INJECTION, SOLUTION INTRAMUSCULAR; INTRAVENOUS; SUBCUTANEOUS at 12:12

## 2023-01-01 RX ADMIN — SODIUM CHLORIDE: 9 INJECTION, SOLUTION INTRAVENOUS at 04:50

## 2023-01-01 RX ADMIN — OXYCODONE HYDROCHLORIDE 5 MG: 5 TABLET ORAL at 14:00

## 2023-01-01 ASSESSMENT — ACTIVITIES OF DAILY LIVING (ADL)
ADLS_ACUITY_SCORE: 37
ADLS_ACUITY_SCORE: 36
ADLS_ACUITY_SCORE: 36
ADLS_ACUITY_SCORE: 37
ADLS_ACUITY_SCORE: 36
ADLS_ACUITY_SCORE: 37

## 2023-01-01 NOTE — PROVIDER NOTIFICATION
MD Notification    Notified Person: MD    Notified Person Name: Patricia Bacon     Notification Date/Time: 1/1/23 @ 0235    Notification Interaction: [employer]     Purpose of Notification:  Albumin low 2.5, feet swollen. Would you like it to be replaced? Tx Juanita S *[phone]    Orders Received:    Comments:

## 2023-01-01 NOTE — PROGRESS NOTES
01/01/23 1533   Appointment Info   Signing Clinician's Name / Credentials (PT) Mariam Bermudez, COC[facility]   Living Environment   People in Home spouse   Current Living Arrangements house   Home Accessibility stairs to enter home;stairs within home   Number of Stairs, Main Entrance 1   Number of Stairs, Within Home, Primary seven   Stair Railings, Within Home, Primary railings safe and in good condition   Transportation Anticipated family or friend will provide   Self-Care   Usual Activity Tolerance good   Current Activity Tolerance moderate   Regular Exercise Yes   Activity/Exercise Type strength training   Equipment Currently Used at Home walker, rolling  (since previous admit and drainage of abscesses)   General Information   Onset of Illness/Injury or Date of Surgery 12/31/22   Referring Physician Tanisha Neil MD   Patient/Family Therapy Goals Statement (PT) Plans to discharge to home today.   Pertinent History of Current Problem (include personal factors and/or comorbidities that impact the POC) 57 year old male with history of T12-L1 decompression for evacuation of epidural abscess on 12/18/22 with Dr. Courtney. He was discharged home on 12/27/22 with IV antibiotics per ID. He was admitted on 12/31/2022 with increased back/flank cramping pain over the past few days.      Today, patient reports continued low back muscle spasms/cramps. Patient denies radicular leg pain, numbness, weakness. No fevers. Incision is healing well. Cervical/thoracic/lumbar spine MRIs reviewed with Dr. Sanabria. Imaging shows persistent epidural abscess at T9-10 with moderate spinal canal narrowing.   General Observations Long sitting in bed. Agreeable to participate. States he has been up in room with wife's assist. Nursing confirms and wants PT to see to clarify if safe to be up alone.   Cognition   Affect/Mental Status (Cognition) WNL   Orientation Status (Cognition) oriented x 4   Follows Commands (Cognition) follows one-step commands    Pain Assessment   Patient Currently in Pain Yes, see Vital Sign flowsheet  (4/10; bilateral mid flank area; sides)   Integumentary/Edema   Integumentary/Edema no deficits were identifed   Posture    Posture Not impaired   Range of Motion (ROM)   Range of Motion ROM is WFL   Strength (Manual Muscle Testing)   Strength (Manual Muscle Testing) strength is WFL   Bed Mobility   Comment, (Bed Mobility) Supine with HOB raised to sitting on EOB I'lly. Reports he has been that can adjust in this fashion.   Transfers   Comment, (Transfers) Sit to stand with SBA   Gait/Stairs (Locomotion)   Comment, (Gait/Stairs) Ambulated 5' in room with WW and SBA for eval; A for IV pole. Notable knees remaining in flexion R>L during heel strike. Pt. reports not feeling as if knees could buckle. Does feel instability in trunk region; bilateral sides mid thoracic.   Balance   Balance Comments No LOB noted with use of WW   Sensory Examination   Sensory Perception patient reports no sensory changes   Coordination   Coordination no deficits were identified   Muscle Tone   Muscle Tone no deficits were identified   Clinical Impression   Criteria for Skilled Therapeutic Intervention   (evaluation on 1 treatment session for education given)   PT Diagnosis (PT) Impaired functional mobility   Influenced by the following impairments pain, trunk weakness   Functional limitations due to impairments decreased independence and endurance in functional mobility   Clinical Presentation (PT Evaluation Complexity) Evolving/Changing   Clinical Presentation Rationale per clinical judgment   Clinical Decision Making (Complexity) low complexity   Planned Therapy Interventions (PT) strengthening;progressive activity/exercise;patient/family education   Anticipated Equipment Needs at Discharge (PT)   (has 2 4WWs)   Risk & Benefits of therapy have been explained evaluation/treatment results reviewed;care plan/treatment goals reviewed;risks/benefits  reviewed;current/potential barriers reviewed;participants voiced agreement with care plan;participants included;patient;spouse/significant other   PT Total Evaluation Time   PT Eval, Low Complexity Minutes (64004) 10   Therapy Certification   Start of care date 01/01/23   Certification date from 01/01/23   Certification date to 01/31/23   Medical Diagnosis throacic abscess   Physical Therapy Goals   PT Frequency One time eval and treatment only   PT Predicted Duration/Target Date for Goal Attainment 01/01/23   PT: Bed Mobility Modified independent;Within precautions;Goal Met;Supine to/from sit   PT: Transfers Supervision/stand-by assist;Sit to/from stand;Assistive device;Within precautions;Goal Met   PT: Gait Supervision/stand-by assist;Rolling walker;50 feet;Goal Met   Therapeutic Activity   Therapeutic Activities: dynamic activities to improve functional performance Minutes (22324) 10   Symptoms Noted During/After Treatment Increased pain   Treatment Detail/Skilled Intervention Pt long sitting in bed. Reports he has adjustable bed and this is how he gets OOB. Able to move to EOB I'lly. Adjusted walker for better fit. Sit to stand with SBA. Ambulated 55' in room with WW and SBA; assisted with IV pole.  Noted bilateral knees remaining in greater knee flexion at heel strike R>L; patient does not feel knees could buckle. Does feel instability in bilateral thoracic muscles and lateral trunk musckes. Discussed when he follows up with physician and at a later date, spine OP PT may be indicated to work on strengthening muscles. Pt. in agreement to follow-up with physician regarding this. Returned to sit and bed with modified I. Wife able to provide SBA at home at times though does work outside the home. Pt. educated on having someone with him when walks in room if he has IV pole still. Once removed, can be up I'lly with WW. Deferred steps with patient stating he has no concerns with his current level of function.   PT  Discharge Planning   PT Plan PT Plan:  D/C in house PT   PT Discharge Recommendation (DC Rec) home with assist   PT Rationale for DC Rec Patient is feeling comfortable with mobilzing with WW. Does not feel he needs wife assist other than for IV pole in hospital. Has 2 4WWs at home. Will follow up with physician for further OP PT recommendations at a later date.   PT Brief overview of current status Modified I to SBA for transfers and ambulation with WW; has IV pole so needing A for this currently   Total Session Time   Timed Code Treatment Minutes 10   Total Session Time (sum of timed and untimed services) 20                                                                                 [facility]      OUTPATIENT PHYSICAL THERAPY EVALUATION  PLAN OF TREATMENT FOR OUTPATIENT REHABILITATION  (COMPLETE FOR INITIAL CLAIMS ONLY)  Patient's Last Name, First Name, M.I.  YOB: 1965  Ian Moncada                        Provider's Name  [facility] Medical Record No.  [id number]                               Onset Date:  12/31/22   Start of Care Date:  01/01/23      Type:     _X_PT   ___OT   ___SLP Medical Diagnosis:  throacic abscess                        PT Diagnosis:  Impaired functional mobility   Visits from SOC:  1   _________________________________________________________________________________  Plan of Treatment/Functional Goals    Planned Interventions: strengthening, progressive activity/exercise, patient/family education     Goals: See Physical Therapy Goals on Care Plan in [facility] electronic health record.    Therapy Frequency: One time eval and treatment only  Predicted Duration of Therapy Intervention: 01/01/23  _________________________________________________________________________________    I CERTIFY THE NEED FOR THESE SERVICES FURNISHED UNDER        THIS PLAN OF TREATMENT AND WHILE UNDER MY CARE     (Physician co-signature of this  document indicates review and certification of the therapy plan).              Certification date from: 01/01/23, Certification date to: 01/31/23    Referring Physician: Tanisha Neil MD            Initial Assessment        See Physical Therapy evaluation dated 01/01/23 in Epic electronic health record.

## 2023-01-01 NOTE — H&P
[facility]    History and Physical - Hospitalist Service       Date of Admission:  12/31/2022    Assessment & Plan      Ian Moncada is a 57 year old male recently admitted for MSSA epidural abscess and spinal cord compression, leg edema, urinary retention presented to the ER due to increased back pain and is being registered under observation on 12/31/2022 for further management.      MSSA epidural abscess  Hospitalization 12/18-12/27.  Noted epidural fluid collection from upper thoracic spine down to the mid lumbar spine with resulting spinal cord compression from T12-L1.  Neurosurgery evaluated patient and underwent thoracic lumbar decompression for evacuation of abscess.  Culture grew MSSA.  ID as well consulted and was discharged on nafcillin IV to continue until 1/21/22.  Patient now with increased back pain for the last 2 days that wraps around.  No fever.  *MRI of cervical thoracic and lumbar spines done in ER, overall improvement although persistent dorsal epidural phlegmon/abscess, 7 mm thickness at T9-10 level causing moderate to spinal cord displacement and spinal canal narrowing noted.    -[place] under observation  -Resume PTA nafcillin  -Neurosurgery and infectious disease consulted from ER. NPO after midnight pending NSG eval.   -Pain control-as needed Tylenol and oxycodone resumed.  IV hydromorphone and also p.o. Robaxin as muscle relaxer added.  -Lidocaine patch as well ordered.      Urinary retention  -Patient had recurrent urinary retention and was discharged with Dacosta catheter.  Flomax as well was restarted.  He was supposed to follow-up with urologist but has not heard from the clinic.    Bilateral leg edema  -Ultrasound was done and DVT was ruled out.    Mild anemia  -Due to surgical blood loss and ongoing illness.  -Hemoglobin stable at baseline now,     Diet:   regular , NPO after Midnight  DVT Prophylaxis: Ambulate every shift  Dacosta Catheter: Not present  Central  Lines: PRESENT     Cardiac Monitoring: None  Code Status:   Full code by default    Clinically Significant Risk Factors Present on Admission                       # Overweight: Estimated body mass index is 28.34 kg/m  as calculated from the following:    Height as of 12/18/22: 1.829 m (6').    Weight as of 12/27/22: 94.8 kg (208 lb 15.9 oz).           Disposition Plan      Expected Discharge Date: 01/01/2023                The patient's care was discussed with the Patient and Patient's Family.    Tanisha Neil MD  Hospitalist Service  [facility]  Securely message with the Vocera Web Console (learn more here)  Text page via [facility] Paging/Directory         ______________________________________________________________________    Chief Complaint   Increased thoracic back pain    History is obtained from the patient and family, chart review, discussion with ER physician    History of Present Illness   Ian Moncada is a 57 year old male recently admitted for MSSA epidural abscess and spinal cord compression, leg edema, urinary retention presented to the ER due to increased back pain    Hospitalization 12/18-12/27.  Noted epidural fluid collection from upper thoracic spine down to the mid lumbar spine with resulting spinal cord compression from T12-L1.  Neurosurgery evaluated patient and underwent thoracic lumbar decompression for evacuation of abscess.  Culture grew MSSA.  ID is well consulted and was discharged on 12/27 with PICC line in place and continuing nafcillin IV. Plan was to continue IV abx until 1/21/22.      Patient now presents to the ER with increased back pain for the last 2 days.  Pain wraps around his lower thorax.  Episodic/spasmodic pain that is severe.  No fever.  He feels his legs are getting stronger.  Denies tingling or numbness.  He has urinary catheter since discharge.  He had bowel movement yesterday.  Denies chest pain, shortness of breath, dizziness.    In ER,  patient was evaluated by Dr. FLAVIA Ríos.  WBC count was normal.  BMP unremarkable.  CRP was trending down at 34.  Neurosurgery and infectious disease consultants contacted from ER, recommended continuing antibiotic and observation admission for pain control..    Review of Systems    The 10 point Review of Systems is negative other than noted in the HPI or here.      Past Medical History    I have reviewed this patient's medical history and updated it with pertinent information if needed.   Past Medical History:   Diagnosis Date     External hemorrhoids with other complication 07/25/2008    Banding 2007.     Hyperlipidemia LDL goal <130 08/12/2010     Hypertriglyceridemia 08/19/2010     Impaired fasting glucose 12/07/2009       Past Surgical History   I have reviewed this patient's surgical history and updated it with pertinent information if needed.  Past Surgical History:   Procedure Laterality Date     DECOMPRESSION LUMBAR ONE LEVEL N/A 12/18/2022    Procedure: THORACIC LUMBAR DECOMPRESSION FOR EVACUATION AND EPIDURAL ABSCESS VERSUS HEMATOMA;  Surgeon: Evans Courtney MD;  Location: [facility] OR     TONSILLECTOMY  [facility]       Social History   I have reviewed this patient's social history and updated it with pertinent information if needed.  Social History     Tobacco Use     Smoking status: Never     Smokeless tobacco: Never   Substance Use Topics     Alcohol use: Yes     Alcohol/week: 7.0 standard drinks     Types: 7 Standard drinks or equivalent per week     Comment: pn the weekends     Drug use: No       Family History   I have reviewed this patient's family history and updated it with pertinent information if needed.  Family History   Problem Relation Age of Onset     Heart Disease Paternal Grandfather      Neurologic Disorder Father         Guillaine [place]     Heart Disease Mother         quad bipass     Heart Disease Maternal Grandfather      Heart Disease Maternal Uncle      Cancer - colorectal Maternal  Grandmother      Heart Disease Maternal Aunt        Prior to Admission Medications   Prior to Admission Medications   Prescriptions Last Dose Informant Patient Reported? Taking?   MULTIVITAMIN TABS   OR   Yes No   Si TABLET DAILY   acetaminophen (TYLENOL) 325 MG tablet   No No   Sig: Take 2 tablets (650 mg) by mouth every 6 hours as needed for other or mild pain (For optimal non-opioid multimodal pain management to improve pain control.)   fish oil-omega-3 fatty acids 1000 MG capsule   Yes No   Sig: Take 2 capsules by mouth daily.   nafcillin 2 GM vial   No No   Sig: Inject 2 g into the vein every 4 hours for 38 days   oxyCODONE (ROXICODONE) 5 MG tablet   No No   Sig: Take 1-2 tablets (5-10 mg) by mouth every 6 hours as needed for moderate pain (4-6) or severe pain (7-10) (5 mg for moderate pain, 10 mg for severe pain) Future refills if needed directed to primary clinic provider or primary neurosurgeon   senna-docusate (SENOKOT-S/PERICOLACE) 8.6-50 MG tablet   No No   Sig: Take 1 tablet by mouth 2 times daily as needed for constipation   tamsulosin (FLOMAX) 0.4 MG capsule   No No   Sig: Take 1 capsule (0.4 mg) by mouth daily Future refills if needed directed to primary clinic provider or Urology Clinic      Facility-Administered Medications: None     Allergies   Allergies   Allergen Reactions     Walnuts [Nuts]        Physical Exam   Vital Signs:     BP: (!) 170/110 Pulse: 78   Resp: 18 SpO2: 99 % O2 Device: None (Room air)    Weight: 0 lbs 0 oz    General: AAOx3, appears comfortable.  HEENT: PERRLA EOMI. Mucosa moist.   Lungs: Bilateral equal air entry. Clear to auscultation, normal work of breathing.   CVS: S1S2 regular, no tachycardia or murmur.   Abdomen: Soft, NT, ND. BS heard.  MSK: mild edema , no deformities.  Neuro: AAOX3. CN 2-12 normal. Strength symmetrical.  Skin: No rash.       Data   Data reviewed today: I reviewed all medications, new labs and imaging results over the last 24 hours. I personally  reviewed MRI cervical thoracic and lumbar spines results reviewed, attached below    Recent Labs   Lab 12/31/22  1508 12/31/22  1129 12/26/22  0701 12/25/22  0650   WBC  --  4.3  --  7.9   HGB  --  9.6* 9.7* 9.5*   MCV  --  99  --  101*   PLT  --  424  --  399   INR 1.08  --   --   --    NA  --  140  --  137   POTASSIUM  --  3.4 3.7 3.6   CHLORIDE  --  107  --  104   CO2  --  29  --  27   BUN  --  9  --  8   CR  --  1.05  --  0.94   ANIONGAP  --  4  --  6   LINA  --  9.1  --  8.3*   GLC  --  142*  --  96     Recent Results (from the past 24 hour(s))   Lumbar spine MRI w & w/o contrast - surgery <10yrs    Narrative    EXAM: MR LUMBAR SPINE WITHOUT AND WITH CONTRAST, MR CERVICAL SPINE WITHOUT AND WITH CONTRAST, MR THORACIC SPINE WITHOUT AND WITH CONTRAST  LOCATION: [facility]  DATE/TIME: 12/31/2022, 2:46 PM    INDICATION: Epidural abscess, increasing thoracic back pain, muscle spasms, evaluate for progression.  COMPARISON: Cervical and lumbar spine MRI 12/23/2022, thoracic spine MRI 12/22/2022.  CONTRAST: 9 mL Gadavist.  TECHNIQUE: Routine cervical, thoracic and lumbar Spine MRI without and with IV contrast.    FINDINGS:     MRI CERVICAL SPINE: Markedly diminished dorsal epidural fluid collection in the mid to lower cervical and upper visualized thoracic spinal canal. The cervical component of the fluid has nearly completely resolved with some residual thin dural enhancement   extending from C4 into the upper thoracic spine. No cervical marrow or soft tissue edema. Persistent moderate to severe degenerative spinal canal narrowing at C5-C6 slightly improved due to the diminished volume of the dorsal epidural fluid collection.   More mild degenerative spinal canal narrowing at C6-C7 also slightly improved. Cervical canal elsewhere appears adequate. No cervical spinal cord signal abnormality.    MRI THORACIC SPINE: Unchanged vertebral body alignment and height loss with edematous compression  fracture deformities affecting both T7 and the superior T12 endplates. Edema associated with the left T9-T10 facet joint appears similar to slightly   increased. Less pronounced but also probably slightly increased edema associated with the right T9-T10 facet joint. The extensive dorsal epidural collection is markedly diminished superiorly where there is mild residual dural thickening and enhancement   extending from the cervicothoracic junction to the T5 level. There is a persistent dorsal epidural collection below this that between T6 and T8 is diminished but not resolved. Between T8 and T10-T11, it is similar in volume measuring 7 mm in AP thickness   at the T9-T10 level and causing moderate spinal canal stenosis and thecal sac effacement without spinal cord signal abnormality. Below the T10-T11 level, the dorsal epidural collection is considerably diminished. Left-sided pleural fluid collection is   similar to prior.    MRI LUMBAR SPINE:  Nomenclature is based on five lumbar-type vertebral bodies. Unchanged Schmorl's node in the superior L4 endplate. Otherwise, normal lumbar vertebral body heights. Normal alignment. L3 vertebral body hemangioma. Left L1 laminectomy. Peripherally enhancing   fluid within the laminectomy defect and subcutaneous tissues along the operative tract are stable. Dorsal epidural enhancement extends from the thoracolumbar junction to the lower L2 level, unchanged in extent versus prior. A slender dorsal epidural   fluid collection/abscess centered at the lower L1 level is slightly diminished in volume.       Impression    IMPRESSION:    MRI CERVICAL SPINE:  1.  Essentially resolved dorsal epidural fluid collection within the mid to lower cervical spinal canal with mild residual dural thickening/enhancement.  2.  No marrow or soft tissue edema in the cervical spine. Normal cervical spinal cord.  3.  Improved spinal canal stenosis at C5-C6 and C6-C7 due to the diminished dorsal epidural  fluid collection with residual degenerative spinal canal narrowing.    MRI THORACIC SPINE:  1.  Dorsal epidural collection in the upper and lower thoracic spine is improved versus 12/22/2022.  2.  In the midthoracic spine, there is a persistent dorsal epidural phlegmon/abscess that continues to measure 7 mm in thickness at the T9-T10 level and cause moderate spinal cord displacement and spinal canal narrowing. No spinal cord signal   abnormality.  3.  Slightly progressed edema associated with the left greater than right T9-T10 facet joints.    MRI LUMBAR SPINE:  1.  Dorsal epidural abscess centered in the lower L1 level is slightly diminished in volume.  2.  Peripherally enhancing fluid collections within the left L1 laminectomy defect and overlying surgical tract are stable.  3.  No definite progressive infection in the lumbar spine.     MR Thoracic Spine w/o & w Contrast    Narrative    EXAM: MR LUMBAR SPINE WITHOUT AND WITH CONTRAST, MR CERVICAL SPINE WITHOUT AND WITH CONTRAST, MR THORACIC SPINE WITHOUT AND WITH CONTRAST  LOCATION: [facility]  DATE/TIME: 12/31/2022, 2:46 PM    INDICATION: Epidural abscess, increasing thoracic back pain, muscle spasms, evaluate for progression.  COMPARISON: Cervical and lumbar spine MRI 12/23/2022, thoracic spine MRI 12/22/2022.  CONTRAST: 9 mL Gadavist.  TECHNIQUE: Routine cervical, thoracic and lumbar Spine MRI without and with IV contrast.    FINDINGS:     MRI CERVICAL SPINE: Markedly diminished dorsal epidural fluid collection in the mid to lower cervical and upper visualized thoracic spinal canal. The cervical component of the fluid has nearly completely resolved with some residual thin dural enhancement   extending from C4 into the upper thoracic spine. No cervical marrow or soft tissue edema. Persistent moderate to severe degenerative spinal canal narrowing at C5-C6 slightly improved due to the diminished volume of the dorsal epidural fluid  collection.   More mild degenerative spinal canal narrowing at C6-C7 also slightly improved. Cervical canal elsewhere appears adequate. No cervical spinal cord signal abnormality.    MRI THORACIC SPINE: Unchanged vertebral body alignment and height loss with edematous compression fracture deformities affecting both T7 and the superior T12 endplates. Edema associated with the left T9-T10 facet joint appears similar to slightly   increased. Less pronounced but also probably slightly increased edema associated with the right T9-T10 facet joint. The extensive dorsal epidural collection is markedly diminished superiorly where there is mild residual dural thickening and enhancement   extending from the cervicothoracic junction to the T5 level. There is a persistent dorsal epidural collection below this that between T6 and T8 is diminished but not resolved. Between T8 and T10-T11, it is similar in volume measuring 7 mm in AP thickness   at the T9-T10 level and causing moderate spinal canal stenosis and thecal sac effacement without spinal cord signal abnormality. Below the T10-T11 level, the dorsal epidural collection is considerably diminished. Left-sided pleural fluid collection is   similar to prior.    MRI LUMBAR SPINE:  Nomenclature is based on five lumbar-type vertebral bodies. Unchanged Schmorl's node in the superior L4 endplate. Otherwise, normal lumbar vertebral body heights. Normal alignment. L3 vertebral body hemangioma. Left L1 laminectomy. Peripherally enhancing   fluid within the laminectomy defect and subcutaneous tissues along the operative tract are stable. Dorsal epidural enhancement extends from the thoracolumbar junction to the lower L2 level, unchanged in extent versus prior. A slender dorsal epidural   fluid collection/abscess centered at the lower L1 level is slightly diminished in volume.       Impression    IMPRESSION:    MRI CERVICAL SPINE:  1.  Essentially resolved dorsal epidural fluid  collection within the mid to lower cervical spinal canal with mild residual dural thickening/enhancement.  2.  No marrow or soft tissue edema in the cervical spine. Normal cervical spinal cord.  3.  Improved spinal canal stenosis at C5-C6 and C6-C7 due to the diminished dorsal epidural fluid collection with residual degenerative spinal canal narrowing.    MRI THORACIC SPINE:  1.  Dorsal epidural collection in the upper and lower thoracic spine is improved versus 12/22/2022.  2.  In the midthoracic spine, there is a persistent dorsal epidural phlegmon/abscess that continues to measure 7 mm in thickness at the T9-T10 level and cause moderate spinal cord displacement and spinal canal narrowing. No spinal cord signal   abnormality.  3.  Slightly progressed edema associated with the left greater than right T9-T10 facet joints.    MRI LUMBAR SPINE:  1.  Dorsal epidural abscess centered in the lower L1 level is slightly diminished in volume.  2.  Peripherally enhancing fluid collections within the left L1 laminectomy defect and overlying surgical tract are stable.  3.  No definite progressive infection in the lumbar spine.     MR Cervical Spine w/o & w Contrast    Narrative    EXAM: MR LUMBAR SPINE WITHOUT AND WITH CONTRAST, MR CERVICAL SPINE WITHOUT AND WITH CONTRAST, MR THORACIC SPINE WITHOUT AND WITH CONTRAST  LOCATION: [facility]  DATE/TIME: 12/31/2022, 2:46 PM    INDICATION: Epidural abscess, increasing thoracic back pain, muscle spasms, evaluate for progression.  COMPARISON: Cervical and lumbar spine MRI 12/23/2022, thoracic spine MRI 12/22/2022.  CONTRAST: 9 mL Gadavist.  TECHNIQUE: Routine cervical, thoracic and lumbar Spine MRI without and with IV contrast.    FINDINGS:     MRI CERVICAL SPINE: Markedly diminished dorsal epidural fluid collection in the mid to lower cervical and upper visualized thoracic spinal canal. The cervical component of the fluid has nearly completely resolved with  some residual thin dural enhancement   extending from C4 into the upper thoracic spine. No cervical marrow or soft tissue edema. Persistent moderate to severe degenerative spinal canal narrowing at C5-C6 slightly improved due to the diminished volume of the dorsal epidural fluid collection.   More mild degenerative spinal canal narrowing at C6-C7 also slightly improved. Cervical canal elsewhere appears adequate. No cervical spinal cord signal abnormality.    MRI THORACIC SPINE: Unchanged vertebral body alignment and height loss with edematous compression fracture deformities affecting both T7 and the superior T12 endplates. Edema associated with the left T9-T10 facet joint appears similar to slightly   increased. Less pronounced but also probably slightly increased edema associated with the right T9-T10 facet joint. The extensive dorsal epidural collection is markedly diminished superiorly where there is mild residual dural thickening and enhancement   extending from the cervicothoracic junction to the T5 level. There is a persistent dorsal epidural collection below this that between T6 and T8 is diminished but not resolved. Between T8 and T10-T11, it is similar in volume measuring 7 mm in AP thickness   at the T9-T10 level and causing moderate spinal canal stenosis and thecal sac effacement without spinal cord signal abnormality. Below the T10-T11 level, the dorsal epidural collection is considerably diminished. Left-sided pleural fluid collection is   similar to prior.    MRI LUMBAR SPINE:  Nomenclature is based on five lumbar-type vertebral bodies. Unchanged Schmorl's node in the superior L4 endplate. Otherwise, normal lumbar vertebral body heights. Normal alignment. L3 vertebral body hemangioma. Left L1 laminectomy. Peripherally enhancing   fluid within the laminectomy defect and subcutaneous tissues along the operative tract are stable. Dorsal epidural enhancement extends from the thoracolumbar junction to the  lower L2 level, unchanged in extent versus prior. A slender dorsal epidural   fluid collection/abscess centered at the lower L1 level is slightly diminished in volume.       Impression    IMPRESSION:    MRI CERVICAL SPINE:  1.  Essentially resolved dorsal epidural fluid collection within the mid to lower cervical spinal canal with mild residual dural thickening/enhancement.  2.  No marrow or soft tissue edema in the cervical spine. Normal cervical spinal cord.  3.  Improved spinal canal stenosis at C5-C6 and C6-C7 due to the diminished dorsal epidural fluid collection with residual degenerative spinal canal narrowing.    MRI THORACIC SPINE:  1.  Dorsal epidural collection in the upper and lower thoracic spine is improved versus 12/22/2022.  2.  In the midthoracic spine, there is a persistent dorsal epidural phlegmon/abscess that continues to measure 7 mm in thickness at the T9-T10 level and cause moderate spinal cord displacement and spinal canal narrowing. No spinal cord signal   abnormality.  3.  Slightly progressed edema associated with the left greater than right T9-T10 facet joints.    MRI LUMBAR SPINE:  1.  Dorsal epidural abscess centered in the lower L1 level is slightly diminished in volume.  2.  Peripherally enhancing fluid collections within the left L1 laminectomy defect and overlying surgical tract are stable.  3.  No definite progressive infection in the lumbar spine.

## 2023-01-01 NOTE — CONSULTS
Consult Date: 01/01/2023    INFECTIOUS DISEASE CONSULTATION    LOCATION:  Room 2420, [facility].    REFERRING PHYSICIAN:  Dr. Neil    IMPRESSION:     1.  A 57-year-old male, very well known to our group, recent methicillin-sensitive Staphylococcus aureus bacteremia with multifocal T4 to L3 epidural abscess and diskitis, status post lumbar drainage, some ongoing abscess formation higher, bacteremia had cleared.  No other obvious involved sites.  2.  Increasing pain in the thoracic area, imaging still a small abscess present, but no obvious worsening or drainable foci, labs are stable or improved, not having systemic symptoms, suspect this is a pain issue, not a worsening infection issue.  3.  PICC issue, it has been removed.    RECOMMENDATIONS:   1.  Replace PICC.  2.  Continue nafcillin, no better or different antibiotic to change to, is on optimal therapy already.  3.  Overall, suspect this does not represent any need for surgical intervention or failure of treatment or need to extend antibiotics.  Watch for now, better pain control.  Okay disposition, anticipate pain to be ongoing for several weeks, only slowly improving.    HISTORY OF PRESENT ILLNESS:  This 57-year-old male is seen in consultation, he is well known to our group.  Recent lengthy hospitalization with Staph aureus bacteremia that cleared and multifocal spine infection from T4 down to L3, had a lower lumbar drainage.  He had some ongoing abscess higher, but it was quite small.  At discharge, his pain meds were switched over and after discharge in a day or two started having increasing pain primarily in the thoracic area, had no other signs of worsening infection, no wound issues.  No other new pain site.  No fevers, chills, sweats.  His labs if anything have improved.  Imaging does not look worse in general and no obvious failure.    PAST MEDICAL HISTORY:  Dominated by the above major spine infection problems, some urinary  retention issues related to all this, bilateral leg edema without major infection problems.    ALLERGIES:  NONE.    SOCIAL AND FAMILY HISTORY:  No recent travel exposures.  No one else he has been around has been ill.    MEDICATIONS:  As listed.    REVIEW OF SYSTEMS:  A 10-point review of systems other than the back pain issue is negative,PICC line issue currently has been removed, now a new one being replaced.    PHYSICAL EXAMINATION:    GENERAL:  The patient appears his stated age, does not look toxic or ill.  VITAL SIGNS:  Normal including being afebrile.  HEENT:  Unremarkable.   HEART AND LUNGS:  Normal.  ABDOMEN:  Soft and nontender.  EXTREMITIES:  No obvious issues.  The spine area looks unremarkable to me.  No major tenderness.    LABORATORY DATA:  Sed rate slightly higher at 75, but CRP is lower.  White count is normal.    IMAGING:  As noted.    Thank you very much for the consultation.  I will follow the patient with you.    Alonso Butler MD        D: 2023   T: 2023   MT: ELIEL    Name:     FLOR MEYERJet  MRN:      [id number]        Account:      [id number]   :      1965           Consult Date: 2023     Document: [id number]

## 2023-01-01 NOTE — PROVIDER NOTIFICATION
MD Notification    Notified Person: MD    Notified Person Name: AUSTEN Davis     Notification Date/Time: 1/1/23  11:07 AM    Notification Interaction: [employer] messaging    Purpose of Notification: K+- 3.1    Orders Received:    Comments:

## 2023-01-01 NOTE — PROGRESS NOTES
CROSS COVER:    Paged regarding PICC being occluded and unable to administer PRN IV hydralazine as /117.    Spoke with charge nurse.    --Alteplase ordered and Vascular access consult requested.      Gino Nice PA-C

## 2023-01-01 NOTE — PROGRESS NOTES
Contacted by primary nursing for patient, PICC line occluded.  Primary nursing staff indicates that PICC line appeared full of blood, unable to flush upon patient's admission to floor.  PICC line appears to have blood in line.  Charge and primary nurse also report that patient came to floor without cap on PICC line / end left open. Per protocol, line was capped by primary nursing, and VAT was immediately paged.  Unknown how long uncapped, unknown if medication within PICC line / compatibility with alteplase.  Unable to draw any fluid from line.  Patient was in MRI prior to admission.  VAT recommends pulling PICC.  Will place new PICC 1/1 if still needed.  To consult with MD for order to remove.

## 2023-01-01 NOTE — PROGRESS NOTES
[facility]  Neurosurgery Daily Progress Note    Assessment & Plan   57 year old male with history of T12-L1 decompression for evacuation of epidural abscess on 12/18/22 with Dr. Courtney. He was discharged home on 12/27/22 with IV antibiotics per ID. He was admitted on 12/31/2022 with increased back/flank cramping pain over the past few days.     Today, patient reports continued low back muscle spasms/cramps. Patient denies radicular leg pain, numbness, weakness. No fevers. Incision is healing well. Cervical/thoracic/lumbar spine MRIs reviewed with Dr. Sanabria. Imaging shows persistent epidural abscess at T9-10 with moderate spinal canal narrowing. CRP 34.4 and ESR 75.    EXAM: MR LUMBAR SPINE WITHOUT AND WITH CONTRAST, MR CERVICAL SPINE WITHOUT AND WITH CONTRAST, MR THORACIC SPINE WITHOUT AND WITH CONTRAST  LOCATION: [facility]  DATE/TIME: 12/31/2022, 2:46 PM  IMPRESSION:  MRI CERVICAL SPINE:  1.  Essentially resolved dorsal epidural fluid collection within the mid to lower cervical spinal canal with mild residual dural thickening/enhancement.  2.  No marrow or soft tissue edema in the cervical spine. Normal cervical spinal cord.  3.  Improved spinal canal stenosis at C5-C6 and C6-C7 due to the diminished dorsal epidural fluid collection with residual degenerative spinal canal narrowing.     MRI THORACIC SPINE:  1.  Dorsal epidural collection in the upper and lower thoracic spine is improved versus 12/22/2022.  2.  In the midthoracic spine, there is a persistent dorsal epidural phlegmon/abscess that continues to measure 7 mm in thickness at the T9-T10 level and cause moderate spinal cord displacement and spinal canal narrowing. No spinal cord signal   abnormality.  3.  Slightly progressed edema associated with the left greater than right T9-T10 facet joints.     MRI LUMBAR SPINE:  1.  Dorsal epidural abscess centered in the lower L1 level is slightly diminished in  volume.  2.  Peripherally enhancing fluid collections within the left L1 laminectomy defect and overlying surgical tract are stable.  3.  No definite progressive infection in the lumbar spine.    Plan:  - No surgical intervention planned at this time  - Continue abx per ID   - Continue pain control measures, increased Robaxin dose for muscle spasms   - From NSG standpoint, okay to discharge once pain is well managed   - Follow up with NSG clinic in 1 week with repeat MRI. Scheduling will call patient to set up appt.     Discussed with Dr. Daniele Zaragoza CNP  [address] [address]  Tel [phone]  Pager [phone]    Interval History   Stable     Physical Exam   Temp: 98.3  F (36.8  C) Temp src: Oral BP: 136/88 Pulse: 68   Resp: 16 SpO2: 98 % O2 Device: None (Room air)    There were no vitals filed for this visit.  Vital Signs with Ranges  Temp:  [98.3  F (36.8  C)-98.7  F (37.1  C)] 98.3  F (36.8  C)  Pulse:  [68-94] 68  Resp:  [] 16  BP: (136-180)/() 136/88  SpO2:  [97 %-100 %] 98 %  I/O last 3 completed shifts:  In: -   Out: 800 [Urine:800]    Mental status:  Alert and oriented x 3, speech is fluent.  Motor:  Moves all extremities, BLE 5/5   Sensation:  Intact   Incision: CDI     Medications     sodium chloride 100 mL/hr at 01/01/23 0450        lidocaine  2 patch Transdermal Q24H     lidocaine   Transdermal Q8H TITA     methocarbamol  500 mg Oral 4x Daily     multivitamin w/minerals  1 tablet Oral Daily     nafcillin  2 g Intravenous Q4H     potassium chloride  20 mEq Oral Once     tamsulosin  0.4 mg Oral Daily       Giselle Zaragoza CNP  [address] [address]  Tel [phone]  Pager [phone]

## 2023-01-01 NOTE — PLAN OF CARE
Physical Therapy Discharge Summary    Reason for therapy discharge:    All goals and outcomes met, no further needs identified.    Progress towards therapy goal(s). See goals on Care Plan in [facility] electronic health record for goal details.  Goals met    Therapy recommendation(s):    No further therapy is recommended.At this time no therapy is recommended. Patient is encouraged to follow-up with physician and if trunk weakness persists, OP spine focused PT may be indicated to increase trunk ROM, strength, and stability.

## 2023-01-01 NOTE — PHARMACY-ADMISSION MEDICATION HISTORY
Pharmacy Medication History  Admission medication history interview status for the 12/31/2022  admission is complete. See EPIC admission navigator for prior to admission medications     Location of Interview: Patient room  Medication history sources: Patient and discharge AVS 12-27-22    Significant changes made to the medication list:  Flagged for removal: Senna-S  Adj: MVI to 2 tablets    In the past week, patient estimated taking medication this percent of the time: greater than 90%    Additional medication history information:   none    Medication reconciliation completed by provider prior to medication history? No    Time spent in this activity: 15 min    Prior to Admission medications    Medication Sig Last Dose Taking? Auth Provider Long Term End Date   acetaminophen (TYLENOL) 325 MG tablet Take 2 tablets (650 mg) by mouth every 6 hours as needed for other or mild pain (For optimal non-opioid multimodal pain management to improve pain control.) 12/31/2022 Yes Nate Beckett MD     fish oil-omega-3 fatty acids 1000 MG capsule Take 2 capsules by mouth daily. 12/31/2022 Yes Alfredo Fink MD Yes    MULTIVITAMIN TABS   OR Take 2 tablets by mouth daily 12/31/2022 Yes Reported, Patient     nafcillin 2 GM vial Inject 2 g into the vein every 4 hours for 38 days 12/31/2022 Yes Elyse Valadez MD  1/31/23   oxyCODONE (ROXICODONE) 5 MG tablet Take 1-2 tablets (5-10 mg) by mouth every 6 hours as needed for moderate pain (4-6) or severe pain (7-10) (5 mg for moderate pain, 10 mg for severe pain) Future refills if needed directed to primary clinic provider or primary neurosurgeon 12/31/2022 Yes Nate Beckett MD     tamsulosin (FLOMAX) 0.4 MG capsule Take 1 capsule (0.4 mg) by mouth daily Future refills if needed directed to primary clinic provider or Urology Clinic 12/30/2022 at 1500 Yes Nate Beckett MD     senna-docusate (SENOKOT-S/PERICOLACE) 8.6-50 MG tablet Take 1 tablet by mouth 2 times daily as  needed for constipation   Nate Beckett MD         The information provided in this note is only as accurate as the sources available at the time of update(s)

## 2023-01-01 NOTE — PROVIDER NOTIFICATION
MD Notification    Notified Person: MD    Notified Person Name: Gino Nice    Notification Date/Time: 12/31/22  @ 2016 hrs     Notification Interaction: [employer]     Purpose of Notification:  Pt writhing in pain 8/10 (epidural abscess) c/0 spasms - would you like to order robaxin or flexeril?     Orders Received:    Comments:

## 2023-01-01 NOTE — PROGRESS NOTES
MD Notification    Notified Person: MD    Notified Person Name: Gino Nice    Notification Date/Time: 128/31/22 -2058 hrs     Notification Interaction: [employer]com    Purpose of Notification: 2420 BS. PICC occluded, no IV access to give PRN IV Hydralazine /117. Would you like to rx altapase? Txx. Juanita BERUMEN [id number]    Orders Received:    Comments:

## 2023-01-01 NOTE — ED NOTES
[facility]  ED Nurse Handoff Report    ED Chief complaint: Post-op Problem and Back Pain      ED Diagnosis:   Final diagnoses:   Acute bilateral thoracic back pain   Spinal epidural abscess       Code Status:     Allergies:   Allergies   Allergen Reactions     Walnuts [Nuts]        Patient Story: pt presents with pain after spinal suction performed two weeks prior. Pt has PICC for abx admin and oxycodone at home.   Focused Assessment:  Pt MRI indicated spinal cyst but not much change. Neuro consulted. Admission for pain control and obsevration     Treatments and/or interventions provided: blood work imaging abx and pain   Patient's response to treatments and/or interventions: pain controlled     To be done/followed up on inpatient unit:  neurosurgery     Does this patient have any cognitive concerns?: noine    Activity level - Baseline/Home:  Independent  Activity Level - Current:   Stand with Assist    Patient's Preferred language: English   Needed?: No    Isolation: None  Infection: Not Applicable  Patient tested for COVID 19 prior to admission: NO  Bariatric?: No    Vital Signs:   Vitals:    12/31/22 1248 12/31/22 1500 12/31/22 1502 12/31/22 1623   BP:  (!) 170/110     Pulse:  78     Resp:       SpO2: 98% 97% 98% 99%       Cardiac Rhythm:     Was the PSS-3 completed:   Yes  What interventions are required if any?               Family Comments: cocnern for increased pain   OBS brochure/video discussed/provided to patient/family: No              Name of person given brochure if not patient:               Relationship to patient: wife     For the majority of the shift this patient's behavior was Green.   Behavioral interventions performed were none .    ED NURSE PHONE NUMBER: RN 9

## 2023-01-01 NOTE — PROGRESS NOTES
[facility]  Hospitalist Progress Note    Date of Admission: 12/31/2022    Interval History   Patient reporting some pain around the mid upper abdomen extending to his back.  He was readmitted with complaints of spasm type symptoms.  Had reported increased back/flank cramping pain in the last few days.  Denies any leg symptoms and feels that the legs are actually doing quite well.  Repeat imaging with neurosurgery consult: no surgical plans     Assessment & Plan   Ian Moncada is a 57 year old male recently admitted for MSSA epidural abscess and spinal cord compression, leg edema, urinary retention presented to the ER due to increased back pain and is being registered under observation on 12/31/2022 for further management.       MSSA epidural abscess    Hospitalization 12/18-12/27.  Noted epidural fluid collection from upper thoracic spine down to the mid lumbar spine with resulting spinal cord compression from T12-L1.  Neurosurgery evaluated patient and underwent thoracic lumbar decompression for evacuation of abscess.  Culture grew MSSA.  ID as well consulted and was discharged on nafcillin IV to continue until 1/21/22.      Patient now with increased back pain for the last 2 days that wraps around the middle of his abdomen.     MRI of cervical thoracic and lumbar spines done in ER, overall improvement although persistent dorsal epidural phlegmon/abscess, 7 mm thickness at T9-10 level causing moderate to spinal cord displacement and spinal canal narrowing noted.      Resume PTA nafcillin    Neurosurgery consulting    ID consulting     Lidocaine patch applied     Robaxin 750 mg po 4 X daily prn     Tylenol 975 mg po q 6 hours as needed     Dilaudid 0.4 every 3 hours prn     Has oxycodone prn     Replaced PICC line     Attempt to get to orals         Urinary retention    Patient had recurrent urinary retention and was discharged with Dacosta catheter    Flomax restarted     He was supposed to  follow-up with urologist but has not heard from the clinic.    Will tag urology in the hospital for consideration of voiding trial but will need to optimize ability to void (? Standing and ambulation)      Bilateral leg edema    Ultrasound was done and DVT was ruled out    Called neurosurgery 1/1/2023? DVT prevention .     Mild anemia    Due to surgical blood loss and ongoing illness.    Hemoglobin stable at baseline now,     Hypertension:    Patient was not discharged on any blood pressure meds last admission.  Blood pressure slightly high    Now with blood pressures increased.    ?? Pain driven     Prn hydralazine but may required scheduled     Diet:  regular diet   DVT Prophylaxis: Ambulate every shift  Dacosta Catheter: Not present  Central Lines: PRESENT     Cardiac Monitoring: None  Code Status:   Full code by default        Clinically Significant Risk Factors Present on Admission                          # Overweight: Estimated body mass index is 28.34 kg/m  as calculated from the following:    Height as of 12/18/22: 1.829 m (6').    Weight as of 12/27/22: 94.8 kg (208 lb 15.9 oz).        Disposition Plan        Expected Discharge Date: 01/01/2023                   The patient's care was discussed with the Patient and Patient's Family.        Diet: Regular Diet Adult  Dacosta Catheter: PRESENT, indication:    DVT Prophylaxis: will need to get approval by neurosurgery   Code Status: Full Code       Expected Discharge Date: 01/02/2023               HOWARD NAGEL MD,   Hospitalist Service  [facility]  ______________________________________________________________________    -Data reviewed today: I reviewed all new labs and imaging results over the last 24 hours. I personally reviewed no images or EKG's today.    Physical Exam   Temp: 98.6  F (37  C) Temp src: Oral BP: (!) 169/104 Pulse: 76   Resp: 18 SpO2: 99 % O2 Device: None (Room air)    There were no vitals filed for this  visit.Constitutional: Appears stated age, no acute distress.  Respiratory: Breath sounds CTA. No increased work of breathing.  Cardiovascular: RRR, no rub or murmur. No peripheral edema.  GI: Soft, non-tender, non-distended.  Skin: Warm, dry, no rashes or lesions.  Other: Moving bilateral lower extremities     Medications     sodium chloride 100 mL/hr at 01/01/23 0450       lidocaine  2 patch Transdermal Q24H     lidocaine   Transdermal Q8H TITA     methocarbamol  750 mg Oral 4x Daily     multivitamin w/minerals  1 tablet Oral Daily     nafcillin  2 g Intravenous Q4H     tamsulosin  0.4 mg Oral Daily       Data   Recent Labs   Lab 01/01/23  0955 12/31/22  2107 12/31/22  2046 12/31/22  1508 12/31/22  1129 12/26/22  0701   WBC 5.6  --   --   --  4.3  --    HGB 9.6*  --   --   --  9.6* 9.7*   *  --   --   --  99  --      --   --   --  424  --    INR  --   --   --  1.08  --   --      --   --   --  140  --    POTASSIUM 3.1*  --   --   --  3.4 3.7   CHLORIDE 104  --   --   --  107  --    CO2 28  --   --   --  29  --    BUN 8  --   --   --  9  --    CR 1.01  --   --   --  1.05  --    ANIONGAP 6  --   --   --  4  --    LINA 8.6  --   --   --  9.1  --    GLC 86 102* 65*  --  142*  --    ALBUMIN 2.4*  --   --   --  2.5*  --    PROTTOTAL 6.5*  --   --   --  6.8  --    BILITOTAL 1.0  --   --   --  1.3  --    ALKPHOS 57  --   --   --  61  --    ALT 30  --   --   --  30  --    AST 28  --   --   --  30  --        Imaging:  No results found for this or any previous visit (from the past 24 hour(s)).

## 2023-01-01 NOTE — PROGRESS NOTES
MD EDGARDO Bacon discusses PICC situation with this RN, verbally orders PICC removal based on findings noted in progress note of 2147, this date.  Primary nursing staff aware that VAT will need new PICC order to replace line, if needed.

## 2023-01-01 NOTE — CONSULTS
ID consult dictated IMP 1 56 yo male multilevel MSSA spine/epidural in fection, pain worsened as outpt but labs/imaging/sxs do not suggest tx failure or more op needed    REc work on pain, home Ok same orders intact new PICC in

## 2023-01-02 ENCOUNTER — APPOINTMENT (OUTPATIENT)
Dept: INTERVENTIONAL RADIOLOGY/VASCULAR | Facility: CLINIC | Age: 58
DRG: 095 | End: 2023-01-02
Attending: INTERNAL MEDICINE
Payer: COMMERCIAL

## 2023-01-02 ENCOUNTER — APPOINTMENT (OUTPATIENT)
Dept: ULTRASOUND IMAGING | Facility: CLINIC | Age: 58
DRG: 095 | End: 2023-01-02
Attending: INTERNAL MEDICINE
Payer: COMMERCIAL

## 2023-01-02 PROBLEM — G06.1 SPINAL EPIDURAL ABSCESS: Status: ACTIVE | Noted: 2023-01-02

## 2023-01-02 PROBLEM — M54.6 ACUTE BILATERAL THORACIC BACK PAIN: Status: ACTIVE | Noted: 2023-01-02

## 2023-01-02 LAB
POTASSIUM BLD-SCNC: 3.2 MMOL/L (ref 3.4–5.3)
POTASSIUM BLD-SCNC: 3.3 MMOL/L (ref 3.4–5.3)

## 2023-01-02 PROCEDURE — 250N000009 HC RX 250: Performed by: HOSPITALIST

## 2023-01-02 PROCEDURE — 36573 INSJ PICC RS&I 5 YR+: CPT

## 2023-01-02 PROCEDURE — C1751 CATH, INF, PER/CENT/MIDLINE: HCPCS

## 2023-01-02 PROCEDURE — 250N000013 HC RX MED GY IP 250 OP 250 PS 637: Performed by: NURSE PRACTITIONER

## 2023-01-02 PROCEDURE — 250N000013 HC RX MED GY IP 250 OP 250 PS 637: Performed by: HOSPITALIST

## 2023-01-02 PROCEDURE — 36415 COLL VENOUS BLD VENIPUNCTURE: CPT | Performed by: HOSPITALIST

## 2023-01-02 PROCEDURE — G0378 HOSPITAL OBSERVATION PER HR: HCPCS

## 2023-01-02 PROCEDURE — 99233 SBSQ HOSP IP/OBS HIGH 50: CPT | Performed by: INTERNAL MEDICINE

## 2023-01-02 PROCEDURE — 02HV33Z INSERTION OF INFUSION DEVICE INTO SUPERIOR VENA CAVA, PERCUTANEOUS APPROACH: ICD-10-PCS | Performed by: RADIOLOGY

## 2023-01-02 PROCEDURE — 84132 ASSAY OF SERUM POTASSIUM: CPT | Performed by: HOSPITALIST

## 2023-01-02 PROCEDURE — 120N000001 HC R&B MED SURG/OB

## 2023-01-02 PROCEDURE — 250N000013 HC RX MED GY IP 250 OP 250 PS 637: Performed by: INTERNAL MEDICINE

## 2023-01-02 PROCEDURE — 96376 TX/PRO/DX INJ SAME DRUG ADON: CPT

## 2023-01-02 PROCEDURE — 250N000009 HC RX 250: Performed by: INTERNAL MEDICINE

## 2023-01-02 PROCEDURE — 93970 EXTREMITY STUDY: CPT

## 2023-01-02 PROCEDURE — 250N000011 HC RX IP 250 OP 636: Performed by: HOSPITALIST

## 2023-01-02 RX ORDER — POTASSIUM CHLORIDE 1500 MG/1
40 TABLET, EXTENDED RELEASE ORAL ONCE
Status: COMPLETED | OUTPATIENT
Start: 2023-01-02 | End: 2023-01-02

## 2023-01-02 RX ORDER — AMLODIPINE BESYLATE 5 MG/1
5 TABLET ORAL DAILY
Status: DISCONTINUED | OUTPATIENT
Start: 2023-01-03 | End: 2023-01-05 | Stop reason: HOSPADM

## 2023-01-02 RX ORDER — AMLODIPINE BESYLATE 2.5 MG/1
2.5 TABLET ORAL DAILY
Status: DISCONTINUED | OUTPATIENT
Start: 2023-01-02 | End: 2023-01-02

## 2023-01-02 RX ORDER — ACETAMINOPHEN 325 MG/1
975 TABLET ORAL EVERY 8 HOURS
Status: DISCONTINUED | OUTPATIENT
Start: 2023-01-02 | End: 2023-01-03

## 2023-01-02 RX ADMIN — ACETAMINOPHEN 975 MG: 325 TABLET, FILM COATED ORAL at 15:18

## 2023-01-02 RX ADMIN — LIDOCAINE 2 PATCH: 560 PATCH PERCUTANEOUS; TOPICAL; TRANSDERMAL at 09:43

## 2023-01-02 RX ADMIN — HYDROMORPHONE HYDROCHLORIDE 0.4 MG: 0.2 INJECTION, SOLUTION INTRAMUSCULAR; INTRAVENOUS; SUBCUTANEOUS at 00:19

## 2023-01-02 RX ADMIN — MULTIPLE VITAMINS W/ MINERALS TAB 1 TABLET: TAB at 09:44

## 2023-01-02 RX ADMIN — LIDOCAINE HYDROCHLORIDE 3 ML: 10 INJECTION, SOLUTION INFILTRATION; PERINEURAL at 09:08

## 2023-01-02 RX ADMIN — ACETAMINOPHEN 975 MG: 325 TABLET, FILM COATED ORAL at 23:37

## 2023-01-02 RX ADMIN — OXYCODONE HYDROCHLORIDE 10 MG: 5 TABLET ORAL at 09:44

## 2023-01-02 RX ADMIN — OXYCODONE HYDROCHLORIDE 10 MG: 5 TABLET ORAL at 15:18

## 2023-01-02 RX ADMIN — ACETAMINOPHEN 975 MG: 325 TABLET, FILM COATED ORAL at 02:40

## 2023-01-02 RX ADMIN — NAFCILLIN SODIUM 2 G: 2 INJECTION, POWDER, LYOPHILIZED, FOR SOLUTION INTRAMUSCULAR; INTRAVENOUS at 11:57

## 2023-01-02 RX ADMIN — ACETAMINOPHEN 975 MG: 325 TABLET, FILM COATED ORAL at 09:44

## 2023-01-02 RX ADMIN — NAFCILLIN SODIUM 2 G: 2 INJECTION, POWDER, LYOPHILIZED, FOR SOLUTION INTRAMUSCULAR; INTRAVENOUS at 06:38

## 2023-01-02 RX ADMIN — NAFCILLIN SODIUM 2 G: 2 INJECTION, POWDER, LYOPHILIZED, FOR SOLUTION INTRAMUSCULAR; INTRAVENOUS at 20:33

## 2023-01-02 RX ADMIN — AMLODIPINE BESYLATE 2.5 MG: 2.5 TABLET ORAL at 13:30

## 2023-01-02 RX ADMIN — METHOCARBAMOL 750 MG: 750 TABLET ORAL at 05:16

## 2023-01-02 RX ADMIN — HYDROMORPHONE HYDROCHLORIDE 0.4 MG: 0.2 INJECTION, SOLUTION INTRAMUSCULAR; INTRAVENOUS; SUBCUTANEOUS at 08:36

## 2023-01-02 RX ADMIN — TAMSULOSIN HYDROCHLORIDE 0.4 MG: 0.4 CAPSULE ORAL at 09:44

## 2023-01-02 RX ADMIN — NAFCILLIN SODIUM 2 G: 2 INJECTION, POWDER, LYOPHILIZED, FOR SOLUTION INTRAMUSCULAR; INTRAVENOUS at 02:41

## 2023-01-02 RX ADMIN — NAFCILLIN SODIUM 2 G: 2 INJECTION, POWDER, LYOPHILIZED, FOR SOLUTION INTRAMUSCULAR; INTRAVENOUS at 16:28

## 2023-01-02 RX ADMIN — OXYCODONE HYDROCHLORIDE 10 MG: 5 TABLET ORAL at 02:40

## 2023-01-02 RX ADMIN — POTASSIUM CHLORIDE 40 MEQ: 1500 TABLET, EXTENDED RELEASE ORAL at 20:32

## 2023-01-02 RX ADMIN — METHOCARBAMOL 750 MG: 750 TABLET ORAL at 23:37

## 2023-01-02 RX ADMIN — METHOCARBAMOL 750 MG: 750 TABLET ORAL at 10:17

## 2023-01-02 RX ADMIN — OXYCODONE HYDROCHLORIDE 10 MG: 5 TABLET ORAL at 21:31

## 2023-01-02 RX ADMIN — POTASSIUM CHLORIDE 40 MEQ: 1500 TABLET, EXTENDED RELEASE ORAL at 13:30

## 2023-01-02 RX ADMIN — METHOCARBAMOL 750 MG: 750 TABLET ORAL at 16:34

## 2023-01-02 ASSESSMENT — ACTIVITIES OF DAILY LIVING (ADL)
ADLS_ACUITY_SCORE: 36

## 2023-01-02 NOTE — PROGRESS NOTES
Check with neurosurgery if NSAID and or volataren gel ok to use.     Also confirm DVT prevention with neurosurgery.   DR. Krystal Davis    Reviewed BP still high at 11 AM start norvasc 2.5 mg po daily     Dr. Krystal Davis

## 2023-01-02 NOTE — IR NOTE
Interventional Radiology Intra-procedural Nursing Note    Patient Name: Ian Moncada  Medical Record Number: [id number]  Today's Date: January 2, 2023    Start Time: 906  End of procedure time: 916  Procedure: Peripherally Inserted Central Catheter Placement with local  Report given to: Ortho/Spine RN  Time pt departs:  921    Other Notes: Pt into IR suite 2 via cart. Pt awake and alert. To table in supine position. Monitoring equipment applied. VSS. Tele SR. Dr. Benson in room. Time out and procedure started. Pt tolerated procedure well. Debrief with Dr. Benson. Right PICC placed and pressure held until hemostasis achieved. Dressing CDI. No complications. Pt transferred back to Ortho/Spine floor.    Medications:    Lidocaine 1% 3 ml    Sanya Barton RN

## 2023-01-02 NOTE — PLAN OF CARE
End of shift summary: Patient admitted on 12/31/2022 dt increased back/flank cramping pain 2/2 Spinal epidural abscess    Neuro: a/oX4,LOPEZ, follows commands, neuros intact   CV: VSS on RA, afebrile, BP at 140s sbp p hydralazine   Respi: Stable SPO2s >90s on RA, no s/sx of respi distress   GI: regular diet, appetite resuming, tolerating meals   : gamez catheter, adequate urine ouput   Pain: back pain, Oxycodone+Tylenol Po given, Robaxin at 2300  Mobiltiy: IND on bed mobility, SBA on gait/ambulation   Access/Drips: R arm/L Arm PIVs, ABX q4hrs   Plan: PICC line insertion in AM, strategise pain control w/ current regimen, if completed, may DC home tomorrow

## 2023-01-02 NOTE — PROGRESS NOTES
[facility]  Hospitalist Progress Note    Date of Admission: 12/31/2022    Interval History   Patient had a more challenging morning.  He had to go down to get a PICC line placed.  This had to be done in interventional radiology.  Laying down flat is uncomfortable.  He has now been on muscle relaxants, oral pain medications.  Blood pressure elevated but having pain and spasm.  He has been receiving as needed hydralazine.  Started on Norvasc today  He had not been on blood pressure pills prior    Assessment & Plan   Ian Moncada is a 57 year old male recently admitted for MSSA epidural abscess and spinal cord compression, leg edema, urinary retention presented to the ER due to increased back pain and is being registered under observation on 12/31/2022 for further management.       MSSA epidural abscess    Hospitalization 12/18-12/27.  Noted epidural fluid collection from upper thoracic spine down to the mid lumbar spine with resulting spinal cord compression from T12-L1.  Neurosurgery evaluated patient and underwent thoracic lumbar decompression for evacuation of abscess.  Culture grew MSSA.  ID as well consulted and was discharged on nafcillin IV to continue until 1/21/22.      Patient now with increased back pain for 2 days PTA  that wraps around the middle of his abdomen.     MRI of cervical thoracic and lumbar spines done in ER, overall improvement although persistent dorsal epidural phlegmon/abscess, 7 mm thickness at T9-10 level causing moderate to spinal cord displacement and spinal canal narrowing noted.      Resume PTA nafcillin    Neurosurgery consulting    ID consulting     Lidocaine patch applied     Robaxin 750 mg po 4 X daily     Tylenol 975 mg po q 6 hours as needed     Has oxycodone prn >> increase dose to 10 mg    Attempt to stop IV medications to develop home regimen that will work    This a.m. pain was worse but had just been laying flat for his PICC line        Urinary  retention    Patient had recurrent urinary retention and was discharged with Dacosta catheter    Flomax restarted     He was supposed to follow-up with urologist but has not heard from the clinic.    Urology consult today and okay to try removal of Dacosta for retention we will need to check PVRs         Bilateral leg edema    Ultrasound was done and DVT was ruled out    Patient has again edema in his legs.    Not been on DVT prevention prior with his back surgery    Recheck Dopplers    Spoke with neurosurgery 1/2 and okay to begin DVT prevention with lovenox     Mild anemia    Due to surgical blood loss and ongoing illness.    Hemoglobin stable at baseline now,    Hemoglobin 9.6 and stable     Hypertension:    Patient was not discharged on any blood pressure meds last admission.  Blood pressure slightly high    Now with blood pressures increased.    ?? Pain driven     Prn hydralazine but may required scheduled     Started on Norvasc 2.5 mg p.o. daily.    He still has as needed hydralazine    Diet:  regular diet   DVT Prophylaxis: Ambulate every shift  Dacosta Catheter: Dacosta in place but removed today for PVR check  Central Lines: PRESENT: PICC line replaced in IR 1/2     Cardiac Monitoring: None  Code Status:   Full code by gumaro                  # Overweight: Estimated body mass index is 28.34 kg/m  as calculated from the following:    Height as of 12/18/22: 1.829 m (6').    Weight as of 12/27/22: 94.8 kg (208 lb 15.9 oz).        Disposition Plan   Patient will be discharged to home if he is able to control pain and BP control.      Expected Discharge Date:  1/3 to home            OHWARD NAGEL MD,   Hospitalist Service  [facility]  ______________________________________________________________________    -Data reviewed today: I reviewed all new labs and imaging results over the last 24 hours. I personally reviewed no images or EKG's today.    Physical Exam   Temp: 98.9  F (37.2  C) Temp  src: Oral BP: (!) 165/105 Pulse: 70   Resp: 16 SpO2: 100 % O2 Device: None (Room air)    There were no vitals filed for this visit.Constitutional: Appears stated age, no acute distress.  Respiratory: Breath sounds CTA. No increased work of breathing.  Cardiovascular: RRR, no rub or murmur. No peripheral edema.  GI: Soft, non-tender, non-distended.  Skin: Warm, dry, no rashes or lesions.  Other: Moving bilateral lower extremities   1+ bilateral lower extremity edema    Medications       amLODIPine  2.5 mg Oral Daily     lidocaine  2 patch Transdermal Q24H     lidocaine   Transdermal Q8H TITA     methocarbamol  750 mg Oral 4x Daily     multivitamin w/minerals  1 tablet Oral Daily     nafcillin  2 g Intravenous Q4H     sodium chloride (PF)  10-40 mL Intracatheter Q7 Days     tamsulosin  0.4 mg Oral Daily       Data   Recent Labs   Lab 01/02/23  0751 01/01/23  0955 12/31/22  2107 12/31/22  2046 12/31/22  1508 12/31/22  1129   WBC  --  5.6  --   --   --  4.3   HGB  --  9.6*  --   --   --  9.6*   MCV  --  101*  --   --   --  99   PLT  --  388  --   --   --  424   INR  --   --   --   --  1.08  --    NA  --  138  --   --   --  140   POTASSIUM 3.2* 3.1*  --   --   --  3.4   CHLORIDE  --  104  --   --   --  107   CO2  --  28  --   --   --  29   BUN  --  8  --   --   --  9   CR  --  1.01  --   --   --  1.05   ANIONGAP  --  6  --   --   --  4   LINA  --  8.6  --   --   --  9.1   GLC  --  86 102* 65*  --  142*   ALBUMIN  --  2.4*  --   --   --  2.5*   PROTTOTAL  --  6.5*  --   --   --  6.8   BILITOTAL  --  1.0  --   --   --  1.3   ALKPHOS  --  57  --   --   --  61   ALT  --  30  --   --   --  30   AST  --  28  --   --   --  30       Imaging:  Recent Results (from the past 24 hour(s))   IR PICC Placement > 5 Yrs of Age    [facility] RADIOLOGY  LOCATION: [facility]  DATE: 1/2/2023    PROCEDURE: PERIPHERALLY INSERTED CENTRAL CATHETER (PICC) PLACEMENT    INTERVENTIONAL RADIOLOGIST: Sean Benson MD.    INDICATION:  57-year-old male with bacteremia in need of long-term  antibiotic therapy.    CONSENT: The risks, benefits and alternatives of peripherally inserted  central catheter placement were discussed with the patient  in detail.  All questions were answered. Informed consent was given to proceed  with the procedure.    MODERATE SEDATION: None.    CONTRAST: None.  ANTIBIOTICS: None.  ADDITIONAL MEDICATIONS: None.    FLUOROSCOPIC TIME: 0.9 minutes.  RADIATION DOSE: Air Kerma: 2 mGy.    COMPLICATIONS: No immediate complications.    STERILE BARRIER TECHNIQUE: Maximum sterile barrier technique was used.  Cutaneous antisepsis was performed at the operative site with  application of 2% chlorhexidine and large sterile drape. Prior to the  procedure, the  and assistant performed hand hygiene and wore  hat, mask, sterile gown, and sterile gloves during the entire  procedure.    PROCEDURE:     Using real-time ultrasound guidance, the right upper arm brachial vein  was punctured. A wire was manipulated centrally into the right atrium.      Over wire exchange was then made for a 4F, 44 cm single lumen  peripherally inserted central catheter which was advanced under  fluoroscopic guidance until the tip was at the cavoatrial junction. A  fluoroscopic image was obtained.    FINDINGS:  Ultrasound shows an anechoic and compressible right upper arm brachial  vein. A permanent image was stored.      The completion fluoroscopic images show the catheter tip to lie near  the cavoatrial junction.      Impression    IMPRESSION:    1.  Peripherally inserted central catheter (PICC) placement, as  discussed above.  2.  The catheter position was verified fluoroscopically and this is  ready for use.    ____________________________________________________________________    CPT codes for physician reference only:  57813        CARLA RAYA MD         SYSTEM ID:  [id number]

## 2023-01-02 NOTE — PROGRESS NOTES
Notified provider about indwelling gamez catheter discussed removal or continued need.    Did provider choose to remove indwelling gamez catheter? Yes    Provider's gamez indication for keeping indwelling gamez catheter: Retention.    Is there an order for indwelling gamez catheter? Yes    Gamez Dced at this time. Voiding trial started.

## 2023-01-02 NOTE — PROGRESS NOTES
[facility]    Patient is currently on service with [facility] for home IV abx (nafcillin). Nursing provided by [facility].      Liaison will follow along. If applicable at discharge, please include Home Infusion Referral in discharge orders.     Thank you,    Dominga Bernabe RN  [facility] Liaison  [phone] (M-F 8a-5p)  [phone] Office

## 2023-01-02 NOTE — PROGRESS NOTES
[facility] Inpatient Progress Note    SUBJECTIVE:    Patient was seen in consultation  for acute urinary retention for which a gamez catheter was placed. Patient instructed to call urology clinic for outpatient appointment but did not. He is now back in the hospital for a spine infection and underwent drainage of spinal abscess with IR this morning.  Consultation placed to consider void trial and patient has now had catheter for approximately 2 weeks. Was started on tamsulosin at last consultation.    OBJECTIVE:  Temp:  [98.1  F (36.7  C)-98.6  F (37  C)] 98.3  F (36.8  C)  Pulse:  [70-77] 77  Resp:  [15-21] 16  BP: (148-180)/() 169/107  SpO2:  [96 %-100 %] 97 %    Temp (24hrs), Av.3  F (36.8  C), Min:98.1  F (36.7  C), Max:98.6  F (37  C)    No data found.     Intake/Output Summary (Last 24 hours) at 2023 1024  Last data filed at 2023 0645  Gross per 24 hour   Intake 150 ml   Output 1800 ml   Net -1650 ml       Recent Labs   Lab Test 23  0751 23  0955 22  1129   POTASSIUM 3.2* 3.1* 3.4   CHLORIDE  --  104 107   BUN  --  8 9       Recent Labs   Lab Test 23  0955 22  1508 22  1129   INR  --  1.08  --    WBC 5.6  --  4.3   HGB 9.6*  --  9.6*     --  424       Active Problems:    * No active hospital problems. *      ASSESSMENT & PLAN:   Ian Moncada is a 57 year old year old male with history of urinary retention.    - Ok to move forward with trial of void from  stand point  - Continue tamsulosin  - Will need follow up in Urology clinic. Message sent to our schedulers to reach out to the patient. If he has not heard from them within 48 hours please have patient call [phone]    Zacarias Alonzo MD  [facility]  Pager: [phone]  Office: [phone]  Surgical [phone]

## 2023-01-02 NOTE — PLAN OF CARE
Goal Outcome Evaluation:      Plan of Care Reviewed With: patient        AOx4, VSS on RA. IV dilaudid x1 for breakthrough pain at 0020 hrs. Otherwise pain managed w/ scheduled robaxin & ORN oxycodone and tylenol. IV abx Q4H.  CMS and neuros intact.  Feet swollen +1 pitting edema.  Chronic gamez (from home) patent & draining, pt does his own catheter care. Reg diet, adequate I/Os. Independent in rm. discharge pending resolution of pain issue.

## 2023-01-02 NOTE — PROGRESS NOTES
Neurosurgery progress note:    Patient states his pain is better than it was yesterday.  He believes taking the pain pill and the muscle relaxant at the same time helps.  On exam, he's sitting up in bed and appears comfortable.  Dacosta catheter was removed this morning.  Incision C/D/I.  Moving extremities well.    RECOMMENDATIONS:  Continue abx per ID.  Continue pain management.  Okay or NSAID's and Voltaren from NS standpoint.  Okay for DVT PPX as well.    Vero Adams, OLIMPIAS  [facility]  [address] [address]    Tel [phone]  Pager [phone]

## 2023-01-02 NOTE — CONSULTS
Care Management Initial Consult    General Information  Assessment completed with: Chart Review, spouse Anita, Patient       Primary Care Provider verified and updated as needed:  Yes, updated Epic. Dr Alfredo Fink,  [facility]  Readmission within the last 30 days:   Yes, 12/18/22        Advance Care Planning:   No ACP Documents on File         Communication Assessment  Patient's communication style: spoken language (English or Bilingual)             Cognitive  Cognitive/Neuro/Behavioral: WDL, Alert and oriented x4                     Living Environment:   People in home:  Lives with spouse    Current living Arrangements:   house     Able to return to prior arrangements:  Yes, therapy recommends home with assist       Family/Social Support:  Care provided by:  self  Provides care for:  No one                Description of Support System:  Spouse Anita. Supportive and involved.       Current Resources:   Patient receiving home care services:  Yes-[facility] for IV Antibiotics     Community Resources:  none  Equipment currently used at home: walker, rolling    Supplies currently used at home:  PICC supplies for Antibiotic infusion    Employment/Financial:  Employment Status:  Works Full-time, sells Health Insurance      Financial Concerns:   No, has Medical Insurance-MEDICA/[employer] EMPLOYEE          Lifestyle & Psychosocial Needs:  Social Determinants of Health     Tobacco Use: Low Risk      Smoking Tobacco Use: Never     Smokeless Tobacco Use: Never     Passive Exposure: Not on file   Alcohol Use: Not on file   Financial Resource Strain: Not on file   Food Insecurity: Not on file   Transportation Needs: Not on file   Physical Activity: Not on file   Stress: Not on file   Social Connections: Not on file   Intimate Partner Violence: Not on file   Depression: Not at risk     PHQ-2 Score: 0   Housing Stability: Not on file       Functional Status:  Prior to admission patient needed  assistance:  Patient had been independent with ADL's, mobility until he had the epidural abscess infection. Now he is using the walker for ambulation and assistance from his spouse Anita when he administers his IV antibiotics.             Mental Health Status:   no concerns       Chemical Dependency Status:    No concerns           Values/Beliefs:  Spiritual, Cultural Beliefs, Denominational Practices, Values that affect care:   No Congregation, Roman Catholic          Additional Information:  Writer informed in rounds this morning that patient may possibly discharge home today. Patient had his PICC line pulled and a new PICC line was placed today. Writer spoke with [facility] informing of a possible discharge today. Elbert Orr from [facility] came and spoke with patient in preparation for discharging home. She will continue to follow for further discharge needs. Writer met with patient and spouse in room. Introduced self and role as a Care Coordinator. Patient continues to be in pain, but feels it has gotten better. His PICC line is in place. Will watch for orders if discharging today. Patient said he was started on a new blood pressure medication and didn't think he would discharge today. Case Management will continue to follow along for discharge needs.        Alejandrina Gusman RN  Care Coordinator  [phone]

## 2023-01-02 NOTE — PLAN OF CARE
Goal Outcome Evaluation:  A/Ox4. VSS on RA except elevated BP this evening, PRN IV Hydralazine given and next shift nurse will rechecked the BP. Denies headache, chest pain and dizziness. Up SBA with walker to the BR. CMS intact. Low potassium of 3.1, gave oral replacement once as ordered. PRN Oxycodone, IV Dilaudid and Robaxin is helping a little bit with his lower back pain. Dacosta intact with good urine output. Started on regular diet, tolerated it well.  Will place a PICC line this evening. Discharge pending until pain is managed.

## 2023-01-02 NOTE — PROGRESS NOTES
"SPIRITUAL HEALTH SERVICES Progress Note  [facility] - [facility] Orth[address] Spine    Referral: Visit for emotional support per nursing.    Ian shared that he's been hospital recently and \"it's a challenge.\" He expressed hopes to get things dealt with so he can go back to work and live his life fully.    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     Ian shared his wife is his main support, he expected her to visit shortly, and he spoke of different travels they have shared, including a recent road trip from [place] back to [place]. He said his \"wife is a  foodie and he gets to enjoy it.\"    Ian shared he grew up Orthodox and considers himself Restorationism but isn't a member of a Congregational; his path \"is more personal.\"    I offered reflective conversation, affirmed experiences, and said a blessing.    SH remains available.    Rev Delmi Berumen  [facility] Phone Line [phone]  [place] (Tuesdays & Thursdays) [phone]    "

## 2023-01-02 NOTE — UTILIZATION REVIEW
Admission Status; Secondary Review Determination    Under the authority of the Utilization Management Committee, the utilization review process indicated a secondary review on the above patient. The review outcome is based on review of the medical records, discussions with staff, and applying clinical experience noted on the date of the review.    (x) Inpatient Status Appropriate - This patient's medical care is consistent with medical management for inpatient care and reasonable inpatient medical practice.    RATIONALE FOR DETERMINATION: 57-year-old male with history of T12-L1 decompression for evacuation of epidural abscess on 12/18/2022 discharged on 12/27/2022 on IV antibiotics.  Patient now readmitted due to significant increased back/flank cramping pain.  Spine MRIs reveal persistent epidural abscess at T9/10 with moderate spinal canal narrowing.  Due to significant history of spinal abscess with worsening pain patient will require ongoing IV antibiotics, adequate pain control prior to discharge.  Patient requiring multiple doses of rescue IV narcotics on hospital day 2 requiring greater than 2 nights in the hospital appropriate for inpatient management.    At the time of admission with the information available to the attending physician more than 2 nights Hospital complex care was anticipated, based on patient risk of adverse outcome if treated as outpatient and complex care required. Inpatient admission is appropriate based on the Medicare guidelines.    This document was produced using voice recognition software    The information on this document is developed by the utilization review team in order for the business office to ensure compliance. This only denotes the appropriateness of proper admission status and does not reflect the quality of care rendered.    The definitions of Inpatient Status and Observation Status used in making the determination above are those provided in the CMS Coverage  Manual, Chapter 1 and Chapter 6, section 70.4.    Sincerely,    Juvenal oBbo MD  Utilization Review  Physician Advisor  [facility].

## 2023-01-02 NOTE — PROGRESS NOTES
In room to place PICC.  Patient with spinal abscess and significant pain.  Pain management plan is to avoid intravenous pain medication per primary nursing.  Patient unable to recline, difficult to assess vasculature of upper arms with patient upright.  PICC planned for several weeks of home antibiotics, patient has strong preference to have PICC placed on his right side as he administers own meds and is left-handed.  Only vessel visible on right appears to be same vessel utilized for previous PICC, and is non-compressible in sections.  Compressible basilic on left, but when option given to patient, still prefers right placement.  Because patient unable to recline, unable to obtain sufficient ultrasound imagery of vasculature.  Referral to IR made, order placed, voicemail left for IR department, patient prefers this option.

## 2023-01-03 ENCOUNTER — APPOINTMENT (OUTPATIENT)
Dept: CT IMAGING | Facility: CLINIC | Age: 58
DRG: 095 | End: 2023-01-03
Attending: INTERNAL MEDICINE
Payer: COMMERCIAL

## 2023-01-03 ENCOUNTER — APPOINTMENT (OUTPATIENT)
Dept: PHYSICAL THERAPY | Facility: CLINIC | Age: 58
DRG: 095 | End: 2023-01-03
Attending: INTERNAL MEDICINE
Payer: COMMERCIAL

## 2023-01-03 LAB
ANION GAP SERPL CALCULATED.3IONS-SCNC: 6 MMOL/L (ref 3–14)
BUN SERPL-MCNC: 9 MG/DL (ref 7–30)
CALCIUM SERPL-MCNC: 8.8 MG/DL (ref 8.5–10.1)
CHLORIDE BLD-SCNC: 109 MMOL/L (ref 94–109)
CO2 SERPL-SCNC: 24 MMOL/L (ref 20–32)
CREAT SERPL-MCNC: 1.01 MG/DL (ref 0.66–1.25)
GFR SERPL CREATININE-BSD FRML MDRD: 86 ML/MIN/1.73M2
GLUCOSE BLD-MCNC: 96 MG/DL (ref 70–99)
POTASSIUM BLD-SCNC: 3.1 MMOL/L (ref 3.4–5.3)
POTASSIUM BLD-SCNC: 3.8 MMOL/L (ref 3.4–5.3)
POTASSIUM BLD-SCNC: 3.8 MMOL/L (ref 3.4–5.3)
SODIUM SERPL-SCNC: 139 MMOL/L (ref 133–144)
TROPONIN I SERPL HS-MCNC: 9 NG/L

## 2023-01-03 PROCEDURE — 97110 THERAPEUTIC EXERCISES: CPT | Mod: GP

## 2023-01-03 PROCEDURE — 74176 CT ABD & PELVIS W/O CONTRAST: CPT

## 2023-01-03 PROCEDURE — 250N000011 HC RX IP 250 OP 636: Performed by: INTERNAL MEDICINE

## 2023-01-03 PROCEDURE — 250N000013 HC RX MED GY IP 250 OP 250 PS 637: Performed by: INTERNAL MEDICINE

## 2023-01-03 PROCEDURE — 99233 SBSQ HOSP IP/OBS HIGH 50: CPT | Performed by: SPECIALIST

## 2023-01-03 PROCEDURE — 250N000013 HC RX MED GY IP 250 OP 250 PS 637: Performed by: NURSE PRACTITIONER

## 2023-01-03 PROCEDURE — 93005 ELECTROCARDIOGRAM TRACING: CPT

## 2023-01-03 PROCEDURE — 84132 ASSAY OF SERUM POTASSIUM: CPT | Performed by: INTERNAL MEDICINE

## 2023-01-03 PROCEDURE — 250N000011 HC RX IP 250 OP 636: Performed by: HOSPITALIST

## 2023-01-03 PROCEDURE — 97140 MANUAL THERAPY 1/> REGIONS: CPT | Mod: GP

## 2023-01-03 PROCEDURE — 120N000001 HC R&B MED SURG/OB

## 2023-01-03 PROCEDURE — 36415 COLL VENOUS BLD VENIPUNCTURE: CPT | Performed by: INTERNAL MEDICINE

## 2023-01-03 PROCEDURE — 80048 BASIC METABOLIC PNL TOTAL CA: CPT | Performed by: INTERNAL MEDICINE

## 2023-01-03 PROCEDURE — 250N000009 HC RX 250: Performed by: HOSPITALIST

## 2023-01-03 PROCEDURE — 99233 SBSQ HOSP IP/OBS HIGH 50: CPT | Performed by: INTERNAL MEDICINE

## 2023-01-03 PROCEDURE — 84484 ASSAY OF TROPONIN QUANT: CPT | Performed by: INTERNAL MEDICINE

## 2023-01-03 PROCEDURE — 250N000013 HC RX MED GY IP 250 OP 250 PS 637: Performed by: HOSPITALIST

## 2023-01-03 PROCEDURE — 93010 ELECTROCARDIOGRAM REPORT: CPT | Performed by: INTERNAL MEDICINE

## 2023-01-03 RX ORDER — AMLODIPINE BESYLATE 5 MG/1
5 TABLET ORAL DAILY
Status: DISCONTINUED | OUTPATIENT
Start: 2023-01-03 | End: 2023-01-03

## 2023-01-03 RX ORDER — PANTOPRAZOLE SODIUM 40 MG/1
40 TABLET, DELAYED RELEASE ORAL
Status: DISCONTINUED | OUTPATIENT
Start: 2023-01-04 | End: 2023-01-05 | Stop reason: HOSPADM

## 2023-01-03 RX ORDER — FUROSEMIDE 20 MG/1
10 TABLET ORAL ONCE
Status: COMPLETED | OUTPATIENT
Start: 2023-01-03 | End: 2023-01-03

## 2023-01-03 RX ORDER — HYDRALAZINE HYDROCHLORIDE 10 MG/1
10 TABLET, FILM COATED ORAL EVERY 6 HOURS PRN
Status: DISCONTINUED | OUTPATIENT
Start: 2023-01-03 | End: 2023-01-05 | Stop reason: HOSPADM

## 2023-01-03 RX ORDER — ACETAMINOPHEN 325 MG/1
975 TABLET ORAL EVERY 6 HOURS
Status: DISCONTINUED | OUTPATIENT
Start: 2023-01-03 | End: 2023-01-05 | Stop reason: HOSPADM

## 2023-01-03 RX ORDER — POTASSIUM CHLORIDE 1.5 G/1.58G
40 POWDER, FOR SOLUTION ORAL ONCE
Status: COMPLETED | OUTPATIENT
Start: 2023-01-03 | End: 2023-01-03

## 2023-01-03 RX ORDER — AMLODIPINE BESYLATE 2.5 MG/1
2.5 TABLET ORAL DAILY
Status: DISCONTINUED | OUTPATIENT
Start: 2023-01-03 | End: 2023-01-03

## 2023-01-03 RX ORDER — IBUPROFEN 200 MG
200 TABLET ORAL EVERY 6 HOURS PRN
Status: DISCONTINUED | OUTPATIENT
Start: 2023-01-03 | End: 2023-01-05 | Stop reason: HOSPADM

## 2023-01-03 RX ORDER — HYDRALAZINE HYDROCHLORIDE 20 MG/ML
10 INJECTION INTRAMUSCULAR; INTRAVENOUS EVERY 6 HOURS PRN
Status: DISCONTINUED | OUTPATIENT
Start: 2023-01-03 | End: 2023-01-05 | Stop reason: HOSPADM

## 2023-01-03 RX ADMIN — NAFCILLIN SODIUM 2 G: 2 INJECTION, POWDER, LYOPHILIZED, FOR SOLUTION INTRAMUSCULAR; INTRAVENOUS at 04:41

## 2023-01-03 RX ADMIN — HYDROMORPHONE HYDROCHLORIDE 0.4 MG: 0.2 INJECTION, SOLUTION INTRAMUSCULAR; INTRAVENOUS; SUBCUTANEOUS at 20:34

## 2023-01-03 RX ADMIN — NAFCILLIN SODIUM 2 G: 2 INJECTION, POWDER, LYOPHILIZED, FOR SOLUTION INTRAMUSCULAR; INTRAVENOUS at 16:24

## 2023-01-03 RX ADMIN — NAFCILLIN SODIUM 2 G: 2 INJECTION, POWDER, LYOPHILIZED, FOR SOLUTION INTRAMUSCULAR; INTRAVENOUS at 00:37

## 2023-01-03 RX ADMIN — AMLODIPINE BESYLATE 5 MG: 5 TABLET ORAL at 08:44

## 2023-01-03 RX ADMIN — NAFCILLIN SODIUM 2 G: 2 INJECTION, POWDER, LYOPHILIZED, FOR SOLUTION INTRAMUSCULAR; INTRAVENOUS at 12:25

## 2023-01-03 RX ADMIN — POTASSIUM CHLORIDE 40 MEQ: 1.5 POWDER, FOR SOLUTION ORAL at 01:25

## 2023-01-03 RX ADMIN — METHOCARBAMOL 750 MG: 750 TABLET ORAL at 05:39

## 2023-01-03 RX ADMIN — OXYCODONE HYDROCHLORIDE 10 MG: 5 TABLET ORAL at 09:38

## 2023-01-03 RX ADMIN — METHOCARBAMOL 750 MG: 750 TABLET ORAL at 12:25

## 2023-01-03 RX ADMIN — NAFCILLIN SODIUM 2 G: 2 INJECTION, POWDER, LYOPHILIZED, FOR SOLUTION INTRAMUSCULAR; INTRAVENOUS at 08:44

## 2023-01-03 RX ADMIN — ACETAMINOPHEN 975 MG: 325 TABLET, FILM COATED ORAL at 07:04

## 2023-01-03 RX ADMIN — OXYCODONE HYDROCHLORIDE 10 MG: 5 TABLET ORAL at 03:36

## 2023-01-03 RX ADMIN — METHOCARBAMOL 750 MG: 750 TABLET ORAL at 17:15

## 2023-01-03 RX ADMIN — FUROSEMIDE 10 MG: 20 TABLET ORAL at 14:53

## 2023-01-03 RX ADMIN — TAMSULOSIN HYDROCHLORIDE 0.4 MG: 0.4 CAPSULE ORAL at 08:44

## 2023-01-03 RX ADMIN — MULTIPLE VITAMINS W/ MINERALS TAB 1 TABLET: TAB at 08:44

## 2023-01-03 RX ADMIN — ACETAMINOPHEN 975 MG: 325 TABLET, FILM COATED ORAL at 21:42

## 2023-01-03 RX ADMIN — OXYCODONE HYDROCHLORIDE 10 MG: 5 TABLET ORAL at 15:40

## 2023-01-03 RX ADMIN — HYDRALAZINE HYDROCHLORIDE 10 MG: 20 INJECTION INTRAMUSCULAR; INTRAVENOUS at 20:33

## 2023-01-03 RX ADMIN — OXYCODONE HYDROCHLORIDE 10 MG: 5 TABLET ORAL at 21:42

## 2023-01-03 RX ADMIN — HYDRALAZINE HYDROCHLORIDE 10 MG: 10 TABLET ORAL at 15:39

## 2023-01-03 RX ADMIN — ACETAMINOPHEN 975 MG: 325 TABLET, FILM COATED ORAL at 15:39

## 2023-01-03 RX ADMIN — Medication 12.5 MG: at 21:23

## 2023-01-03 RX ADMIN — NAFCILLIN SODIUM 2 G: 2 INJECTION, POWDER, LYOPHILIZED, FOR SOLUTION INTRAMUSCULAR; INTRAVENOUS at 20:34

## 2023-01-03 ASSESSMENT — ACTIVITIES OF DAILY LIVING (ADL)
ADLS_ACUITY_SCORE: 37
ADLS_ACUITY_SCORE: 37
ADLS_ACUITY_SCORE: 36
ADLS_ACUITY_SCORE: 37
ADLS_ACUITY_SCORE: 36

## 2023-01-03 NOTE — PROGRESS NOTES
UROLOGY PROGRESS NOTE    January 3, 2023    SUBJECTIVE:  Voiding trial unfortunately unsuccessful yesterday.  Straight cathed for >1200cc.  Gamez replaced overnight, now draining suri urine.    AVSS aside from HTN  Cr 1.01  ?/1800cc UOP (no NOC output recorded)    OBJECTIVE:  Temp:  [98  F (36.7  C)-98.9  F (37.2  C)] 98  F (36.7  C)  Pulse:  [70-83] 82  Resp:  [16-18] 16  BP: (149-180)/() 154/97  SpO2:  [97 %-100 %] 97 %    Intake/Output Summary (Last 24 hours) at 1/3/2023 0751  Last data filed at 1/2/2023 2027  Gross per 24 hour   Intake 240 ml   Output 1800 ml   Net -1560 ml       GENERAL:  Awake, alert, no acute distress, up to chair on cell phone  HEAD: Normocephalic atraumatic  SCLERA: Anicteric  EXTREMITIES: Warm and well perfused  : gamez with suri urine output    LABS:  Lab Results   Component Value Date    WBC 5.6 01/01/2023    WBC 10.7 09/22/2019     Lab Results   Component Value Date    HGB 9.6 01/01/2023    HGB 13.1 09/22/2019     Lab Results   Component Value Date    HCT 29.6 01/01/2023    HCT 38.5 09/22/2019     Lab Results   Component Value Date     01/01/2023     09/22/2019     Last Basic Metabolic Panel:  Lab Results   Component Value Date     01/03/2023     02/02/2021      Lab Results   Component Value Date    POTASSIUM 3.8 01/03/2023    POTASSIUM 3.8 01/03/2023    POTASSIUM 4.2 02/02/2021     Lab Results   Component Value Date    CHLORIDE 109 01/03/2023    CHLORIDE 106 02/02/2021     Lab Results   Component Value Date    LINA 8.8 01/03/2023    LINA 9.5 02/02/2021     Lab Results   Component Value Date    CO2 24 01/03/2023    CO2 28 02/02/2021     Lab Results   Component Value Date    BUN 9 01/03/2023    BUN 14 02/02/2021     Lab Results   Component Value Date    CR 1.01 01/03/2023    CR 1.04 02/02/2021     Lab Results   Component Value Date    GLC 96 01/03/2023    GLC 88 02/02/2021       ASSESSMENT/PLAN: 58yo man with urinary retention, failed voiding trial on  1/2/23. Gamez replaced overnight 1/2-1/3.    - Continue gamez until he can have outpatient urology follow up  - Gamez will need to be changed Q4 weeks  - Continue tamsulosin 0.4 mg daily  - Plan for likely AI and cysto in urology clinic as outpatient with urology MD.  AVS updated 1/3 with follow-up information.    Urology will sign off at this time.  Stan Martin PA-C is rounding for our team tomorrow 1/4/23.  Otherwise, please call office at [phone] to have the appropriate on-call provider paged if further questions arise.       Yulisa Luther PA-C  [facility] division of [facility]  Office Phone: [phone]  After 4pm and on weekends, please call [phone]

## 2023-01-03 NOTE — PROGRESS NOTES
[facility]    Neurosurgery  Daily Post-Op Note    Assessment & Plan      * No surgery found *  Pain well-controlled.  Tolerating physical therapy and rehabilitation well.  Failed voiding trial, and gamez was replaced. Appreciate Urology recs.     Plan:  -Advance activity as tolerated  -Continue supportive and symptomatic treatment  -Start or continue physical therapy  -ok for discharge when bed available    Marco Licona PA-C    Interval History   Stable.  Doing well.  Improving slowly.  Pain is reasonably controlled.  No fevers.    Physical Exam   Temp: 98  F (36.7  C) Temp src: Oral BP: (!) 154/97 Pulse: 82   Resp: 16 SpO2: 97 % O2 Device: None (Room air)    There were no vitals filed for this visit.  Vital Signs with Ranges  Temp:  [98  F (36.7  C)-98.9  F (37.2  C)] 98  F (36.7  C)  Pulse:  [70-83] 82  Resp:  [16] 16  BP: (149-173)/() 154/97  SpO2:  [97 %-100 %] 97 %  I/O last 3 completed shifts:  In: 240 [P.O.:240]  Out: 1800 [Urine:1800]    Alert and oriented.  Moves all extremities equally.  Reflexes symmetrical.     Incision: CDI      Medications        acetaminophen  975 mg Oral Q6H     amLODIPine  5 mg Oral Daily     furosemide  10 mg Oral Once     lidocaine  2 patch Transdermal Q24H     lidocaine   Transdermal Q8H TITA     methocarbamol  750 mg Oral 4x Daily     multivitamin w/minerals  1 tablet Oral Daily     nafcillin  2 g Intravenous Q4H     [START ON 1/4/2023] pantoprazole  40 mg Oral QAM AC     sodium chloride (PF)  10-40 mL Intracatheter Q7 Days     tamsulosin  0.4 mg Oral Daily           Marco Licona PA-C  [facility]  [facility]  [address]  [address]  [place] [address]    Tel [phone]  Pager [phone]

## 2023-01-03 NOTE — PROGRESS NOTES
[facility]  Hospitalist Progress Note    Date of Admission: 12/31/2022    Interval History   Patient appears to be feeling better.  His blood pressure is higher this admission.  He felt like yesterday his pain control regimen was better taking oxycodone at the time of his Tylenol.  In addition taking a muscle relaxant.  Okay per neurosurgery to be on low-dose Motrin.  Patient getting up and trying to move around more.   He has been puffy but also receiving 600 mils of NS daily with his antibiotics.  Had urinary retention overnight and required replacement of his Dacosta catheter.    Assessment & Plan   Ian Moncada is a 57 year old male recently admitted for MSSA epidural abscess and spinal cord compression, leg edema, urinary retention presented to the ER due to increased back pain     MSSA epidural abscess    Hospitalization 12/18-12/27.  Noted epidural fluid collection from upper thoracic spine down to the mid lumbar spine with resulting spinal cord compression from T12-L1.  Neurosurgery evaluated patient and underwent thoracic lumbar decompression for evacuation of abscess.  Culture grew MSSA.  ID as well consulted and was discharged on nafcillin IV to continue until 1/21/22.      Patient now with increased back pain for 2 days PTA  that wraps around the middle of his abdomen.     MRI of cervical thoracic and lumbar spines done in ER, overall improvement although persistent dorsal epidural phlegmon/abscess, 7 mm thickness at T9-10 level causing moderate to spinal cord displacement and spinal canal narrowing noted.      Resume PTA nafcillin    Neurosurgery consulting    ID consulting     Lidocaine patch applied     Robaxin 750 mg po 4 X daily     Tylenol 975 mg po q 6 hours scheduled    Has oxycodone prn >> increase dose to 10 mg q 6 hours     Patient felt the scheduled Tylenol and oxycodone together for better.  Also the muscle relaxant.    Okay per neurosurgery to start Motrin given 200  mg every 6 as needed        Urinary retention    Patient had recurrent urinary retention and was discharged with Dacosta catheter    Flomax restarted     1/2 void trial with 1200 cc residual>> replaced Dacosta will need to follow-up with urology after discharge     Bilateral leg edema    Ultrasound was done and DVT was ruled out    Patient has again edema in his legs.    Not been on DVT prevention prior with his back surgery    1/2/2022 venous dopplers negative     Spoke with neurosurgery 1/2 and okay to begin DVT prevention with lovenox     Mild anemia    Due to surgical blood loss and ongoing illness.    Hemoglobin stable at baseline now,    Hemoglobin 9.6 and stable on 1/20     Hypertension:    Patient was not discharged on any blood pressure meds last admission.  Blood pressure slightly high    Now with blood pressures increased.    ?? Pain driven and/or fluid retention given the amount of nafcillin with 600 mill daily    Now on Norvasc 5 mg.    Start metoprolol if needed as well low-dose 12.5 mg p.o. twice daily    Given diuretic as well as he has fluid retention which may be adding to his hypertension    EKG unremarkable with troponin check    ADDENDUM:   Hypokalemia:     Replacing. On protocol    May need more aggressive potassium replace with diuretics as well         Diet:  regular diet   DVT Prophylaxis: Ambulate every shift  Dacosta Catheter: Dacosta in place but removed today for PVR check  Central Lines: PRESENT: PICC line replaced in IR 1/2     Cardiac Monitoring: None  Code Status:   Full code by gumaro                  # Overweight: Estimated body mass index is 28.34 kg/m  as calculated from the following:    Height as of 12/18/22: 1.829 m (6').    Weight as of 12/27/22: 94.8 kg (208 lb 15.9 oz).        Disposition Plan   Patient will be discharged to home if he is able to control pain and BP control.      Expected Discharge Date:  1/3 to home            HOWARD NAGEL MD,   Hospitalist Service  [facility]  [facility]  ______________________________________________________________________    -Data reviewed today: I reviewed all new labs and imaging results over the last 24 hours. I personally reviewed no images or EKG's today.    Physical Exam   Temp: 98  F (36.7  C) Temp src: Oral BP: (!) 173/110 Pulse: 70   Resp: 16 SpO2: 97 % O2 Device: None (Room air)    There were no vitals filed for this visit.Constitutional: Appears stated age, no acute distress.  Respiratory: Breath sounds CTA. No increased work of breathing.  Cardiovascular: RRR, no rub or murmur. No peripheral edema.  GI: Soft, non-tender, non-distended.  Skin: Warm, dry, no rashes or lesions.  Other: Moving bilateral lower extremities   1+ bilateral lower extremity edema    Medications       acetaminophen  975 mg Oral Q6H     amLODIPine  5 mg Oral Daily     lidocaine  2 patch Transdermal Q24H     lidocaine   Transdermal Q8H TITA     methocarbamol  750 mg Oral 4x Daily     multivitamin w/minerals  1 tablet Oral Daily     nafcillin  2 g Intravenous Q4H     [START ON 1/4/2023] pantoprazole  40 mg Oral QAM AC     sodium chloride (PF)  10-40 mL Intracatheter Q7 Days     tamsulosin  0.4 mg Oral Daily       Data   Recent Labs   Lab 01/03/23  0629 01/03/23  0036 01/02/23  1810 01/02/23  0751 01/01/23  0955 12/31/22  2107 12/31/22  2046 12/31/22  1508 12/31/22  1129   WBC  --   --   --   --  5.6  --   --   --  4.3   HGB  --   --   --   --  9.6*  --   --   --  9.6*   MCV  --   --   --   --  101*  --   --   --  99   PLT  --   --   --   --  388  --   --   --  424   INR  --   --   --   --   --   --   --  1.08  --      --   --   --  138  --   --   --  140   POTASSIUM 3.8  3.8 3.1* 3.3*   < > 3.1*  --   --   --  3.4   CHLORIDE 109  --   --   --  104  --   --   --  107   CO2 24  --   --   --  28  --   --   --  29   BUN 9  --   --   --  8  --   --   --  9   CR 1.01  --   --   --  1.01  --   --   --  1.05   ANIONGAP 6  --   --   --  6  --   --   --  4    LINA 8.8  --   --   --  8.6  --   --   --  9.1   GLC 96  --   --   --  86 102*   < >  --  142*   ALBUMIN  --   --   --   --  2.4*  --   --   --  2.5*   PROTTOTAL  --   --   --   --  6.5*  --   --   --  6.8   BILITOTAL  --   --   --   --  1.0  --   --   --  1.3   ALKPHOS  --   --   --   --  57  --   --   --  61   ALT  --   --   --   --  30  --   --   --  30   AST  --   --   --   --  28  --   --   --  30    < > = values in this interval not displayed.       Imaging:  No results found for this or any previous visit (from the past 24 hour(s)).

## 2023-01-03 NOTE — PROGRESS NOTES
Shift Summary [id number]    Admitting Diagnosis: Spinal epidural abscess [G06.1]  Acute bilateral thoracic back pain [M54.6]   Vitals : high SBps.Amlodopine scheduled by Provider.   Pain 5-8/10. Taking Oxycodone and tylenol  PRN.   A&Ox4  Voiding : urinary retention post Dacosta discontinuation.1 x staright cath done.    Mobility : stand by walker GB.   Tele NA.   CMS WNL, except for edema in BLE.   Lung Sounds clear on room air.   GI : WNL.   Dressing: epidural puncture site on mid back CDI. PICC line RUE placed today by IR. REGINA.     Orders Placed This Encounter      Regular Diet Adult       Plan:     Pain management and IV antibiotics. Discharge pending.

## 2023-01-03 NOTE — PLAN OF CARE
Goal Outcome Evaluation:      Pt is A&Ox4, up with stand by assist. Order received to put gamez back in place; gamez now in place with adequate urine output. CMS intact and neuros intact. Pain managed with PRN Oxycodone, scheduled Tylenol, and scheduled Robaxin. Potassium replaced and is now 3.8.

## 2023-01-03 NOTE — PLAN OF CARE
Patient up with assist of 1 and walker. Adequate I/O, gamez in place/patent. Pain manage with PO meds. HTN - hospitalist aware. Rounding neurosurgery PA initially said NO to ibuprofen order, clarified w/ on-call Vero this evening, OK to give ibuprofen.

## 2023-01-03 NOTE — PROVIDER NOTIFICATION
Notified provider about indwelling gamez catheter discussed removal or continued need.     Did provider choose to remove indwelling gamez catheter? No     Provider's gamez indication for keeping indwelling gamez catheter: retention     Is there an order for indwelling gamez catheter? yes     *If there is a plan to keep gamez catheter in place at discharge daily notification with provider is not necessary.

## 2023-01-03 NOTE — PROGRESS NOTES
[facility]    Infectious Disease Progress Note    Date of Service : 01/03/2023     Assessment:  57 YM , previously healthy with recent admission from 12/18-12/28 for S.aureus bacteremia associated with extensive multi level cervical and thoracolumbar spinal epidural abscess from C4 to T4-T5 down to L2-L3 with spinal chord compression.  S/p debridement with S.aureus in operative and blood cx.  He has been readmitted soon after discharge due to uncontrolled pain. MRI cervical thoracic and lumbar spine appears improved.     -Uncontrolled back pain and spasms  -Resolved MSSA bacteremia. Blood cxs negative since 12/20  -Extensive cervical and thoracolumbar spinal epidural abscess from C4 down to T4-T5 down to L2-L3  with spinal chord compression.  S/p debridement. Repeat MRI s from 12/21 show resolved cervical spin epidural abscess, improved epidural abscess in the upper and lower thoracic spine and decreased lumbar epidural abscess  -New left knee pain and bilateral ankle pain without clear evidence of secondary infection     Recommendations  1. Pain management per primary team  2. No change in treatment plan from the ID stand point. Continue Nafcillin. 6 week treatment is planned until 01/21/2023, possibly longer based on clinical and radiographic improvement    ID will continue to follow  Elyse Valadez MD    Interval History   Patient was seen and examined, chart reviewed  Feels better but still some back spasms and ongoing difficulty with pain control. MRI appears to be improving, remains afebrile, diarrhea has resolved. No leukocytosis, CRP is trending down    Physical Exam   Temp: 98  F (36.7  C) Temp src: Oral BP: (!) 154/97 Pulse: 82   Resp: 16 SpO2: 97 % O2 Device: None (Room air)    There were no vitals filed for this visit.  Vital Signs with Ranges  Temp:  [98  F (36.7  C)-98.9  F (37.2  C)] 98  F (36.7  C)  Pulse:  [70-83] 82  Resp:  [16] 16  BP: (149-173)/() 154/97  SpO2:  [97  %-100 %] 97 %    Constitutional: Awake, alert, cooperative, no apparent distress  Lungs: Clear to auscultation bilaterally, no crackles or wheezing  Cardiovascular: Regular rate and rhythm, normal S1 and S2, and no murmur noted  Abdomen: Normal bowel sounds, soft, non-distended, non-tender  Skin: No rashes, no cyanosis, no edema    Other:    Medications       acetaminophen  975 mg Oral Q8H     amLODIPine  5 mg Oral Daily     lidocaine  2 patch Transdermal Q24H     lidocaine   Transdermal Q8H TITA     methocarbamol  750 mg Oral 4x Daily     multivitamin w/minerals  1 tablet Oral Daily     nafcillin  2 g Intravenous Q4H     sodium chloride (PF)  10-40 mL Intracatheter Q7 Days     tamsulosin  0.4 mg Oral Daily       Data   All microbiology laboratory data reviewed.  Recent Labs   Lab Test 01/01/23  0955 12/31/22  1129 12/26/22  0701 12/25/22  0650   WBC 5.6 4.3  --  7.9   HGB 9.6* 9.6* 9.7* 9.5*   HCT 29.6* 28.9*  --  29.2*   * 99  --  101*    424  --  399     Recent Labs   Lab Test 01/03/23  0629 01/01/23  0955 12/31/22  1129   CR 1.01 1.01 1.05     Recent Labs   Lab Test 12/31/22  1129   SED 75*      Imaging  12/31 MRI cervical, thoracic and lumbar spines    EXAM: MR LUMBAR SPINE WITHOUT AND WITH CONTRAST, MR CERVICAL SPINE WITHOUT AND WITH CONTRAST, MR THORACIC SPINE WITHOUT AND WITH CONTRAST  LOCATION: [facility]  DATE/TIME: 12/31/2022, 2:46 PM     INDICATION: Epidural abscess, increasing thoracic back pain, muscle spasms, evaluate for progression.  COMPARISON: Cervical and lumbar spine MRI 12/23/2022, thoracic spine MRI 12/22/2022.  CONTRAST: 9 mL Gadavist.  TECHNIQUE: Routine cervical, thoracic and lumbar Spine MRI without and with IV contrast.     FINDINGS:      MRI CERVICAL SPINE: Markedly diminished dorsal epidural fluid collection in the mid to lower cervical and upper visualized thoracic spinal canal. The cervical component of the fluid has nearly completely resolved  with some residual thin dural enhancement   extending from C4 into the upper thoracic spine. No cervical marrow or soft tissue edema. Persistent moderate to severe degenerative spinal canal narrowing at C5-C6 slightly improved due to the diminished volume of the dorsal epidural fluid collection.   More mild degenerative spinal canal narrowing at C6-C7 also slightly improved. Cervical canal elsewhere appears adequate. No cervical spinal cord signal abnormality.     MRI THORACIC SPINE: Unchanged vertebral body alignment and height loss with edematous compression fracture deformities affecting both T7 and the superior T12 endplates. Edema associated with the left T9-T10 facet joint appears similar to slightly   increased. Less pronounced but also probably slightly increased edema associated with the right T9-T10 facet joint. The extensive dorsal epidural collection is markedly diminished superiorly where there is mild residual dural thickening and enhancement   extending from the cervicothoracic junction to the T5 level. There is a persistent dorsal epidural collection below this that between T6 and T8 is diminished but not resolved. Between T8 and T10-T11, it is similar in volume measuring 7 mm in AP thickness   at the T9-T10 level and causing moderate spinal canal stenosis and thecal sac effacement without spinal cord signal abnormality. Below the T10-T11 level, the dorsal epidural collection is considerably diminished. Left-sided pleural fluid collection is   similar to prior.     MRI LUMBAR SPINE:  Nomenclature is based on five lumbar-type vertebral bodies. Unchanged Schmorl's node in the superior L4 endplate. Otherwise, normal lumbar vertebral body heights. Normal alignment. L3 vertebral body hemangioma. Left L1 laminectomy. Peripherally enhancing   fluid within the laminectomy defect and subcutaneous tissues along the operative tract are stable. Dorsal epidural enhancement extends from the thoracolumbar junction  to the lower L2 level, unchanged in extent versus prior. A slender dorsal epidural   fluid collection/abscess centered at the lower L1 level is slightly diminished in volume.                                                                       IMPRESSION:     MRI CERVICAL SPINE:  1.  Essentially resolved dorsal epidural fluid collection within the mid to lower cervical spinal canal with mild residual dural thickening/enhancement.  2.  No marrow or soft tissue edema in the cervical spine. Normal cervical spinal cord.  3.  Improved spinal canal stenosis at C5-C6 and C6-C7 due to the diminished dorsal epidural fluid collection with residual degenerative spinal canal narrowing.     MRI THORACIC SPINE:  1.  Dorsal epidural collection in the upper and lower thoracic spine is improved versus 12/22/2022.  2.  In the midthoracic spine, there is a persistent dorsal epidural phlegmon/abscess that continues to measure 7 mm in thickness at the T9-T10 level and cause moderate spinal cord displacement and spinal canal narrowing. No spinal cord signal   abnormality.  3.  Slightly progressed edema associated with the left greater than right T9-T10 facet joints.     MRI LUMBAR SPINE:  1.  Dorsal epidural abscess centered in the lower L1 level is slightly diminished in volume.  2.  Peripherally enhancing fluid collections within the left L1 laminectomy defect and overlying surgical tract are stable.  3.  No definite progressive infection in the lumbar spine.

## 2023-01-04 ENCOUNTER — APPOINTMENT (OUTPATIENT)
Dept: PHYSICAL THERAPY | Facility: CLINIC | Age: 58
DRG: 095 | End: 2023-01-04
Payer: COMMERCIAL

## 2023-01-04 LAB
ALBUMIN UR-MCNC: NEGATIVE MG/DL
APPEARANCE UR: CLEAR
BILIRUB UR QL STRIP: NEGATIVE
COLOR UR AUTO: NORMAL
CRP SERPL-MCNC: 21.1 MG/L (ref 0–8)
GLUCOSE UR STRIP-MCNC: NEGATIVE MG/DL
HGB UR QL STRIP: NEGATIVE
KETONES UR STRIP-MCNC: NEGATIVE MG/DL
LEUKOCYTE ESTERASE UR QL STRIP: NEGATIVE
NITRATE UR QL: NEGATIVE
PH UR STRIP: 6.5 [PH] (ref 5–7)
POTASSIUM BLD-SCNC: 3.6 MMOL/L (ref 3.4–5.3)
SP GR UR STRIP: 1.01 (ref 1–1.03)
UROBILINOGEN UR STRIP-MCNC: NORMAL MG/DL

## 2023-01-04 PROCEDURE — 250N000013 HC RX MED GY IP 250 OP 250 PS 637: Performed by: INTERNAL MEDICINE

## 2023-01-04 PROCEDURE — 250N000013 HC RX MED GY IP 250 OP 250 PS 637: Performed by: HOSPITALIST

## 2023-01-04 PROCEDURE — 120N000001 HC R&B MED SURG/OB

## 2023-01-04 PROCEDURE — 250N000011 HC RX IP 250 OP 636: Performed by: INTERNAL MEDICINE

## 2023-01-04 PROCEDURE — 86140 C-REACTIVE PROTEIN: CPT | Performed by: PHYSICIAN ASSISTANT

## 2023-01-04 PROCEDURE — 250N000013 HC RX MED GY IP 250 OP 250 PS 637: Performed by: NURSE PRACTITIONER

## 2023-01-04 PROCEDURE — 99233 SBSQ HOSP IP/OBS HIGH 50: CPT | Performed by: INTERNAL MEDICINE

## 2023-01-04 PROCEDURE — 97140 MANUAL THERAPY 1/> REGIONS: CPT | Mod: GP

## 2023-01-04 PROCEDURE — 99232 SBSQ HOSP IP/OBS MODERATE 35: CPT | Performed by: SPECIALIST

## 2023-01-04 PROCEDURE — 250N000009 HC RX 250: Performed by: HOSPITALIST

## 2023-01-04 PROCEDURE — 84132 ASSAY OF SERUM POTASSIUM: CPT | Performed by: INTERNAL MEDICINE

## 2023-01-04 PROCEDURE — 81003 URINALYSIS AUTO W/O SCOPE: CPT | Performed by: INTERNAL MEDICINE

## 2023-01-04 RX ORDER — METOPROLOL TARTRATE 25 MG/1
25 TABLET, FILM COATED ORAL 2 TIMES DAILY
Status: DISCONTINUED | OUTPATIENT
Start: 2023-01-04 | End: 2023-01-04

## 2023-01-04 RX ORDER — METOPROLOL TARTRATE 25 MG/1
25 TABLET, FILM COATED ORAL 2 TIMES DAILY
Status: DISCONTINUED | OUTPATIENT
Start: 2023-01-04 | End: 2023-01-05 | Stop reason: HOSPADM

## 2023-01-04 RX ORDER — AMLODIPINE BESYLATE 5 MG/1
5 TABLET ORAL DAILY
Status: DISCONTINUED | OUTPATIENT
Start: 2023-01-04 | End: 2023-01-04

## 2023-01-04 RX ORDER — DIAZEPAM 2 MG
2 TABLET ORAL EVERY 8 HOURS PRN
Status: DISCONTINUED | OUTPATIENT
Start: 2023-01-04 | End: 2023-01-05 | Stop reason: HOSPADM

## 2023-01-04 RX ORDER — ENOXAPARIN SODIUM 100 MG/ML
40 INJECTION SUBCUTANEOUS DAILY
Status: DISCONTINUED | OUTPATIENT
Start: 2023-01-04 | End: 2023-01-05 | Stop reason: HOSPADM

## 2023-01-04 RX ADMIN — METOPROLOL TARTRATE 25 MG: 25 TABLET, FILM COATED ORAL at 20:36

## 2023-01-04 RX ADMIN — NAFCILLIN SODIUM 2 G: 2 INJECTION, POWDER, LYOPHILIZED, FOR SOLUTION INTRAMUSCULAR; INTRAVENOUS at 00:11

## 2023-01-04 RX ADMIN — IBUPROFEN 200 MG: 200 TABLET, FILM COATED ORAL at 08:28

## 2023-01-04 RX ADMIN — METHOCARBAMOL 750 MG: 750 TABLET ORAL at 18:22

## 2023-01-04 RX ADMIN — NAFCILLIN SODIUM 2 G: 2 INJECTION, POWDER, LYOPHILIZED, FOR SOLUTION INTRAMUSCULAR; INTRAVENOUS at 16:28

## 2023-01-04 RX ADMIN — ACETAMINOPHEN 975 MG: 325 TABLET, FILM COATED ORAL at 05:38

## 2023-01-04 RX ADMIN — NAFCILLIN SODIUM 2 G: 2 INJECTION, POWDER, LYOPHILIZED, FOR SOLUTION INTRAMUSCULAR; INTRAVENOUS at 20:36

## 2023-01-04 RX ADMIN — TAMSULOSIN HYDROCHLORIDE 0.4 MG: 0.4 CAPSULE ORAL at 08:28

## 2023-01-04 RX ADMIN — IBUPROFEN 200 MG: 200 TABLET, FILM COATED ORAL at 20:36

## 2023-01-04 RX ADMIN — METHOCARBAMOL 750 MG: 750 TABLET ORAL at 00:06

## 2023-01-04 RX ADMIN — ACETAMINOPHEN 975 MG: 325 TABLET, FILM COATED ORAL at 18:21

## 2023-01-04 RX ADMIN — MULTIPLE VITAMINS W/ MINERALS TAB 1 TABLET: TAB at 08:28

## 2023-01-04 RX ADMIN — ENOXAPARIN SODIUM 40 MG: 40 INJECTION SUBCUTANEOUS at 20:37

## 2023-01-04 RX ADMIN — OXYCODONE HYDROCHLORIDE 10 MG: 5 TABLET ORAL at 11:43

## 2023-01-04 RX ADMIN — OXYCODONE HYDROCHLORIDE 10 MG: 5 TABLET ORAL at 05:38

## 2023-01-04 RX ADMIN — METHOCARBAMOL 750 MG: 750 TABLET ORAL at 11:43

## 2023-01-04 RX ADMIN — HYDRALAZINE HYDROCHLORIDE 10 MG: 20 INJECTION INTRAMUSCULAR; INTRAVENOUS at 18:27

## 2023-01-04 RX ADMIN — AMLODIPINE BESYLATE 5 MG: 5 TABLET ORAL at 08:28

## 2023-01-04 RX ADMIN — IBUPROFEN 200 MG: 200 TABLET, FILM COATED ORAL at 14:33

## 2023-01-04 RX ADMIN — PANTOPRAZOLE SODIUM 40 MG: 40 TABLET, DELAYED RELEASE ORAL at 06:52

## 2023-01-04 RX ADMIN — NAFCILLIN SODIUM 2 G: 2 INJECTION, POWDER, LYOPHILIZED, FOR SOLUTION INTRAMUSCULAR; INTRAVENOUS at 08:29

## 2023-01-04 RX ADMIN — NAFCILLIN SODIUM 2 G: 2 INJECTION, POWDER, LYOPHILIZED, FOR SOLUTION INTRAMUSCULAR; INTRAVENOUS at 13:11

## 2023-01-04 RX ADMIN — OXYCODONE HYDROCHLORIDE 10 MG: 5 TABLET ORAL at 18:22

## 2023-01-04 RX ADMIN — Medication 12.5 MG: at 08:28

## 2023-01-04 RX ADMIN — NAFCILLIN SODIUM 2 G: 2 INJECTION, POWDER, LYOPHILIZED, FOR SOLUTION INTRAMUSCULAR; INTRAVENOUS at 04:34

## 2023-01-04 RX ADMIN — ACETAMINOPHEN 975 MG: 325 TABLET, FILM COATED ORAL at 11:43

## 2023-01-04 RX ADMIN — METHOCARBAMOL 750 MG: 750 TABLET ORAL at 04:34

## 2023-01-04 ASSESSMENT — ACTIVITIES OF DAILY LIVING (ADL)
ADLS_ACUITY_SCORE: 38
ADLS_ACUITY_SCORE: 36
ADLS_ACUITY_SCORE: 37
ADLS_ACUITY_SCORE: 38
ADLS_ACUITY_SCORE: 36
ADLS_ACUITY_SCORE: 37
ADLS_ACUITY_SCORE: 36
ADLS_ACUITY_SCORE: 37
ADLS_ACUITY_SCORE: 38
ADLS_ACUITY_SCORE: 36

## 2023-01-04 NOTE — PROGRESS NOTES
[facility]  Hospitalist Progress Note    Date of Admission: 12/31/2022    Interval History   Patient     Assessment & Plan   Ian Moncada is a 57 year old male recently admitted for MSSA epidural abscess and spinal cord compression, leg edema, urinary retention presented to the ER due to increased back pain     MSSA epidural abscess    Hospitalization 12/18-12/27.  Noted epidural fluid collection from upper thoracic spine down to the mid lumbar spine with resulting spinal cord compression from T12-L1.  Neurosurgery evaluated patient and underwent thoracic lumbar decompression for evacuation of abscess.  Culture grew MSSA.  ID as well consulted and was discharged on nafcillin IV to continue until 1/21/22.      Patient now with increased back pain for 2 days PTA  that wraps around the middle of his abdomen.     MRI of cervical thoracic and lumbar spines done in ER, overall improvement although persistent dorsal epidural phlegmon/abscess, 7 mm thickness at T9-10 level causing moderate to spinal cord displacement and spinal canal narrowing noted.      Resume PTA nafcillin    Neurosurgery consulting    ID consulting     Lidocaine patch applied     Robaxin 750 mg po 4 X daily     Tylenol 975 mg po q 6 hours scheduled    Has oxycodone prn >> increase dose to 10 mg q 6 hours     Patient felt the scheduled Tylenol and oxycodone together for better.  Also the muscle relaxant.    Okay per neurosurgery to start Motrin given 200 mg every 6 as needed        Urinary retention    Patient had recurrent urinary retention and was discharged with Dacosta catheter    Flomax restarted     1/2 void trial with 1200 cc residual>> replaced Dacosta will need to follow-up with urology after discharge     Bilateral leg edema    Ultrasound was done and DVT was ruled out    Patient has again edema in his legs.    Not been on DVT prevention prior with his back surgery    1/2/2022 venous dopplers negative     Spoke with  neurosurgery 1/2 and okay to begin DVT prevention with lovenox     Mild anemia    Due to surgical blood loss and ongoing illness.    Hemoglobin stable at baseline now,    Hemoglobin 9.6 and stable on 1/20     Hypertension:    Patient was not discharged on any blood pressure meds last admission.  Blood pressure slightly high    Now with blood pressures increased.    ?? Pain driven and/or fluid retention given the amount of nafcillin with 600 mill daily    Now on Norvasc 5 mg.    Start metoprolol if needed as well low-dose 12.5 mg p.o. twice daily    Given diuretic as well as he has fluid retention which may be adding to his hypertension    EKG unremarkable with troponin check    ADDENDUM:   Hypokalemia:     Replacing. On protocol    May need more aggressive potassium replace with diuretics as well         Diet:  regular diet   DVT Prophylaxis: Ambulate every shift  Dacosta Catheter: Dacosta in place but removed today for PVR check  Central Lines: PRESENT: PICC line replaced in IR 1/2     Cardiac Monitoring: None  Code Status:   Full code by gumaro                  # Overweight: Estimated body mass index is 28.34 kg/m  as calculated from the following:    Height as of 12/18/22: 1.829 m (6').    Weight as of 12/27/22: 94.8 kg (208 lb 15.9 oz).        Disposition Plan   Patient will be discharged to home if he is able to control pain and BP control.      Expected Discharge Date:  1/3 to home            HOWARD NAGEL MD,   Hospitalist Service  [facility]  ______________________________________________________________________    -Data reviewed today: I reviewed all new labs and imaging results over the last 24 hours. I personally reviewed no images or EKG's today.    Physical Exam   Temp: 98.5  F (36.9  C) Temp src: Oral BP: (!) 155/93 Pulse: 70   Resp: 18 SpO2: 98 % O2 Device: None (Room air)    Vitals:    01/04/23 0934   Weight: 85.6 kg (188 lb 11.2 oz)   Constitutional: Appears stated age, no  acute distress.  Respiratory: Breath sounds CTA. No increased work of breathing.  Cardiovascular: RRR, no rub or murmur. No peripheral edema.  GI: Soft, non-tender, non-distended.  Skin: Warm, dry, no rashes or lesions.  Other: Moving bilateral lower extremities   1+ bilateral lower extremity edema    Medications       acetaminophen  975 mg Oral Q6H     amLODIPine  5 mg Oral Daily     lidocaine  2 patch Transdermal Q24H     lidocaine   Transdermal Q8H TITA     methocarbamol  750 mg Oral 4x Daily     metoprolol tartrate  12.5 mg Oral BID     multivitamin w/minerals  1 tablet Oral Daily     nafcillin  2 g Intravenous Q4H     pantoprazole  40 mg Oral QAM AC     sodium chloride (PF)  10-40 mL Intracatheter Q7 Days     tamsulosin  0.4 mg Oral Daily       Data   Recent Labs   Lab 01/04/23  0935 01/03/23  0629 01/03/23  0036 01/02/23  0751 01/01/23  0955 12/31/22  2107 12/31/22  2046 12/31/22  1508 12/31/22  1129   WBC  --   --   --   --  5.6  --   --   --  4.3   HGB  --   --   --   --  9.6*  --   --   --  9.6*   MCV  --   --   --   --  101*  --   --   --  99   PLT  --   --   --   --  388  --   --   --  424   INR  --   --   --   --   --   --   --  1.08  --    NA  --  139  --   --  138  --   --   --  140   POTASSIUM 3.6 3.8  3.8 3.1*   < > 3.1*  --   --   --  3.4   CHLORIDE  --  109  --   --  104  --   --   --  107   CO2  --  24  --   --  28  --   --   --  29   BUN  --  9  --   --  8  --   --   --  9   CR  --  1.01  --   --  1.01  --   --   --  1.05   ANIONGAP  --  6  --   --  6  --   --   --  4   LINA  --  8.8  --   --  8.6  --   --   --  9.1   GLC  --  96  --   --  86 102*   < >  --  142*   ALBUMIN  --   --   --   --  2.4*  --   --   --  2.5*   PROTTOTAL  --   --   --   --  6.5*  --   --   --  6.8   BILITOTAL  --   --   --   --  1.0  --   --   --  1.3   ALKPHOS  --   --   --   --  57  --   --   --  61   ALT  --   --   --   --  30  --   --   --  30   AST  --   --   --   --  28  --   --   --  30    < > = values in this  interval not displayed.       Imaging:  Recent Results (from the past 24 hour(s))   CT Abdomen Pelvis w/o Contrast    Narrative    EXAM: CT ABDOMEN PELVIS W/O CONTRAST  LOCATION: [facility]  DATE/TIME: 1/3/2023 10:16 PM    INDICATION: Upper abdominal and back pain.  COMPARISON: CT 12/18/2022  TECHNIQUE: CT scan of the abdomen and pelvis was performed without IV contrast. Multiplanar reformats were obtained. Dose reduction techniques were used.  CONTRAST: None.    FINDINGS:   LOWER CHEST: There is a mild left pleural effusion with associated mild atelectasis with no central airway obstruction in the left lower lobe. There is minimal dependent atelectasis seen in the right lower lobe.    HEPATOBILIARY: The gallbladder is mildly contracted, most typical for nonfasting status of the patient given the appearance of the stomach. The liver and bile ducts are normal.    PANCREAS: Normal.    SPLEEN: Normal.    ADRENAL GLANDS: Normal.    KIDNEYS/BLADDER: There is a Dacosta catheter in the bladder with an associated mild amount of gas seen in the bladder. There is mild circumferential thickening of the bladder wall which can be seen with chronic bilateral outlet obstruction or possibly   cystitis, although there is no significant stranding of the adjacent fat to suggest cystitis. The kidneys, ureters, and bladder are otherwise normal.    BOWEL: Normal to include the appendix.    LYMPH NODES: Normal.    VASCULATURE: Minimal calcification with no aneurysm.    PELVIC ORGANS: Minimal prostatic gland enlargement.    MUSCULOSKELETAL: Minute fatty umbilical hernia. Scattered benign Schmorl's nodes. Mild to moderate scattered hypertrophic changes seen in the spine.      Impression    IMPRESSION:   1.  Since 12/18/2022, the prior seen nonspecific fluid in the left retroperitoneum has resolved, the mild to moderate-sized left pleural effusion with associated atelectasis has significantly increased, otherwise  there have been no other significant   changes. There are numerous nonacute incidental findings, please refer to the above report for details.    2.  There is a Dacosta catheter in the bladder with mild circumferential thickening of the bladder wall most likely in the base of chronic bladder outlet obstruction rather than cystitis.

## 2023-01-04 NOTE — PLAN OF CARE
Goal Outcome Evaluation:     A&O x4, IND. Dacosta. Scheduled Robaxin, scheduled Tylenol and PRN Oxycodone x1 for pain along pt sides radiating to abdomen. VSS RA. Small dressing on back CDI.  PICC saline locked. Continues on IV abx.

## 2023-01-04 NOTE — PROGRESS NOTES
CT abdomen/pelvis with no acute concerns for tonight.   Dr. Davis will discuss with patient in AM.   Ok to give report   Dr. Krystal Davis

## 2023-01-04 NOTE — PROGRESS NOTES
[facility]  Neurosurgery Daily Progress Note    Assessment & Plan   57 year old male with history of T12-L1 decompression for evacuation of epidural abscess on 12/18/22 with Dr. Courtney. He was discharged home on 12/27/22 with IV antibiotics per ID. He was admitted on 12/31/2022 with increased back/flank cramping pain.     Today, patient reports low back cramps/spasms improved with pain medication adjustment. Patient denies radicular leg pain, numbness, weakness. No fevers. Incision is healing well. Hospitalist following high BP.     Plan:  - Continue supportive and symptomatic treatment   - Continue abx per ID   - Continue pain control measures  - Per Hospitalist, discharge pending BP control   - Follow up with NSG clinic in 1 week with repeat MRI. Scheduling will call patient to set up appt.   - NSG will sign off, please call with questions     Discussed with Dr. Daniele Zaragoza, CNP  [facility] Neurosurgery  [address] [address]  Tel [phone]  Pager [phone]    Interval History   Stable     Physical Exam   Temp: 98.4  F (36.9  C) Temp src: Oral BP: (!) 159/103 Pulse: 69   Resp: 16 SpO2: 96 % O2 Device: None (Room air)    Vitals:    01/04/23 0934   Weight: 85.6 kg (188 lb 11.2 oz)     Vital Signs with Ranges  Temp:  [98.1  F (36.7  C)-98.7  F (37.1  C)] 98.4  F (36.9  C)  Pulse:  [68-88] 69  Resp:  [14-16] 16  BP: (143-174)/() 159/103  SpO2:  [96 %-100 %] 96 %  I/O last 3 completed shifts:  In: 240 [P.O.:240]  Out: 2525 [Urine:2525]    Mental status:  Alert and oriented x 3, speech is fluent.  Motor:  Moves all extremities, BLE 5/5   Sensation:  Intact   Incision: CDI     Medications        acetaminophen  975 mg Oral Q6H     amLODIPine  5 mg Oral Daily     lidocaine  2 patch Transdermal Q24H     lidocaine   Transdermal Q8H TITA     methocarbamol  750 mg Oral 4x Daily     metoprolol tartrate  12.5 mg  Oral BID     multivitamin w/minerals  1 tablet Oral Daily     nafcillin  2 g Intravenous Q4H     pantoprazole  40 mg Oral QAM AC     sodium chloride (PF)  10-40 mL Intracatheter Q7 Days     tamsulosin  0.4 mg Oral Daily       Giselle Zaragoza [facility] Neurosurgery   [address]  [place] [address]  Tel [phone]  Pager [phone]

## 2023-01-04 NOTE — PLAN OF CARE
Goal Outcome Evaluation:    Patient vital signs are at baseline: No,  Reason:  still hypertensive  Patient able to ambulate as they were prior to admission or with assist devices provided by therapies during their stay:  Yes, up IND in room for transfers   Patient MUST void prior to discharge:  No,  Reason:  plan to keep gamez in at discharge  Patient able to tolerate oral intake:  Yes  Pain has adequate pain control using Oral analgesics:  Yes

## 2023-01-04 NOTE — PROGRESS NOTES
[facility]    Infectious Disease Progress Note    Date of Service: 01/04/2023     Assessment:  57 YM , previously healthy with recent admission from 12/18-12/28 for S.aureus bacteremia associated with extensive multi level cervical and thoracolumbar spinal epidural abscess from C4 to T4-T5 down to L2-L3 with spinal chord compression.  S/p debridement with S.aureus in operative and blood cx.  He has been readmitted soon after discharge due to uncontrolled pain. MRI cervical thoracic and lumbar spine appears improved.     -Uncontrolled back pain and spasms  -Resolved MSSA bacteremia. Blood cxs negative since 12/20  -Extensive cervical and thoracolumbar spinal epidural abscess from C4 down to T4-T5 down to L2-L3  with spinal chord compression.  S/p debridement. Repeat MRI s from 12/21 show resolved cervical spin epidural abscess, improved epidural abscess in the upper and lower thoracic spine and decreased lumbar epidural abscess  -New left knee pain and bilateral ankle pain without clear evidence of secondary infection     Recommendations  1. Pain management per primary team  2. No change in treatment plan from the ID stand point. Continue Nafcillin. 6 week treatment is planned until 01/21/2023, possibly longer based on clinical and radiographic improvement        Elyse Valadez MD    Interval History   Remains the same, ongoing pain and spasms in parapsinal muscles, some lower extremity edema . No new complaints. CT abdomen noted no major intra abdominal pathology, moderate left pleural effusion noted, LE doppler negative for DVT    Physical Exam   Temp: 98.4  F (36.9  C) Temp src: Oral BP: (!) 159/103 Pulse: 69   Resp: 16 SpO2: 96 % O2 Device: None (Room air)    Vitals:    01/04/23 0934   Weight: 85.6 kg (188 lb 11.2 oz)     Vital Signs with Ranges  Temp:  [98.1  F (36.7  C)-98.7  F (37.1  C)] 98.4  F (36.9  C)  Pulse:  [68-88] 69  Resp:  [14-16] 16  BP: (143-174)/() 159/103  SpO2:  [96  %-100 %] 96 %    Constitutional: Awake, alert, cooperative, no apparent distress  Lungs: Clear to auscultation bilaterally, no crackles or wheezing  Cardiovascular: Regular rate and rhythm, normal S1 and S2, and no murmur noted  Abdomen: Normal bowel sounds, soft, non-distended, non-tender  Skin: No rash  MS : ankle edema    Other:    Medications       acetaminophen  975 mg Oral Q6H     amLODIPine  5 mg Oral Daily     lidocaine  2 patch Transdermal Q24H     lidocaine   Transdermal Q8H TITA     methocarbamol  750 mg Oral 4x Daily     metoprolol tartrate  12.5 mg Oral BID     multivitamin w/minerals  1 tablet Oral Daily     nafcillin  2 g Intravenous Q4H     pantoprazole  40 mg Oral QAM AC     sodium chloride (PF)  10-40 mL Intracatheter Q7 Days     tamsulosin  0.4 mg Oral Daily       Data   All microbiology laboratory data reviewed.  Recent Labs   Lab Test 01/01/23  0955 12/31/22  1129 12/26/22  0701 12/25/22  0650   WBC 5.6 4.3  --  7.9   HGB 9.6* 9.6* 9.7* 9.5*   HCT 29.6* 28.9*  --  29.2*   * 99  --  101*    424  --  399     Recent Labs   Lab Test 01/03/23  0629 01/01/23  0955 12/31/22  1129   CR 1.01 1.01 1.05     Recent Labs   Lab Test 12/31/22  1129   SED 75*   Imaging  1/3 CT abdomen pelvis  EXAM: CT ABDOMEN PELVIS W/O CONTRAST  LOCATION: [facility]  DATE/TIME: 1/3/2023 10:16 PM     INDICATION: Upper abdominal and back pain.  COMPARISON: CT 12/18/2022  TECHNIQUE: CT scan of the abdomen and pelvis was performed without IV contrast. Multiplanar reformats were obtained. Dose reduction techniques were used.  CONTRAST: None.     FINDINGS:   LOWER CHEST: There is a mild left pleural effusion with associated mild atelectasis with no central airway obstruction in the left lower lobe. There is minimal dependent atelectasis seen in the right lower lobe.     HEPATOBILIARY: The gallbladder is mildly contracted, most typical for nonfasting status of the patient given the appearance of  the stomach. The liver and bile ducts are normal.     PANCREAS: Normal.     SPLEEN: Normal.     ADRENAL GLANDS: Normal.     KIDNEYS/BLADDER: There is a Dacosta catheter in the bladder with an associated mild amount of gas seen in the bladder. There is mild circumferential thickening of the bladder wall which can be seen with chronic bilateral outlet obstruction or possibly   cystitis, although there is no significant stranding of the adjacent fat to suggest cystitis. The kidneys, ureters, and bladder are otherwise normal.     BOWEL: Normal to include the appendix.     LYMPH NODES: Normal.     VASCULATURE: Minimal calcification with no aneurysm.     PELVIC ORGANS: Minimal prostatic gland enlargement.     MUSCULOSKELETAL: Minute fatty umbilical hernia. Scattered benign Schmorl's nodes. Mild to moderate scattered hypertrophic changes seen in the spine.                                                                      IMPRESSION:   1.  Since 12/18/2022, the prior seen nonspecific fluid in the left retroperitoneum has resolved, the mild to moderate-sized left pleural effusion with associated atelectasis has significantly increased, otherwise there have been no other significant   changes. There are numerous nonacute incidental findings, please refer to the above report for details.     2.  There is a Dacosta catheter in the bladder with mild circumferential thickening of the bladder wall most likely in the base of chronic bladder outlet obstruction rather than cystitis.       12/31 MRI cervical, thoracic and lumbar spines     EXAM: MR LUMBAR SPINE WITHOUT AND WITH CONTRAST, MR CERVICAL SPINE WITHOUT AND WITH CONTRAST, MR THORACIC SPINE WITHOUT AND WITH CONTRAST  LOCATION: [facility]  DATE/TIME: 12/31/2022, 2:46 PM     INDICATION: Epidural abscess, increasing thoracic back pain, muscle spasms, evaluate for progression.  COMPARISON: Cervical and lumbar spine MRI 12/23/2022, thoracic spine MRI  12/22/2022.  CONTRAST: 9 mL Gadavist.  TECHNIQUE: Routine cervical, thoracic and lumbar Spine MRI without and with IV contrast.     FINDINGS:      MRI CERVICAL SPINE: Markedly diminished dorsal epidural fluid collection in the mid to lower cervical and upper visualized thoracic spinal canal. The cervical component of the fluid has nearly completely resolved with some residual thin dural enhancement   extending from C4 into the upper thoracic spine. No cervical marrow or soft tissue edema. Persistent moderate to severe degenerative spinal canal narrowing at C5-C6 slightly improved due to the diminished volume of the dorsal epidural fluid collection.   More mild degenerative spinal canal narrowing at C6-C7 also slightly improved. Cervical canal elsewhere appears adequate. No cervical spinal cord signal abnormality.     MRI THORACIC SPINE: Unchanged vertebral body alignment and height loss with edematous compression fracture deformities affecting both T7 and the superior T12 endplates. Edema associated with the left T9-T10 facet joint appears similar to slightly   increased. Less pronounced but also probably slightly increased edema associated with the right T9-T10 facet joint. The extensive dorsal epidural collection is markedly diminished superiorly where there is mild residual dural thickening and enhancement   extending from the cervicothoracic junction to the T5 level. There is a persistent dorsal epidural collection below this that between T6 and T8 is diminished but not resolved. Between T8 and T10-T11, it is similar in volume measuring 7 mm in AP thickness   at the T9-T10 level and causing moderate spinal canal stenosis and thecal sac effacement without spinal cord signal abnormality. Below the T10-T11 level, the dorsal epidural collection is considerably diminished. Left-sided pleural fluid collection is   similar to prior.     MRI LUMBAR SPINE:  Nomenclature is based on five lumbar-type vertebral bodies.  Unchanged Schmorl's node in the superior L4 endplate. Otherwise, normal lumbar vertebral body heights. Normal alignment. L3 vertebral body hemangioma. Left L1 laminectomy. Peripherally enhancing   fluid within the laminectomy defect and subcutaneous tissues along the operative tract are stable. Dorsal epidural enhancement extends from the thoracolumbar junction to the lower L2 level, unchanged in extent versus prior. A slender dorsal epidural   fluid collection/abscess centered at the lower L1 level is slightly diminished in volume.                                                                       IMPRESSION:     MRI CERVICAL SPINE:  1.  Essentially resolved dorsal epidural fluid collection within the mid to lower cervical spinal canal with mild residual dural thickening/enhancement.  2.  No marrow or soft tissue edema in the cervical spine. Normal cervical spinal cord.  3.  Improved spinal canal stenosis at C5-C6 and C6-C7 due to the diminished dorsal epidural fluid collection with residual degenerative spinal canal narrowing.     MRI THORACIC SPINE:  1.  Dorsal epidural collection in the upper and lower thoracic spine is improved versus 12/22/2022.  2.  In the midthoracic spine, there is a persistent dorsal epidural phlegmon/abscess that continues to measure 7 mm in thickness at the T9-T10 level and cause moderate spinal cord displacement and spinal canal narrowing. No spinal cord signal   abnormality.  3.  Slightly progressed edema associated with the left greater than right T9-T10 facet joints.     MRI LUMBAR SPINE:  1.  Dorsal epidural abscess centered in the lower L1 level is slightly diminished in volume.  2.  Peripherally enhancing fluid collections within the left L1 laminectomy defect and overlying surgical tract are stable.  3.  No definite progressive infection in the lumbar spine.

## 2023-01-05 ENCOUNTER — APPOINTMENT (OUTPATIENT)
Dept: PHYSICAL THERAPY | Facility: CLINIC | Age: 58
DRG: 095 | End: 2023-01-05
Payer: COMMERCIAL

## 2023-01-05 ENCOUNTER — TELEPHONE (OUTPATIENT)
Dept: NEUROSURGERY | Facility: CLINIC | Age: 58
End: 2023-01-05

## 2023-01-05 ENCOUNTER — HOSPITAL ENCOUNTER (OUTPATIENT)
Facility: CLINIC | Age: 58
End: 2023-01-05
Payer: COMMERCIAL

## 2023-01-05 VITALS
TEMPERATURE: 98.4 F | OXYGEN SATURATION: 97 % | SYSTOLIC BLOOD PRESSURE: 147 MMHG | DIASTOLIC BLOOD PRESSURE: 95 MMHG | WEIGHT: 188.7 LBS | RESPIRATION RATE: 16 BRPM | BODY MASS INDEX: 25.59 KG/M2 | HEART RATE: 71 BPM

## 2023-01-05 DIAGNOSIS — G06.1 SPINAL EPIDURAL ABSCESS: Primary | ICD-10-CM

## 2023-01-05 LAB
ATRIAL RATE - MUSE: 66 BPM
CREAT SERPL-MCNC: 1.04 MG/DL (ref 0.66–1.25)
DIASTOLIC BLOOD PRESSURE - MUSE: NORMAL MMHG
GFR SERPL CREATININE-BSD FRML MDRD: 83 ML/MIN/1.73M2
INTERPRETATION ECG - MUSE: NORMAL
P AXIS - MUSE: 51 DEGREES
PLATELET # BLD AUTO: 282 10E3/UL (ref 150–450)
POTASSIUM BLD-SCNC: 3.6 MMOL/L (ref 3.4–5.3)
PR INTERVAL - MUSE: 144 MS
QRS DURATION - MUSE: 86 MS
QT - MUSE: 434 MS
QTC - MUSE: 454 MS
R AXIS - MUSE: 40 DEGREES
SYSTOLIC BLOOD PRESSURE - MUSE: NORMAL MMHG
T AXIS - MUSE: 28 DEGREES
VENTRICULAR RATE- MUSE: 66 BPM

## 2023-01-05 PROCEDURE — 250N000013 HC RX MED GY IP 250 OP 250 PS 637: Performed by: HOSPITALIST

## 2023-01-05 PROCEDURE — 97140 MANUAL THERAPY 1/> REGIONS: CPT | Mod: GP | Performed by: PHYSICAL THERAPIST

## 2023-01-05 PROCEDURE — 250N000013 HC RX MED GY IP 250 OP 250 PS 637: Performed by: INTERNAL MEDICINE

## 2023-01-05 PROCEDURE — 250N000009 HC RX 250: Performed by: HOSPITALIST

## 2023-01-05 PROCEDURE — 84132 ASSAY OF SERUM POTASSIUM: CPT | Performed by: INTERNAL MEDICINE

## 2023-01-05 PROCEDURE — 82565 ASSAY OF CREATININE: CPT | Performed by: HOSPITALIST

## 2023-01-05 PROCEDURE — 99239 HOSP IP/OBS DSCHRG MGMT >30: CPT | Performed by: INTERNAL MEDICINE

## 2023-01-05 PROCEDURE — 85049 AUTOMATED PLATELET COUNT: CPT | Performed by: HOSPITALIST

## 2023-01-05 PROCEDURE — 250N000013 HC RX MED GY IP 250 OP 250 PS 637: Performed by: NURSE PRACTITIONER

## 2023-01-05 PROCEDURE — 250N000011 HC RX IP 250 OP 636: Performed by: INTERNAL MEDICINE

## 2023-01-05 RX ORDER — OXYCODONE HYDROCHLORIDE 5 MG/1
5 TABLET ORAL EVERY 6 HOURS PRN
Status: DISCONTINUED | OUTPATIENT
Start: 2023-01-05 | End: 2023-01-05

## 2023-01-05 RX ORDER — METHOCARBAMOL 750 MG/1
750 TABLET, FILM COATED ORAL 4 TIMES DAILY
Qty: 40 TABLET | Refills: 0 | Status: SHIPPED | OUTPATIENT
Start: 2023-01-05 | End: 2023-02-28

## 2023-01-05 RX ORDER — OXYCODONE HYDROCHLORIDE 5 MG/1
5 TABLET ORAL EVERY 6 HOURS PRN
Status: DISCONTINUED | OUTPATIENT
Start: 2023-01-05 | End: 2023-01-05 | Stop reason: HOSPADM

## 2023-01-05 RX ORDER — AMLODIPINE BESYLATE 5 MG/1
5 TABLET ORAL DAILY
Qty: 30 TABLET | Refills: 0 | Status: SHIPPED | OUTPATIENT
Start: 2023-01-06 | End: 2023-01-06

## 2023-01-05 RX ORDER — LIDOCAINE 4 G/G
2 PATCH TOPICAL EVERY 24 HOURS
Qty: 14 PATCH | Refills: 0 | Status: SHIPPED | OUTPATIENT
Start: 2023-01-05 | End: 2023-01-05

## 2023-01-05 RX ORDER — METOPROLOL TARTRATE 25 MG/1
25 TABLET, FILM COATED ORAL 2 TIMES DAILY
Qty: 60 TABLET | Refills: 0 | Status: SHIPPED | OUTPATIENT
Start: 2023-01-05 | End: 2023-02-03

## 2023-01-05 RX ORDER — OXYCODONE HYDROCHLORIDE 5 MG/1
5 TABLET ORAL EVERY 6 HOURS PRN
Qty: 35 TABLET | Refills: 0 | Status: SHIPPED | OUTPATIENT
Start: 2023-01-05 | End: 2023-01-05

## 2023-01-05 RX ORDER — ACETAMINOPHEN 500 MG
500-1000 TABLET ORAL EVERY 6 HOURS PRN
Refills: 0
Start: 2023-01-05 | End: 2023-02-28

## 2023-01-05 RX ORDER — OXYCODONE HYDROCHLORIDE 5 MG/1
5 TABLET ORAL EVERY 6 HOURS PRN
Qty: 15 TABLET | Refills: 0 | Status: SHIPPED | OUTPATIENT
Start: 2023-01-05 | End: 2023-01-09

## 2023-01-05 RX ORDER — ACETAMINOPHEN 325 MG/1
975 TABLET ORAL EVERY 6 HOURS
Refills: 0
Start: 2023-01-05 | End: 2023-01-05

## 2023-01-05 RX ORDER — IBUPROFEN 200 MG
200 TABLET ORAL EVERY 6 HOURS PRN
Refills: 0
Start: 2023-01-05 | End: 2023-02-28

## 2023-01-05 RX ADMIN — OXYCODONE HYDROCHLORIDE 10 MG: 5 TABLET ORAL at 00:29

## 2023-01-05 RX ADMIN — TAMSULOSIN HYDROCHLORIDE 0.4 MG: 0.4 CAPSULE ORAL at 08:04

## 2023-01-05 RX ADMIN — NAFCILLIN SODIUM 2 G: 2 INJECTION, POWDER, LYOPHILIZED, FOR SOLUTION INTRAMUSCULAR; INTRAVENOUS at 05:37

## 2023-01-05 RX ADMIN — AMLODIPINE BESYLATE 5 MG: 5 TABLET ORAL at 08:04

## 2023-01-05 RX ADMIN — METHOCARBAMOL 750 MG: 750 TABLET ORAL at 00:29

## 2023-01-05 RX ADMIN — METHOCARBAMOL 750 MG: 750 TABLET ORAL at 10:54

## 2023-01-05 RX ADMIN — MULTIPLE VITAMINS W/ MINERALS TAB 1 TABLET: TAB at 08:04

## 2023-01-05 RX ADMIN — NAFCILLIN SODIUM 2 G: 2 INJECTION, POWDER, LYOPHILIZED, FOR SOLUTION INTRAMUSCULAR; INTRAVENOUS at 08:06

## 2023-01-05 RX ADMIN — OXYCODONE HYDROCHLORIDE 5 MG: 5 TABLET ORAL at 06:42

## 2023-01-05 RX ADMIN — ACETAMINOPHEN 975 MG: 325 TABLET, FILM COATED ORAL at 00:29

## 2023-01-05 RX ADMIN — ACETAMINOPHEN 975 MG: 325 TABLET, FILM COATED ORAL at 06:42

## 2023-01-05 RX ADMIN — PANTOPRAZOLE SODIUM 40 MG: 40 TABLET, DELAYED RELEASE ORAL at 06:43

## 2023-01-05 RX ADMIN — IBUPROFEN 200 MG: 200 TABLET, FILM COATED ORAL at 03:46

## 2023-01-05 RX ADMIN — METHOCARBAMOL 750 MG: 750 TABLET ORAL at 05:37

## 2023-01-05 RX ADMIN — OXYCODONE HYDROCHLORIDE 5 MG: 5 TABLET ORAL at 12:57

## 2023-01-05 RX ADMIN — NAFCILLIN SODIUM 2 G: 2 INJECTION, POWDER, LYOPHILIZED, FOR SOLUTION INTRAMUSCULAR; INTRAVENOUS at 00:29

## 2023-01-05 RX ADMIN — METOPROLOL TARTRATE 25 MG: 25 TABLET, FILM COATED ORAL at 08:04

## 2023-01-05 RX ADMIN — ACETAMINOPHEN 975 MG: 325 TABLET, FILM COATED ORAL at 12:57

## 2023-01-05 RX ADMIN — NAFCILLIN SODIUM 2 G: 2 INJECTION, POWDER, LYOPHILIZED, FOR SOLUTION INTRAMUSCULAR; INTRAVENOUS at 12:58

## 2023-01-05 RX ADMIN — ENOXAPARIN SODIUM 40 MG: 40 INJECTION SUBCUTANEOUS at 08:06

## 2023-01-05 ASSESSMENT — ACTIVITIES OF DAILY LIVING (ADL)
ADLS_ACUITY_SCORE: 36

## 2023-01-05 NOTE — PROGRESS NOTES
"New order placed for STAT MRI thoracic w and w/out contrast d/t inablitlity to schedule the scan when ordered as \"routine\" within the desired follow up timeframe.    Pt is to have MRI completed prior to 1/12 and follow up with Dr. Sanabria in clinic on that date for an epidural abscess follow up.     Indiana Ricci RN   "

## 2023-01-05 NOTE — PROGRESS NOTES
[facility]  Hospitalist Progress Note    Date of Admission: 12/31/2022    Interval History   Patient has had the new onset of hypertension over the last couple days this admission.  On prior admission his blood pressure had not been as high.  He does have additional pain and spasm.  CT overnight to ensure not missing another source of pain in his upper abdominal area.  No acute findings.  Dopplers of his legs  Patient has been started on blood pressure meds with some improvement still intermittent spike      Assessment & Plan   Ian Moncada is a 57 year old male recently admitted for MSSA epidural abscess and spinal cord compression, leg edema, urinary retention presented to the ER due to increased back pain     MSSA epidural abscess    Hospitalization 12/18-12/27.  Noted epidural fluid collection from upper thoracic spine down to the mid lumbar spine with resulting spinal cord compression from T12-L1.  Neurosurgery evaluated patient and underwent thoracic lumbar decompression for evacuation of abscess.  Culture grew MSSA.  ID as well consulted and was discharged on nafcillin IV to continue until 1/21/22.      Patient now with increased back pain for 2 days PTA  that wraps around the middle of his abdomen.     MRI of cervical thoracic and lumbar spines done in ER, overall improvement although persistent dorsal epidural phlegmon/abscess, 7 mm thickness at T9-10 level causing moderate to spinal cord displacement and spinal canal narrowing noted.      Resume PTA nafcillin    Neurosurgery consulting    ID consulting     Lidocaine patch applied     Robaxin 750 mg po 4 X daily     Tylenol 975 mg po q 6 hours scheduled    Has oxycodone prn >> increase dose to 10 mg q 6 hours     Patient felt the scheduled Tylenol and oxycodone together for better.  Also the muscle relaxant.    Okay per neurosurgery to start Motrin given 200 mg every 6 as needed    Also started on Valium 2 mg every 8 hours as  needed.>>  Determine if this could help slightly more with spasm        Urinary retention    Patient had recurrent urinary retention and was discharged with Dacosta catheter    Flomax restarted     1/2 void trial with 1200 cc residual>> replaced Dacosta will need to follow-up with urology after discharge     Bilateral leg edema    Ultrasound was done and DVT was ruled out    Patient has again edema in his legs.    Not been on DVT prevention prior with his back surgery    1/2/2022 venous dopplers negative     Spoke with neurosurgery 1/2 and okay to begin DVT prevention with lovenox     Mild anemia    Due to surgical blood loss and ongoing illness.    Hemoglobin stable at baseline now,    Hemoglobin 9.6 and stable on 1/20     Hypertension:    Patient was not discharged on any blood pressure meds last admission.  Blood pressure slightly high    Now with blood pressures increased.    ?? Pain driven and/or fluid retention given the amount of nafcillin with 600 mill daily    Now on Norvasc 5 mg.    Start metoprolol if needed as well low-dose 12.5 mg p.o. twice daily    Given diuretic as well as he has fluid retention which may be adding to his hypertension    EKG unremarkable with troponin check    BP has been excessively elevated from baseline this admission.    Prior admit not this excessive    Started on Norvasc 5 mg, metoprolol 12.5 mg p.o. twice daily increased to 25 mg twice daily added Lasix as well    Suspect he may have a component of fluid retention as above      Hypokalemia:     Replacing. On protocol    May need more aggressive potassium replace with diuretics as well         Diet:  regular diet   DVT Prophylaxis: Ambulate every shift  Dacosta Catheter: Dacosta in place but removed today for PVR check  Central Lines: PRESENT: PICC line replaced in IR 1/2   Started on Lovenox 40 mg subcu  Cardiac Monitoring: None  Code Status:   Full code by gumaro                  # Overweight: Estimated body mass index is 28.34 kg/m   as calculated from the following:    Height as of 12/18/22: 1.829 m (6').    Weight as of 12/27/22: 94.8 kg (208 lb 15.9 oz).        Disposition Plan   Patient will be discharged to home if he is able to control pain and BP control.      Expected Discharge Date:  1/3 to home            HOWARD NAGEL MD,   Hospitalist Service  [facility]  ______________________________________________________________________    -Data reviewed today: I reviewed all new labs and imaging results over the last 24 hours. I personally reviewed no images or EKG's today.    Physical Exam   Temp: 98.5  F (36.9  C) Temp src: Oral BP: (!) 169/107 Pulse: 80   Resp: 18 SpO2: 98 % O2 Device: None (Room air)    Vitals:    01/04/23 0934   Weight: 85.6 kg (188 lb 11.2 oz)   Constitutional: Appears stated age, no acute distress.  Respiratory: Breath sounds CTA. No increased work of breathing.  Cardiovascular: RRR, no rub or murmur. No peripheral edema.  GI: Soft, non-tender, non-distended.  Skin: Warm, dry, no rashes or lesions.  Other: Moving bilateral lower extremities   1+ bilateral lower extremity edema    Medications       acetaminophen  975 mg Oral Q6H     amLODIPine  5 mg Oral Daily     enoxaparin ANTICOAGULANT  40 mg Subcutaneous Daily     lidocaine  2 patch Transdermal Q24H     lidocaine   Transdermal Q8H TITA     methocarbamol  750 mg Oral 4x Daily     metoprolol tartrate  25 mg Oral BID     multivitamin w/minerals  1 tablet Oral Daily     nafcillin  2 g Intravenous Q4H     pantoprazole  40 mg Oral QAM AC     sodium chloride (PF)  10-40 mL Intracatheter Q7 Days     tamsulosin  0.4 mg Oral Daily       Data   Recent Labs   Lab 01/04/23  0935 01/03/23  0629 01/03/23  0036 01/02/23  0751 01/01/23  0955 12/31/22  2107 12/31/22  2046 12/31/22  1508 12/31/22  1129   WBC  --   --   --   --  5.6  --   --   --  4.3   HGB  --   --   --   --  9.6*  --   --   --  9.6*   MCV  --   --   --   --  101*  --   --   --  99   PLT  --    --   --   --  388  --   --   --  424   INR  --   --   --   --   --   --   --  1.08  --    NA  --  139  --   --  138  --   --   --  140   POTASSIUM 3.6 3.8  3.8 3.1*   < > 3.1*  --   --   --  3.4   CHLORIDE  --  109  --   --  104  --   --   --  107   CO2  --  24  --   --  28  --   --   --  29   BUN  --  9  --   --  8  --   --   --  9   CR  --  1.01  --   --  1.01  --   --   --  1.05   ANIONGAP  --  6  --   --  6  --   --   --  4   LINA  --  8.8  --   --  8.6  --   --   --  9.1   GLC  --  96  --   --  86 102*   < >  --  142*   ALBUMIN  --   --   --   --  2.4*  --   --   --  2.5*   PROTTOTAL  --   --   --   --  6.5*  --   --   --  6.8   BILITOTAL  --   --   --   --  1.0  --   --   --  1.3   ALKPHOS  --   --   --   --  57  --   --   --  61   ALT  --   --   --   --  30  --   --   --  30   AST  --   --   --   --  28  --   --   --  30    < > = values in this interval not displayed.       Imaging:  Recent Results (from the past 24 hour(s))   CT Abdomen Pelvis w/o Contrast    Narrative    EXAM: CT ABDOMEN PELVIS W/O CONTRAST  LOCATION: [facility]  DATE/TIME: 1/3/2023 10:16 PM    INDICATION: Upper abdominal and back pain.  COMPARISON: CT 12/18/2022  TECHNIQUE: CT scan of the abdomen and pelvis was performed without IV contrast. Multiplanar reformats were obtained. Dose reduction techniques were used.  CONTRAST: None.    FINDINGS:   LOWER CHEST: There is a mild left pleural effusion with associated mild atelectasis with no central airway obstruction in the left lower lobe. There is minimal dependent atelectasis seen in the right lower lobe.    HEPATOBILIARY: The gallbladder is mildly contracted, most typical for nonfasting status of the patient given the appearance of the stomach. The liver and bile ducts are normal.    PANCREAS: Normal.    SPLEEN: Normal.    ADRENAL GLANDS: Normal.    KIDNEYS/BLADDER: There is a Dacosta catheter in the bladder with an associated mild amount of gas seen in the bladder.  There is mild circumferential thickening of the bladder wall which can be seen with chronic bilateral outlet obstruction or possibly   cystitis, although there is no significant stranding of the adjacent fat to suggest cystitis. The kidneys, ureters, and bladder are otherwise normal.    BOWEL: Normal to include the appendix.    LYMPH NODES: Normal.    VASCULATURE: Minimal calcification with no aneurysm.    PELVIC ORGANS: Minimal prostatic gland enlargement.    MUSCULOSKELETAL: Minute fatty umbilical hernia. Scattered benign Schmorl's nodes. Mild to moderate scattered hypertrophic changes seen in the spine.      Impression    IMPRESSION:   1.  Since 12/18/2022, the prior seen nonspecific fluid in the left retroperitoneum has resolved, the mild to moderate-sized left pleural effusion with associated atelectasis has significantly increased, otherwise there have been no other significant   changes. There are numerous nonacute incidental findings, please refer to the above report for details.    2.  There is a Dacosta catheter in the bladder with mild circumferential thickening of the bladder wall most likely in the base of chronic bladder outlet obstruction rather than cystitis.

## 2023-01-05 NOTE — PLAN OF CARE
Physical Therapy/Lymphedema Discharge Summary    Reason for therapy discharge:    All goals and outcomes met, no further needs identified.    Progress towards therapy goal(s). See goals on Care Plan in [facility] electronic health record for goal details.  Goals met    Therapy recommendation(s):    Continue home exercise program.

## 2023-01-05 NOTE — TELEPHONE ENCOUNTER
PATIENT NAME: Ian Moncada   YOB: 1965   MRN: [id number]   SURGEON: Sherwin   DATE of SURGERY: 12/18   PROCEDURE: T12-L1 decompression for evacuation of epidural abscess       FOLLOW-UP PLAN:   Next Visit: 1 week f/up persistent abscess at T9-10   Post-op Provider: Daniele in [place]   DIAGNOSTICS:  MRI thoracic w and w/out prior to office visit (orders placed)   DISPOSITION: home         ADDITIONAL INSTRUCTIONS FOR MEDICAL STAFF: Please r/s 1/13 MRI to be completed prior to 1/12 and have pt come into clinic on 1/12 to see Dr. Sanabria.     Indiana Ricci, RN

## 2023-01-05 NOTE — PROVIDER NOTIFICATION
"Dr. Davis web paged \"Can we please have an electronic signature for discharge for this patient, ASAP? They are ready to go. TY\"      "

## 2023-01-05 NOTE — PROGRESS NOTES
Care Management Follow Up    Length of Stay (days): 3    Expected Discharge Date: 01/05/2023     Concerns to be Addressed:       Patient plan of care discussed at interdisciplinary rounds: Yes    Anticipated Discharge Disposition: Home Infusion     Anticipated Discharge Services:    Anticipated Discharge DME:      Patient/family educated on Medicare website which has current facility and service quality ratings:    Education Provided on the Discharge Plan:    Patient/Family in Agreement with the Plan: yes    Referrals Placed by CM/SW: [facility] Care Coordination, Home Infusion  Private pay costs discussed: Not applicable    Additional Information:  Met with patient to follow up on discharge planning.  Pt already open to [facility].  Has medication through 1/6/23 and has supplies.   [facility] will bring next supply tomorrow.  Updated [facility] that pt possibly discharging today and they will watch for orders.    Care Management Discharge Note    Discharge Date: 01/05/2023       Discharge Disposition: Home Infusion    Discharge Services:      Discharge DME:      Discharge Transportation: family or friend will provide    Private pay costs discussed: Not applicable    PAS Confirmation Code:    Patient/family educated on Medicare website which has current facility and service quality ratings:      Education Provided on the Discharge Plan:    Persons Notified of Discharge Plans: pt, bedside RN, [facility]  Patient/Family in Agreement with the Plan: yes    Handoff Referral Completed: Yes    Additional Information:  Plan to discharge home with continuation of [facility].  Already established with home infusion and has all supplies.      Floresita Shahid RN, BS  Care Coordinator  [email or web address]

## 2023-01-05 NOTE — PROVIDER NOTIFICATION
"Dr. Ryan huang paged \"Do you have plans to discharge this patient? If so, may we have orders? TY\"  "

## 2023-01-05 NOTE — DISCHARGE SUMMARY
VSS. Discharged home with wife. AVS printed and all mediations given. Pt educated and verbalize understanding on plan and follow ups. All belongings went home with pt.

## 2023-01-05 NOTE — PLAN OF CARE
A&O x4, IND. [place]. Scheduled Robaxin, scheduled Tylenol and PRN Oxycodone x1 for pain along pt sides radiating to abdomen. VSS RA. Small dressing on back CDI.  PICC saline locked. Continues on IV abx

## 2023-01-05 NOTE — CONSULTS
Discussed with primary team.  Consult canceled.  Essential hypertension which is better controlled now on amlodipine and metoprolol.  Patient will follow-up as outpatient with PCP for hypertension management.    Miriam Marie MD  [facility] - Nephrology   [phone]

## 2023-01-05 NOTE — PROGRESS NOTES
STAT MRI no longer needed.     Pt scheduled for thoracic MRI on 1/10 and follow up with Dr. Sanabria on 1/12. Pt will be notified via telephone and MyChart of the appt changes.     Indiana Ricci RN

## 2023-01-06 ENCOUNTER — OFFICE VISIT (OUTPATIENT)
Dept: PEDIATRICS | Facility: CLINIC | Age: 58
End: 2023-01-06
Payer: COMMERCIAL

## 2023-01-06 ENCOUNTER — PATIENT OUTREACH (OUTPATIENT)
Dept: CARE COORDINATION | Facility: CLINIC | Age: 58
End: 2023-01-06

## 2023-01-06 VITALS
SYSTOLIC BLOOD PRESSURE: 144 MMHG | RESPIRATION RATE: 16 BRPM | OXYGEN SATURATION: 100 % | HEART RATE: 58 BPM | DIASTOLIC BLOOD PRESSURE: 92 MMHG | TEMPERATURE: 98.2 F

## 2023-01-06 DIAGNOSIS — G06.1 SPINAL EPIDURAL ABSCESS: Primary | ICD-10-CM

## 2023-01-06 DIAGNOSIS — G95.20 SPINAL CORD COMPRESSION (H): ICD-10-CM

## 2023-01-06 DIAGNOSIS — Z96.0 INDWELLING URINARY CATHETER PRESENT: ICD-10-CM

## 2023-01-06 DIAGNOSIS — I10 BENIGN ESSENTIAL HYPERTENSION: ICD-10-CM

## 2023-01-06 PROCEDURE — 99495 TRANSJ CARE MGMT MOD F2F 14D: CPT | Performed by: INTERNAL MEDICINE

## 2023-01-06 RX ORDER — AMLODIPINE BESYLATE 5 MG/1
5 TABLET ORAL DAILY
Qty: 30 TABLET | Refills: 2 | Status: SHIPPED | OUTPATIENT
Start: 2023-01-06 | End: 2023-02-28

## 2023-01-06 ASSESSMENT — PAIN SCALES - GENERAL: PAINLEVEL: MILD PAIN (3)

## 2023-01-06 NOTE — DISCHARGE SUMMARY
[facility]  Hospitalist Discharge Summary      Date of Admission:  12/31/2022  Date of Discharge:  1/5/2023  3:22 PM  Discharging Provider: HOWARD NAGEL MD  Discharge Service: Hospitalist Service    Discharge Diagnoses   1. Back spasms/cramps   2. MSSA epidural abscess  3. Urinary retention with indwelling gamez  4. Hypertension   5. Mild anemia   6. Hypokalemia      Follow-ups Needed After Discharge   1. Neurosurgery  2. Urology for urinary retention   3. Primary care provider       Discharge Disposition   Discharged to home  Condition at discharge: Stable    Hospital Course   Ian Moncada is a 57 year old male recently admitted for MSSA epidural abscess and spinal cord compression, leg edema, urinary retention presented to the ER due to increased back pain.   Patient had been hospitalized from 12/18 through 12/27 for an epidural fluid collection extending from his upper thoracic spine down to the mid lumbar spine resulting in spinal cord compression from T12-S1.  Neurosurgery evaluated the patient and he underwent a thoracic lumbar decompression for evacuation of abscess.  Cultures grew MSSA.  ID was consulted.  He was ultimately discharged on nafcillin with a PICC line.  He was able to discharge home with ongoing antibiotic needs until 1/21/2022  He presented to the emergency room approximately 2 days after discharge with increasing pain in his back.  Felt like it wrapped around his thorax.  Described as episodic and spasmodic but severe.  No fever.  His legs were feeling like they were still strong.  Denied tingling or numbness.  He had a urinary catheter placed on his last admission given urinary retention.  He was seen in consultation by neurosurgery in the emergency room.  White count was normal.  CRP trending down at 34.       MSSA epidural abscess  Back pain and muscle spasm  MRI of cervical thoracic and lumbar spines done in ER, overall improvement although persistent dorsal  epidural phlegmon/abscess, 7 mm thickness at T9-10 level causing moderate to spinal cord displacement and spinal canal narrowing noted.  Patient was followed closely by neurosurgery.  No surgical intervention was required.  In addition seen by ID.  Patient was treated for pain control and muscle spasm.  Started on oxycodone, Tylenol, Robaxin.  In addition tried Lidoderm patch.  He clinically started to improve after scheduled medications for spasm control.  His PICC line required replacement as the initial 1 placed became clotted after arriving to the floor from the emergency room.  This was removed and replaced.  On discharge patient was getting up and moving around quite well independently.  His medications are scheduled about every 6 hours.  After discharge he would try to taper to as needed's as able.  Most effective was 5 mg oxycodone, Tylenol and Robaxin.  Discussion with neurosurgery and okay to use as needed Motrin.  He did have a follow-up MRI scheduled for the week after discharge.  Follow-up with neurosurgery as prior.  Patient discharged home     Urinary retention  Patient had recurrent urinary retention and was discharged with Dacosta catheter. Flomax restarted .  He had been trying to get a trial void as an outpatient but this was difficult to schedule.  He was seen by urology in the hospital.  Remove the Dacosta per recommendations of urology to try void trial.. 1/2 void trial with 1200 cc residual>> replaced Dacosta will need to follow-up with urology after discharge.  Patient will follow up with urology in the next 3 to 4 weeks likely with a cystoscopy and voiding evaluation     Bilateral leg edema  Ultrasound was done and DVT was ruled out.  He had mild edema in his legs.  He was given diuretics and the edema improved.  1/2/2023 venous Dopplers negative.  Very little edema on the day of discharge     Mild anemia  Due to surgical blood loss and ongoing illness. Hemoglobin stable at baseline now,  Hemoglobin 9.6 and stable     Hypertension: Patient was not discharged on any blood pressure meds last admission.  Blood pressure slightly high on prior hospital stay.  During his hospital stay his blood pressure was much more elevated and at times had a diastolic running 110.  He was initially started on low-dose Norvasc which was titrated to 5 mg.  Also had the addition of metoprolol to 25 mg p.o. twice daily.  Given diuretics to improve his lower extremity edema and fluid retention likely from underlying nafcillin.  With Norvasc 5 mg, metoprolol 25 mg twice daily and diuresis patient had significant improvement in his blood pressure.  Further titration as an outpatient.  Patient was established with a primary on the day after discharged on 1/6 at a previously scheduled appointment.  He was told to keep this to establish care with a primary care physician.  Blood pressure control much improved.  Avoidance to over treat his blood pressure given pain and spasm likely contributing to increasing blood pressures.  Instructed if his blood pressure was running less than 120 that he should stop his Norvasc first and update his clinic on concerns regarding his medications     Hypokalemia: Replaced  per protocol          Consultations This Hospital Stay   NEUROSURGERY IP CONSULT  INFECTIOUS DISEASES IP CONSULT  CARE MANAGEMENT / SOCIAL WORK IP CONSULT  PHYSICAL THERAPY ADULT IP CONSULT  VASCULAR ACCESS ADULT IP CONSULT  VASCULAR ACCESS ADULT IP CONSULT  VASCULAR ACCESS ADULT IP CONSULT  UROLOGY IP CONSULT  PHYSICAL THERAPY ADULT IP CONSULT  NEPHROLOGY IP CONSULT  NEPHROLOGY IP CONSULT    Code Status   Prior    Time Spent on this Encounter   I, HOWARD NAGEL MD, personally saw the patient today and spent greater than 30 minutes discharging this patient.       HOWARD NAGEL MD  [facility] ORTHOPEDICS SPINE  [address] [address]  Phone: [phone]  Fax:  [phone]  ______________________________________________________________________    Physical Exam   Temp: 98.4  F (36.9  C) Temp src: Oral BP: (!) 147/95 Pulse: 71   Resp: 16 SpO2: 97 %      Vitals:    01/04/23 0934   Weight: 85.6 kg (188 lb 11.2 oz)     Constitutional: Appears stated age, no acute distress.  Respiratory: Breath sounds CTA. No increased work of breathing.  Cardiovascular: RRR, no rub or murmur. No peripheral edema.  GI: Soft, non-tender, non-distended.  Skin: Warm, dry, no rashes or lesions.  Other: No significant lower extremity edema.         Primary Care Physician   Alfredo Fink    Discharge Orders      MR Thoracic Spine w/o & w Contrast     Follow-up and recommended labs and tests     Please follow up at [facility] in 1 week with repeat MRI.   You may call the clinic with any scheduling questions or concerns.    [facility]  Tel [phone]  Fax [phone]     Follow Up ([facility])    Follow up with urology in 3-4 weeks for cystoscopy, AI, and possible voiding trial.  Our office will call you to schedule an appointment.  If you do not hear from us within 3 business days of hospital discharge, please call [phone] to schedule an appointment.    [facility]  [address] [place] [address]  You may call [phone] with any questions or concerns.     Reason for your hospital stay    Back pain and spasm     Follow-up and recommended labs and tests     Follow up BMP, hgb next week     Activity    Your activity upon discharge:  per neurosurgery     Brief Discharge Instructions    You have the option to take 500 mg - 73260 mg of tylenol every 6 hours. If pain is controlled on 500 mg then would try this first. Try to max at 3000 mg tylenol daily if possible. (Absolute max of 4000 mg)     Brief Discharge Instructions    If your top BP number is 120 or less when you are to take your norvasc then would  hold and take metoprolol only. Monitor your pulse and if < 50 on metoprolol contact your provider     Resume Home Care Services    Resume home infusion care     Diet    Follow this diet upon discharge: Orders Placed This Encounter      Regular Diet Adult       Significant Results and Procedures   Most Recent 3 CBC's:Recent Labs   Lab Test 01/05/23  0617 01/01/23  0955 12/31/22  1129 12/26/22  0701 12/25/22  0650   WBC  --  5.6 4.3  --  7.9   HGB  --  9.6* 9.6* 9.7* 9.5*   MCV  --  101* 99  --  101*    388 424  --  399     Most Recent 3 BMP's:Recent Labs   Lab Test 01/05/23  0617 01/04/23  0935 01/03/23  0629 01/02/23  0751 01/01/23  0955 12/31/22  2107 12/31/22  2046 12/31/22  1129   NA  --   --  139  --  138  --   --  140   POTASSIUM 3.6 3.6 3.8  3.8   < > 3.1*  --   --  3.4   CHLORIDE  --   --  109  --  104  --   --  107   CO2  --   --  24  --  28  --   --  29   BUN  --   --  9  --  8  --   --  9   CR 1.04  --  1.01  --  1.01  --   --  1.05   ANIONGAP  --   --  6  --  6  --   --  4   LINA  --   --  8.8  --  8.6  --   --  9.1   GLC  --   --  96  --  86 102*   < > 142*    < > = values in this interval not displayed.     Most Recent 2 LFT's:Recent Labs   Lab Test 01/01/23  0955 12/31/22  1129   AST 28 30   ALT 30 30   ALKPHOS 57 61   BILITOTAL 1.0 1.3   ,   Results for orders placed or performed during the hospital encounter of 12/31/22   Lumbar spine MRI w & w/o contrast - surgery <10yrs    Narrative    EXAM: MR LUMBAR SPINE WITHOUT AND WITH CONTRAST, MR CERVICAL SPINE WITHOUT AND WITH CONTRAST, MR THORACIC SPINE WITHOUT AND WITH CONTRAST  LOCATION: [facility]  DATE/TIME: 12/31/2022, 2:46 PM    INDICATION: Epidural abscess, increasing thoracic back pain, muscle spasms, evaluate for progression.  COMPARISON: Cervical and lumbar spine MRI 12/23/2022, thoracic spine MRI 12/22/2022.  CONTRAST: 9 mL Gadavist.  TECHNIQUE: Routine cervical, thoracic and lumbar Spine MRI without and with IV  contrast.    FINDINGS:     MRI CERVICAL SPINE: Markedly diminished dorsal epidural fluid collection in the mid to lower cervical and upper visualized thoracic spinal canal. The cervical component of the fluid has nearly completely resolved with some residual thin dural enhancement   extending from C4 into the upper thoracic spine. No cervical marrow or soft tissue edema. Persistent moderate to severe degenerative spinal canal narrowing at C5-C6 slightly improved due to the diminished volume of the dorsal epidural fluid collection.   More mild degenerative spinal canal narrowing at C6-C7 also slightly improved. Cervical canal elsewhere appears adequate. No cervical spinal cord signal abnormality.    MRI THORACIC SPINE: Unchanged vertebral body alignment and height loss with edematous compression fracture deformities affecting both T7 and the superior T12 endplates. Edema associated with the left T9-T10 facet joint appears similar to slightly   increased. Less pronounced but also probably slightly increased edema associated with the right T9-T10 facet joint. The extensive dorsal epidural collection is markedly diminished superiorly where there is mild residual dural thickening and enhancement   extending from the cervicothoracic junction to the T5 level. There is a persistent dorsal epidural collection below this that between T6 and T8 is diminished but not resolved. Between T8 and T10-T11, it is similar in volume measuring 7 mm in AP thickness   at the T9-T10 level and causing moderate spinal canal stenosis and thecal sac effacement without spinal cord signal abnormality. Below the T10-T11 level, the dorsal epidural collection is considerably diminished. Left-sided pleural fluid collection is   similar to prior.    MRI LUMBAR SPINE:  Nomenclature is based on five lumbar-type vertebral bodies. Unchanged Schmorl's node in the superior L4 endplate. Otherwise, normal lumbar vertebral body heights. Normal alignment. L3  vertebral body hemangioma. Left L1 laminectomy. Peripherally enhancing   fluid within the laminectomy defect and subcutaneous tissues along the operative tract are stable. Dorsal epidural enhancement extends from the thoracolumbar junction to the lower L2 level, unchanged in extent versus prior. A slender dorsal epidural   fluid collection/abscess centered at the lower L1 level is slightly diminished in volume.       Impression    IMPRESSION:    MRI CERVICAL SPINE:  1.  Essentially resolved dorsal epidural fluid collection within the mid to lower cervical spinal canal with mild residual dural thickening/enhancement.  2.  No marrow or soft tissue edema in the cervical spine. Normal cervical spinal cord.  3.  Improved spinal canal stenosis at C5-C6 and C6-C7 due to the diminished dorsal epidural fluid collection with residual degenerative spinal canal narrowing.    MRI THORACIC SPINE:  1.  Dorsal epidural collection in the upper and lower thoracic spine is improved versus 12/22/2022.  2.  In the midthoracic spine, there is a persistent dorsal epidural phlegmon/abscess that continues to measure 7 mm in thickness at the T9-T10 level and cause moderate spinal cord displacement and spinal canal narrowing. No spinal cord signal   abnormality.  3.  Slightly progressed edema associated with the left greater than right T9-T10 facet joints.    MRI LUMBAR SPINE:  1.  Dorsal epidural abscess centered in the lower L1 level is slightly diminished in volume.  2.  Peripherally enhancing fluid collections within the left L1 laminectomy defect and overlying surgical tract are stable.  3.  No definite progressive infection in the lumbar spine.     MR Thoracic Spine w/o & w Contrast    Narrative    EXAM: MR LUMBAR SPINE WITHOUT AND WITH CONTRAST, MR CERVICAL SPINE WITHOUT AND WITH CONTRAST, MR THORACIC SPINE WITHOUT AND WITH CONTRAST  LOCATION: [facility]  DATE/TIME: 12/31/2022, 2:46 PM    INDICATION: Epidural  abscess, increasing thoracic back pain, muscle spasms, evaluate for progression.  COMPARISON: Cervical and lumbar spine MRI 12/23/2022, thoracic spine MRI 12/22/2022.  CONTRAST: 9 mL Gadavist.  TECHNIQUE: Routine cervical, thoracic and lumbar Spine MRI without and with IV contrast.    FINDINGS:     MRI CERVICAL SPINE: Markedly diminished dorsal epidural fluid collection in the mid to lower cervical and upper visualized thoracic spinal canal. The cervical component of the fluid has nearly completely resolved with some residual thin dural enhancement   extending from C4 into the upper thoracic spine. No cervical marrow or soft tissue edema. Persistent moderate to severe degenerative spinal canal narrowing at C5-C6 slightly improved due to the diminished volume of the dorsal epidural fluid collection.   More mild degenerative spinal canal narrowing at C6-C7 also slightly improved. Cervical canal elsewhere appears adequate. No cervical spinal cord signal abnormality.    MRI THORACIC SPINE: Unchanged vertebral body alignment and height loss with edematous compression fracture deformities affecting both T7 and the superior T12 endplates. Edema associated with the left T9-T10 facet joint appears similar to slightly   increased. Less pronounced but also probably slightly increased edema associated with the right T9-T10 facet joint. The extensive dorsal epidural collection is markedly diminished superiorly where there is mild residual dural thickening and enhancement   extending from the cervicothoracic junction to the T5 level. There is a persistent dorsal epidural collection below this that between T6 and T8 is diminished but not resolved. Between T8 and T10-T11, it is similar in volume measuring 7 mm in AP thickness   at the T9-T10 level and causing moderate spinal canal stenosis and thecal sac effacement without spinal cord signal abnormality. Below the T10-T11 level, the dorsal epidural collection is considerably  diminished. Left-sided pleural fluid collection is   similar to prior.    MRI LUMBAR SPINE:  Nomenclature is based on five lumbar-type vertebral bodies. Unchanged Schmorl's node in the superior L4 endplate. Otherwise, normal lumbar vertebral body heights. Normal alignment. L3 vertebral body hemangioma. Left L1 laminectomy. Peripherally enhancing   fluid within the laminectomy defect and subcutaneous tissues along the operative tract are stable. Dorsal epidural enhancement extends from the thoracolumbar junction to the lower L2 level, unchanged in extent versus prior. A slender dorsal epidural   fluid collection/abscess centered at the lower L1 level is slightly diminished in volume.       Impression    IMPRESSION:    MRI CERVICAL SPINE:  1.  Essentially resolved dorsal epidural fluid collection within the mid to lower cervical spinal canal with mild residual dural thickening/enhancement.  2.  No marrow or soft tissue edema in the cervical spine. Normal cervical spinal cord.  3.  Improved spinal canal stenosis at C5-C6 and C6-C7 due to the diminished dorsal epidural fluid collection with residual degenerative spinal canal narrowing.    MRI THORACIC SPINE:  1.  Dorsal epidural collection in the upper and lower thoracic spine is improved versus 12/22/2022.  2.  In the midthoracic spine, there is a persistent dorsal epidural phlegmon/abscess that continues to measure 7 mm in thickness at the T9-T10 level and cause moderate spinal cord displacement and spinal canal narrowing. No spinal cord signal   abnormality.  3.  Slightly progressed edema associated with the left greater than right T9-T10 facet joints.    MRI LUMBAR SPINE:  1.  Dorsal epidural abscess centered in the lower L1 level is slightly diminished in volume.  2.  Peripherally enhancing fluid collections within the left L1 laminectomy defect and overlying surgical tract are stable.  3.  No definite progressive infection in the lumbar spine.     MR Cervical  Spine w/o & w Contrast    Narrative    EXAM: MR LUMBAR SPINE WITHOUT AND WITH CONTRAST, MR CERVICAL SPINE WITHOUT AND WITH CONTRAST, MR THORACIC SPINE WITHOUT AND WITH CONTRAST  LOCATION: [facility]  DATE/TIME: 12/31/2022, 2:46 PM    INDICATION: Epidural abscess, increasing thoracic back pain, muscle spasms, evaluate for progression.  COMPARISON: Cervical and lumbar spine MRI 12/23/2022, thoracic spine MRI 12/22/2022.  CONTRAST: 9 mL Gadavist.  TECHNIQUE: Routine cervical, thoracic and lumbar Spine MRI without and with IV contrast.    FINDINGS:     MRI CERVICAL SPINE: Markedly diminished dorsal epidural fluid collection in the mid to lower cervical and upper visualized thoracic spinal canal. The cervical component of the fluid has nearly completely resolved with some residual thin dural enhancement   extending from C4 into the upper thoracic spine. No cervical marrow or soft tissue edema. Persistent moderate to severe degenerative spinal canal narrowing at C5-C6 slightly improved due to the diminished volume of the dorsal epidural fluid collection.   More mild degenerative spinal canal narrowing at C6-C7 also slightly improved. Cervical canal elsewhere appears adequate. No cervical spinal cord signal abnormality.    MRI THORACIC SPINE: Unchanged vertebral body alignment and height loss with edematous compression fracture deformities affecting both T7 and the superior T12 endplates. Edema associated with the left T9-T10 facet joint appears similar to slightly   increased. Less pronounced but also probably slightly increased edema associated with the right T9-T10 facet joint. The extensive dorsal epidural collection is markedly diminished superiorly where there is mild residual dural thickening and enhancement   extending from the cervicothoracic junction to the T5 level. There is a persistent dorsal epidural collection below this that between T6 and T8 is diminished but not resolved. Between  T8 and T10-T11, it is similar in volume measuring 7 mm in AP thickness   at the T9-T10 level and causing moderate spinal canal stenosis and thecal sac effacement without spinal cord signal abnormality. Below the T10-T11 level, the dorsal epidural collection is considerably diminished. Left-sided pleural fluid collection is   similar to prior.    MRI LUMBAR SPINE:  Nomenclature is based on five lumbar-type vertebral bodies. Unchanged Schmorl's node in the superior L4 endplate. Otherwise, normal lumbar vertebral body heights. Normal alignment. L3 vertebral body hemangioma. Left L1 laminectomy. Peripherally enhancing   fluid within the laminectomy defect and subcutaneous tissues along the operative tract are stable. Dorsal epidural enhancement extends from the thoracolumbar junction to the lower L2 level, unchanged in extent versus prior. A slender dorsal epidural   fluid collection/abscess centered at the lower L1 level is slightly diminished in volume.       Impression    IMPRESSION:    MRI CERVICAL SPINE:  1.  Essentially resolved dorsal epidural fluid collection within the mid to lower cervical spinal canal with mild residual dural thickening/enhancement.  2.  No marrow or soft tissue edema in the cervical spine. Normal cervical spinal cord.  3.  Improved spinal canal stenosis at C5-C6 and C6-C7 due to the diminished dorsal epidural fluid collection with residual degenerative spinal canal narrowing.    MRI THORACIC SPINE:  1.  Dorsal epidural collection in the upper and lower thoracic spine is improved versus 12/22/2022.  2.  In the midthoracic spine, there is a persistent dorsal epidural phlegmon/abscess that continues to measure 7 mm in thickness at the T9-T10 level and cause moderate spinal cord displacement and spinal canal narrowing. No spinal cord signal   abnormality.  3.  Slightly progressed edema associated with the left greater than right T9-T10 facet joints.    MRI LUMBAR SPINE:  1.  Dorsal epidural  abscess centered in the lower L1 level is slightly diminished in volume.  2.  Peripherally enhancing fluid collections within the left L1 laminectomy defect and overlying surgical tract are stable.  3.  No definite progressive infection in the lumbar spine.     IR PICC Placement > 5 Yrs of Age    Narrative    [facility]  LOCATION: [facility]  DATE: 1/2/2023    PROCEDURE: PERIPHERALLY INSERTED CENTRAL CATHETER (PICC) PLACEMENT    INTERVENTIONAL RADIOLOGIST: Sean Benson MD.    INDICATION: 57-year-old male with bacteremia in need of long-term  antibiotic therapy.    CONSENT: The risks, benefits and alternatives of peripherally inserted  central catheter placement were discussed with the patient  in detail.  All questions were answered. Informed consent was given to proceed  with the procedure.    MODERATE SEDATION: None.    CONTRAST: None.  ANTIBIOTICS: None.  ADDITIONAL MEDICATIONS: None.    FLUOROSCOPIC TIME: 0.9 minutes.  RADIATION DOSE: Air Kerma: 2 mGy.    COMPLICATIONS: No immediate complications.    STERILE BARRIER TECHNIQUE: Maximum sterile barrier technique was used.  Cutaneous antisepsis was performed at the operative site with  application of 2% chlorhexidine and large sterile drape. Prior to the  procedure, the  and assistant performed hand hygiene and wore  hat, mask, sterile gown, and sterile gloves during the entire  procedure.    PROCEDURE:     Using real-time ultrasound guidance, the right upper arm brachial vein  was punctured. A wire was manipulated centrally into the right atrium.      Over wire exchange was then made for a 4F, 44 cm single lumen  peripherally inserted central catheter which was advanced under  fluoroscopic guidance until the tip was at the cavoatrial junction. A  fluoroscopic image was obtained.    FINDINGS:  Ultrasound shows an anechoic and compressible right upper arm brachial  vein. A permanent image was stored.      The completion fluoroscopic images  show the catheter tip to lie near  the cavoatrial junction.      Impression    IMPRESSION:    1.  Peripherally inserted central catheter (PICC) placement, as  discussed above.  2.  The catheter position was verified fluoroscopically and this is  ready for use.    ____________________________________________________________________    CPT codes for physician reference only:  89642        CARLA RAYA MD         SYSTEM ID:  [id number]   US Lower Extremity Venous Duplex Bilateral    Narrative    ULTRASOUND VENOUS LOWER EXTREMITY BILATERAL   1/2/2023 1:23 PM     HISTORY: Bilateral lower extremity pain, swelling and edema.    COMPARISON: None.    TECHNIQUE: Ultrasound gray scale, Color Doppler flow, and spectral  Doppler waveform analysis performed.    FINDINGS:   The bilateral common femoral, superficial femoral, popliteal and  segmentally visualized calf veins are patent, fully compressible and  demonstrate antegrade Doppler flow. The visualized cephalad great  saphenous veins are negative for thrombus.     Within the left popliteal fossa is a hypoechoic avascular fluid  collection measuring 4.5 x 1.6 x 3.1 cm. The appearance is typical for  a Baker's cyst.      Impression    IMPRESSION:   1.  The bilateral lower extremities are negative for deep venous  thrombosis.  2.  Left popliteal fossa Baker's cyst    CARLA RAYA MD         SYSTEM ID:  [id number]   CT Abdomen Pelvis w/o Contrast    Narrative    EXAM: CT ABDOMEN PELVIS W/O CONTRAST  LOCATION: [facility]  DATE/TIME: 1/3/2023 10:16 PM    INDICATION: Upper abdominal and back pain.  COMPARISON: CT 12/18/2022  TECHNIQUE: CT scan of the abdomen and pelvis was performed without IV contrast. Multiplanar reformats were obtained. Dose reduction techniques were used.  CONTRAST: None.    FINDINGS:   LOWER CHEST: There is a mild left pleural effusion with associated mild atelectasis with no central airway obstruction in the left lower lobe. There is  minimal dependent atelectasis seen in the right lower lobe.    HEPATOBILIARY: The gallbladder is mildly contracted, most typical for nonfasting status of the patient given the appearance of the stomach. The liver and bile ducts are normal.    PANCREAS: Normal.    SPLEEN: Normal.    ADRENAL GLANDS: Normal.    KIDNEYS/BLADDER: There is a Dacosta catheter in the bladder with an associated mild amount of gas seen in the bladder. There is mild circumferential thickening of the bladder wall which can be seen with chronic bilateral outlet obstruction or possibly   cystitis, although there is no significant stranding of the adjacent fat to suggest cystitis. The kidneys, ureters, and bladder are otherwise normal.    BOWEL: Normal to include the appendix.    LYMPH NODES: Normal.    VASCULATURE: Minimal calcification with no aneurysm.    PELVIC ORGANS: Minimal prostatic gland enlargement.    MUSCULOSKELETAL: Minute fatty umbilical hernia. Scattered benign Schmorl's nodes. Mild to moderate scattered hypertrophic changes seen in the spine.      Impression    IMPRESSION:   1.  Since 12/18/2022, the prior seen nonspecific fluid in the left retroperitoneum has resolved, the mild to moderate-sized left pleural effusion with associated atelectasis has significantly increased, otherwise there have been no other significant   changes. There are numerous nonacute incidental findings, please refer to the above report for details.    2.  There is a Dacosta catheter in the bladder with mild circumferential thickening of the bladder wall most likely in the base of chronic bladder outlet obstruction rather than cystitis.           Discharge Medications   Discharge Medication List as of 1/5/2023  3:09 PM      START taking these medications    Details   amLODIPine (NORVASC) 5 MG tablet Take 1 tablet (5 mg) by mouth daily, Disp-30 tablet, R-0, E-Prescribe      ibuprofen (ADVIL/MOTRIN) 200 MG tablet Take 1 tablet (200 mg) by mouth every 6 hours as  needed for inflammatory pain, R-0, No Print Out      methocarbamol (ROBAXIN) 750 MG tablet Take 1 tablet (750 mg) by mouth 4 times daily, Disp-40 tablet, R-0, E-Prescribe      metoprolol tartrate (LOPRESSOR) 25 MG tablet Take 1 tablet (25 mg) by mouth 2 times daily, Disp-60 tablet, R-0, E-Prescribe         CONTINUE these medications which have CHANGED    Details   acetaminophen (TYLENOL) 500 MG tablet Take 1-2 tablets (500-1,000 mg) by mouth every 6 hours as needed for mild pain or pain, R-0, No Print Out      oxyCODONE (ROXICODONE) 5 MG tablet Take 1 tablet (5 mg) by mouth every 6 hours as needed for severe pain (7-10), Disp-15 tablet, R-0, E-Prescribe         CONTINUE these medications which have NOT CHANGED    Details   fish oil-omega-3 fatty acids 1000 MG capsule Take 2 capsules by mouth daily., Disp-120 capsule, R-3, Historical      MULTIVITAMIN TABS   OR Take 2 tablets by mouth daily, Historical      nafcillin 2 GM vial Inject 2 g into the vein every 4 hours for 38 days, Disp-1824 mL, R-0, TransitionalCheck weekly CBC/diff, creatinine, AST, CRP -results to Dr Valadez @ [facility] consultants      tamsulosin (FLOMAX) 0.4 MG capsule Take 1 capsule (0.4 mg) by mouth daily Future refills if needed directed to primary clinic provider or Urology Clinic, Disp-30 capsule, R-0, E-Prescribe      senna-docusate (SENOKOT-S/PERICOLACE) 8.6-50 MG tablet Take 1 tablet by mouth 2 times daily as needed for constipation, Disp-60 tablet, R-0, E-Prescribe         STOP taking these medications       Lidocaine (LIDOCARE) 4 % Patch Comments:   Reason for Stopping:             Allergies   Allergies   Allergen Reactions     Walnuts [Nuts]

## 2023-01-06 NOTE — PATIENT INSTRUCTIONS
MRI next week Can 10.  Then follow up with Dr. Holcomb on 1/12.    Urology at end of the month to discuss removal of gamez.     To meet virtually with Dr. Butler to discuss duration of the Nafcillin.     Continue for now with as needed oxycodone 10 mg four times daily. If given #40, this should last 5 days.  Let me know when you need more.     We want to keep going at minimum effective dose. Let me know if you think you can do 1 tab at a time.    Follow up in 4-6 weeks with me for follow up appointment and rechecking labs.    If you blood pressure lowers itself to 120-130, can come off the amlodipine.  Alfredo Fink MD  Internal Medicine and Pediatrics

## 2023-01-06 NOTE — PROGRESS NOTES
[facility]    Background: Transitional Care Management program identified per system criteria and reviewed by [facility] team for possible outreach.    Assessment: Upon chart review, [facility] Team member will not proceed with patient outreach related to this episode of Transitional Care Management program due to reason below:    Patient has a follow up appointment with an appropriate provider today for hospital discharge    Plan: Transitional Care Management episode addressed appropriately per reason noted above.      Ade Beckett MA  [facility]    *Connected Care Resource Team does NOT follow patient ongoing. Referrals are identified based on internal discharge reports and the outreach is to ensure patient has an understanding of their discharge instructions.

## 2023-01-06 NOTE — PROGRESS NOTES
Assessment & Plan     Spinal cord compression (H)  Symptoms slowly improving.     Benign essential hypertension  Not at goal, but better.  Will likely be able to taper once infection and pain are improved.   - amLODIPine (NORVASC) 5 MG tablet; Take 1 tablet (5 mg) by mouth daily    Spinal epidural abscess  S/p drainage.  Ongoing nafcillin; duration determined by ID.  No signs or symptoms of ongoing infection.     Indwelling urinary catheter present  To follow up with urology later this month for removal.     Pain:  On 10 mg four times daily for now.  To call us next week with his supply needs; will try to cut to 5 mg four times daily.              MED REC REQUIRED  Post Medication Reconciliation Status:       BMI:   Estimated body mass index is 25.59 kg/m  as calculated from the following:    Height as of 12/18/22: 1.829 m (6').    Weight as of 1/4/23: 85.6 kg (188 lb 11.2 oz).       Patient Instructions   MRI next week Can 10.  Then follow up with Dr. Holcomb on 1/12.    Urology at end of the month to discuss removal of gamez.     To meet virtually with Dr. Butler to discuss duration of the Nafcillin.     Continue for now with as needed oxycodone 10 mg four times daily. If given #40, this should last 5 days.  Let me know when you need more.     We want to keep going at minimum effective dose. Let me know if you think you can do 1 tab at a time.    Follow up in 4-6 weeks with me for follow up appointment and rechecking labs.    If you blood pressure lowers itself to 120-130, can come off the amlodipine.  Alfredo Fink MD  Internal Medicine and Pediatrics             Return in about 2 months (around 3/6/2023) for with me, medicine followup, in person.    Alfredo Fink MD  [facility] CECILY Sosa is a 57 year old accompanied by his Wife, presenting for the following health issues:  Hospital F/U      History of Present Illness       Back Pain:  He presents for follow up of back pain. Patient's  back pain is a new problem.    Original cause of back pain: other  First noticed back pain: 1-4 weeks ago  Patient feels back pain: constantlyLocation of back pain:  Right lower back and left lower back  Description of back pain: cramping and shooting  Back pain spreads: nowhere    Since patient first noticed back pain, pain is: always present, but gets better and worse  Does back pain interfere with his job:  Yes  On a scale of 1-10 (10 being the worst), patient describes pain as:  5  What makes back pain worse: bending, coughing, certain positions and twisting  Acupuncture: not tried  Acetaminophen: helpful  Activity or exercise: helpful  Chiropractor:  Not tried  Cold: not helpful  Heat: not helpful  Massage: not tried  Muscle relaxants: not helpful  NSAIDS: not helpful  Opioids: helpful  Physical Therapy: helpful  Rest: helpful  Steroid Injection: not tried  Stretching: helpful  Surgery: helpful  TENS unit: not tried  Topical pain relievers: not helpful  Other healthcare providers patient is seeing for back pain: Other    He eats 0-1 servings of fruits and vegetables daily.He consumes 0 sweetened beverage(s) daily.He exercises with enough effort to increase his heart rate 10 to 19 minutes per day.  He exercises with enough effort to increase his heart rate 4 days per week.   He is taking medications regularly.     Admitted for epidural abscess.  On nafcillin now.    On oxycodone; 10 mg, 1000 tylenol every 6 hours. For last 1 week.    Had been on 5 mg, but increased to 10 mg.  And also added robaxin.      Mobility:  Is improved.  Can walk, slowly, but spasms in ribcage to hip bone.      Has indwelling Dacosta since initial surgery; follow up end of this month.      Swelling in legs, but no DVT.  Improved.     Hemoglobin has been low at 9.6    Hospital Follow-up Visit:    Hospital/Nursing Home/IP Rehab Facility: [facility]  Date of Admission: 12/31/2022  Date of Discharge: 1/5/23  Reason(s)  for Admission:   1. Back spasms/cramps   2. MSSA epidural abscess  3. Urinary retention with indwelling gamez  4. Hypertension   5. Mild anemia   6. Hypokalemia          Discharge Disposition      Discharged to home  Condition at discharge: Stable           Hospital Course     Ian Moncada is a 57 year old male recently admitted for MSSA epidural abscess and spinal cord compression, leg edema, urinary retention presented to the ER due to increased back pain.   Patient had been hospitalized from 12/18 through 12/27 for an epidural fluid collection extending from his upper thoracic spine down to the mid lumbar spine resulting in spinal cord compression from T12-S1.  Neurosurgery evaluated the patient and he underwent a thoracic lumbar decompression for evacuation of abscess.  Cultures grew MSSA.  ID was consulted.  He was ultimately discharged on nafcillin with a PICC line.  He was able to discharge home with ongoing antibiotic needs until 1/21/2022  He presented to the emergency room approximately 2 days after discharge with increasing pain in his back.  Felt like it wrapped around his thorax.  Described as episodic and spasmodic but severe.  No fever.  His legs were feeling like they were still strong.  Denied tingling or numbness.  He had a urinary catheter placed on his last admission given urinary retention.  He was seen in consultation by neurosurgery in the emergency room.  White count was normal.  CRP trending down at 34.        MSSA epidural abscess  Back pain and muscle spasm  MRI of cervical thoracic and lumbar spines done in ER, overall improvement although persistent dorsal epidural phlegmon/abscess, 7 mm thickness at T9-10 level causing moderate to spinal cord displacement and spinal canal narrowing noted.  Patient was followed closely by neurosurgery.  No surgical intervention was required.  In addition seen by ID.  Patient was treated for pain control and muscle spasm.  Started on oxycodone, Tylenol,  Robaxin.  In addition tried Lidoderm patch.  He clinically started to improve after scheduled medications for spasm control.  His PICC line required replacement as the initial 1 placed became clotted after arriving to the floor from the emergency room.  This was removed and replaced.  On discharge patient was getting up and moving around quite well independently.  His medications are scheduled about every 6 hours.  After discharge he would try to taper to as needed's as able.  Most effective was 5 mg oxycodone, Tylenol and Robaxin.  Discussion with neurosurgery and alonso to use as needed Motrin.  He did have a follow-up MRI scheduled for the week after discharge.  Follow-up with neurosurgery as prior.  Patient discharged home     Urinary retention  Patient had recurrent urinary retention and was discharged with Dacosta catheter. Flomax restarted .  He had been trying to get a trial void as an outpatient but this was difficult to schedule.  He was seen by urology in the hospital.  Remove the Dacosta per recommendations of urology to try void trial.. 1/2 void trial with 1200 cc residual>> replaced Dacosta will need to follow-up with urology after discharge.  Patient will follow up with urology in the next 3 to 4 weeks likely with a cystoscopy and voiding evaluation     Bilateral leg edema  Ultrasound was done and DVT was ruled out.  He had mild edema in his legs.  He was given diuretics and the edema improved.  1/2/2023 venous Dopplers negative.  Very little edema on the day of discharge     Mild anemia  Due to surgical blood loss and ongoing illness. Hemoglobin stable at baseline now, Hemoglobin 9.6 and stable     Hypertension: Patient was not discharged on any blood pressure meds last admission.  Blood pressure slightly high on prior hospital stay.  During his hospital stay his blood pressure was much more elevated and at times had a diastolic running 110.  He was initially started on low-dose Norvasc which was titrated to  5 mg.  Also had the addition of metoprolol to 25 mg p.o. twice daily.  Given diuretics to improve his lower extremity edema and fluid retention likely from underlying nafcillin.  With Norvasc 5 mg, metoprolol 25 mg twice daily and diuresis patient had significant improvement in his blood pressure.  Further titration as an outpatient.  Patient was established with a primary on the day after discharged on 1/6 at a previously scheduled appointment.  He was told to keep this to establish care with a primary care physician.  Blood pressure control much improved.  Avoidance to over treat his blood pressure given pain and spasm likely contributing to increasing blood pressures.  Instructed if his blood pressure was running less than 120 that he should stop his Norvasc first and update his clinic on concerns regarding his medications     Hypokalemia: Replaced  per protocol       Was your hospitalization related to COVID-19? No   Problems taking medications regularly:  None  Medication changes since discharge: None  Problems adhering to non-medication therapy:  None    Summary of hospitalization:  [facility] discharge summary reviewed  Diagnostic Tests/Treatments reviewed.  Follow up needed: none  Other Healthcare Providers Involved in Patient s Care:         None  Update since discharge: improved.         Plan of care communicated with patient               Review of Systems   CONSTITUTIONAL: NEGATIVE for fever, chills, change in weight  INTEGUMENTARY/SKIN: NEGATIVE for worrisome rashes, moles or lesions  EYES: NEGATIVE for vision changes or irritation  ENT/MOUTH: NEGATIVE for ear, mouth and throat problems  RESP: NEGATIVE for significant cough or SOB  BREAST: NEGATIVE for masses, tenderness or discharge  CV: NEGATIVE for chest pain, palpitations or peripheral edema  GI: NEGATIVE for nausea, abdominal pain, heartburn, or change in bowel habits  : NEGATIVE for frequency, dysuria, or  hematuria  MUSCULOSKELETAL: NEGATIVE for significant arthralgias or myalgia  NEURO: NEGATIVE for weakness, dizziness or paresthesias  ENDOCRINE: NEGATIVE for temperature intolerance, skin/hair changes  HEME: NEGATIVE for bleeding problems  PSYCHIATRIC: NEGATIVE for changes in mood or affect      Objective    BP (!) 144/92   Pulse 58   Temp 98.2  F (36.8  C) (Tympanic)   Resp 16   SpO2 100%   There is no height or weight on file to calculate BMI.  Physical Exam   GENERAL: healthy, alert and no distress  EYES: Eyes grossly normal to inspection, PERRL and conjunctivae and sclerae normal  HENT: ear canals and TM's normal, nose and mouth without ulcers or lesions  NECK: no adenopathy, no asymmetry, masses, or scars and thyroid normal to palpation  RESP: lungs clear to auscultation - no rales, rhonchi or wheezes  CV: regular rate and rhythm, normal S1 S2, no S3 or S4, no murmur, click or rub, no peripheral edema and peripheral pulses strong  ABDOMEN: soft, nontender, no hepatosplenomegaly, no masses and bowel sounds normal  MS: no gross musculoskeletal defects noted, no edema  SKIN: no suspicious lesions or rashes  NEURO: Normal strength and tone, mentation intact and speech normal  PSYCH: mentation appears normal, affect normal/bright  Dacosta in place.

## 2023-01-08 ENCOUNTER — MYC MEDICAL ADVICE (OUTPATIENT)
Dept: PEDIATRICS | Facility: CLINIC | Age: 58
End: 2023-01-08

## 2023-01-08 DIAGNOSIS — G95.20 SPINAL CORD COMPRESSION (H): ICD-10-CM

## 2023-01-09 RX ORDER — OXYCODONE HYDROCHLORIDE 5 MG/1
5 TABLET ORAL EVERY 6 HOURS PRN
Qty: 15 TABLET | Refills: 0 | Status: CANCELLED | OUTPATIENT
Start: 2023-01-09

## 2023-01-09 RX ORDER — OXYCODONE HYDROCHLORIDE 5 MG/1
5 TABLET ORAL EVERY 6 HOURS PRN
Qty: 28 TABLET | Refills: 0 | Status: SHIPPED | OUTPATIENT
Start: 2023-01-09 | End: 2023-02-28

## 2023-01-09 NOTE — TELEPHONE ENCOUNTER
See patient's [employer] message regarding his oxycodone   - Patient requesting a refill of his oxycodone medication to get him to his MRI and clinic visits   - Patient is scheduled for an MR Thoracic Spine tomorrow (1/10/2023)   - Patient has an upcoming visit with Dr. Fink on 2/28/2023     oxyCODONE (ROXICODONE) 5 MG tablet 15 tablet 0 1/5/2023  No   Sig - Route: Take 1 tablet (5 mg) by mouth every 6 hours as needed for severe pain (7-10) - Oral     - Pended oxycodone medication     Dr. Fink, please review, advise, and order as appropriate.     Christina HARRIS RN   Patient Advocate Liaison (PAL)  [facility]   [phone]

## 2023-01-10 ENCOUNTER — MYC REFILL (OUTPATIENT)
Dept: PEDIATRICS | Facility: CLINIC | Age: 58
End: 2023-01-10

## 2023-01-10 ENCOUNTER — TELEPHONE (OUTPATIENT)
Dept: PEDIATRICS | Facility: CLINIC | Age: 58
End: 2023-01-10

## 2023-01-10 ENCOUNTER — HOSPITAL ENCOUNTER (OUTPATIENT)
Dept: MRI IMAGING | Facility: HOSPITAL | Age: 58
Discharge: HOME | End: 2023-01-10
Attending: NURSE PRACTITIONER | Admitting: NURSE PRACTITIONER
Payer: COMMERCIAL

## 2023-01-10 ENCOUNTER — MYC MEDICAL ADVICE (OUTPATIENT)
Dept: PEDIATRICS | Facility: CLINIC | Age: 58
End: 2023-01-10

## 2023-01-10 DIAGNOSIS — G06.1 SPINAL EPIDURAL ABSCESS: ICD-10-CM

## 2023-01-10 DIAGNOSIS — G95.20 SPINAL CORD COMPRESSION (H): ICD-10-CM

## 2023-01-10 PROCEDURE — 72157 MRI CHEST SPINE W/O & W/DYE: CPT

## 2023-01-10 PROCEDURE — A9585 GADOBUTROL INJECTION: HCPCS | Performed by: NURSE PRACTITIONER

## 2023-01-10 PROCEDURE — 255N000002 HC RX 255 OP 636: Performed by: NURSE PRACTITIONER

## 2023-01-10 RX ORDER — OXYCODONE HYDROCHLORIDE 5 MG/1
5 TABLET ORAL EVERY 6 HOURS PRN
Qty: 28 TABLET | Refills: 0 | OUTPATIENT
Start: 2023-01-10

## 2023-01-10 RX ORDER — GADOBUTROL 604.72 MG/ML
8.5 INJECTION INTRAVENOUS ONCE
Status: DISCONTINUED | OUTPATIENT
Start: 2023-01-10 | End: 2023-01-10

## 2023-01-10 RX ORDER — GADOBUTROL 604.72 MG/ML
8.5 INJECTION INTRAVENOUS ONCE
Status: COMPLETED | OUTPATIENT
Start: 2023-01-10 | End: 2023-01-10

## 2023-01-10 RX ADMIN — GADOBUTROL 8.5 ML: 604.72 INJECTION INTRAVENOUS at 19:57

## 2023-01-10 NOTE — TELEPHONE ENCOUNTER
Received a call from the patient regarding his oxycodone medication   - Patient requesting a refill of his oxycodone medication   - Informed the patient that Dr. Fink sent a prescription for oxyCODONE (ROXICODONE) 5 MG tablet to the [facility] [place] - [place], [place] - [address]     oxyCODONE (ROXICODONE) 5 MG tablet 28 tablet 0 1/9/2023  No   Sig - Route: Take 1 tablet (5 mg) by mouth every 6 hours as needed for severe pain (7-10) - Oral     - Patient requesting that the address of the pharmacy be sent to him via LE[employer]   - Sent a [employer] message to the patient informing him of the [facility] address (See 1/10/2023 [facility])   - Patient verbalized understanding and agrees with the plan     Christina HARRIS RN   Patient Advocate Liaison (PAL)  [facility]   [phone]

## 2023-01-10 NOTE — TELEPHONE ENCOUNTER
Please see pt comment about requesting additional week.    Next 5 appointments (look out 90 days)      Jan 12, 2023  2:20 PM  (Arrive by 2:05 PM)  Return Visit with Marbella Sanabria MD  [facility] ([facility] ) [address]  [place] [address]  [phone]     Feb 28, 2023  4:00 PM  (Arrive by 3:40 PM)  Provider Visit with Alfredo Fink MD  [facility] ([facility] ) [address]  [place] [id number] [phone]

## 2023-01-10 NOTE — TELEPHONE ENCOUNTER
Patient called and requested the address of the [facility]   - Sent a [employer] message to the patient informing him of the [facility] ([address] [address], [facility] [address])    Christina HARRIS RN   Patient Advocate Liaison (PAL)  [facility]   [phone]

## 2023-01-10 NOTE — TELEPHONE ENCOUNTER
See patient's MyChart message   - Patient states that the pharmacy denied his refill     Called the [facility] at [phone] and spoke with [facility]   - [facility] states that the patient's oxycodone medication is filled and ready to be picked up     Called the patient at [phone]   - Informed the patient that his oxycodone medication is filled and ready to be picked up from the [facility]   - Patient verbalized understanding and agrees with the plan     Christina HARRIS RN   Patient Advocate Liaison (PAL)  [facility]   [phone]

## 2023-01-11 ENCOUNTER — LAB REQUISITION (OUTPATIENT)
Dept: LAB | Facility: CLINIC | Age: 58
End: 2023-01-11
Payer: COMMERCIAL

## 2023-01-11 LAB
AST SERPL W P-5'-P-CCNC: 25 U/L (ref 10–50)
BASOPHILS # BLD AUTO: 0 10E3/UL (ref 0–0.2)
BASOPHILS NFR BLD AUTO: 1 %
BUN SERPL-MCNC: 9.8 MG/DL (ref 6–20)
CREAT SERPL-MCNC: 1.05 MG/DL (ref 0.67–1.17)
CRP SERPL-MCNC: 46.31 MG/L
EOSINOPHIL # BLD AUTO: 0.1 10E3/UL (ref 0–0.7)
EOSINOPHIL NFR BLD AUTO: 3 %
ERYTHROCYTE [DISTWIDTH] IN BLOOD BY AUTOMATED COUNT: 13.8 % (ref 10–15)
GFR SERPL CREATININE-BSD FRML MDRD: 82 ML/MIN/1.73M2
HCT VFR BLD AUTO: 29.2 % (ref 40–53)
HGB BLD-MCNC: 9.6 G/DL (ref 13.3–17.7)
IMM GRANULOCYTES # BLD: 0 10E3/UL
IMM GRANULOCYTES NFR BLD: 0 %
LYMPHOCYTES # BLD AUTO: 0.6 10E3/UL (ref 0.8–5.3)
LYMPHOCYTES NFR BLD AUTO: 14 %
MCH RBC QN AUTO: 33 PG (ref 26.5–33)
MCHC RBC AUTO-ENTMCNC: 32.9 G/DL (ref 31.5–36.5)
MCV RBC AUTO: 100 FL (ref 78–100)
MONOCYTES # BLD AUTO: 0.4 10E3/UL (ref 0–1.3)
MONOCYTES NFR BLD AUTO: 9 %
NEUTROPHILS # BLD AUTO: 3.3 10E3/UL (ref 1.6–8.3)
NEUTROPHILS NFR BLD AUTO: 73 %
NRBC # BLD AUTO: 0 10E3/UL
NRBC BLD AUTO-RTO: 0 /100
PLATELET # BLD AUTO: 216 10E3/UL (ref 150–450)
RBC # BLD AUTO: 2.91 10E6/UL (ref 4.4–5.9)
WBC # BLD AUTO: 4.5 10E3/UL (ref 4–11)

## 2023-01-11 PROCEDURE — 85025 COMPLETE CBC W/AUTO DIFF WBC: CPT | Performed by: SPECIALIST

## 2023-01-11 PROCEDURE — 82565 ASSAY OF CREATININE: CPT | Performed by: SPECIALIST

## 2023-01-11 PROCEDURE — 84450 TRANSFERASE (AST) (SGOT): CPT | Performed by: SPECIALIST

## 2023-01-11 PROCEDURE — 86140 C-REACTIVE PROTEIN: CPT | Performed by: SPECIALIST

## 2023-01-11 PROCEDURE — 84520 ASSAY OF UREA NITROGEN: CPT | Performed by: SPECIALIST

## 2023-01-12 ENCOUNTER — TELEPHONE (OUTPATIENT)
Dept: PEDIATRICS | Facility: CLINIC | Age: 58
End: 2023-01-12

## 2023-01-12 ENCOUNTER — OFFICE VISIT (OUTPATIENT)
Dept: NEUROSURGERY | Facility: CLINIC | Age: 58
End: 2023-01-12
Payer: COMMERCIAL

## 2023-01-12 VITALS
OXYGEN SATURATION: 100 % | WEIGHT: 188 LBS | DIASTOLIC BLOOD PRESSURE: 86 MMHG | SYSTOLIC BLOOD PRESSURE: 163 MMHG | HEART RATE: 65 BPM | BODY MASS INDEX: 25.47 KG/M2 | HEIGHT: 72 IN

## 2023-01-12 DIAGNOSIS — G06.1 SPINAL EPIDURAL ABSCESS: Primary | ICD-10-CM

## 2023-01-12 PROCEDURE — 99024 POSTOP FOLLOW-UP VISIT: CPT | Performed by: SURGERY

## 2023-01-12 NOTE — TELEPHONE ENCOUNTER
Received a call from Dr. Mensah with [facility]   - States that she wanted to give Dr. Fink an update regarding the patient   - Transferred the call to Dr. Fink as Dr. Fink stated that he was available to speak with her at this time     Christina HARRIS RN   Patient Advocate Liaison (PAL)  [facility]   [phone]

## 2023-01-12 NOTE — PROGRESS NOTES
Neurosurgery Progress Note: 1/12/2023     A/P: postoperative Minimal thoracic laminectomy at left T12 for drainage of thoracolumbar epidural abscess by Dr. Courtney on 12/18/2022, represented to ED with worsening imaging findings       Plan:  Patient has been advised to continue with IV antibiotics throughout ID. Spoke with urology and void trail per provider to be 3-4 weeks after last seen on 1/3/2023 though he would like to have an earlier appointment. Also will plan to repeat thoracic MRI in 4 weeks once he is supposed to complete the IV antibiotic infusion on February 12th. Follow up will be telephone appointment for review of MRI.     Mr. Moncada is a pleasant 57 year old right handed male who presented to [facility] on December 18th with 2-3 day history of left lower extremity paresthesias, weakness, urinary incontinence and urinary retention of which MRI revealed large longitudinally extensive dorsal epidural fluid collection extending from T4-T5 down to L2-L3 and he underwent emergent minimally invasive T12 hemilaminectomy and evacuation of epidural abscess with Dr. Courtney. He was discharged home on 12/27 and was doing well but noted continued left leg weakness though improved. He represented to the ED with increased flank pain and back pain and was noted to have worsening epidural abscess collection but had improved neurological exam. He states that overall he is doing well. He has continued left foot weakness but is much improved when compared to his visit ED admission. He also notes slight radicular pain into his left leg of which is not nearly as severe as it was. He has edema of his left lower extremity and continues to wear a compression sock. He has a gamez in place and planned void trial on 1/25. He continues with IV antibiotics and is scheduled to do so until 2/12/2023.      Exam:  BP (!) 163/86   Pulse 65   Ht 6' (1.829 m)   Wt 188 lb (85.3 kg)   SpO2 100%   BMI 25.50 kg/m      General: alert and  oriented x3     Strength is 5/5 throughout with exception to slight dorsiflexion weakness of left 4/5    Sensation is intact throughout both upper and lower extremities    Gait is smooth and coordinated    Incision: well healed without erythema, induration, or drainage      Imaging:  MRI reviewed personally by self and Dr. Sanabria noted improvements of thoracic epidural abscess though still present with severe spinal stenosis does not extend as many levels as it once had    FINDINGS:   Anterior compression fractures of the T7 and T12 vertebra are unchanged. No progressive vertebral body height loss. No new or worsening anterior compression deformity. Unchanged edema associated with the superior endplate Schmorl's node at T9. Unchanged   multiple chronic anterior compression deformities.      Unchanged edema and enhancement within of the left T9 and T10 facets and ribs. Adjacent prevertebral soft tissue thickening and enhancement on the left is improved .      Redemonstrated is an enhancing dorsal epidural collection compatible with history of epidural abscess. Dorsal collection has decreased in size. Previously the collection extended to the T6 level. On today's exam the collection is largely centered at the   T9-T10 level extending between the T7-T8 and T10-T11 levels. Persistent severe spinal canal stenosis posterior to the T9 vertebral body, at the T9-T10 disc space, and posterior to the T10 vertebral body. Spinal canal patency at the T8-T9 level is   improved. Above and below this there is  diffuse dorsal dural thickening and enhancement extending from the cervicothoracic junction to the upper lumbar spine which is similar to prior.     A rim-enhancing fluid collection is noted in the dorsal paraspinal soft tissues of the L1 laminectomy bed on the left measuring 1.9 cm AP by 1.5 cm TV, decreased in size from the prior exam.      Small to moderate left pleural effusion noted. Small right pleural effusion.                                                                    IMPRESSION:  1.  Persistent though improved dorsal epidural collection compatible with epidural abscess. Abscess is centered at the T9-T10 level and continues to produce severe spinal canal stenosis at this level.  2.  Mild diffuse dorsal epidural thickening and enhancement throughout the visualized spine, similar to prior.  3.  Unchanged edema and enhancement of the left T9-T10 facets and ribs. Subjacent prevertebral soft tissue thickening and enhancement is improved.  4.  Rim enhancing fluid collection in the L1 laminectomy bed on the left, decreased in size.  5.  Small to moderate left pleural effusion. Small right pleural effusion.    Yulisa Frank PA-C  [facility]  O: [phone]

## 2023-01-12 NOTE — LETTER
1/12/2023         RE: Ian Moncada  [address]  [place] [address]        Dear Colleague,    Thank you for referring your patient, Ian Moncada, to the [facility]. Please see a copy of my visit note below.    Neurosurgery Progress Note: 1/12/2023     A/P: postoperative Minimal thoracic laminectomy at left T12 for drainage of thoracolumbar epidural abscess by Dr. Courtney on 12/18/2022, represented to ED with worsening imaging findings       Plan:  Patient has been advised to continue with IV antibiotics throughout ID. Spoke with urology and void trail per provider to be 3-4 weeks after last seen on 1/3/2023 though he would like to have an earlier appointment. Also will plan to repeat thoracic MRI in 4 weeks once he is supposed to complete the IV antibiotic infusion on February 12th. Follow up will be telephone appointment for review of MRI.     Mr. Moncada is a pleasant 57 year old right handed male who presented to [facility] on December 18th with 2-3 day history of left lower extremity paresthesias, weakness, urinary incontinence and urinary retention of which MRI revealed large longitudinally extensive dorsal epidural fluid collection extending from T4-T5 down to L2-L3 and he underwent emergent minimally invasive T12 hemilaminectomy and evacuation of epidural abscess with Dr. Courtney. He was discharged home on 12/27 and was doing well but noted continued left leg weakness though improved. He represented to the ED with increased flank pain and back pain and was noted to have worsening epidural abscess collection but had improved neurological exam. He states that overall he is doing well. He has continued left foot weakness but is much improved when compared to his visit ED admission. He also notes slight radicular pain into his left leg of which is not nearly as severe as it was. He has edema of his left lower extremity and continues to wear a compression sock. He has a gamez in  place and planned void trial on 1/25. He continues with IV antibiotics and is scheduled to do so until 2/12/2023.      Exam:  BP (!) 163/86   Pulse 65   Ht 6' (1.829 m)   Wt 188 lb (85.3 kg)   SpO2 100%   BMI 25.50 kg/m      General: alert and oriented x3     Strength is 5/5 throughout with exception to slight dorsiflexion weakness of left 4/5    Sensation is intact throughout both upper and lower extremities    Gait is smooth and coordinated    Incision: well healed without erythema, induration, or drainage      Imaging:  MRI reviewed personally by self and Dr. Sanabria noted improvements of thoracic epidural abscess though still present with severe spinal stenosis does not extend as many levels as it once had    FINDINGS:   Anterior compression fractures of the T7 and T12 vertebra are unchanged. No progressive vertebral body height loss. No new or worsening anterior compression deformity. Unchanged edema associated with the superior endplate Schmorl's node at T9. Unchanged   multiple chronic anterior compression deformities.      Unchanged edema and enhancement within of the left T9 and T10 facets and ribs. Adjacent prevertebral soft tissue thickening and enhancement on the left is improved .      Redemonstrated is an enhancing dorsal epidural collection compatible with history of epidural abscess. Dorsal collection has decreased in size. Previously the collection extended to the T6 level. On today's exam the collection is largely centered at the   T9-T10 level extending between the T7-T8 and T10-T11 levels. Persistent severe spinal canal stenosis posterior to the T9 vertebral body, at the T9-T10 disc space, and posterior to the T10 vertebral body. Spinal canal patency at the T8-T9 level is   improved. Above and below this there is  diffuse dorsal dural thickening and enhancement extending from the cervicothoracic junction to the upper lumbar spine which is similar to prior.     A rim-enhancing fluid  collection is noted in the dorsal paraspinal soft tissues of the L1 laminectomy bed on the left measuring 1.9 cm AP by 1.5 cm TV, decreased in size from the prior exam.      Small to moderate left pleural effusion noted. Small right pleural effusion.                                                                   IMPRESSION:  1.  Persistent though improved dorsal epidural collection compatible with epidural abscess. Abscess is centered at the T9-T10 level and continues to produce severe spinal canal stenosis at this level.  2.  Mild diffuse dorsal epidural thickening and enhancement throughout the visualized spine, similar to prior.  3.  Unchanged edema and enhancement of the left T9-T10 facets and ribs. Subjacent prevertebral soft tissue thickening and enhancement is improved.  4.  Rim enhancing fluid collection in the L1 laminectomy bed on the left, decreased in size.  5.  Small to moderate left pleural effusion. Small right pleural effusion.    Yulisa Frank PA-C  [facility]  O: [phone]      Again, thank you for allowing me to participate in the care of your patient.        Sincerely,        Marbella Sanabria MD

## 2023-01-12 NOTE — PATIENT INSTRUCTIONS
Patient has been advised to continue with IV antibiotics throughout ID. Will attempt to get urology appointment sooner for void trail. Also will plan to repeat thoracic MRI in 4 weeks once he is supposed to complete the IV antibiotic infusion on February 12th. Follow up will be telephone appointment for review of MRI.

## 2023-01-18 ENCOUNTER — LAB REQUISITION (OUTPATIENT)
Dept: LAB | Facility: CLINIC | Age: 58
End: 2023-01-18
Payer: COMMERCIAL

## 2023-01-18 DIAGNOSIS — R78.81 BACTEREMIA: ICD-10-CM

## 2023-01-18 LAB
AST SERPL W P-5'-P-CCNC: 26 U/L (ref 10–50)
BASOPHILS # BLD AUTO: 0 10E3/UL (ref 0–0.2)
BASOPHILS NFR BLD AUTO: 1 %
BUN SERPL-MCNC: 10.5 MG/DL (ref 6–20)
CREAT SERPL-MCNC: 1.03 MG/DL (ref 0.67–1.17)
CRP SERPL-MCNC: 22 MG/L
EOSINOPHIL # BLD AUTO: 0.1 10E3/UL (ref 0–0.7)
EOSINOPHIL NFR BLD AUTO: 2 %
ERYTHROCYTE [DISTWIDTH] IN BLOOD BY AUTOMATED COUNT: 14.1 % (ref 10–15)
GFR SERPL CREATININE-BSD FRML MDRD: 84 ML/MIN/1.73M2
HCT VFR BLD AUTO: 31.5 % (ref 40–53)
HGB BLD-MCNC: 10.4 G/DL (ref 13.3–17.7)
IMM GRANULOCYTES # BLD: 0 10E3/UL
IMM GRANULOCYTES NFR BLD: 0 %
LYMPHOCYTES # BLD AUTO: 0.7 10E3/UL (ref 0.8–5.3)
LYMPHOCYTES NFR BLD AUTO: 16 %
MCH RBC QN AUTO: 32.4 PG (ref 26.5–33)
MCHC RBC AUTO-ENTMCNC: 33 G/DL (ref 31.5–36.5)
MCV RBC AUTO: 98 FL (ref 78–100)
MONOCYTES # BLD AUTO: 0.5 10E3/UL (ref 0–1.3)
MONOCYTES NFR BLD AUTO: 10 %
NEUTROPHILS # BLD AUTO: 3.3 10E3/UL (ref 1.6–8.3)
NEUTROPHILS NFR BLD AUTO: 71 %
NRBC # BLD AUTO: 0 10E3/UL
NRBC BLD AUTO-RTO: 0 /100
PLATELET # BLD AUTO: 261 10E3/UL (ref 150–450)
RBC # BLD AUTO: 3.21 10E6/UL (ref 4.4–5.9)
WBC # BLD AUTO: 4.7 10E3/UL (ref 4–11)

## 2023-01-18 PROCEDURE — 85004 AUTOMATED DIFF WBC COUNT: CPT | Performed by: SPECIALIST

## 2023-01-18 PROCEDURE — 84450 TRANSFERASE (AST) (SGOT): CPT | Performed by: SPECIALIST

## 2023-01-18 PROCEDURE — 84520 ASSAY OF UREA NITROGEN: CPT | Performed by: SPECIALIST

## 2023-01-18 PROCEDURE — 82565 ASSAY OF CREATININE: CPT | Performed by: SPECIALIST

## 2023-01-18 PROCEDURE — 86140 C-REACTIVE PROTEIN: CPT | Performed by: SPECIALIST

## 2023-01-25 ENCOUNTER — TRANSFERRED RECORDS (OUTPATIENT)
Dept: HEALTH INFORMATION MANAGEMENT | Facility: CLINIC | Age: 58
End: 2023-01-25

## 2023-01-26 ENCOUNTER — LAB REQUISITION (OUTPATIENT)
Dept: LAB | Facility: CLINIC | Age: 58
End: 2023-01-26
Payer: COMMERCIAL

## 2023-01-26 DIAGNOSIS — R78.81 BACTEREMIA: ICD-10-CM

## 2023-01-26 LAB
AST SERPL W P-5'-P-CCNC: 25 U/L (ref 10–50)
BASOPHILS # BLD AUTO: 0 10E3/UL (ref 0–0.2)
BASOPHILS NFR BLD AUTO: 1 %
BUN SERPL-MCNC: 14 MG/DL (ref 6–20)
CREAT SERPL-MCNC: 1.03 MG/DL (ref 0.67–1.17)
CRP SERPL-MCNC: 5.71 MG/L
EOSINOPHIL # BLD AUTO: 0.1 10E3/UL (ref 0–0.7)
EOSINOPHIL NFR BLD AUTO: 2 %
ERYTHROCYTE [DISTWIDTH] IN BLOOD BY AUTOMATED COUNT: 13.9 % (ref 10–15)
GFR SERPL CREATININE-BSD FRML MDRD: 84 ML/MIN/1.73M2
HCT VFR BLD AUTO: 32.3 % (ref 40–53)
HGB BLD-MCNC: 10.6 G/DL (ref 13.3–17.7)
IMM GRANULOCYTES # BLD: 0 10E3/UL
IMM GRANULOCYTES NFR BLD: 0 %
LYMPHOCYTES # BLD AUTO: 0.7 10E3/UL (ref 0.8–5.3)
LYMPHOCYTES NFR BLD AUTO: 13 %
MCH RBC QN AUTO: 32.1 PG (ref 26.5–33)
MCHC RBC AUTO-ENTMCNC: 32.8 G/DL (ref 31.5–36.5)
MCV RBC AUTO: 98 FL (ref 78–100)
MONOCYTES # BLD AUTO: 0.5 10E3/UL (ref 0–1.3)
MONOCYTES NFR BLD AUTO: 10 %
NEUTROPHILS # BLD AUTO: 3.8 10E3/UL (ref 1.6–8.3)
NEUTROPHILS NFR BLD AUTO: 74 %
NRBC # BLD AUTO: 0 10E3/UL
NRBC BLD AUTO-RTO: 0 /100
PLATELET # BLD AUTO: 238 10E3/UL (ref 150–450)
RBC # BLD AUTO: 3.3 10E6/UL (ref 4.4–5.9)
WBC # BLD AUTO: 5.2 10E3/UL (ref 4–11)

## 2023-01-26 PROCEDURE — 82565 ASSAY OF CREATININE: CPT | Performed by: SPECIALIST

## 2023-01-26 PROCEDURE — 84520 ASSAY OF UREA NITROGEN: CPT | Performed by: SPECIALIST

## 2023-01-26 PROCEDURE — 86140 C-REACTIVE PROTEIN: CPT | Performed by: SPECIALIST

## 2023-01-26 PROCEDURE — 85025 COMPLETE CBC W/AUTO DIFF WBC: CPT | Performed by: SPECIALIST

## 2023-01-26 PROCEDURE — 84450 TRANSFERASE (AST) (SGOT): CPT | Performed by: SPECIALIST

## 2023-02-01 ENCOUNTER — LAB REQUISITION (OUTPATIENT)
Dept: LAB | Facility: CLINIC | Age: 58
End: 2023-02-01
Payer: COMMERCIAL

## 2023-02-01 DIAGNOSIS — R78.81 BACTEREMIA: ICD-10-CM

## 2023-02-01 LAB
AST SERPL W P-5'-P-CCNC: 30 U/L (ref 10–50)
BASOPHILS # BLD AUTO: 0.1 10E3/UL (ref 0–0.2)
BASOPHILS NFR BLD AUTO: 1 %
BUN SERPL-MCNC: 12.2 MG/DL (ref 6–20)
CREAT SERPL-MCNC: 1.1 MG/DL (ref 0.67–1.17)
CRP SERPL-MCNC: 4.54 MG/L
EOSINOPHIL # BLD AUTO: 0.2 10E3/UL (ref 0–0.7)
EOSINOPHIL NFR BLD AUTO: 4 %
ERYTHROCYTE [DISTWIDTH] IN BLOOD BY AUTOMATED COUNT: 14.4 % (ref 10–15)
GFR SERPL CREATININE-BSD FRML MDRD: 78 ML/MIN/1.73M2
HCT VFR BLD AUTO: 33.8 % (ref 40–53)
HGB BLD-MCNC: 11.3 G/DL (ref 13.3–17.7)
IMM GRANULOCYTES # BLD: 0 10E3/UL
IMM GRANULOCYTES NFR BLD: 0 %
LYMPHOCYTES # BLD AUTO: 1.3 10E3/UL (ref 0.8–5.3)
LYMPHOCYTES NFR BLD AUTO: 25 %
MCH RBC QN AUTO: 32.7 PG (ref 26.5–33)
MCHC RBC AUTO-ENTMCNC: 33.4 G/DL (ref 31.5–36.5)
MCV RBC AUTO: 98 FL (ref 78–100)
MONOCYTES # BLD AUTO: 0.6 10E3/UL (ref 0–1.3)
MONOCYTES NFR BLD AUTO: 12 %
NEUTROPHILS # BLD AUTO: 3 10E3/UL (ref 1.6–8.3)
NEUTROPHILS NFR BLD AUTO: 58 %
NRBC # BLD AUTO: 0 10E3/UL
NRBC BLD AUTO-RTO: 0 /100
PLATELET # BLD AUTO: 234 10E3/UL (ref 150–450)
RBC # BLD AUTO: 3.46 10E6/UL (ref 4.4–5.9)
WBC # BLD AUTO: 5.3 10E3/UL (ref 4–11)

## 2023-02-01 PROCEDURE — 86140 C-REACTIVE PROTEIN: CPT | Performed by: SPECIALIST

## 2023-02-01 PROCEDURE — 84450 TRANSFERASE (AST) (SGOT): CPT | Performed by: SPECIALIST

## 2023-02-01 PROCEDURE — 82565 ASSAY OF CREATININE: CPT | Performed by: SPECIALIST

## 2023-02-01 PROCEDURE — 84520 ASSAY OF UREA NITROGEN: CPT | Performed by: SPECIALIST

## 2023-02-01 PROCEDURE — 85014 HEMATOCRIT: CPT | Performed by: SPECIALIST

## 2023-02-03 ENCOUNTER — MYC MEDICAL ADVICE (OUTPATIENT)
Dept: PEDIATRICS | Facility: CLINIC | Age: 58
End: 2023-02-03
Payer: COMMERCIAL

## 2023-02-03 DIAGNOSIS — I10 BENIGN ESSENTIAL HYPERTENSION: ICD-10-CM

## 2023-02-03 RX ORDER — METOPROLOL TARTRATE 25 MG/1
25 TABLET, FILM COATED ORAL 2 TIMES DAILY
Qty: 60 TABLET | Refills: 0 | Status: SHIPPED | OUTPATIENT
Start: 2023-02-03 | End: 2023-02-28

## 2023-02-03 NOTE — TELEPHONE ENCOUNTER
See patient's MyChart message   - Patient requesting a refill of his blood pressure medications     amLODIPine (NORVASC) 5 MG tablet 30 tablet 2 1/6/2023  No   Sig - Route: Take 1 tablet (5 mg) by mouth daily - Oral     metoprolol tartrate (LOPRESSOR) 25 MG tablet 60 tablet 0 1/5/2023  No   Sig - Route: Take 1 tablet (25 mg) by mouth 2 times daily - Oral     Called the patient at [phone]   - Informed the patient that he should have refills on file for his amlodipine medication at the [id number] - [place], [place] - [address] AT [facility]  - Patient requesting a refill of his metoprolol medication and requesting that it be sent to the [id number] - [place], [place] - [address] AT [facility]    Routing the metoprolol medication refill request to the  Refill pool.     Christina HARRIS RN   Patient Advocate Liaison (PAL)  [facility]   [phone]

## 2023-02-03 NOTE — TELEPHONE ENCOUNTER
Routing refill request to provider for review/approval because:  Patient does not have a recent blood pressure less than 140/90 on file.     BP Readings from Last 3 Encounters:   01/12/23 (!) 163/86   01/06/23 (!) 144/92   01/05/23 (!) 147/95     Christina HARRIS RN   Patient Advocate Liaison (PAL)  [facility]   [phone]

## 2023-02-07 NOTE — TELEPHONE ENCOUNTER
See patient's [employer] message   - Patient states that the [facility] has not received the amlodipine prescription yet     amLODIPine (NORVASC) 5 MG tablet 30 tablet 2 1/6/2023  No   Sig - Route: Take 1 tablet (5 mg) by mouth daily - Oral     Called the [facility] in [place] at [phone] and spoke with [facility] Pharmacy Operations Manager   - [facility] Pharmacy Operations Manager states that they hav ethe 1/6/2023 prescription on file and states that they will fill the medication for the patient at this time     Sent a [employer] message to the patient informing him that the [facility] is filling his amlodipine medication at this time     Christina HARRIS RN   Patient Advocate Liaison (PAL)  [facility]   [phone]

## 2023-02-08 ENCOUNTER — LAB REQUISITION (OUTPATIENT)
Dept: LAB | Facility: CLINIC | Age: 58
End: 2023-02-08
Payer: COMMERCIAL

## 2023-02-08 DIAGNOSIS — R78.81 BACTEREMIA: ICD-10-CM

## 2023-02-08 LAB
AST SERPL W P-5'-P-CCNC: 26 U/L (ref 10–50)
BASOPHILS # BLD AUTO: 0 10E3/UL (ref 0–0.2)
BASOPHILS NFR BLD AUTO: 1 %
BUN SERPL-MCNC: 12.1 MG/DL (ref 6–20)
CREAT SERPL-MCNC: 1.1 MG/DL (ref 0.67–1.17)
CRP SERPL-MCNC: 5.16 MG/L
EOSINOPHIL # BLD AUTO: 0.2 10E3/UL (ref 0–0.7)
EOSINOPHIL NFR BLD AUTO: 4 %
ERYTHROCYTE [DISTWIDTH] IN BLOOD BY AUTOMATED COUNT: 14.3 % (ref 10–15)
GFR SERPL CREATININE-BSD FRML MDRD: 78 ML/MIN/1.73M2
HCT VFR BLD AUTO: 33.4 % (ref 40–53)
HGB BLD-MCNC: 11.3 G/DL (ref 13.3–17.7)
IMM GRANULOCYTES # BLD: 0 10E3/UL
IMM GRANULOCYTES NFR BLD: 0 %
LYMPHOCYTES # BLD AUTO: 0.9 10E3/UL (ref 0.8–5.3)
LYMPHOCYTES NFR BLD AUTO: 19 %
MCH RBC QN AUTO: 32.8 PG (ref 26.5–33)
MCHC RBC AUTO-ENTMCNC: 33.8 G/DL (ref 31.5–36.5)
MCV RBC AUTO: 97 FL (ref 78–100)
MONOCYTES # BLD AUTO: 0.5 10E3/UL (ref 0–1.3)
MONOCYTES NFR BLD AUTO: 11 %
NEUTROPHILS # BLD AUTO: 2.9 10E3/UL (ref 1.6–8.3)
NEUTROPHILS NFR BLD AUTO: 65 %
NRBC # BLD AUTO: 0 10E3/UL
NRBC BLD AUTO-RTO: 0 /100
PLATELET # BLD AUTO: 235 10E3/UL (ref 150–450)
RBC # BLD AUTO: 3.44 10E6/UL (ref 4.4–5.9)
WBC # BLD AUTO: 4.5 10E3/UL (ref 4–11)

## 2023-02-08 PROCEDURE — 82565 ASSAY OF CREATININE: CPT | Performed by: SPECIALIST

## 2023-02-08 PROCEDURE — 84520 ASSAY OF UREA NITROGEN: CPT | Performed by: SPECIALIST

## 2023-02-08 PROCEDURE — 84450 TRANSFERASE (AST) (SGOT): CPT | Performed by: SPECIALIST

## 2023-02-08 PROCEDURE — 85004 AUTOMATED DIFF WBC COUNT: CPT | Performed by: SPECIALIST

## 2023-02-08 PROCEDURE — 86140 C-REACTIVE PROTEIN: CPT | Performed by: SPECIALIST

## 2023-02-13 ENCOUNTER — HOSPITAL ENCOUNTER (OUTPATIENT)
Dept: MRI IMAGING | Facility: CLINIC | Age: 58
Discharge: HOME | End: 2023-02-13
Attending: PHYSICIAN ASSISTANT | Admitting: PHYSICIAN ASSISTANT
Payer: COMMERCIAL

## 2023-02-13 DIAGNOSIS — G06.1 SPINAL EPIDURAL ABSCESS: ICD-10-CM

## 2023-02-13 PROCEDURE — 72157 MRI CHEST SPINE W/O & W/DYE: CPT

## 2023-02-13 PROCEDURE — 255N000002 HC RX 255 OP 636: Performed by: PHYSICIAN ASSISTANT

## 2023-02-13 PROCEDURE — A9585 GADOBUTROL INJECTION: HCPCS | Performed by: PHYSICIAN ASSISTANT

## 2023-02-13 RX ORDER — GADOBUTROL 604.72 MG/ML
8.5 INJECTION INTRAVENOUS ONCE
Status: COMPLETED | OUTPATIENT
Start: 2023-02-13 | End: 2023-02-13

## 2023-02-13 RX ADMIN — GADOBUTROL 7.5 ML: 604.72 INJECTION INTRAVENOUS at 10:27

## 2023-02-16 ENCOUNTER — VIRTUAL VISIT (OUTPATIENT)
Dept: NEUROSURGERY | Facility: CLINIC | Age: 58
End: 2023-02-16
Attending: PHYSICIAN ASSISTANT
Payer: COMMERCIAL

## 2023-02-16 DIAGNOSIS — G06.1 SPINAL EPIDURAL ABSCESS: Primary | ICD-10-CM

## 2023-02-16 PROCEDURE — 99024 POSTOP FOLLOW-UP VISIT: CPT | Performed by: NURSE PRACTITIONER

## 2023-02-16 NOTE — PROGRESS NOTES
Ian is a 57 year old who is being evaluated via a billable telephone visit.      What phone number would you like to be contacted at? Number listed  How would you like to obtain your AVS? MyChart  Distant Location (provider location):  On-site    Subjective   Ian is a 57 year old postoperative Minimal thoracic laminectomy at left T12 for drainage of thoracolumbar epidural abscess by Dr. Courtney on 12/18/2022. MSSA bacteremia. Extensive cervical and thoracolumbar spinal epidural abscess from C4 down to T4-T5 down to L2-L3  with spinal chord compression.  Finished course of Abx 4 days ago. Has been having weekly labs which are improving. Has not seen ID since the hospital. MRI reviewed. Discussed with Ian, often imaging is delayed n showing improvement in infections compared to patient's clinic symptoms. Ian reports feeling great. He has not back pain. Legs feel fine. He feels his legs are 90% normal in terms of strength. Had his gamez catheter removed recently and is voiding successfully. He reports working with his PCP for HTN. See MRI results below. Discussed reported clinical exam and MRI results with Dr Sanabria who recommends follow up as needed. Discussed with Ian. He is in agreement.       Objective           Vitals:  No vitals were obtained today due to virtual visit.    Physical Exam   healthy and alert  PSYCH: Alert and oriented times 3; coherent speech, normal   rate and volume  RESP: No cough, no audible wheezing, able to talk in full sentences  Remainder of exam unable to be completed due to telephone visits        Phone call duration: 8  Minutes    LAMBERTO De Leon-CNP  [facility]  O: [phone]          MRI  IMPRESSION:  1. Overall decreased craniocaudal extent of previously demonstrated  dorsal epidural phlegmon centered at T9-T10, as described. This  phlegmon continues to contribute to severe spinal canal stenosis at  T9-T10 where there is flattening of the normal spinal cord  contour.  Spinal canal narrowing elsewhere (at the adjacent levels) has  decreased.  2. Persistent abnormal marrow signal centered about the left greater  than right T9-T10 facet joints with corresponding abnormal  enhancement. Given the patient's history and the proximity to the  dorsal epidural phlegmon, septic arthritis is a possibility. There  appears to be mildly increased extent of marrow signal abnormality and  enhancement in the anterior aspect of the T9 spinous process.  Osteomyelitis cannot be excluded. Recommend close clinical follow-up.  3. Multilevel degenerative changes elsewhere, as described, not  significantly changed.  4. Moderate to severe left T8-T9 and T9-T10 neural foraminal stenosis.

## 2023-02-16 NOTE — LETTER
2/16/2023         RE: Ian Moncada  [address]  [place] [address]        Dear Colleague,    Thank you for referring your patient, Ian Moncada, to the [facility]. Please see a copy of my visit note below.    Ian is a 57 year old who is being evaluated via a billable telephone visit.      What phone number would you like to be contacted at? Number listed  How would you like to obtain your AVS? MyChart  Distant Location (provider location):  On-site    Subjective   Ian is a 57 year old postoperative Minimal thoracic laminectomy at left T12 for drainage of thoracolumbar epidural abscess by Dr. Courtney on 12/18/2022. MSSA bacteremia. Extensive cervical and thoracolumbar spinal epidural abscess from C4 down to T4-T5 down to L2-L3  with spinal chord compression.  Finished course of Abx 4 days ago. Has been having weekly labs which are improving. Has not seen ID since the hospital. MRI reviewed. Discussed with Ian, often imaging is delayed n showing improvement in infections compared to patient's clinic symptoms. Ian reports feeling great. He has not back pain. Legs feel fine. He feels his legs are 90% normal in terms of strength. Had his gamez catheter removed recently and is voiding successfully. He reports working with his PCP for HTN. See MRI results below. Discussed reported clinical exam and MRI results with Dr Sanabria who recommends follow up as needed. Discussed with Ian. He is in agreement.       Objective           Vitals:  No vitals were obtained today due to virtual visit.    Physical Exam   healthy and alert  PSYCH: Alert and oriented times 3; coherent speech, normal   rate and volume  RESP: No cough, no audible wheezing, able to talk in full sentences  Remainder of exam unable to be completed due to telephone visits       Phone call duration: 8  Minutes    LAMBERTO De Leon-CNP  [facility]  O: [phone]          MRI  IMPRESSION:  1. Overall  decreased craniocaudal extent of previously demonstrated  dorsal epidural phlegmon centered at T9-T10, as described. This  phlegmon continues to contribute to severe spinal canal stenosis at  T9-T10 where there is flattening of the normal spinal cord contour.  Spinal canal narrowing elsewhere (at the adjacent levels) has  decreased.  2. Persistent abnormal marrow signal centered about the left greater  than right T9-T10 facet joints with corresponding abnormal  enhancement. Given the patient's history and the proximity to the  dorsal epidural phlegmon, septic arthritis is a possibility. There  appears to be mildly increased extent of marrow signal abnormality and  enhancement in the anterior aspect of the T9 spinous process.  Osteomyelitis cannot be excluded. Recommend close clinical follow-up.  3. Multilevel degenerative changes elsewhere, as described, not  significantly changed.  4. Moderate to severe left T8-T9 and T9-T10 neural foraminal stenosis.           Again, thank you for allowing me to participate in the care of your patient.        Sincerely,        SILVA Barron CNP

## 2023-02-28 ENCOUNTER — PATIENT OUTREACH (OUTPATIENT)
Dept: PEDIATRICS | Facility: CLINIC | Age: 58
End: 2023-02-28

## 2023-02-28 ENCOUNTER — OFFICE VISIT (OUTPATIENT)
Dept: PEDIATRICS | Facility: CLINIC | Age: 58
End: 2023-02-28
Payer: COMMERCIAL

## 2023-02-28 VITALS
DIASTOLIC BLOOD PRESSURE: 84 MMHG | BODY MASS INDEX: 26.46 KG/M2 | RESPIRATION RATE: 18 BRPM | SYSTOLIC BLOOD PRESSURE: 132 MMHG | TEMPERATURE: 97.7 F | HEART RATE: 64 BPM | OXYGEN SATURATION: 100 % | WEIGHT: 195.1 LBS

## 2023-02-28 DIAGNOSIS — L98.9 SKIN LESION: ICD-10-CM

## 2023-02-28 DIAGNOSIS — Z11.4 SCREENING FOR HIV (HUMAN IMMUNODEFICIENCY VIRUS): ICD-10-CM

## 2023-02-28 DIAGNOSIS — G95.20 SPINAL CORD COMPRESSION (H): ICD-10-CM

## 2023-02-28 DIAGNOSIS — G06.1 SPINAL EPIDURAL ABSCESS: Primary | ICD-10-CM

## 2023-02-28 DIAGNOSIS — I10 BENIGN ESSENTIAL HYPERTENSION: ICD-10-CM

## 2023-02-28 PROCEDURE — 99214 OFFICE O/P EST MOD 30 MIN: CPT | Performed by: INTERNAL MEDICINE

## 2023-02-28 RX ORDER — METOPROLOL TARTRATE 25 MG/1
25 TABLET, FILM COATED ORAL 2 TIMES DAILY
Qty: 60 TABLET | Refills: 0 | Status: CANCELLED | OUTPATIENT
Start: 2023-02-28

## 2023-02-28 RX ORDER — AMLODIPINE BESYLATE 5 MG/1
5 TABLET ORAL DAILY
Qty: 90 TABLET | Refills: 1 | Status: SHIPPED | OUTPATIENT
Start: 2023-02-28 | End: 2023-08-29

## 2023-02-28 ASSESSMENT — PAIN SCALES - GENERAL: PAINLEVEL: NO PAIN (0)

## 2023-02-28 NOTE — PROGRESS NOTES
Assessment & Plan     Benign essential hypertension  At goal. Will taper off the beta blocker and remain on calcium channel blocker for now.  Follow up in 6 months at complete physcial exam and possibly stop the calcium channel blocker.   - amLODIPine (NORVASC) 5 MG tablet; Take 1 tablet (5 mg) by mouth daily    Screening for HIV (human immunodeficiency virus)      Skin lesion  Referred for evaluation.   - Adult Dermatology Referral; Future    Spinal cord compression (H)  Symptoms resolved, off antibiotic, and doing well.  Still with some headache, possibly attributed to CSF circulation issues?  MRI still shows compression.  Headaches are actually improving.     Spinal epidural abscess  No evidence of ongoing fevers.                See Patient Instructions    Return in about 6 months (around 8/28/2023) for physical, with me, in person.    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  He does check blood pressure  regularly outside of the clinic. Outside blood pressures have been over 140/90. He follows a low salt diet.     Reason for visit:  Check up and discuss high blood pressure    He eats 0-1 servings of fruits and vegetables daily.He consumes 0 sweetened beverage(s) daily.He exercises with enough effort to increase his heart rate 20 to 29 minutes per day.  He exercises with enough effort to increase his heart rate 5 days per week.   He is taking medications regularly.     Seen by neurosurgery and told not to come back; things have resolved.     Finished antibiotic on Feb 12.  MRI on 2/13 showed ongoing compression of spinal cord.    However, has no pain, and no weakness.  Only some mild unsteadiness on the left.  No fevers, no chills.  No drainage.      No b/b  symptoms.  Still taking flomax (tamsulosin).  The catheter was removed near 1/13.          Review of Systems   CONSTITUTIONAL: NEGATIVE for fever, chills, change in weight  INTEGUMENTARY/SKIN: NEGATIVE for worrisome rashes, moles or lesions  EYES: NEGATIVE for vision changes or irritation  ENT/MOUTH: NEGATIVE for ear, mouth and throat problems  RESP: NEGATIVE for significant cough or SOB  BREAST: NEGATIVE for masses, tenderness or discharge  CV: NEGATIVE for chest pain, palpitations or peripheral edema  GI: NEGATIVE for nausea, abdominal pain, heartburn, or change in bowel habits  : NEGATIVE for frequency, dysuria, or hematuria  MUSCULOSKELETAL: NEGATIVE for significant arthralgias or myalgia  NEURO: NEGATIVE for weakness, dizziness or paresthesias  ENDOCRINE: NEGATIVE for temperature intolerance, skin/hair changes  HEME: NEGATIVE for bleeding problems  PSYCHIATRIC: NEGATIVE for changes in mood or affect      Objective    /84   Pulse 64   Temp 97.7  F (36.5  C) (Oral)   Resp 18   Wt 88.5 kg (195 lb 1.6 oz)   SpO2 100%   BMI 26.46 kg/m    Body mass index is 26.46 kg/m .  Physical Exam   GENERAL: healthy, alert and no distress  EYES: Eyes grossly normal to inspection, PERRL and conjunctivae and sclerae normal  HENT: ear canals and TM's normal, nose and mouth without ulcers or lesions  NECK: no adenopathy, no asymmetry, masses, or scars and thyroid normal to palpation  RESP: lungs clear to auscultation - no rales, rhonchi or wheezes  CV: regular rate and rhythm, normal S1 S2, no S3 or S4, no murmur, click or rub, no peripheral edema and peripheral pulses strong  ABDOMEN: soft, nontender, no hepatosplenomegaly, no masses and bowel sounds normal  MS: no gross musculoskeletal defects noted, no edema  SKIN: no suspicious lesions or rashes  NEURO: Normal strength and tone, mentation intact and speech normal  PSYCH: mentation appears normal, affect normal/bright

## 2023-02-28 NOTE — PATIENT INSTRUCTIONS
Continue your home physical therapy exercises every day.    Stop the metoprolol.     Stay on the amlodipine and flomax (tamsulosin).    Monitor for any worsening of your headache and let us know.    Set up a complete physcial exam in the next 6 months or so.

## 2023-02-28 NOTE — LETTER
Ian Moncada  [address]  [place] [address]    Pasquale Sosa,     Thank you for choosing [facility] today for your health care needs.     [facility] is transforming primary care  At [facility], we re dedicated to constantly improve how we serve the health care needs of our patients and communities. We re currently making changes to the way we deliver care.     Changes you ll notice include:    An emphasis on building a relationship with a primary care provider    Access to a PAL (patient advocate and liaison) to help guide you with your care needs    Appointment lengths tailored to your specific needs and greater access to a care team to help you and your provider improve and maintain your health and well-being    Improved online access to your care team    Benefits of a primary care provider  If you don t have a designated primary care provider, we encourage you to get to know our care team online and find a provider you d like to see. Most of our providers have a short video on their online provider page. Visit [email or web address] to explore our providers and locations.    Benefits of having a primary care provider include:      They get to know you - your health history, family history and goals, making it easier to make a health plan together.     You get to know them - making health-related conversations and decisions easier      Primary care doctors help you when you re sick or hurt - but also focus on keeping you healthy with preventive care and screenings.      A doctor who sees you regularly is more likely to notice changes in your health.     You ll be connected to a broad care team who partners with your provider to support you.    Patient Advocate Liaison (PAL)   To help make sure you get the right care, at the right time, we include PALs, or Patient Advocate Liaisons, as part of your care team. Your PAL will be your first line of contact. They ll advocate for your needs and help you  navigate our services, connecting you with care team members and community resources to ensure your care is well coordinated. You ll be introduced to a PAL in an upcoming visit.     Expanded care team access with tailored appointment lengths  Depending on your health care needs, you may have longer or shorter appointments and see additional care team providers - including Medication Therapy Management (MTM) pharmacists, diabetes educators, behavioral health clinicians, or social workers. At times, they may be included in your visit with your provider, or you may see them individually.     Online access to your health care records and care team  [employer] is our online tool that makes it easy to see your health care information and communicate with your care team.     [employer] allows you to:     View your health maintenance plan so you know when you re due for a preventive screening    Send secure messages to your care team    View your health history and visit summaries     Schedule appointments     Complete questionnaires and eCheck-in before appointments      Get care from your provider with an e-visit      View and pay your bill     Sign up at [email or web address]. Once you have an account, you also can download the mobile rio.     Connecting to fast and convenient care  When you need fast, convenient care - consider one of the following options:       Video Visit: A convenient care option for visiting with your provider out of the comfort of your own home. Most of the things you come to the clinic to address with your provider can now be done virtually through a video. This includes your chronic medication follow up, questions or concerns you may have, and even your annual Medicare Wellness Visit.       Phone Visit: Another convenient option for follow up of common problems that may require a more in-depth discussion with your provider.       E-visit: When you need acute care quickly, or have a quick question about  your medication, an E-visit is completed through getupp and your provider will respond within one business day.      Christina HARRIS RN   Patient Advocate Liaison (PAL)  [facility]   [phone]

## 2023-02-28 NOTE — TELEPHONE ENCOUNTER
SB3 Welcome Letter sent.     Christina HARRIS RN   Patient Advocate Liaison (PAL)  [facility]  [phone]

## 2023-03-15 ENCOUNTER — OFFICE VISIT (OUTPATIENT)
Dept: FAMILY MEDICINE | Facility: CLINIC | Age: 58
End: 2023-03-15
Payer: COMMERCIAL

## 2023-03-15 DIAGNOSIS — D48.9 NEOPLASM OF UNCERTAIN BEHAVIOR: ICD-10-CM

## 2023-03-15 DIAGNOSIS — L98.9 SKIN LESION: ICD-10-CM

## 2023-03-15 PROCEDURE — 88342 IMHCHEM/IMCYTCHM 1ST ANTB: CPT | Performed by: DERMATOLOGY

## 2023-03-15 PROCEDURE — 99203 OFFICE O/P NEW LOW 30 MIN: CPT | Mod: 25 | Performed by: NURSE PRACTITIONER

## 2023-03-15 PROCEDURE — 11102 TANGNTL BX SKIN SINGLE LES: CPT | Performed by: NURSE PRACTITIONER

## 2023-03-15 PROCEDURE — 88305 TISSUE EXAM BY PATHOLOGIST: CPT | Performed by: DERMATOLOGY

## 2023-03-15 PROCEDURE — 11103 TANGNTL BX SKIN EA SEP/ADDL: CPT | Performed by: NURSE PRACTITIONER

## 2023-03-15 ASSESSMENT — PAIN SCALES - GENERAL: PAINLEVEL: NO PAIN (0)

## 2023-03-15 NOTE — PROGRESS NOTES
[facility] Dermatology Note  Encounter Date: Mar 15, 2023  Office Visit     Reviewed patients past medical history and pertinent chart review prior to patients visit today.     Dermatology Problem List:  NUB right posterior shoulder superior, right posterior shoulder inferior, mid back, right low back, shave biopsy 03/15/23 .     Patient denies personal history of skin cancer or dysplastic nevi.      ____________________________________________    Assessment & Plan:     # Neoplasm of uncertain behavior: Right posterior shoulder, superior DDx includes irritated nevus. Shave biopsy today.  # Neoplasm of uncertain behavior: Right posterior shoulder inferior DDx includes irritated nevus. Shave biopsy today.  # Neoplasm of uncertain behavior: Mid back DDx includes dysplastic nevus vs melanoma. Shave biopsy today.  # Neoplasm of uncertain behavior: Right low back DDx includes dysplastic nevus vs melanoma. Shave biopsy today.    Procedure Note: Biopsy by shave technique  The risks and benefits of the procedure were described to the patient. These include but are not limited to bleeding, infection, scar, incomplete removal, and non-diagnostic biopsy. Verbal informed consent was obtained. The above site(s) was cleansed with an alcohol pad and injected with 1% lidocaine with epinephrine. Once anesthesia was obtained, a biopsy(ies) was performed with Gilette blade. The tissue(s) was placed in a labeled container(s) with formalin and sent to pathology. Hemostasis was achieved with aluminum chloride. Vaseline and a bandage were applied to the wound(s). The patient tolerated the procedure well and was given post biopsy care instructions.     # Benign skin findings including: seborrheic keratoses, cherry angioma, lentigines and benign nevi.   - No further intervention required. Patient to report changes.   - Patient reassured of the benign nature of these lesions.    #Signs and Symptoms of non-melanoma skin  cancer and ABCDEs of melanoma reviewed with patient. Patient encouraged to perform monthly self skin exams and educated on how to perform them. UV precautions reviewed with patient. Patient was asked about new or changing moles/lesions on body.     #Reviewed Sunscreen: Apply 20 minutes prior to going outdoors and reapply every two hours, when wet or sweating. We recommend using an SPF 30 or higher, and to use one that is water resistant.       Follow-up:  1 years for follow up full body skin exam, prn for new or changing lesions or new concerns    Marbella Charlton, APRN CNP on 3/15/2023 at 10:22 AM    ____________________________________________    CC: Skin Check ([facility]. Family hx of )    HPI:  Mr. Ian Moncada is a(n) 57 year old male who presents today as a new patient for a full body skin cancer screening. Patient has concerns today about a spot on his back that his primary care pointed out.  He thought it was the raised spot on his left shoulder that he has felt.  It is asymptomatic.  He also has 2 irritated moles on the right upper back that he would like removed if possible..     Patient is otherwise feeling well, without additional skin concerns.     Physical Exam:  Vitals: There were no vitals taken for this visit.  SKIN: Total skin excluding the genitalia areas was performed. The exam included the head/face, neck, both arms, chest, back, abdomen, both legs, digits, mons pubis, buttock and nails.   -Right upper back, superior, about a 5 to 6 mm brown soft papule  - Right upper back inferior, about a 4 mm pink/brown soft papule  - Mid back, 5 mm medium brown macule with some irregular dark brown pigmentation scattered on the superior edge  - Right low back, 4 mm macule with medium to dark brown pigmentation scattered within  -several 1-2mm red dome shaped symmetric papules scattered on the trunk  -multiple tan/brown flat round macules and raised papules scattered throughout trunk, extremities and head. No  worrisome features for malignancy noted on examination.  -scattered tan, homogenous macules scattered on sun exposed areas of trunk, extremities and face.   -scattered waxy, stuck on tan/brown papules and patches on the trunk     - No other lesions of concern on areas examined.         Medications:  Current Outpatient Medications   Medication     amLODIPine (NORVASC) 5 MG tablet     fish oil-omega-3 fatty acids 1000 MG capsule     MULTIVITAMIN TABS   OR     tamsulosin (FLOMAX) 0.4 MG capsule     No current facility-administered medications for this visit.      Past Medical History:   Patient Active Problem List   Diagnosis     External hemorrhoids with other complication     Family history of ischemic heart disease     Impaired fasting glucose     Hyperlipidemia LDL goal <130     Hypertriglyceridemia     Melanocytic nevus of trunk     Spinal cord compression (H)     Spinal epidural abscess     Acute bilateral thoracic back pain     Past Medical History:   Diagnosis Date     External hemorrhoids with other complication 07/25/2008    Banding 2007.     Hyperlipidemia LDL goal <130 08/12/2010     Hypertriglyceridemia 08/19/2010     Impaired fasting glucose 12/07/2009       CC No referring provider defined for this encounter. on close of this encounter.

## 2023-03-15 NOTE — LETTER
3/15/2023         RE: Ian Moncada  [address] [place] [address]        Dear Colleague,    Thank you for referring your patient, Ian Moncada, to the [facility] [place]. Please see a copy of my visit note below.    [facility] Dermatology Note  Encounter Date: Mar 15, 2023  Office Visit     Reviewed patients past medical history and pertinent chart review prior to patients visit today.     Dermatology Problem List:  NUB right posterior shoulder superior, right posterior shoulder inferior, mid back, right low back, shave biopsy 03/15/23 .     Patient denies personal history of skin cancer or dysplastic nevi.      ____________________________________________    Assessment & Plan:     # Neoplasm of uncertain behavior: Right posterior shoulder, superior DDx includes irritated nevus. Shave biopsy today.  # Neoplasm of uncertain behavior: Right posterior shoulder inferior DDx includes irritated nevus. Shave biopsy today.  # Neoplasm of uncertain behavior: Mid back DDx includes dysplastic nevus vs melanoma. Shave biopsy today.  # Neoplasm of uncertain behavior: Right low back DDx includes dysplastic nevus vs melanoma. Shave biopsy today.    Procedure Note: Biopsy by shave technique  The risks and benefits of the procedure were described to the patient. These include but are not limited to bleeding, infection, scar, incomplete removal, and non-diagnostic biopsy. Verbal informed consent was obtained. The above site(s) was cleansed with an alcohol pad and injected with 1% lidocaine with epinephrine. Once anesthesia was obtained, a biopsy(ies) was performed with Gilette blade. The tissue(s) was placed in a labeled container(s) with formalin and sent to pathology. Hemostasis was achieved with aluminum chloride. Vaseline and a bandage were applied to the wound(s). The patient tolerated the procedure well and was given post biopsy care instructions.     # Benign skin findings  including: seborrheic keratoses, cherry angioma, lentigines and benign nevi.   - No further intervention required. Patient to report changes.   - Patient reassured of the benign nature of these lesions.    #Signs and Symptoms of non-melanoma skin cancer and ABCDEs of melanoma reviewed with patient. Patient encouraged to perform monthly self skin exams and educated on how to perform them. UV precautions reviewed with patient. Patient was asked about new or changing moles/lesions on body.     #Reviewed Sunscreen: Apply 20 minutes prior to going outdoors and reapply every two hours, when wet or sweating. We recommend using an SPF 30 or higher, and to use one that is water resistant.       Follow-up:  1 years for follow up full body skin exam, prn for new or changing lesions or new concerns    Marbella Charlton, SILVA CNP on 3/15/2023 at 10:22 AM    ____________________________________________    CC: Skin Check ([facility]. Family hx of )    HPI:  Mr. Ian Moncada is a(n) 57 year old male who presents today as a new patient for a full body skin cancer screening. Patient has concerns today about a spot on his back that his primary care pointed out.  He thought it was the raised spot on his left shoulder that he has felt.  It is asymptomatic.  He also has 2 irritated moles on the right upper back that he would like removed if possible..     Patient is otherwise feeling well, without additional skin concerns.     Physical Exam:  Vitals: There were no vitals taken for this visit.  SKIN: Total skin excluding the genitalia areas was performed. The exam included the head/face, neck, both arms, chest, back, abdomen, both legs, digits, mons pubis, buttock and nails.   -Right upper back, superior, about a 5 to 6 mm brown soft papule  - Right upper back inferior, about a 4 mm pink/brown soft papule  - Mid back, 5 mm medium brown macule with some irregular dark brown pigmentation scattered on the superior edge  - Right low back, 4 mm  macule with medium to dark brown pigmentation scattered within  -several 1-2mm red dome shaped symmetric papules scattered on the trunk  -multiple tan/brown flat round macules and raised papules scattered throughout trunk, extremities and head. No worrisome features for malignancy noted on examination.  -scattered tan, homogenous macules scattered on sun exposed areas of trunk, extremities and face.   -scattered waxy, stuck on tan/brown papules and patches on the trunk     - No other lesions of concern on areas examined.         Medications:  Current Outpatient Medications   Medication     amLODIPine (NORVASC) 5 MG tablet     fish oil-omega-3 fatty acids 1000 MG capsule     MULTIVITAMIN TABS   OR     tamsulosin (FLOMAX) 0.4 MG capsule     No current facility-administered medications for this visit.      Past Medical History:   Patient Active Problem List   Diagnosis     External hemorrhoids with other complication     Family history of ischemic heart disease     Impaired fasting glucose     Hyperlipidemia LDL goal <130     Hypertriglyceridemia     Melanocytic nevus of trunk     Spinal cord compression (H)     Spinal epidural abscess     Acute bilateral thoracic back pain     Past Medical History:   Diagnosis Date     External hemorrhoids with other complication 07/25/2008    Banding 2007.     Hyperlipidemia LDL goal <130 08/12/2010     Hypertriglyceridemia 08/19/2010     Impaired fasting glucose 12/07/2009       CC No referring provider defined for this encounter. on close of this encounter.      Again, thank you for allowing me to participate in the care of your patient.        Sincerely,        SILVA Robles CNP

## 2023-03-15 NOTE — PATIENT INSTRUCTIONS
Wound Care After a Biopsy    What is a skin biopsy?  A skin biopsy allows the doctor to examine a very small piece of tissue under the microscope to determine the diagnosis and the best treatment for the skin condition. A local anesthetic (numbing medicine)  is injected with a very small needle into the skin area to be tested. A small piece of skin is taken from the area. Sometimes a suture (stitch) is used.     What are the risks of a skin biopsy?  I will experience scar, bleeding, swelling, pain, crusting and redness. I may experience incomplete removal or recurrence. Risks of this procedure are excessive bleeding, bruising, infection, nerve damage, numbness, thick (hypertrophic or keloidal) scar and non-diagnostic biopsy.    How should I care for my wound for the first 24 hours?  Keep the wound dry and covered for 24 hours  If it bleeds, hold direct pressure on the area for 15 minutes. If bleeding does not stop then go to the emergency room  Avoid strenuous exercise the first 1-2 days or as your doctor instructs you    How should I care for the wound after 24 hours?  After 24 hours, remove the bandage  You may bathe or shower as normal  If you had a scalp biopsy, you can shampoo as usual and can use shower water to clean the biopsy site daily  Clean the wound twice a day with gentle soap and water  Do not scrub, be gentle  Apply white petroleum/Vaseline after cleaning the wound with a cotton swab or a clean finger, and keep the site covered with a Bandaid /bandage. Bandages are not necessary with a scalp biopsy  If you are unable to cover the site with a Bandaid /bandage, re-apply ointment 2-3 times a day to keep the site moist. Moisture will help with healing  Avoid strenuous activity for first 1-2 days  Avoid lakes, rivers, pools, and oceans until the stitches are removed or the site is healed    How do I clean my wound?  Wash hands thoroughly with soap or use hand  before all wound care  Clean the  wound with gentle soap and water  Apply white petroleum/Vaseline  to wound after it is clean  Replace the Bandaid /bandage to keep the wound covered for the first few days or as instructed by your doctor  If you had a scalp biopsy, warm shower water to the area on a daily basis should suffice    What should I use to clean my wound?   Cotton-tipped applicators (Qtips )  White petroleum jelly (Vaseline ). Use a clean new container and use Q-tips to apply.  Bandaids   as needed  Gentle soap     How should I care for my wound long term?  Do not get your wound dirty  Keep up with wound care for one week or until the area is healed.  A small scab will form and fall off by itself when the area is completely healed. The area will be red and will become pink in color as it heals. Sun protection is very important for how your scar will turn out. Sunscreen with an SPF 30 or greater is recommended once the area is healed.  You may return to our clinic for this or you may have it done locally at your doctor s office.  You should have some soreness but it should be mild and slowly go away over several days. Talk to your doctor about using tylenol for pain,    When should I call my doctor?  If you have increased:   Pain or swelling  Pus or drainage (clear or slightly yellow drainage is ok)  Temperature over 100F  Spreading redness or warmth around wound    When will I hear about my results?  The biopsy results can take 2 weeks to come back.  Your results will automatically release to [employer] before your provider has even reviewed them.  The clinic will call you with the results, send you a [employer] message, or have you schedule a follow-up clinic or phone time to discuss the results.  Contact our clinics if you do not hear from us in 2 weeks.    Who should I call with questions?  [facility]: [phone]  [facility]: [phone]  For urgent needs outside of business  hours call the [facility] at [phone] and ask for the dermatology resident on call

## 2023-03-17 LAB
PATH REPORT.COMMENTS IMP SPEC: NORMAL
PATH REPORT.COMMENTS IMP SPEC: NORMAL
PATH REPORT.FINAL DX SPEC: NORMAL
PATH REPORT.GROSS SPEC: NORMAL
PATH REPORT.MICROSCOPIC SPEC OTHER STN: NORMAL
PATH REPORT.RELEVANT HX SPEC: NORMAL

## 2023-06-01 ENCOUNTER — HEALTH MAINTENANCE LETTER (OUTPATIENT)
Age: 58
End: 2023-06-01

## 2023-06-08 SDOH — HEALTH STABILITY: PHYSICAL HEALTH: ON AVERAGE, HOW MANY DAYS PER WEEK DO YOU ENGAGE IN MODERATE TO STRENUOUS EXERCISE (LIKE A BRISK WALK)?: 5 DAYS

## 2023-06-08 SDOH — ECONOMIC STABILITY: INCOME INSECURITY: IN THE LAST 12 MONTHS, WAS THERE A TIME WHEN YOU WERE NOT ABLE TO PAY THE MORTGAGE OR RENT ON TIME?: NO

## 2023-06-08 SDOH — ECONOMIC STABILITY: FOOD INSECURITY: WITHIN THE PAST 12 MONTHS, THE FOOD YOU BOUGHT JUST DIDN'T LAST AND YOU DIDN'T HAVE MONEY TO GET MORE.: NEVER TRUE

## 2023-06-08 SDOH — HEALTH STABILITY: PHYSICAL HEALTH: ON AVERAGE, HOW MANY MINUTES DO YOU ENGAGE IN EXERCISE AT THIS LEVEL?: 30 MIN

## 2023-06-08 SDOH — ECONOMIC STABILITY: TRANSPORTATION INSECURITY
IN THE PAST 12 MONTHS, HAS LACK OF TRANSPORTATION KEPT YOU FROM MEETINGS, WORK, OR FROM GETTING THINGS NEEDED FOR DAILY LIVING?: NO

## 2023-06-08 SDOH — ECONOMIC STABILITY: FOOD INSECURITY: WITHIN THE PAST 12 MONTHS, YOU WORRIED THAT YOUR FOOD WOULD RUN OUT BEFORE YOU GOT MONEY TO BUY MORE.: NEVER TRUE

## 2023-06-08 SDOH — ECONOMIC STABILITY: TRANSPORTATION INSECURITY
IN THE PAST 12 MONTHS, HAS THE LACK OF TRANSPORTATION KEPT YOU FROM MEDICAL APPOINTMENTS OR FROM GETTING MEDICATIONS?: NO

## 2023-06-08 SDOH — ECONOMIC STABILITY: INCOME INSECURITY: HOW HARD IS IT FOR YOU TO PAY FOR THE VERY BASICS LIKE FOOD, HOUSING, MEDICAL CARE, AND HEATING?: NOT HARD AT ALL

## 2023-06-08 ASSESSMENT — LIFESTYLE VARIABLES
HOW OFTEN DO YOU HAVE SIX OR MORE DRINKS ON ONE OCCASION: NEVER
SKIP TO QUESTIONS 9-10: 1
AUDIT-C TOTAL SCORE: 3
HOW OFTEN DO YOU HAVE A DRINK CONTAINING ALCOHOL: 2-3 TIMES A WEEK
HOW MANY STANDARD DRINKS CONTAINING ALCOHOL DO YOU HAVE ON A TYPICAL DAY: 1 OR 2

## 2023-06-08 ASSESSMENT — SOCIAL DETERMINANTS OF HEALTH (SDOH)
IN A TYPICAL WEEK, HOW MANY TIMES DO YOU TALK ON THE PHONE WITH FAMILY, FRIENDS, OR NEIGHBORS?: MORE THAN THREE TIMES A WEEK
DO YOU BELONG TO ANY CLUBS OR ORGANIZATIONS SUCH AS CHURCH GROUPS UNIONS, FRATERNAL OR ATHLETIC GROUPS, OR SCHOOL GROUPS?: YES
HOW OFTEN DO YOU GET TOGETHER WITH FRIENDS OR RELATIVES?: ONCE A WEEK
HOW OFTEN DO YOU ATTEND CHURCH OR RELIGIOUS SERVICES?: NEVER

## 2023-06-09 ENCOUNTER — OFFICE VISIT (OUTPATIENT)
Dept: PEDIATRICS | Facility: CLINIC | Age: 58
End: 2023-06-09
Attending: INTERNAL MEDICINE
Payer: COMMERCIAL

## 2023-06-09 ENCOUNTER — HOSPITAL ENCOUNTER (OUTPATIENT)
Dept: MRI IMAGING | Facility: CLINIC | Age: 58
Discharge: HOME | End: 2023-06-09
Attending: NURSE PRACTITIONER
Payer: COMMERCIAL

## 2023-06-09 VITALS
DIASTOLIC BLOOD PRESSURE: 93 MMHG | BODY MASS INDEX: 26.45 KG/M2 | SYSTOLIC BLOOD PRESSURE: 138 MMHG | WEIGHT: 195 LBS | HEART RATE: 60 BPM | TEMPERATURE: 97.8 F

## 2023-06-09 DIAGNOSIS — M54.9 BACK PAIN, UNSPECIFIED BACK LOCATION, UNSPECIFIED BACK PAIN LATERALITY, UNSPECIFIED CHRONICITY: ICD-10-CM

## 2023-06-09 DIAGNOSIS — R03.0 ELEVATED BLOOD PRESSURE READING WITHOUT DIAGNOSIS OF HYPERTENSION: ICD-10-CM

## 2023-06-09 DIAGNOSIS — R42 DIZZINESS: ICD-10-CM

## 2023-06-09 DIAGNOSIS — R30.0 DYSURIA: ICD-10-CM

## 2023-06-09 DIAGNOSIS — G06.1 SPINAL EPIDURAL ABSCESS: ICD-10-CM

## 2023-06-09 DIAGNOSIS — G40.909 RECURRENT SEIZURES (H): ICD-10-CM

## 2023-06-09 DIAGNOSIS — M54.9 BACK PAIN, UNSPECIFIED BACK LOCATION, UNSPECIFIED BACK PAIN LATERALITY, UNSPECIFIED CHRONICITY: Primary | ICD-10-CM

## 2023-06-09 LAB
ALBUMIN SERPL BCG-MCNC: 4.9 G/DL (ref 3.5–5.2)
ALBUMIN UR-MCNC: NEGATIVE MG/DL
ALP SERPL-CCNC: 73 U/L (ref 40–129)
ALT SERPL W P-5'-P-CCNC: 24 U/L (ref 10–50)
ANION GAP SERPL CALCULATED.3IONS-SCNC: 12 MMOL/L (ref 7–15)
APPEARANCE UR: CLEAR
AST SERPL W P-5'-P-CCNC: 32 U/L (ref 10–50)
BASOPHILS # BLD AUTO: 0 10E3/UL (ref 0–0.2)
BASOPHILS NFR BLD AUTO: 1 %
BILIRUB SERPL-MCNC: 0.8 MG/DL
BILIRUB UR QL STRIP: NEGATIVE
BUN SERPL-MCNC: 16.6 MG/DL (ref 8–23)
CALCIUM SERPL-MCNC: 9.5 MG/DL (ref 8.6–10)
CHLORIDE SERPL-SCNC: 100 MMOL/L (ref 98–107)
COLOR UR AUTO: YELLOW
CREAT SERPL-MCNC: 1.14 MG/DL (ref 0.67–1.17)
CRP SERPL-MCNC: <3 MG/L
DEPRECATED HCO3 PLAS-SCNC: 26 MMOL/L (ref 22–29)
EOSINOPHIL # BLD AUTO: 0.1 10E3/UL (ref 0–0.7)
EOSINOPHIL NFR BLD AUTO: 2 %
ERYTHROCYTE [DISTWIDTH] IN BLOOD BY AUTOMATED COUNT: 11.9 % (ref 10–15)
GFR SERPL CREATININE-BSD FRML MDRD: 74 ML/MIN/1.73M2
GLUCOSE SERPL-MCNC: 105 MG/DL (ref 70–99)
GLUCOSE UR STRIP-MCNC: NEGATIVE MG/DL
HCT VFR BLD AUTO: 39.4 % (ref 40–53)
HGB BLD-MCNC: 13.5 G/DL (ref 13.3–17.7)
HGB UR QL STRIP: NEGATIVE
IMM GRANULOCYTES # BLD: 0 10E3/UL
IMM GRANULOCYTES NFR BLD: 0 %
KETONES UR STRIP-MCNC: NEGATIVE MG/DL
LEUKOCYTE ESTERASE UR QL STRIP: NEGATIVE
LYMPHOCYTES # BLD AUTO: 1.4 10E3/UL (ref 0.8–5.3)
LYMPHOCYTES NFR BLD AUTO: 27 %
MCH RBC QN AUTO: 34.1 PG (ref 26.5–33)
MCHC RBC AUTO-ENTMCNC: 34.3 G/DL (ref 31.5–36.5)
MCV RBC AUTO: 100 FL (ref 78–100)
MONOCYTES # BLD AUTO: 0.5 10E3/UL (ref 0–1.3)
MONOCYTES NFR BLD AUTO: 10 %
NEUTROPHILS # BLD AUTO: 3.1 10E3/UL (ref 1.6–8.3)
NEUTROPHILS NFR BLD AUTO: 60 %
NITRATE UR QL: NEGATIVE
NRBC # BLD AUTO: 0 10E3/UL
NRBC BLD AUTO-RTO: 0 /100
PH UR STRIP: 7 [PH] (ref 5–7)
PLATELET # BLD AUTO: 233 10E3/UL (ref 150–450)
POTASSIUM SERPL-SCNC: 3.9 MMOL/L (ref 3.4–5.3)
PROT SERPL-MCNC: 8.4 G/DL (ref 6.4–8.3)
RBC # BLD AUTO: 3.96 10E6/UL (ref 4.4–5.9)
SODIUM SERPL-SCNC: 138 MMOL/L (ref 136–145)
SP GR UR STRIP: 1.01 (ref 1–1.03)
UROBILINOGEN UR STRIP-MCNC: NORMAL MG/DL
WBC # BLD AUTO: 5.2 10E3/UL (ref 4–11)

## 2023-06-09 PROCEDURE — A9585 GADOBUTROL INJECTION: HCPCS | Performed by: NURSE PRACTITIONER

## 2023-06-09 PROCEDURE — 72157 MRI CHEST SPINE W/O & W/DYE: CPT

## 2023-06-09 PROCEDURE — 81003 URINALYSIS AUTO W/O SCOPE: CPT | Performed by: NURSE PRACTITIONER

## 2023-06-09 PROCEDURE — 255N000002 HC RX 255 OP 636: Performed by: NURSE PRACTITIONER

## 2023-06-09 PROCEDURE — 36415 COLL VENOUS BLD VENIPUNCTURE: CPT | Performed by: NURSE PRACTITIONER

## 2023-06-09 PROCEDURE — 85025 COMPLETE CBC W/AUTO DIFF WBC: CPT | Performed by: NURSE PRACTITIONER

## 2023-06-09 PROCEDURE — 80053 COMPREHEN METABOLIC PANEL: CPT | Performed by: NURSE PRACTITIONER

## 2023-06-09 PROCEDURE — 86140 C-REACTIVE PROTEIN: CPT | Performed by: NURSE PRACTITIONER

## 2023-06-09 PROCEDURE — 72158 MRI LUMBAR SPINE W/O & W/DYE: CPT

## 2023-06-09 PROCEDURE — 99214 OFFICE O/P EST MOD 30 MIN: CPT | Performed by: NURSE PRACTITIONER

## 2023-06-09 RX ORDER — GADOBUTROL 604.72 MG/ML
10 INJECTION INTRAVENOUS ONCE
Status: COMPLETED | OUTPATIENT
Start: 2023-06-09 | End: 2023-06-09

## 2023-06-09 RX ORDER — GADOBUTROL 604.72 MG/ML
9 INJECTION INTRAVENOUS ONCE
Status: COMPLETED | OUTPATIENT
Start: 2023-06-09 | End: 2023-06-09

## 2023-06-09 RX ADMIN — GADOBUTROL 9 ML: 604.72 INJECTION INTRAVENOUS at 13:08

## 2023-06-09 RX ADMIN — GADOBUTROL 9 ML: 604.72 INJECTION INTRAVENOUS at 14:04

## 2023-06-09 NOTE — PROGRESS NOTES
Assessment & Plan     Back pain, unspecified back location, unspecified back pain laterality, unspecified chronicity  - CBC with platelets differential; Future  - Comprehensive metabolic panel; Future  - CRP inflammation; Future  - IV access  - sodium chloride (PF) 0.9% PF flush 3 mL  - MR Thoracic Spine w/o & w Contrast; Future  - MR Lumbar Spine w/o & w Contrast; Future  - CBC with platelets differential  - Comprehensive metabolic panel  - CRP inflammation    Spinal epidural abscess  - CBC with platelets differential; Future  - Comprehensive metabolic panel; Future  - CRP inflammation; Future  - IV access  - sodium chloride (PF) 0.9% PF flush 3 mL  - MR Thoracic Spine w/o & w Contrast; Future  - MR Lumbar Spine w/o & w Contrast; Future  - CBC with platelets differential  - Comprehensive metabolic panel  - CRP inflammation    Recurrent seizures (H)  - CBC with platelets differential; Future  - Comprehensive metabolic panel; Future  - CRP inflammation; Future  - IV access  - sodium chloride (PF) 0.9% PF flush 3 mL  - MR Thoracic Spine w/o & w Contrast; Future  - MR Lumbar Spine w/o & w Contrast; Future  - CBC with platelets differential  - Comprehensive metabolic panel  - CRP inflammation    Elevated blood pressure reading without diagnosis of hypertension  - CBC with platelets differential; Future  - Comprehensive metabolic panel; Future  - CRP inflammation; Future  - IV access  - sodium chloride (PF) 0.9% PF flush 3 mL  - MR Thoracic Spine w/o & w Contrast; Future  - MR Lumbar Spine w/o & w Contrast; Future  - CBC with platelets differential  - Comprehensive metabolic panel  - CRP inflammation    Dizziness  - MR Thoracic Spine w/o & w Contrast; Future  - MR Lumbar Spine w/o & w Contrast; Future    Dysuria  - UA Macroscopic with reflex to Microscopic and Culture        Patient Instructions     Results for orders placed or performed during the hospital encounter of 06/09/23   MR Thoracic Spine w/o & w Contrast      Status: None    Narrative    MR THORACIC SPINE WITHOUT AND WITH CONTRAST  6/9/2023 2:05 PM    INDICATION: Prior MSSA epidural abscess, recent back pain with  paresthesia and dizziness. Back pain, unspecified back location,  unspecified back pain laterality, unspecified chronicity. Spinal  epidural abscess. Recurrent seizures (H). Elevated blood pressure  reading without diagnosis of hypertension. Dizziness.    TECHNIQUE: Noncontrast and contrast-enhanced MRI images of the  thoracic spine.  CONTRAST:   9mL Gadavist  COMPARISON: 2/13/2023    FINDINGS: Considerably diminished dorsal epidural enhancement at the  T9 and T10 levels compared to prior. No definite residual collection.  There is also considerably diminished enhancement involving the left  T9-T10 foramen and adjacent bony structures. There are scattered areas  of T2 hyperintense edematous endplate changes scattered throughout the  thoracic spine that are stable to diminished compared to prior.  Essentially resolved T9 spinous process edema. No progressive endplate  or posterior element edema. Multiple thoracic vertebral body endplate  compression deformities are unchanged. Normal thoracic spinal cord.  Multilevel degenerative changes with mild central canal narrowing.      Impression    IMPRESSION:  1.  Nearly resolved dorsal epidural enhancement at the T9 and T10  levels. No definite residual collection. Patent canal.  2.  Diminished, but not completely resolved, enhancement associated  with the left T9-T10 foramen and adjacent bony structures.  3.  Essentially resolved T9 spinous process edema.  4.  No progressive edema, new fluid collection or progressive abnormal  enhancement.    SANTANA CORBETT MD         SYSTEM ID:  [id number]   Results for orders placed or performed during the hospital encounter of 06/09/23   MR Lumbar Spine w/o & w Contrast     Status: None    Narrative    MR LUMBAR SPINE WITHOUT AND WITH CONTRAST 6/9/2023 1:57 PM    INDICATION:  Prior MSSA epidural abscess, recent back pain with  paraesthesia and dizziness; Back pain, unspecified back location,  unspecified back pain laterality, unspecified chronicity; Spinal  epidural abscess; Recurrent seizures (H); Elevated blood pressure  reading without diagnosis of hypertension; Dizziness.    TECHNIQUE: MRI images of the lumbar spine without and with contrast.    CONTRAST: 9mL Gadavist    COMPARISON: Lumbar spine MRI 12/31/2022.    FINDINGS: Nomenclature is based on 5 lumbar type vertebral bodies.  Chronic depression of the superior T12 and L4 endplates. Other  vertebral bodies demonstrate normal heights. Scattered vertebral body  hemangiomas noted. Normal alignment. No pars defect. The conus tip is  identified at L2. Postoperative changes in the posterior elements at  T12-L1 level. There is some residual enhancement within the  subcutaneous tissues and interlaminar region at T12-L1. Interval  resolution of the previously demonstrated peripheral enhancing fluid.  Previously demonstrated dorsal epidural enhancement at this level is  essentially resolved. Progressive right-sided facet joint effusions at  L4-L5 and L5-S1 with mild adjacent soft tissue edema.    T12-L1: Slight loss of disc signal. Normal disc height. No herniation.  Minor facet arthropathy. No stenosis.        L1-L2: Slight loss of disc signal. Normal disc height. Minor disc  bulge and facet arthropathy. Minor spinal canal and foraminal  narrowing.    L2-L3: Slight loss of disc signal. Normal disc height. Minor disc  bulge and facet arthropathy. Minor spinal canal narrowing. No  foraminal narrowing.    L3-L4: Moderate loss of disc signal. Mild loss of disc height. Mild  disc bulge. Mild to moderate facet arthropathy. Mild spinal canal and  foraminal narrowing.    L4-L5: Mild loss of disc signal. Minimal loss of disc height. Mild  disc bulge. Left annular tear at the margin of the neural foramen.  Moderate facet arthropathy appears  progressed. Mild spinal canal  narrowing. No foraminal narrowing.    L5-S1: Mild loss of disc signal. Minimal loss of disc height. Tiny  central protrusion. Mild facet arthropathy. No stenosis.      Impression    IMPRESSION:  1.  Progressive right-sided facet joint effusions at L4-L5 and L5-S1  with mild adjacent edema favored to be degenerative in nature but not  entirely specific as an early infectious/septic facet arthropathy  could have a similar appearance.  2.  Postoperative changes in the dorsal paraspinal soft tissues at the  T12-L1 level. Interval resolution of the previously demonstrated  peripherally enhancing fluid, as well as the dorsal epidural  collection. Presently no evidence for discitis osteomyelitis or  epidural abscess.  3.  Degenerative changes resulting in mild spinal canal and foraminal  narrowing as detailed above.    SANTANA CORBETT MD         SYSTEM ID:  [id number]   Results for orders placed or performed in visit on 06/09/23   Comprehensive metabolic panel     Status: Abnormal   Result Value Ref Range    Sodium 138 136 - 145 mmol/L    Potassium 3.9 3.4 - 5.3 mmol/L    Chloride 100 98 - 107 mmol/L    Carbon Dioxide (CO2) 26 22 - 29 mmol/L    Anion Gap 12 7 - 15 mmol/L    Urea Nitrogen 16.6 8.0 - 23.0 mg/dL    Creatinine 1.14 0.67 - 1.17 mg/dL    Calcium 9.5 8.6 - 10.0 mg/dL    Glucose 105 (H) 70 - 99 mg/dL    Alkaline Phosphatase 73 40 - 129 U/L    AST 32 10 - 50 U/L    ALT 24 10 - 50 U/L    Protein Total 8.4 (H) 6.4 - 8.3 g/dL    Albumin 4.9 3.5 - 5.2 g/dL    Bilirubin Total 0.8 <=1.2 mg/dL    GFR Estimate 74 >60 mL/min/1.73m2   CRP inflammation     Status: Normal   Result Value Ref Range    CRP Inflammation <3.00 <5.00 mg/L   CBC with platelets and differential     Status: Abnormal   Result Value Ref Range    WBC Count 5.2 4.0 - 11.0 10e3/uL    RBC Count 3.96 (L) 4.40 - 5.90 10e6/uL    Hemoglobin 13.5 13.3 - 17.7 g/dL    Hematocrit 39.4 (L) 40.0 - 53.0 %     78 - 100 fL    MCH  34.1 (H) 26.5 - 33.0 pg    MCHC 34.3 31.5 - 36.5 g/dL    RDW 11.9 10.0 - 15.0 %    Platelet Count 233 150 - 450 10e3/uL    % Neutrophils 60 %    % Lymphocytes 27 %    % Monocytes 10 %    % Eosinophils 2 %    % Basophils 1 %    % Immature Granulocytes 0 %    NRBCs per 100 WBC 0 <1 /100    Absolute Neutrophils 3.1 1.6 - 8.3 10e3/uL    Absolute Lymphocytes 1.4 0.8 - 5.3 10e3/uL    Absolute Monocytes 0.5 0.0 - 1.3 10e3/uL    Absolute Eosinophils 0.1 0.0 - 0.7 10e3/uL    Absolute Basophils 0.0 0.0 - 0.2 10e3/uL    Absolute Immature Granulocytes 0.0 <=0.4 10e3/uL    Absolute NRBCs 0.0 10e3/uL   UA Macroscopic with reflex to Microscopic and Culture     Status: Normal    Specimen: Urine, Clean Catch   Result Value Ref Range    Color Urine Yellow Colorless, Straw, Light Yellow, Yellow    Appearance Urine Clear Clear    Glucose Urine Negative Negative mg/dL    Bilirubin Urine Negative Negative    Ketones Urine Negative Negative mg/dL    Specific Gravity Urine 1.013 1.003 - 1.035    Blood Urine Negative Negative    pH Urine 7.0 5.0 - 7.0    Protein Albumin Urine Negative Negative mg/dL    Urobilinogen Urine Normal Normal, 2.0 mg/dL    Nitrite Urine Negative Negative    Leukocyte Esterase Urine Negative Negative    Narrative    Microscopic not indicated   CBC with platelets differential     Status: Abnormal    Narrative    The following orders were created for panel order CBC with platelets differential.  Procedure                               Abnormality         Status                     ---------                               -----------         ------                     CBC with platelets and d...[[id number]]  Abnormal            Final result                 Please view results for these tests on the individual orders.     Labs and UA normal  MRI with several chronic changes and the prior abscess is nearly resolved.    Recommend follow up with spine specialist.        Return in about 1 week (around 6/16/2023) for with  "regular provider if symptoms persist.    SILVA Ramirez CNP  [facility] LISA Sosa is a 58 year old, presenting  urgently to [facility] by referral from Dr Fink to rule out recurrance of spinal abscess  for the following health issues:  Back Pain         View : No data to display.              HPI     Back Pain   Duration: 6/5- getting worse and very similar to last episode         Specific cause: Spinal Abscess?   Description:   Location of pain: low back right \"Same exact place as last time\"  Character of pain: gnawing and constant  Pain radiation:radiates into the right leg  New numbness or weakness in legs, not attributed to pain:  YES  Intensity: Pain was worse yesterday, but everything is progressing just as last time: issues voiding, pain in same spot, pain going down leg and numbness in right leg  History:   Pain interferes with job: YES  History of back problems: Yes- hospitalized in Dec.2022: \"MSSA epidural abscess\"   Any previous MRI or X-rays: Yes- at [facility].  Date 2/13/23  Sees a specialist for back pain:  Yes- has followed with Neurosurg, last visit 2/16/23  Therapies tried without relief: Ibuprofen   Alleviating factors:   Improved by: none    Precipitating factors:  Worsened by: Standing    Feels similar to last episode with epidural abscess.      Review of Systems   Constitutional, HEENT, cardiovascular, pulmonary, GI, , musculoskeletal, neuro, skin, endocrine and psych systems are negative, except as otherwise noted.      Objective    BP (!) 138/93 (Patient Position: Sitting)   Pulse 60   Temp 97.8  F (36.6  C) (Oral)   Wt 88.5 kg (195 lb)   BMI 26.45 kg/m    Body mass index is 26.45 kg/m .  Physical Exam   GENERAL: healthy, alert and no distress  RESP: lungs clear to auscultation - no rales, rhonchi or wheezes  CV: regular rate and rhythm, normal S1 S2, no S3 or S4, no murmur, click or rub, no peripheral edema and peripheral pulses strong  MS: no edema, " peripheral pulses normal and spine exam shows left lateral paraspinal tenderness at T10-L1  SKIN: no suspicious lesions or rashes

## 2023-06-09 NOTE — PATIENT INSTRUCTIONS
Results for orders placed or performed during the hospital encounter of 06/09/23   MR Thoracic Spine w/o & w Contrast     Status: None    Narrative    MR THORACIC SPINE WITHOUT AND WITH CONTRAST  6/9/2023 2:05 PM    INDICATION: Prior MSSA epidural abscess, recent back pain with  paresthesia and dizziness. Back pain, unspecified back location,  unspecified back pain laterality, unspecified chronicity. Spinal  epidural abscess. Recurrent seizures (H). Elevated blood pressure  reading without diagnosis of hypertension. Dizziness.    TECHNIQUE: Noncontrast and contrast-enhanced MRI images of the  thoracic spine.  CONTRAST:   9mL Gadavist  COMPARISON: 2/13/2023    FINDINGS: Considerably diminished dorsal epidural enhancement at the  T9 and T10 levels compared to prior. No definite residual collection.  There is also considerably diminished enhancement involving the left  T9-T10 foramen and adjacent bony structures. There are scattered areas  of T2 hyperintense edematous endplate changes scattered throughout the  thoracic spine that are stable to diminished compared to prior.  Essentially resolved T9 spinous process edema. No progressive endplate  or posterior element edema. Multiple thoracic vertebral body endplate  compression deformities are unchanged. Normal thoracic spinal cord.  Multilevel degenerative changes with mild central canal narrowing.      Impression    IMPRESSION:  1.  Nearly resolved dorsal epidural enhancement at the T9 and T10  levels. No definite residual collection. Patent canal.  2.  Diminished, but not completely resolved, enhancement associated  with the left T9-T10 foramen and adjacent bony structures.  3.  Essentially resolved T9 spinous process edema.  4.  No progressive edema, new fluid collection or progressive abnormal  enhancement.    SANTANA CORBETT MD         SYSTEM ID:  [id number]   Results for orders placed or performed during the hospital encounter of 06/09/23   MR Lumbar Spine w/o  & w Contrast     Status: None    Narrative    MR LUMBAR SPINE WITHOUT AND WITH CONTRAST 6/9/2023 1:57 PM    INDICATION: Prior MSSA epidural abscess, recent back pain with  paraesthesia and dizziness; Back pain, unspecified back location,  unspecified back pain laterality, unspecified chronicity; Spinal  epidural abscess; Recurrent seizures (H); Elevated blood pressure  reading without diagnosis of hypertension; Dizziness.    TECHNIQUE: MRI images of the lumbar spine without and with contrast.    CONTRAST: 9mL Gadavist    COMPARISON: Lumbar spine MRI 12/31/2022.    FINDINGS: Nomenclature is based on 5 lumbar type vertebral bodies.  Chronic depression of the superior T12 and L4 endplates. Other  vertebral bodies demonstrate normal heights. Scattered vertebral body  hemangiomas noted. Normal alignment. No pars defect. The conus tip is  identified at L2. Postoperative changes in the posterior elements at  T12-L1 level. There is some residual enhancement within the  subcutaneous tissues and interlaminar region at T12-L1. Interval  resolution of the previously demonstrated peripheral enhancing fluid.  Previously demonstrated dorsal epidural enhancement at this level is  essentially resolved. Progressive right-sided facet joint effusions at  L4-L5 and L5-S1 with mild adjacent soft tissue edema.    T12-L1: Slight loss of disc signal. Normal disc height. No herniation.  Minor facet arthropathy. No stenosis.        L1-L2: Slight loss of disc signal. Normal disc height. Minor disc  bulge and facet arthropathy. Minor spinal canal and foraminal  narrowing.    L2-L3: Slight loss of disc signal. Normal disc height. Minor disc  bulge and facet arthropathy. Minor spinal canal narrowing. No  foraminal narrowing.    L3-L4: Moderate loss of disc signal. Mild loss of disc height. Mild  disc bulge. Mild to moderate facet arthropathy. Mild spinal canal and  foraminal narrowing.    L4-L5: Mild loss of disc signal. Minimal loss of disc  height. Mild  disc bulge. Left annular tear at the margin of the neural foramen.  Moderate facet arthropathy appears progressed. Mild spinal canal  narrowing. No foraminal narrowing.    L5-S1: Mild loss of disc signal. Minimal loss of disc height. Tiny  central protrusion. Mild facet arthropathy. No stenosis.      Impression    IMPRESSION:  1.  Progressive right-sided facet joint effusions at L4-L5 and L5-S1  with mild adjacent edema favored to be degenerative in nature but not  entirely specific as an early infectious/septic facet arthropathy  could have a similar appearance.  2.  Postoperative changes in the dorsal paraspinal soft tissues at the  T12-L1 level. Interval resolution of the previously demonstrated  peripherally enhancing fluid, as well as the dorsal epidural  collection. Presently no evidence for discitis osteomyelitis or  epidural abscess.  3.  Degenerative changes resulting in mild spinal canal and foraminal  narrowing as detailed above.    SANTANA CORBETT MD         SYSTEM ID:  [id number]   Results for orders placed or performed in visit on 06/09/23   Comprehensive metabolic panel     Status: Abnormal   Result Value Ref Range    Sodium 138 136 - 145 mmol/L    Potassium 3.9 3.4 - 5.3 mmol/L    Chloride 100 98 - 107 mmol/L    Carbon Dioxide (CO2) 26 22 - 29 mmol/L    Anion Gap 12 7 - 15 mmol/L    Urea Nitrogen 16.6 8.0 - 23.0 mg/dL    Creatinine 1.14 0.67 - 1.17 mg/dL    Calcium 9.5 8.6 - 10.0 mg/dL    Glucose 105 (H) 70 - 99 mg/dL    Alkaline Phosphatase 73 40 - 129 U/L    AST 32 10 - 50 U/L    ALT 24 10 - 50 U/L    Protein Total 8.4 (H) 6.4 - 8.3 g/dL    Albumin 4.9 3.5 - 5.2 g/dL    Bilirubin Total 0.8 <=1.2 mg/dL    GFR Estimate 74 >60 mL/min/1.73m2   CRP inflammation     Status: Normal   Result Value Ref Range    CRP Inflammation <3.00 <5.00 mg/L   CBC with platelets and differential     Status: Abnormal   Result Value Ref Range    WBC Count 5.2 4.0 - 11.0 10e3/uL    RBC Count 3.96 (L) 4.40 -  5.90 10e6/uL    Hemoglobin 13.5 13.3 - 17.7 g/dL    Hematocrit 39.4 (L) 40.0 - 53.0 %     78 - 100 fL    MCH 34.1 (H) 26.5 - 33.0 pg    MCHC 34.3 31.5 - 36.5 g/dL    RDW 11.9 10.0 - 15.0 %    Platelet Count 233 150 - 450 10e3/uL    % Neutrophils 60 %    % Lymphocytes 27 %    % Monocytes 10 %    % Eosinophils 2 %    % Basophils 1 %    % Immature Granulocytes 0 %    NRBCs per 100 WBC 0 <1 /100    Absolute Neutrophils 3.1 1.6 - 8.3 10e3/uL    Absolute Lymphocytes 1.4 0.8 - 5.3 10e3/uL    Absolute Monocytes 0.5 0.0 - 1.3 10e3/uL    Absolute Eosinophils 0.1 0.0 - 0.7 10e3/uL    Absolute Basophils 0.0 0.0 - 0.2 10e3/uL    Absolute Immature Granulocytes 0.0 <=0.4 10e3/uL    Absolute NRBCs 0.0 10e3/uL   UA Macroscopic with reflex to Microscopic and Culture     Status: Normal    Specimen: Urine, Clean Catch   Result Value Ref Range    Color Urine Yellow Colorless, Straw, Light Yellow, Yellow    Appearance Urine Clear Clear    Glucose Urine Negative Negative mg/dL    Bilirubin Urine Negative Negative    Ketones Urine Negative Negative mg/dL    Specific Gravity Urine 1.013 1.003 - 1.035    Blood Urine Negative Negative    pH Urine 7.0 5.0 - 7.0    Protein Albumin Urine Negative Negative mg/dL    Urobilinogen Urine Normal Normal, 2.0 mg/dL    Nitrite Urine Negative Negative    Leukocyte Esterase Urine Negative Negative    Narrative    Microscopic not indicated   CBC with platelets differential     Status: Abnormal    Narrative    The following orders were created for panel order CBC with platelets differential.  Procedure                               Abnormality         Status                     ---------                               -----------         ------                     CBC with platelets and d...[[id number]]  Abnormal            Final result                 Please view results for these tests on the individual orders.     Labs and UA normal  MRI with several chronic changes and the prior abscess is nearly  resolved.    Recommend follow up with spine specialist.

## 2023-07-27 ENCOUNTER — TRANSFERRED RECORDS (OUTPATIENT)
Dept: HEALTH INFORMATION MANAGEMENT | Facility: CLINIC | Age: 58
End: 2023-07-27
Payer: COMMERCIAL

## 2023-08-22 ASSESSMENT — ENCOUNTER SYMPTOMS
NAUSEA: 0
HEMATURIA: 0
COUGH: 0
PALPITATIONS: 0
NERVOUS/ANXIOUS: 0
PARESTHESIAS: 0
WEAKNESS: 0
FEVER: 0
HEARTBURN: 0
CONSTIPATION: 1
EYE PAIN: 0
ABDOMINAL PAIN: 0
DIZZINESS: 0
HEMATOCHEZIA: 0
DIARRHEA: 0
MYALGIAS: 0
FREQUENCY: 0
SHORTNESS OF BREATH: 0
SORE THROAT: 0
HEADACHES: 0
JOINT SWELLING: 1
DYSURIA: 0
ARTHRALGIAS: 0
CHILLS: 0

## 2023-08-29 ENCOUNTER — OFFICE VISIT (OUTPATIENT)
Dept: PEDIATRICS | Facility: CLINIC | Age: 58
End: 2023-08-29
Payer: COMMERCIAL

## 2023-08-29 VITALS
HEIGHT: 71 IN | HEART RATE: 63 BPM | OXYGEN SATURATION: 98 % | TEMPERATURE: 98 F | SYSTOLIC BLOOD PRESSURE: 124 MMHG | BODY MASS INDEX: 26.88 KG/M2 | RESPIRATION RATE: 16 BRPM | WEIGHT: 192 LBS | DIASTOLIC BLOOD PRESSURE: 85 MMHG

## 2023-08-29 DIAGNOSIS — E78.00 HYPERCHOLESTEREMIA: ICD-10-CM

## 2023-08-29 DIAGNOSIS — Z11.4 SCREENING FOR HIV (HUMAN IMMUNODEFICIENCY VIRUS): ICD-10-CM

## 2023-08-29 DIAGNOSIS — I10 BENIGN ESSENTIAL HYPERTENSION: ICD-10-CM

## 2023-08-29 DIAGNOSIS — Z00.00 ENCOUNTER FOR ROUTINE ADULT HEALTH EXAMINATION WITHOUT ABNORMAL FINDINGS: Primary | ICD-10-CM

## 2023-08-29 LAB
CHOLEST SERPL-MCNC: 255 MG/DL
HBA1C MFR BLD: 4.8 % (ref 0–5.6)
HDLC SERPL-MCNC: 46 MG/DL
LDLC SERPL CALC-MCNC: 167 MG/DL
NONHDLC SERPL-MCNC: 209 MG/DL
TRIGL SERPL-MCNC: 212 MG/DL

## 2023-08-29 PROCEDURE — 99396 PREV VISIT EST AGE 40-64: CPT | Performed by: INTERNAL MEDICINE

## 2023-08-29 PROCEDURE — 83036 HEMOGLOBIN GLYCOSYLATED A1C: CPT | Performed by: INTERNAL MEDICINE

## 2023-08-29 PROCEDURE — 99213 OFFICE O/P EST LOW 20 MIN: CPT | Mod: 25 | Performed by: INTERNAL MEDICINE

## 2023-08-29 PROCEDURE — 80061 LIPID PANEL: CPT | Performed by: INTERNAL MEDICINE

## 2023-08-29 PROCEDURE — 36415 COLL VENOUS BLD VENIPUNCTURE: CPT | Performed by: INTERNAL MEDICINE

## 2023-08-29 RX ORDER — AMLODIPINE BESYLATE 5 MG/1
5 TABLET ORAL DAILY
Qty: 90 TABLET | Refills: 3 | Status: SHIPPED | OUTPATIENT
Start: 2023-08-29 | End: 2024-09-09

## 2023-08-29 RX ORDER — ATORVASTATIN CALCIUM 20 MG/1
20 TABLET, FILM COATED ORAL DAILY
Qty: 90 TABLET | Refills: 3 | Status: SHIPPED | OUTPATIENT
Start: 2023-08-29

## 2023-08-29 ASSESSMENT — ENCOUNTER SYMPTOMS
FREQUENCY: 0
JOINT SWELLING: 1
MYALGIAS: 0
PALPITATIONS: 0
DYSURIA: 0
HEARTBURN: 0
SORE THROAT: 0
NERVOUS/ANXIOUS: 0
EYE PAIN: 0
HEMATOCHEZIA: 0
FEVER: 0
ABDOMINAL PAIN: 0
DIARRHEA: 0
COUGH: 0
HEMATURIA: 0
NAUSEA: 0
CHILLS: 0
PARESTHESIAS: 0
SHORTNESS OF BREATH: 0
CONSTIPATION: 1
HEADACHES: 0
ARTHRALGIAS: 0
DIZZINESS: 0
WEAKNESS: 0

## 2023-08-29 ASSESSMENT — PAIN SCALES - GENERAL: PAINLEVEL: NO PAIN (0)

## 2023-08-29 NOTE — PATIENT INSTRUCTIONS
"You could get Shingrix.  Age 64:  prevnar 20.    Colonoscopy; repeat in 2029.    Lab work today:  We can do labs in the exam room today, or you can get them done downstairs in the lab.      If you are going downstairs:  Directions:  As you walk through the first floor, you'll see (on the right) first the pharmacy, then some bathrooms, then the \"Lab and Imaging\" area. Give them your name at the window there and wait for them to call you.    Alfredo Fink MD  Internal Medicine and Pediatrics            "

## 2023-08-29 NOTE — PROGRESS NOTES
SUBJECTIVE:   CC: Ian is an 58 year old who presents for preventative health visit.       8/29/2023     7:40 AM   Additional Questions   Roomed by [facility]   Accompanied by self         8/29/2023     7:40 AM   Patient Reported Additional Medications   Patient reports taking the following new medications none       Healthy Habits:     Getting at least 3 servings of Calcium per day:  NO    Bi-annual eye exam:  Yes    Dental care twice a year:  Yes    Sleep apnea or symptoms of sleep apnea:  None    Diet:  Regular (no restrictions)    Frequency of exercise:  4-5 days/week    Duration of exercise:  30-45 minutes    Taking medications regularly:  Yes    Medication side effects:  None    Additional concerns today:  No    Bursitis left elbow.  Has tried ice and ibuprofen.      Was         Social History     Tobacco Use    Smoking status: Never     Passive exposure: Never    Smokeless tobacco: Never   Substance Use Topics    Alcohol use: Yes     Alcohol/week: 7.0 standard drinks of alcohol     Types: 7 Standard drinks or equivalent per week     Comment: pn the weekends             8/22/2023     8:59 AM   Alcohol Use   Prescreen: >3 drinks/day or >7 drinks/week? No       Last PSA: No results found for: PSA    Reviewed orders with patient. Reviewed health maintenance and updated orders accordingly - Yes  Lab work is in process  Labs reviewed in EPIC    Reviewed and updated as needed this visit by clinical staff   Tobacco  Allergies  Meds              Reviewed and updated as needed this visit by Provider                   Past Medical History:   Diagnosis Date    External hemorrhoids with other complication 07/25/2008    Banding 2007.    Hyperlipidemia LDL goal <130 08/12/2010    Hypertriglyceridemia 08/19/2010    Impaired fasting glucose 12/07/2009      Past Surgical History:   Procedure Laterality Date    COLONOSCOPY  When I was 44    DECOMPRESSION LUMBAR ONE LEVEL N/A 12/18/2022    Procedure: THORACIC LUMBAR DECOMPRESSION FOR  EVACUATION AND EPIDURAL ABSCESS VERSUS HEMATOMA;  Surgeon: Evans Courtney MD;  Location: [facility] OR    IR PICC PLACEMENT > 5 YRS OF AGE  01/02/2023    TONSILLECTOMY  1970s       Review of Systems   Constitutional:  Negative for chills and fever.   HENT:  Negative for congestion, ear pain, hearing loss and sore throat.    Eyes:  Negative for pain and visual disturbance.   Respiratory:  Negative for cough and shortness of breath.    Cardiovascular:  Negative for chest pain, palpitations and peripheral edema.   Gastrointestinal:  Positive for constipation. Negative for abdominal pain, diarrhea, heartburn, hematochezia and nausea.   Genitourinary:  Negative for dysuria, frequency, genital sores, hematuria, impotence, penile discharge and urgency.   Musculoskeletal:  Positive for joint swelling. Negative for arthralgias and myalgias.   Skin:  Negative for rash.   Neurological:  Negative for dizziness, weakness, headaches and paresthesias.   Psychiatric/Behavioral:  Negative for mood changes. The patient is not nervous/anxious.      CONSTITUTIONAL: NEGATIVE for fever, chills, change in weight  INTEGUMENTARY/SKIN: NEGATIVE for worrisome rashes, moles or lesions  EYES: NEGATIVE for vision changes or irritation  ENT: NEGATIVE for ear, mouth and throat problems  RESP: NEGATIVE for significant cough or SOB  CV: NEGATIVE for chest pain, palpitations or peripheral edema  GI: NEGATIVE for nausea, abdominal pain, heartburn, or change in bowel habits   male: negative for dysuria, hematuria, decreased urinary stream, erectile dysfunction, urethral discharge  MUSCULOSKELETAL: NEGATIVE for significant arthralgias or myalgia  NEURO: NEGATIVE for weakness, dizziness or paresthesias  PSYCHIATRIC: NEGATIVE for changes in mood or affect    OBJECTIVE:   There were no vitals taken for this visit.    Physical Exam  GENERAL: healthy, alert and no distress  EYES: Eyes grossly normal to inspection, PERRL and conjunctivae and sclerae  normal  HENT: ear canals and TM's normal, nose and mouth without ulcers or lesions  NECK: no adenopathy, no asymmetry, masses, or scars and thyroid normal to palpation  RESP: lungs clear to auscultation - no rales, rhonchi or wheezes  CV: regular rate and rhythm, normal S1 S2, no S3 or S4, no murmur, click or rub, no peripheral edema and peripheral pulses strong  ABDOMEN: soft, nontender, no hepatosplenomegaly, no masses and bowel sounds normal   (male): normal male genitalia without lesions or urethral discharge, no hernia  MS: no gross musculoskeletal defects noted, no edema  SKIN: no suspicious lesions or rashes  NEURO: Normal strength and tone, mentation intact and speech normal  PSYCH: mentation appears normal, affect normal/bright    Diagnostic Test Results:  Labs reviewed in Epic    ASSESSMENT/PLAN:   (Z11.4) Screening for HIV (human immunodeficiency virus)  (primary encounter diagnosis)  Comment:   Plan: refuses screening.     (Z00.00) Encounter for routine adult health examination without abnormal findings  Comment:   Plan: Lipid panel reflex to direct LDL Fasting,         Hemoglobin A1c        Discussed diet, exercise, testicular self exam, blood pressure, cholesterol, and need for cancer surveillance at appropriate ages.     (I10) Benign essential hypertension  Comment:   Plan: amLODIPine (NORVASC) 5 MG tablet        At goal.  Refilled.     Patient has been advised of split billing requirements and indicates understanding: Yes      COUNSELING:   Reviewed preventive health counseling, as reflected in patient instructions       Regular exercise       Healthy diet/nutrition       Vision screening       Hearing screening       Colorectal cancer screening       Prostate cancer screening      BMI:   Estimated body mass index is 26.45 kg/m  as calculated from the following:    Height as of 1/12/23: 1.829 m (6').    Weight as of 6/9/23: 88.5 kg (195 lb).         He reports that he has never smoked. He has never  been exposed to tobacco smoke. He has never used smokeless tobacco.            Alfredo Fink MD  [facility]

## 2024-02-12 ENCOUNTER — MYC MEDICAL ADVICE (OUTPATIENT)
Dept: PEDIATRICS | Facility: CLINIC | Age: 59
End: 2024-02-12
Payer: COMMERCIAL

## 2024-02-12 NOTE — TELEPHONE ENCOUNTER
Patient Quality Outreach Health Maintenance - PAL RN    Summary:    PAL RN contacted pt regarding overdue health maintenance    Patient is due/failing the following:       Topic Date Due    Hepatitis B Vaccine (1 of 3 - 3-dose series) Never done    COVID-19 Vaccine (1) Never done    Zoster (Shingles) Vaccine (1 of 2) Never done    Flu Vaccine (1) 09/01/2023       Health Maintenance Due   Topic Date Due    ANNUAL REVIEW OF HM ORDERS  Never done    HEPATITIS B IMMUNIZATION (1 of 3 - 3-dose series) Never done    COVID-19 Vaccine (1) Never done    HIV SCREENING  Never done    ZOSTER IMMUNIZATION (1 of 2) Never done    INFLUENZA VACCINE (1) 09/01/2023    PHQ-2 (once per calendar year)  01/01/2024       Type of outreach:    Sent e-contratos message. Attached a PHQ-2 Questionnaire to the [employer] message for the patient to complete.     - Advised patient if any questions or concerns comes up to call the PAL RN.   - Patient given opportunity to ask questions and at this time there is nothing further needed.     Questions for provider review:    None    Christina HARRIS RN   Patient Advocate Liaison (PAL)  [facility]   [phone]

## 2024-02-19 NOTE — TELEPHONE ENCOUNTER
1st / 2nd Attempt Patient Quality Outreach Health Maintenance - PAL RN    Summary:    PAL RN attempted (attempt # 2) to contact pt regarding overdue health maintenance    Patient is due/failing the following:       Topic Date Due    Hepatitis B Vaccine (1 of 3 - 3-dose series) Never done    COVID-19 Vaccine (1) Never done    Zoster (Shingles) Vaccine (1 of 2) Never done    Flu Vaccine (1) 09/01/2023       Health Maintenance Due   Topic Date Due    ANNUAL REVIEW OF HM ORDERS  Never done    HEPATITIS B IMMUNIZATION (1 of 3 - 3-dose series) Never done    COVID-19 Vaccine (1) Never done    HIV SCREENING  Never done    ZOSTER IMMUNIZATION (1 of 2) Never done    INFLUENZA VACCINE (1) 09/01/2023    PHQ-2 (once per calendar year)  01/01/2024       Type of outreach:    Sent [employer] message.    - PAL RN Left VM requesting call back to further discuss and schedule appt, awaiting call back at this time.  - PAL RN sent MyChart message, advised to schedule appointment with provider    Next Steps:  Max number of attempts reached: Yes. Will try again in 90 days if patient still on fail list.    Upon chart review:   - Patient read the [employer] message with the attached PHQ-2 Questionnaire but did not complete the questionnaire   - Patient has an upcoming appointment on 3/13/2024     Appointments in Next Year      Mar 13, 2024 10:15 AM  (Arrive by 10:00 AM)  Return Visit with SILVA Palencia CNP  [facility] ([facility] [place] ) [phone]     Christina HARRIS RN   Patient Advocate Liaison (PAL)  [facility]   [phone]

## 2024-03-13 ENCOUNTER — OFFICE VISIT (OUTPATIENT)
Dept: FAMILY MEDICINE | Facility: CLINIC | Age: 59
End: 2024-03-13
Payer: COMMERCIAL

## 2024-03-13 DIAGNOSIS — L81.4 LENTIGINES: ICD-10-CM

## 2024-03-13 DIAGNOSIS — L82.1 SEBORRHEIC KERATOSES: ICD-10-CM

## 2024-03-13 DIAGNOSIS — D18.01 CHERRY ANGIOMA: ICD-10-CM

## 2024-03-13 DIAGNOSIS — D22.9 MULTIPLE BENIGN NEVI: Primary | ICD-10-CM

## 2024-03-13 DIAGNOSIS — L82.0 INFLAMED SEBORRHEIC KERATOSIS: ICD-10-CM

## 2024-03-13 DIAGNOSIS — L57.0 AK (ACTINIC KERATOSIS): ICD-10-CM

## 2024-03-13 DIAGNOSIS — Z12.83 ENCOUNTER FOR SCREENING FOR MALIGNANT NEOPLASM OF SKIN: ICD-10-CM

## 2024-03-13 PROCEDURE — 17110 DESTRUCTION B9 LES UP TO 14: CPT | Performed by: NURSE PRACTITIONER

## 2024-03-13 PROCEDURE — 99213 OFFICE O/P EST LOW 20 MIN: CPT | Mod: 25 | Performed by: NURSE PRACTITIONER

## 2024-03-13 PROCEDURE — 17000 DESTRUCT PREMALG LESION: CPT | Mod: XS | Performed by: NURSE PRACTITIONER

## 2024-03-13 ASSESSMENT — PAIN SCALES - GENERAL: PAINLEVEL: NO PAIN (0)

## 2024-03-13 NOTE — LETTER
3/13/2024         RE: Ian Moncada  [address] [place] [address]        Dear Colleague,    Thank you for referring your patient, Ian Moncada, to the [facility]. Please see a copy of my visit note below.    [facility] Dermatology Note  Encounter Date: Mar 13, 2024  Office Visit     Reviewed patients past medical history and pertinent chart review prior to patients visit today.     Dermatology Problem List:  Mildly DN mid back, right low back, shave biopsy 03/15/23 .   Benign nevi biopsied from right posterior shoulder superior, right posterior shoulder inferior  AK right frontal scalp, cryo 3/13/2024   ISK back and flank, cryo 3/13/2024     Patient denies personal history of skin cancer or dysplastic nevi.  ____________________________________________    Assessment & Plan:     # Actinic keratosis, right frontal scalp. Premalignant nature discussed with patient. Treatment options discussed with patient today including no treatment, topical treatment, and cryotherapy. Patient elects to treat visible lesions today with cryotherapy. After verbal consent and discussion of risks and benefits including but no limited to dyspigmentation/scar, blister, and pain. A total of 1 actinic keratoses were treated with 1-2mm freeze border for 2 cycle with liquid nitrogen. Post cryotherapy instructions were provided.     # Irritated and inflamed Seborrheic Keratosis. Discussed treatment options with patient including no treatment, cryotherapy, and shave removal. Patient prefers cryotherapy today due to irritation and itching. After verbal consent and discussion of risks and benefits including but no limited to dyspigmentation/scar, blister, and pain, 6 was(were) treated with 1-2mm freeze border for 2 cycles with liquid nitrogen. Post cryotherapy instructions were provided.       # Benign skin findings including: seborrheic keratoses, cherry angioma, lentigines and benign nevi.    - No further intervention required. Patient to report changes.   - Patient reassured of the benign nature of these lesions.    #Signs and Symptoms of non-melanoma skin cancer and ABCDEs of melanoma reviewed with patient. Patient encouraged to perform monthly self skin exams and educated on how to perform them. UV precautions reviewed with patient. Patient was asked about new or changing moles/lesions on body.     #Reviewed Sunscreen: Apply 20 minutes prior to going outdoors and reapply every two hours, when wet or sweating. We recommend using an SPF 30 or higher, and to use one that is water resistant.       Follow-up:  1 years for follow up full body skin exam, prn for new or changing lesions or new concerns    Leesa Charlton CNP  Dermatology   ____________________________________________    CC: RECHECK (@ itchy lesions previously bx)    HPI:  Mr. Ian Moncada is a(n) 58 year old male who presents today as a return patient for an upper body skin cancer screening.  Patient declines examination of the lower body.  He has 1 spot on the left back near his shoulder that has been quite itchy and bothersome.  He wonders if that can be treated or removed.  There are other few that were more mildly irritated on the back and the left flank.  He also has a rough spot on the right frontal scalp that is been there for at least a few months.      Patient is otherwise feeling well, without additional skin concerns.     Physical Exam:  Vitals: There were no vitals taken for this visit.  SKIN: Total skin excluding the genitalia areas was performed. The exam included the head/face, neck, both arms, chest, back, abdomen, both legs, digits, mons pubis, buttock and nails.   -Well-healed scars without signs of recurrent pigmentation on the back from previous biopsy sites  -Pink mildly scaly papule on the right frontal scalp  -several 1-2mm red dome shaped symmetric papules scattered on the trunk  -multiple tan/brown flat round macules and  raised papules scattered throughout trunk, extremities and head. No worrisome features for malignancy noted on examination.  -scattered tan, homogenous macules scattered on sun exposed areas of trunk, extremities and face.   -scattered waxy, stuck on tan/brown papules and patches on the trunk     - No other lesions of concern on areas examined.     Medications:  Current Outpatient Medications   Medication     amLODIPine (NORVASC) 5 MG tablet     fish oil-omega-3 fatty acids 1000 MG capsule     MULTIVITAMIN TABS   OR     atorvastatin (LIPITOR) 20 MG tablet     No current facility-administered medications for this visit.      Past Medical History:   Patient Active Problem List   Diagnosis     External hemorrhoids with other complication     Family history of ischemic heart disease     Impaired fasting glucose     Hyperlipidemia LDL goal <130     Hypertriglyceridemia     Melanocytic nevus of trunk     Spinal cord compression (H)     Spinal epidural abscess     Acute bilateral thoracic back pain     Past Medical History:   Diagnosis Date     External hemorrhoids with other complication 07/25/2008    Banding 2007.     Hyperlipidemia LDL goal <130 08/12/2010     Hypertriglyceridemia 08/19/2010     Impaired fasting glucose 12/07/2009       CC No referring provider defined for this encounter. on close of this encounter.      Again, thank you for allowing me to participate in the care of your patient.        Sincerely,        Marbella Charlton, SILVA CNP

## 2024-03-13 NOTE — PATIENT INSTRUCTIONS
Patient Education       Proper skin care from [facility]:    -Eliminate harsh soaps as they strip the natural oils from the skin, often resulting in dry itchy skin ( i.e. Dial, Zest, [place] Spring)  -Use mild soaps such as Cetaphil or Dove Sensitive Skin in the shower. You do not need to use soap on arms, legs, and trunk every time you shower unless visibly soiled.   -Avoid hot or cold showers.  -After showering, lightly dry off and apply moisturizing within 2-3 minutes. This will help trap moisture in the skin.   -Aggressive use of a moisturizer at least 1-2 times a day to the entire body (including -Vanicream, Cetaphil, Aquaphor or Cerave) and moisturize hands after every washing.  -We recommend using moisturizers that come in a tub that needs to be scooped out, not a pump. This has more of an oil base. It will hold moisture in your skin much better than a water base moisturizer. The above recommended are non-pore clogging.      Wear a sunscreen with at least SPF 30 on your face, ears, neck and V of the chest daily. Wear sunscreen on other areas of the body if those areas are exposed to the sun throughout the day. Sunscreens can contain physical and/or chemical blockers. Physical blockers are less likely to clog pores, these include zinc oxide and titanium dioxide. Reapply every two hour and after swimming.     Sunscreen examples: https://www.ewg.org/sunscreen/    UV radiation  UVA radiation remains constant throughout the day and throughout the year. It is a longer wavelength than UVB and therefore penetrates deeper into the skin leading to immediate and delayed tanning, photoaging, and skin cancer. 70-80% of UVA and UVB radiation occurs between the hours of 10am-2pm.  UVB radiation  UVB radiation causes the most harmful effects and is more significant during the summer months. However, snow and ice can reflect UVB radiation leading to skin damage during the winter months as well. UVB radiation is  responsible for tanning, burning, inflammation, delayed erythema (pinkness), pigmentation (brown spots), and skin cancer.     I recommend self monthly full body exams and yearly full body exams with a dermatology provider. If you develop a new or changing lesion please follow up for examination. Most skin cancers are pink and scaly or pink and pearly. However, we do see blue/brown/black skin cancers.  Consider the ABCDEs of melanoma when giving yourself your monthly full body exam ( don't forget the groin, buttocks, feet, toes, etc). A-asymmetry, B-borders, C-color, D-diameter, E-elevation or evolving. If you see any of these changes please follow up in clinic. If you cannot see your back I recommend purchasing a hand held mirror to use with a larger wall mirror.       Checking for Skin Cancer  You can find cancer early by checking your skin each month. There are 3 kinds of skin cancer. They are melanoma, basal cell carcinoma, and squamous cell carcinoma. Doing monthly skin checks is the best way to find new marks or skin changes. Follow the instructions below for checking your skin.   The ABCDEs of checking moles for melanoma   Check your moles or growths for signs of melanoma using ABCDE:   Asymmetry: the sides of the mole or growth don t match  Border: the edges are ragged, notched, or blurred  Color: the color within the mole or growth varies  Diameter: the mole or growth is larger than 6 mm (size of a pencil eraser)  Evolving: the size, shape, or color of the mole or growth is changing (evolving is not shown in the images below)    Checking for other types of skin cancer  Basal cell carcinoma or squamous cell carcinoma have symptoms such as:     A spot or mole that looks different from all other marks on your skin  Changes in how an area feels, such as itching, tenderness, or pain  Changes in the skin's surface, such as oozing, bleeding, or scaliness  A sore that does not heal  New swelling or redness beyond  the border of a mole    Who s at risk?  Anyone can get skin cancer. But you are at greater risk if you have:   Fair skin, light-colored hair, or light-colored eyes  Many moles or abnormal moles on your skin  A history of sunburns from sunlight or tanning beds  A family history of skin cancer  A history of exposure to radiation or chemicals  A weakened immune system  If you have had skin cancer in the past, you are at risk for recurring skin cancer.   How to check your skin  Do your monthly skin checkups in front of a full-length mirror. Check all parts of your body, including your:   Head (ears, face, neck, and scalp)  Torso (front, back, and sides)  Arms (tops, undersides, upper, and lower armpits)  Hands (palms, backs, and fingers, including under the nails)  Buttocks and genitals  Legs (front, back, and sides)  Feet (tops, soles, toes, including under the nails, and between toes)  If you have a lot of moles, take digital photos of them each month. Make sure to take photos both up close and from a distance. These can help you see if any moles change over time.   Most skin changes are not cancer. But if you see any changes in your skin, call your doctor right away. Only he or she can diagnose a problem. If you have skin cancer, seeing your doctor can be the first step toward getting the treatment that could save your life.   [employer] last reviewed this educational content on 4/1/2019 2000-2020 [employer]. [address] [place] [address], [place] [address]. All rights reserved. This information is not intended as a substitute for professional medical care. Always follow your healthcare professional's instructions.       When should I call my doctor?  If you are worsening or not improving, please, contact us or seek urgent care as noted below.     Who should I call with questions (adults)?  [facility] (adult and pediatric): [phone]  [facility]  [place] (adult): [phone]  [facility] ([place], [place], [place] and [place]) [phone]  For urgent needs outside of business hours call the [facility] at [phone] and ask for the dermatology resident on call to be paged  If this is a medical emergency and you are unable to reach an ER, Call 911      If you need a prescription refill, please contact your pharmacy. Refills are approved or denied by our Physicians during normal business hours, Monday through Fridays  Per office policy, refills will not be granted if you have not been seen within the past year (or sooner depending on your child's condition)

## 2024-03-13 NOTE — PROGRESS NOTES
[facility] Dermatology Note  Encounter Date: Mar 13, 2024  Office Visit     Reviewed patients past medical history and pertinent chart review prior to patients visit today.     Dermatology Problem List:  Mildly DN mid back, right low back, shave biopsy 03/15/23 .   Benign nevi biopsied from right posterior shoulder superior, right posterior shoulder inferior  AK right frontal scalp, cryo 3/13/2024   ISK back and flank, cryo 3/13/2024     Patient denies personal history of skin cancer or dysplastic nevi.  ____________________________________________    Assessment & Plan:     # Actinic keratosis, right frontal scalp. Premalignant nature discussed with patient. Treatment options discussed with patient today including no treatment, topical treatment, and cryotherapy. Patient elects to treat visible lesions today with cryotherapy. After verbal consent and discussion of risks and benefits including but no limited to dyspigmentation/scar, blister, and pain. A total of 1 actinic keratoses were treated with 1-2mm freeze border for 2 cycle with liquid nitrogen. Post cryotherapy instructions were provided.     # Irritated and inflamed Seborrheic Keratosis. Discussed treatment options with patient including no treatment, cryotherapy, and shave removal. Patient prefers cryotherapy today due to irritation and itching. After verbal consent and discussion of risks and benefits including but no limited to dyspigmentation/scar, blister, and pain, 6 was(were) treated with 1-2mm freeze border for 2 cycles with liquid nitrogen. Post cryotherapy instructions were provided.       # Benign skin findings including: seborrheic keratoses, cherry angioma, lentigines and benign nevi.   - No further intervention required. Patient to report changes.   - Patient reassured of the benign nature of these lesions.    #Signs and Symptoms of non-melanoma skin cancer and ABCDEs of melanoma reviewed with patient. Patient encouraged to  perform monthly self skin exams and educated on how to perform them. UV precautions reviewed with patient. Patient was asked about new or changing moles/lesions on body.     #Reviewed Sunscreen: Apply 20 minutes prior to going outdoors and reapply every two hours, when wet or sweating. We recommend using an SPF 30 or higher, and to use one that is water resistant.       Follow-up:  1 years for follow up full body skin exam, prn for new or changing lesions or new concerns    Leesa Charlton, ANNA  Dermatology   ____________________________________________    CC: RECHECK (@ itchy lesions previously bx)    HPI:  Mr. Ian Moncada is a(n) 58 year old male who presents today as a return patient for an upper body skin cancer screening.  Patient declines examination of the lower body.  He has 1 spot on the left back near his shoulder that has been quite itchy and bothersome.  He wonders if that can be treated or removed.  There are other few that were more mildly irritated on the back and the left flank.  He also has a rough spot on the right frontal scalp that is been there for at least a few months.      Patient is otherwise feeling well, without additional skin concerns.     Physical Exam:  Vitals: There were no vitals taken for this visit.  SKIN: Total skin excluding the genitalia areas was performed. The exam included the head/face, neck, both arms, chest, back, abdomen, both legs, digits, mons pubis, buttock and nails.   -Well-healed scars without signs of recurrent pigmentation on the back from previous biopsy sites  -Pink mildly scaly papule on the right frontal scalp  -several 1-2mm red dome shaped symmetric papules scattered on the trunk  -multiple tan/brown flat round macules and raised papules scattered throughout trunk, extremities and head. No worrisome features for malignancy noted on examination.  -scattered tan, homogenous macules scattered on sun exposed areas of trunk, extremities and face.   -scattered waxy,  stuck on tan/brown papules and patches on the trunk     - No other lesions of concern on areas examined.     Medications:  Current Outpatient Medications   Medication    amLODIPine (NORVASC) 5 MG tablet    fish oil-omega-3 fatty acids 1000 MG capsule    MULTIVITAMIN TABS   OR    atorvastatin (LIPITOR) 20 MG tablet     No current facility-administered medications for this visit.      Past Medical History:   Patient Active Problem List   Diagnosis    External hemorrhoids with other complication    Family history of ischemic heart disease    Impaired fasting glucose    Hyperlipidemia LDL goal <130    Hypertriglyceridemia    Melanocytic nevus of trunk    Spinal cord compression (H)    Spinal epidural abscess    Acute bilateral thoracic back pain     Past Medical History:   Diagnosis Date    External hemorrhoids with other complication 07/25/2008    Banding 2007.    Hyperlipidemia LDL goal <130 08/12/2010    Hypertriglyceridemia 08/19/2010    Impaired fasting glucose 12/07/2009       CC No referring provider defined for this encounter. on close of this encounter.

## 2024-06-23 ENCOUNTER — OFFICE VISIT (OUTPATIENT)
Dept: URGENT CARE | Facility: URGENT CARE | Age: 59
End: 2024-06-23
Payer: COMMERCIAL

## 2024-06-23 ENCOUNTER — ANCILLARY PROCEDURE (OUTPATIENT)
Dept: GENERAL RADIOLOGY | Facility: CLINIC | Age: 59
End: 2024-06-23
Attending: PHYSICIAN ASSISTANT
Payer: COMMERCIAL

## 2024-06-23 VITALS
HEART RATE: 65 BPM | BODY MASS INDEX: 27.8 KG/M2 | SYSTOLIC BLOOD PRESSURE: 143 MMHG | WEIGHT: 197.7 LBS | TEMPERATURE: 97.7 F | DIASTOLIC BLOOD PRESSURE: 81 MMHG | OXYGEN SATURATION: 99 %

## 2024-06-23 DIAGNOSIS — M18.11 ARTHRITIS OF CARPOMETACARPAL (CMC) JOINT OF RIGHT THUMB: ICD-10-CM

## 2024-06-23 DIAGNOSIS — M77.8 TENDINITIS OF RIGHT WRIST: Primary | ICD-10-CM

## 2024-06-23 DIAGNOSIS — M25.531 RIGHT WRIST PAIN: ICD-10-CM

## 2024-06-23 PROCEDURE — 73110 X-RAY EXAM OF WRIST: CPT | Mod: TC | Performed by: RADIOLOGY

## 2024-06-23 PROCEDURE — 99213 OFFICE O/P EST LOW 20 MIN: CPT | Performed by: PHYSICIAN ASSISTANT

## 2024-06-23 RX ORDER — IBUPROFEN 800 MG/1
800 TABLET, FILM COATED ORAL EVERY 8 HOURS PRN
COMMUNITY

## 2024-06-23 ASSESSMENT — ENCOUNTER SYMPTOMS
JOINT SWELLING: 1
ARTHRALGIAS: 1

## 2024-06-23 NOTE — PROGRESS NOTES
Assessment & Plan        1. Tendinitis of right wrist    -Patient was placed in a wrist brace for comfort and support  -Acetaminophen and ibuprofen recommended for pain  -Follow-up with Orthopedics for further evaluation and treatment  - Orthopedic  Referral; Future  - Wrist/Arm/Hand Bracking Supplies Order Wrist Brace; Right; with thumb spica    2. Arthritis of carpometacarpal (CMC) joint of right thumb    -Acetaminophen and ibuprofen recommended for pain    3. Right wrist pain    - XR Wrist Right G/E 3 Views is negative for fracture.  There is mild degenerative arthritis on the CMC joint, right thumb    Results for orders placed or performed in visit on 06/23/24   XR Wrist Right G/E 3 Views     Status: None    Narrative    EXAM: XR WRIST RIGHT G/E 3 VIEWS  LOCATION: [facility] [place]  DATE: 6/23/2024    INDICATION: R O FRACTURE, WRIST PAIN X 3 DAYS. NO TRAUMA  COMPARISON: None.      Impression    IMPRESSION: Normal alignment. No acute fracture. Mild degenerative arthritis of the first CMC joint. Tiny calcification projecting over the dorsal margin of the radiocarpal joint probably incidental.       Patient Instructions   Acetaminophen and ibuprofen for pain  Wear the wrist brace for comfort and support  Follow up with Orthopedics for further evaluation and treatment    Return for Follow up with Orthopedics.    At the end of the encounter, I discussed results, diagnosis, medications. Discussed red flags for immediate return to clinic/ER, as well as indications for follow up if no improvement. Patient understood and agreed to plan. Patient was stable for discharge.    Jordyn Sosa is a 59 year old male who presents to clinic today for the following health issues:  Chief Complaint   Patient presents with    SWOLLEN RT WRIST NO INJURY     ONSET THURSDAY      HPI    Patient reports right wrist pain which started 3 days ago.  He reports pain radiates to the forearm.  He has pain with  movement of the wrist.  He denies trauma or injury or repetitive movements to the forearm and wrist.  He has taken taking 800 mg of ibuprofen today which helped somewhat.  He denies weakness, loss in sensation, redness, warmth.      Review of Systems   Musculoskeletal:  Positive for arthralgias and joint swelling.   All other systems reviewed and are negative.      Problem List:  2023-01: Spinal epidural abscess  2023-01: Acute bilateral thoracic back pain  2022-12: Spinal cord compression (H)  2017-10: Melanocytic nevus of trunk  2010-08: Hypertriglyceridemia  2010-08: Hyperlipidemia LDL goal <160  2010-08: Hyperlipidemia LDL goal <130  2010-02: CARDIOVASCULAR SCREENING; LDL GOAL LESS THAN 160  2009-12: Impaired fasting glucose  2008-07: Hypertriglyceridemia  2008-07: External hemorrhoids with other complication  2008-07: Family history of ischemic heart disease      Past Medical History:   Diagnosis Date    External hemorrhoids with other complication 07/25/2008    Banding 2007.    Hyperlipidemia LDL goal <130 08/12/2010    Hypertriglyceridemia 08/19/2010    Impaired fasting glucose 12/07/2009       Social History     Tobacco Use    Smoking status: Never     Passive exposure: Never    Smokeless tobacco: Never   Substance Use Topics    Alcohol use: Yes     Alcohol/week: 7.0 standard drinks of alcohol     Types: 7 Standard drinks or equivalent per week     Comment: pn the weekends           Objective    BP (!) 143/81   Pulse 65   Temp 97.7  F (36.5  C)   Wt 89.7 kg (197 lb 11.2 oz)   SpO2 99%   BMI 27.80 kg/m    Physical Exam  Vitals and nursing note reviewed.   Constitutional:       Appearance: Normal appearance.   Cardiovascular:      Rate and Rhythm: Normal rate and regular rhythm.   Pulmonary:      Effort: Pulmonary effort is normal.      Breath sounds: Normal breath sounds.   Musculoskeletal:      Right wrist: Swelling and tenderness present. Decreased range of motion.        Arms:       Cervical back:  Normal range of motion and neck supple.   Skin:     General: Skin is warm and dry.   Neurological:      General: No focal deficit present.      Mental Status: He is alert and oriented to person, place, and time.      Sensory: Sensation is intact.      Motor: Motor function is intact.   Psychiatric:         Mood and Affect: Mood normal.         Behavior: Behavior normal.              Cora Loza PA-C

## 2024-06-23 NOTE — PATIENT INSTRUCTIONS
Acetaminophen and ibuprofen for pain  Wear the wrist brace for comfort and support  Follow up with Orthopedics for further evaluation and treatment

## 2024-06-28 ENCOUNTER — TELEPHONE (OUTPATIENT)
Dept: ORTHOPEDICS | Facility: CLINIC | Age: 59
End: 2024-06-28
Payer: COMMERCIAL

## 2024-07-30 ENCOUNTER — PATIENT OUTREACH (OUTPATIENT)
Dept: CARE COORDINATION | Facility: CLINIC | Age: 59
End: 2024-07-30
Payer: COMMERCIAL

## 2024-08-13 ENCOUNTER — PATIENT OUTREACH (OUTPATIENT)
Dept: CARE COORDINATION | Facility: CLINIC | Age: 59
End: 2024-08-13
Payer: COMMERCIAL

## 2024-09-09 DIAGNOSIS — I10 BENIGN ESSENTIAL HYPERTENSION: ICD-10-CM

## 2024-09-09 RX ORDER — AMLODIPINE BESYLATE 5 MG/1
5 TABLET ORAL DAILY
Qty: 90 TABLET | Refills: 3 | Status: SHIPPED | OUTPATIENT
Start: 2024-09-09

## 2024-10-20 ENCOUNTER — HEALTH MAINTENANCE LETTER (OUTPATIENT)
Age: 59
End: 2024-10-20

## 2025-02-25 ENCOUNTER — PATIENT OUTREACH (OUTPATIENT)
Dept: CARE COORDINATION | Facility: CLINIC | Age: 60
End: 2025-02-25
Payer: COMMERCIAL

## 2025-03-19 ENCOUNTER — OFFICE VISIT (OUTPATIENT)
Dept: DERMATOLOGY | Facility: CLINIC | Age: 60
End: 2025-03-19
Payer: COMMERCIAL

## 2025-03-19 DIAGNOSIS — D22.9 MULTIPLE BENIGN NEVI: ICD-10-CM

## 2025-03-19 DIAGNOSIS — Z12.83 ENCOUNTER FOR SCREENING FOR MALIGNANT NEOPLASM OF SKIN: ICD-10-CM

## 2025-03-19 DIAGNOSIS — L82.1 SEBORRHEIC KERATOSES: ICD-10-CM

## 2025-03-19 DIAGNOSIS — L81.4 LENTIGINES: ICD-10-CM

## 2025-03-19 DIAGNOSIS — D18.01 CHERRY ANGIOMA: ICD-10-CM

## 2025-03-19 DIAGNOSIS — D48.9 NEOPLASM OF UNCERTAIN BEHAVIOR: Primary | ICD-10-CM

## 2025-03-19 NOTE — LETTER
3/19/2025      Ian Moncada  [address] [place] [address]      Dear Colleague,    Thank you for referring your patient, Ian Moncada, to the [facility]. Please see a copy of my visit note below.    [facility] Dermatology Note  Encounter Date: Mar 19, 2025  Office Visit     Reviewed patients past medical history and pertinent chart review prior to patients visit today.     Dermatology Problem List:  Mildly DN mid back, right low back, shave biopsy 03/15/23 .   Benign nevi biopsied from right posterior shoulder superior, right posterior shoulder inferior  AK right frontal scalp, cryo 3/13/2024   ISK back and flank, cryo 3/13/2024     Patient denies personal history of skin cancer or dysplastic nevi.    ____________________________________________    Assessment & Plan:     # Neoplasm of uncertain behavior:  left mid shin  DDx includes SCC vs other. Shave biopsy today.    Procedure Note: Biopsy by shave technique  The risks and benefits of the procedure were described to the patient. These include but are not limited to bleeding, infection, scar, incomplete removal, and non-diagnostic biopsy. Verbal informed consent was obtained. The above site(s) was cleansed with an alcohol pad and injected with 1% lidocaine with epinephrine. Once anesthesia was obtained, a biopsy(ies) was performed with Gilette blade. The tissue(s) was placed in a labeled container(s) with formalin and sent to pathology. Hemostasis was achieved with aluminum chloride. Vaseline and a bandage were applied to the wound(s). The patient tolerated the procedure well and was given post biopsy care instructions.       # Benign skin findings including: seborrheic keratoses, cherry angioma, lentigines and benign nevi.   - No further intervention required. Patient to report changes.   - Patient reassured of the benign nature of these lesions.    #Signs and Symptoms of non-melanoma skin cancer and ABCDEs of  melanoma reviewed with patient. Patient encouraged to perform monthly self skin exams and educated on how to perform them. UV precautions reviewed with patient. Patient was asked about new or changing moles/lesions on body.     #Reviewed Sunscreen: Apply 20 minutes prior to going outdoors and reapply every two hours, when wet or sweating. We recommend using an SPF 30 or higher, and to use one that is water resistant.       Follow-up:  1 years for follow up full body skin exam, prn for new or changing lesions or new concerns    Leesa Charlton, ANNA  Dermatology   ____________________________________________    CC: Derm Problem (Here for lesion L leg over the past 2 months itchy at times)    HPI:  Mr. Ian Moncada is a(n) 59 year old male who presents today as a return patient for an upper body skin cancer screening.  Patient declines examination of the lower body.  He has 1 spot on the left back near his shoulder that has been quite itchy and bothersome.  He wonders if that can be treated or removed.  There are other few that were more mildly irritated on the back and the left flank.  He also has a rough spot on the right frontal scalp that is been there for at least a few months.      Patient is otherwise feeling well, without additional skin concerns.     Physical Exam:  Vitals: There were no vitals taken for this visit.  SKIN: Total skin excluding the genitalia areas was performed. The exam included the head/face, neck, both arms, chest, back, abdomen, both legs, digits, mons pubis, buttock and nails.   -Well-healed scars without signs of recurrent pigmentation on the back from previous biopsy sites  -left mid shin, 7 mm firm pink crusted dome shaped papule  -several 1-2mm red dome shaped symmetric papules scattered on the trunk  -multiple tan/brown flat round macules and raised papules scattered throughout trunk, extremities and head. No worrisome features for malignancy noted on examination.  -scattered tan, homogenous  macules scattered on sun exposed areas of trunk, extremities and face.   -scattered waxy, stuck on tan/brown papules and patches on the trunk     - No other lesions of concern on areas examined.     Medications:  Current Outpatient Medications   Medication Sig Dispense Refill     amLODIPine (NORVASC) 5 MG tablet TAKE 1 TABLET(5 MG) BY MOUTH DAILY 90 tablet 3     fish oil-omega-3 fatty acids 1000 MG capsule Take 2 capsules by mouth daily. 120 capsule 3     ibuprofen (ADVIL/MOTRIN) 800 MG tablet Take 800 mg by mouth every 8 hours as needed for moderate pain       MULTIVITAMIN TABS   OR Take 2 tablets by mouth daily       atorvastatin (LIPITOR) 20 MG tablet Take 1 tablet (20 mg) by mouth daily 90 tablet 3     No current facility-administered medications for this visit.      Past Medical History:   Patient Active Problem List   Diagnosis     External hemorrhoids with other complication     Family history of ischemic heart disease     Impaired fasting glucose     Hyperlipidemia LDL goal <130     Hypertriglyceridemia     Melanocytic nevus of trunk     Spinal cord compression (H)     Spinal epidural abscess     Acute bilateral thoracic back pain     Past Medical History:   Diagnosis Date     External hemorrhoids with other complication 07/25/2008    Banding 2007.     Hyperlipidemia LDL goal <130 08/12/2010     Hypertriglyceridemia 08/19/2010     Impaired fasting glucose 12/07/2009       CC No referring provider defined for this encounter. on close of this encounter.      Again, thank you for allowing me to participate in the care of your patient.        Sincerely,        SILVA Robles CNP    Electronically signed

## 2025-03-19 NOTE — PROGRESS NOTES
[facility] Dermatology Note  Encounter Date: Mar 19, 2025  Office Visit     Reviewed patients past medical history and pertinent chart review prior to patients visit today.     Dermatology Problem List:  Mildly DN mid back, right low back, shave biopsy 03/15/23 .   Benign nevi biopsied from right posterior shoulder superior, right posterior shoulder inferior  AK right frontal scalp, cryo 3/13/2024   ISK back and flank, cryo 3/13/2024     Patient denies personal history of skin cancer or dysplastic nevi.    ____________________________________________    Assessment & Plan:     # Neoplasm of uncertain behavior:  left mid shin  DDx includes SCC vs other. Shave biopsy today.    Procedure Note: Biopsy by shave technique  The risks and benefits of the procedure were described to the patient. These include but are not limited to bleeding, infection, scar, incomplete removal, and non-diagnostic biopsy. Verbal informed consent was obtained. The above site(s) was cleansed with an alcohol pad and injected with 1% lidocaine with epinephrine. Once anesthesia was obtained, a biopsy(ies) was performed with Gilette blade. The tissue(s) was placed in a labeled container(s) with formalin and sent to pathology. Hemostasis was achieved with aluminum chloride. Vaseline and a bandage were applied to the wound(s). The patient tolerated the procedure well and was given post biopsy care instructions.       # Benign skin findings including: seborrheic keratoses, cherry angioma, lentigines and benign nevi.   - No further intervention required. Patient to report changes.   - Patient reassured of the benign nature of these lesions.    #Signs and Symptoms of non-melanoma skin cancer and ABCDEs of melanoma reviewed with patient. Patient encouraged to perform monthly self skin exams and educated on how to perform them. UV precautions reviewed with patient. Patient was asked about new or changing moles/lesions on body.      #Reviewed Sunscreen: Apply 20 minutes prior to going outdoors and reapply every two hours, when wet or sweating. We recommend using an SPF 30 or higher, and to use one that is water resistant.       Follow-up:  1 years for follow up full body skin exam, prn for new or changing lesions or new concerns    Leesa Charlton CNP  Dermatology   ____________________________________________    CC: Derm Problem (Here for lesion L leg over the past 2 months itchy at times)    HPI:  Mr. Ian Moncada is a(n) 59 year old male who presents today as a return patient for an upper body skin cancer screening.  Patient declines examination of the lower body.  He has 1 spot on the left back near his shoulder that has been quite itchy and bothersome.  He wonders if that can be treated or removed.  There are other few that were more mildly irritated on the back and the left flank.  He also has a rough spot on the right frontal scalp that is been there for at least a few months.      Patient is otherwise feeling well, without additional skin concerns.     Physical Exam:  Vitals: There were no vitals taken for this visit.  SKIN: Total skin excluding the genitalia areas was performed. The exam included the head/face, neck, both arms, chest, back, abdomen, both legs, digits, mons pubis, buttock and nails.   -Well-healed scars without signs of recurrent pigmentation on the back from previous biopsy sites  -left mid shin, 7 mm firm pink crusted dome shaped papule  -several 1-2mm red dome shaped symmetric papules scattered on the trunk  -multiple tan/brown flat round macules and raised papules scattered throughout trunk, extremities and head. No worrisome features for malignancy noted on examination.  -scattered tan, homogenous macules scattered on sun exposed areas of trunk, extremities and face.   -scattered waxy, stuck on tan/brown papules and patches on the trunk     - No other lesions of concern on areas examined.     Medications:  Current  Outpatient Medications   Medication Sig Dispense Refill    amLODIPine (NORVASC) 5 MG tablet TAKE 1 TABLET(5 MG) BY MOUTH DAILY 90 tablet 3    fish oil-omega-3 fatty acids 1000 MG capsule Take 2 capsules by mouth daily. 120 capsule 3    ibuprofen (ADVIL/MOTRIN) 800 MG tablet Take 800 mg by mouth every 8 hours as needed for moderate pain      MULTIVITAMIN TABS   OR Take 2 tablets by mouth daily      atorvastatin (LIPITOR) 20 MG tablet Take 1 tablet (20 mg) by mouth daily 90 tablet 3     No current facility-administered medications for this visit.      Past Medical History:   Patient Active Problem List   Diagnosis    External hemorrhoids with other complication    Family history of ischemic heart disease    Impaired fasting glucose    Hyperlipidemia LDL goal <130    Hypertriglyceridemia    Melanocytic nevus of trunk    Spinal cord compression (H)    Spinal epidural abscess    Acute bilateral thoracic back pain     Past Medical History:   Diagnosis Date    External hemorrhoids with other complication 07/25/2008    Banding 2007.    Hyperlipidemia LDL goal <130 08/12/2010    Hypertriglyceridemia 08/19/2010    Impaired fasting glucose 12/07/2009       CC No referring provider defined for this encounter. on close of this encounter.

## 2025-03-19 NOTE — PATIENT INSTRUCTIONS
Proper skin care from [facility]:    -Eliminate harsh soaps as they strip the natural oils from the skin, often resulting in dry itchy skin ( i.e. Dial, Zest, [place] Spring)  -Use mild soaps such as Cetaphil or Dove Sensitive Skin in the shower. You do not need to use soap on arms, legs, and trunk every time you shower unless visibly soiled.   -Avoid hot or cold showers.  -After showering, lightly dry off and apply moisturizing within 2-3 minutes. This will help trap moisture in the skin.   -Aggressive use of a moisturizer at least 1-2 times a day to the entire body (including -Vanicream, Cetaphil, Aquaphor or Cerave) and moisturize hands after every washing.  -We recommend using moisturizers that come in a tub that needs to be scooped out, not a pump. This has more of an oil base. It will hold moisture in your skin much better than a water base moisturizer. The above recommended are non-pore clogging.      Wear a sunscreen with at least SPF 30 on your face, ears, neck and V of the chest daily. Wear sunscreen on other areas of the body if those areas are exposed to the sun throughout the day. Sunscreens can contain physical and/or chemical blockers. Physical blockers are less likely to clog pores, these include zinc oxide and titanium dioxide. Reapply every two hour and after swimming.     Sunscreen examples: https://www.ewg.org/sunscreen/    UV radiation  UVA radiation remains constant throughout the day and throughout the year. It is a longer wavelength than UVB and therefore penetrates deeper into the skin leading to immediate and delayed tanning, photoaging, and skin cancer. 70-80% of UVA and UVB radiation occurs between the hours of 10am-2pm.  UVB radiation  UVB radiation causes the most harmful effects and is more significant during the summer months. However, snow and ice can reflect UVB radiation leading to skin damage during the winter months as well. UVB radiation is responsible for tanning,  burning, inflammation, delayed erythema (pinkness), pigmentation (brown spots), and skin cancer.     I recommend self monthly full body exams and yearly full body exams with a dermatology provider. If you develop a new or changing lesion please follow up for examination. Most skin cancers are pink and scaly or pink and pearly. However, we do see blue/brown/black skin cancers.  Consider the ABCDEs of melanoma when giving yourself your monthly full body exam ( don't forget the groin, buttocks, feet, toes, etc). A-asymmetry, B-borders, C-color, D-diameter, E-elevation or evolving. If you see any of these changes please follow up in clinic. If you cannot see your back I recommend purchasing a hand held mirror to use with a larger wall mirror.       Checking for Skin Cancer  You can find cancer early by checking your skin each month. There are 3 kinds of skin cancer. They are melanoma, basal cell carcinoma, and squamous cell carcinoma. Doing monthly skin checks is the best way to find new marks or skin changes. Follow the instructions below for checking your skin.   The ABCDEs of checking moles for melanoma   Check your moles or growths for signs of melanoma using ABCDE:   Asymmetry: the sides of the mole or growth don t match  Border: the edges are ragged, notched, or blurred  Color: the color within the mole or growth varies  Diameter: the mole or growth is larger than 6 mm (size of a pencil eraser)  Evolving: the size, shape, or color of the mole or growth is changing (evolving is not shown in the images below)    Checking for other types of skin cancer  Basal cell carcinoma or squamous cell carcinoma have symptoms such as:     A spot or mole that looks different from all other marks on your skin  Changes in how an area feels, such as itching, tenderness, or pain  Changes in the skin's surface, such as oozing, bleeding, or scaliness  A sore that does not heal  New swelling or redness beyond the border of a  mole    Who s at risk?  Anyone can get skin cancer. But you are at greater risk if you have:   Fair skin, light-colored hair, or light-colored eyes  Many moles or abnormal moles on your skin  A history of sunburns from sunlight or tanning beds  A family history of skin cancer  A history of exposure to radiation or chemicals  A weakened immune system  If you have had skin cancer in the past, you are at risk for recurring skin cancer.   How to check your skin  Do your monthly skin checkups in front of a full-length mirror. Check all parts of your body, including your:   Head (ears, face, neck, and scalp)  Torso (front, back, and sides)  Arms (tops, undersides, upper, and lower armpits)  Hands (palms, backs, and fingers, including under the nails)  Buttocks and genitals  Legs (front, back, and sides)  Feet (tops, soles, toes, including under the nails, and between toes)  If you have a lot of moles, take digital photos of them each month. Make sure to take photos both up close and from a distance. These can help you see if any moles change over time.   Most skin changes are not cancer. But if you see any changes in your skin, call your doctor right away. Only he or she can diagnose a problem. If you have skin cancer, seeing your doctor can be the first step toward getting the treatment that could save your life.   [employer] last reviewed this educational content on 4/1/2019 2000-2020 [employer]. [address] [place] [address], [place] [address]. All rights reserved. This information is not intended as a substitute for professional medical care. Always follow your healthcare professional's instructions.       When should I call my doctor?  If you are worsening or not improving, please, contact us or seek urgent care as noted below.     Who should I call with questions (adults)?    [facility] [phone]  For urgent needs outside of business hours call the [facility] at  [phone] and ask for the dermatology resident on call to be paged  If this is a medical emergency and you are unable to reach an ER, Call 911      If you need a prescription refill, please contact your pharmacy. Refills are approved or denied by our Physicians during normal business hours, Monday through Friday.  Per office policy, refills will not be granted if you have not been seen within the past year (or sooner depending on the condition).

## 2025-03-24 ENCOUNTER — TELEPHONE (OUTPATIENT)
Dept: DERMATOLOGY | Facility: CLINIC | Age: 60
End: 2025-03-24
Payer: COMMERCIAL

## 2025-03-24 DIAGNOSIS — C44.729 SQUAMOUS CELL CARCINOMA OF LEFT LOWER LEG: Primary | ICD-10-CM

## 2025-03-24 NOTE — TELEPHONE ENCOUNTER
Pt called back and went over Leesa's message below about biopsy results.  Explained and answered all questions about mohs and excision procedures.  Scheduled with Dr. Bah on Thursday May 8th at 8:45 am at .    Mohs letter and information sent via my chart and mailed home.    Mona BOSS RN  [facility]  [phone]

## 2025-03-24 NOTE — LETTER
[address]  [id number] [phone]        3/24/2025       Ian Moncada  [address] [place] [id number]      Dear Ian:    You are scheduled for Mohs Surgery on: Thursday May 8,2025 at 8:45 am.    Please check in at [facility], [address].     You don't need to arrive more than 5-10 minutes prior to your appointment time.     Be sure to eat a good breakfast and bathe and wash your hair prior to surgery.     If you are taking any anti-coagulants that are prescribed by your Doctor (such as Coumadin/Warfarin, Plavix, Aspirin, Ibuprofen), please continue taking them.     However, if you are taking anti-coagulants over the counter without a Doctor's order for a medical condition, please discontinue them 10 days prior to surgery.     Please wear loose comfortable clothing as it could possibly be 4-6 hours until your surgery is completed depending upon how many layers of tissue need to be removed.      Please bring  with you if this is above your neck.     If you need any mobility assistance (getting on the exam chair or toilet) please bring a caregiver, family member, or staff member to assist you. We are not equipped to transfer patients.      Thank you,    SHAWN Bah MD

## 2025-03-24 NOTE — TELEPHONE ENCOUNTER
Patient Contact    Attempt # 1    Was call answered?  No.  Left message on voicemail with information to call nurse back at [phone].     Dr. Bah available 3/27 at 9 am, 4/10 at 9 am or 5/8 at 8:45 am or 9 am at .    Mona BOSS RN  [facility] [place]  [phone]

## 2025-03-24 NOTE — TELEPHONE ENCOUNTER
----- Message from Marbella Charlton sent at 3/21/2025  4:31 PM CDT -----    Left mid shin:  - Squamous cell carcinoma, well-differentiated     This could be excision or mohs, I'm not sure what the surgeons will do. Refer to derm surgery

## 2025-04-14 SDOH — HEALTH STABILITY: PHYSICAL HEALTH: ON AVERAGE, HOW MANY DAYS PER WEEK DO YOU ENGAGE IN MODERATE TO STRENUOUS EXERCISE (LIKE A BRISK WALK)?: 5 DAYS

## 2025-04-14 SDOH — HEALTH STABILITY: PHYSICAL HEALTH: ON AVERAGE, HOW MANY MINUTES DO YOU ENGAGE IN EXERCISE AT THIS LEVEL?: 50 MIN

## 2025-04-14 ASSESSMENT — SOCIAL DETERMINANTS OF HEALTH (SDOH): HOW OFTEN DO YOU GET TOGETHER WITH FRIENDS OR RELATIVES?: ONCE A WEEK

## 2025-04-17 ENCOUNTER — OFFICE VISIT (OUTPATIENT)
Dept: PEDIATRICS | Facility: CLINIC | Age: 60
End: 2025-04-17
Payer: COMMERCIAL

## 2025-04-17 VITALS
SYSTOLIC BLOOD PRESSURE: 131 MMHG | HEART RATE: 62 BPM | BODY MASS INDEX: 28.5 KG/M2 | WEIGHT: 203.6 LBS | OXYGEN SATURATION: 100 % | RESPIRATION RATE: 14 BRPM | DIASTOLIC BLOOD PRESSURE: 84 MMHG | HEIGHT: 71 IN | TEMPERATURE: 97.3 F

## 2025-04-17 DIAGNOSIS — Z00.00 ROUTINE GENERAL MEDICAL EXAMINATION AT A HEALTH CARE FACILITY: Primary | ICD-10-CM

## 2025-04-17 DIAGNOSIS — I10 BENIGN ESSENTIAL HYPERTENSION: ICD-10-CM

## 2025-04-17 DIAGNOSIS — E78.5 HYPERLIPIDEMIA LDL GOAL <130: ICD-10-CM

## 2025-04-17 DIAGNOSIS — R73.01 IMPAIRED FASTING GLUCOSE: ICD-10-CM

## 2025-04-17 DIAGNOSIS — D04.72 SQUAMOUS CELL CARCINOMA IN SITU OF SKIN OF LEFT LOWER LEG: ICD-10-CM

## 2025-04-17 DIAGNOSIS — G40.909 RECURRENT SEIZURES (H): ICD-10-CM

## 2025-04-17 DIAGNOSIS — E78.1 HYPERTRIGLYCERIDEMIA: ICD-10-CM

## 2025-04-17 DIAGNOSIS — Z13.6 SCREENING FOR CARDIOVASCULAR CONDITION: ICD-10-CM

## 2025-04-17 DIAGNOSIS — Z11.4 SCREENING FOR HIV (HUMAN IMMUNODEFICIENCY VIRUS): ICD-10-CM

## 2025-04-17 PROBLEM — G95.20 SPINAL CORD COMPRESSION (H): Status: RESOLVED | Noted: 2022-12-18 | Resolved: 2025-04-17

## 2025-04-17 PROBLEM — G06.1 SPINAL EPIDURAL ABSCESS: Status: RESOLVED | Noted: 2023-01-02 | Resolved: 2025-04-17

## 2025-04-17 PROBLEM — M54.6 ACUTE BILATERAL THORACIC BACK PAIN: Status: RESOLVED | Noted: 2023-01-02 | Resolved: 2025-04-17

## 2025-04-17 LAB
EST. AVERAGE GLUCOSE BLD GHB EST-MCNC: 91 MG/DL
HBA1C MFR BLD: 4.8 % (ref 0–5.6)

## 2025-04-17 RX ORDER — AMLODIPINE BESYLATE 5 MG/1
5 TABLET ORAL DAILY
Qty: 90 TABLET | Refills: 3 | Status: SHIPPED | OUTPATIENT
Start: 2025-04-17

## 2025-04-17 NOTE — PROGRESS NOTES
"Preventive Care Visit  [facility] CECILY Fink MD, Internal Medicine - Pediatrics  Apr 17, 2025      Assessment & Plan     Benign essential hypertension  At goal.  Refilled.   - amLODIPine (NORVASC) 5 MG tablet; Take 1 tablet (5 mg) by mouth daily.    Screening for cardiovascular condition      Hypertriglyceridemia    - Lipid panel reflex to direct LDL Non-fasting; Future  - Lipid panel reflex to direct LDL Non-fasting    Screening for HIV (human immunodeficiency virus)      Hyperlipidemia LDL goal <130    - Hemoglobin A1c; Future  - Comprehensive metabolic panel (BMP + Alb, Alk Phos, ALT, AST, Total. Bili, TP); Future  - Hemoglobin A1c  - Comprehensive metabolic panel (BMP + Alb, Alk Phos, ALT, AST, Total. Bili, TP)    Impaired fasting glucose      Recurrent seizures (H)  None since stopping statin.     Routine general medical examination at a health care facility  Discussed diet, exercise, testicular self exam, blood pressure, cholesterol, and need for cancer surveillance at appropriate ages.     Squamous cell carcinoma in situ of skin of left lower leg  Management per Dermatology.     Patient has been advised of split billing requirements and indicates understanding: Yes        BMI  Estimated body mass index is 28.6 kg/m  as calculated from the following:    Height as of this encounter: 1.797 m (5' 10.75\").    Weight as of this encounter: 92.4 kg (203 lb 9.6 oz).   Weight management plan: Discussed healthy diet and exercise guidelines    Counseling  Appropriate preventive services were addressed with this patient via screening, questionnaire, or discussion as appropriate for fall prevention, nutrition, physical activity, Tobacco-use cessation, social engagement, weight loss and cognition.  Checklist reviewing preventive services available has been given to the patient.  Reviewed patient's diet, addressing concerns and/or questions.   The patient reports drinking more than 3 alcoholic drinks " "per day and/or more than 7 drhnks per week. The patient was counseled and given information about possible harmful effects of excessive alcohol intake.    Follow-up    Follow-up Visit   Expected date:  Apr 17, 2026 (Approximate)      Follow Up Appointment Details:     Follow-up with whom?: PCP    Follow-Up for what?: Adult Preventive    How?: In Person                 Subjective   Ian is a 60 year old, presenting for the following:  Physical        4/17/2025    10:34 AM   Additional Questions   Roomed by REINIER CMA          HPI  Weight gain, thought due to weight training.    Upcoming dermatology re-do for squamous cell leg.     \"Knob\" on left elbow.               Advance Care Planning    Discussed advance care planning with patient; however, patient declined at this time.The longitudinal plan of care for the diagnosis(es)/condition(s) as documented were addressed during this visit. Due to the added complexity in care, I will continue to support Ian in the subsequent management and with ongoing continuity of care.        4/14/2025   General Health   How would you rate your overall physical health? Good   Feel stress (tense, anxious, or unable to sleep) Not at all         4/14/2025   Nutrition   Three or more servings of calcium each day? (!) I DON'T KNOW   Diet: Regular (no restrictions)   How many servings of fruit and vegetables per day? (!) 2-3   How many sweetened beverages each day? 0-1         4/14/2025   Exercise   Days per week of moderate/strenous exercise 5 days   Average minutes spent exercising at this level 50 min         4/14/2025   Social Factors   Frequency of gathering with friends or relatives Once a week   Worry food won't last until get money to buy more No   Food not last or not have enough money for food? No   Do you have housing? (Housing is defined as stable permanent housing and does not include staying ouside in a car, in a tent, in an abandoned building, in an overnight shelter, or " couch-surfing.) Yes   Are you worried about losing your housing? No   Lack of transportation? No   Unable to get utilities (heat,electricity)? No         4/14/2025   Fall Risk   Fallen 2 or more times in the past year? No   Trouble with walking or balance? No          4/14/2025   Dental   Dentist two times every year? Yes         Today's PHQ-2 Score:       4/16/2025    12:51 PM   PHQ-2 ( 1999 [employer])   Q1: Little interest or pleasure in doing things 0   Q2: Feeling down, depressed or hopeless 0   PHQ-2 Score 0    Q1: Little interest or pleasure in doing things Not at all   Q2: Feeling down, depressed or hopeless Not at all   PHQ-2 Score 0       Patient-reported           4/14/2025   Substance Use   Alcohol more than 3/day or more than 7/wk Yes   How often do you have a drink containing alcohol 4 or more times a week   How many alcohol drinks on typical day 1 or 2   How often do you have 5+ drinks at one occasion Less than monthly   Audit 2/3 Score 1   How often not able to stop drinking once started Never   How often failed to do what normally expected Never   How often needed first drink in am after a heavy drinking session Never   How often feeling of guilt or remorse after drinking Never   How often unable to remember what happened the night before Never   Have you or someone else been injured because of your drinking No   Has anyone been concerned or suggested you cut down on drinking No   TOTAL SCORE - AUDIT 5   Do you use any other substances recreationally? No     Social History     Tobacco Use    Smoking status: Never     Passive exposure: Never    Smokeless tobacco: Never   Vaping Use    Vaping status: Never Used   Substance Use Topics    Alcohol use: Yes     Alcohol/week: 7.0 standard drinks of alcohol     Types: 7 Standard drinks or equivalent per week     Comment: pn the weekends    Drug use: No             4/14/2025   One time HIV Screening   Previous HIV test? No         4/14/2025   STI Screening   New  "sexual partner(s) since last STI/HIV test? No   Last PSA: No results found for: \"PSA\"  ASCVD Risk   The 10-year ASCVD risk score (Deep SOARES, et al., 2019) is: 14.7%    Values used to calculate the score:      Age: 60 years      Sex: Male      Is Non- : No      Diabetic: No      Tobacco smoker: No      Systolic Blood Pressure: 131 mmHg      Is BP treated: Yes      HDL Cholesterol: 46 mg/dL      Total Cholesterol: 255 mg/dL           Reviewed and updated as needed this visit by Provider                    Past Medical History:   Diagnosis Date    External hemorrhoids with other complication 07/25/2008    Banding 2007.    Hyperlipidemia LDL goal <130 08/12/2010    Hypertriglyceridemia 08/19/2010    Impaired fasting glucose 12/07/2009     Past Surgical History:   Procedure Laterality Date    COLONOSCOPY  When I was 44    DECOMPRESSION LUMBAR ONE LEVEL N/A 12/18/2022    Procedure: THORACIC LUMBAR DECOMPRESSION FOR EVACUATION AND EPIDURAL ABSCESS VERSUS HEMATOMA;  Surgeon: Evans Courtney MD;  Location: [facility] OR    IR PICC PLACEMENT > 5 YRS OF AGE  01/02/2023    TONSILLECTOMY  1970s         Review of Systems  Constitutional, HEENT, cardiovascular, pulmonary, GI, , musculoskeletal, neuro, skin, endocrine and psych systems are negative, except as otherwise noted.     Objective    Exam  /84 (BP Location: Right arm, Patient Position: Sitting, Cuff Size: Adult Large)   Pulse 62   Temp 97.3  F (36.3  C) (Temporal)   Resp 14   Ht 1.797 m (5' 10.75\")   Wt 92.4 kg (203 lb 9.6 oz)   SpO2 100%   BMI 28.60 kg/m     Estimated body mass index is 28.6 kg/m  as calculated from the following:    Height as of this encounter: 1.797 m (5' 10.75\").    Weight as of this encounter: 92.4 kg (203 lb 9.6 oz).    Physical Exam  GENERAL: alert and no distress  EYES: Eyes grossly normal to inspection, PERRL and conjunctivae and sclerae normal  HENT: ear canals and TM's normal, nose and mouth " without ulcers or lesions  NECK: no adenopathy, no asymmetry, masses, or scars  RESP: lungs clear to auscultation - no rales, rhonchi or wheezes  CV: regular rate and rhythm, normal S1 S2, no S3 or S4, no murmur, click or rub, no peripheral edema  ABDOMEN: soft, nontender, no hepatosplenomegaly, no masses and bowel sounds normal   (male): normal male genitalia without lesions or urethral discharge, no hernia  MS: no gross musculoskeletal defects noted, no edema  SKIN: no suspicious lesions or rashes  NEURO: Normal strength and tone, mentation intact and speech normal  PSYCH: mentation appears normal, affect normal/bright        Signed Electronically by: Alfredo Fink MD

## 2025-04-17 NOTE — PATIENT INSTRUCTIONS
Colonoscopy in 2029.    Lab work today:  We can do labs in the exam room today, or you can get them done downstairs in the lab.      Consider Shingrix and Prevnar 20 shots.    Refilled the amlodipine for the year.    Cholesterol and lipid values are broken down into three different types:  the LDL (or bad) cholesterol, the HDL (or good) cholesterol, and the Triglycerides.     The goals for each of these levels vary depending on your cardiac risk factors--that is, if you have more risk factors (like age, male sex, diabetes, hypertension, heart disease, stroke, smoking) then you would want your LDL cholesterol goal or your triglyceride goal to be lower.  If you have no risk factors, it may be acceptable to have your cholesterol level slightly higher.    The best way to achieve a lower cholesterol level is a diet that is similar to the Mediterranean diet: lean proteins like chicken, fish, and beans; lots of vegetables and fruits; whole grain foods; and healthy fats like olive oil, avocado, and almonds.  Try to avoid foods that are higher in saturated fats (like red meats and cheese) as well as fried foods like chips, crackers, and anything deep fried.     Your HDL cholesterol, while not as critical as the LDL and triglyceride levels, can be improved with  exercise--preferably 30-45 minutes of cardiovascular activity 3 to 4 times per week.            Patient Education   Preventive Care Advice   This is general advice given by our system to help you stay healthy. However, your care team may have specific advice just for you. Please talk to your care team about your preventive care needs.  Nutrition  Eat 5 or more servings of fruits and vegetables each day.  Try wheat bread, brown rice and whole grain pasta (instead of white bread, rice, and pasta).  Get enough calcium and vitamin D. Check the label on foods and aim for 100% of the RDA (recommended daily allowance).  Lifestyle  Exercise at least 150 minutes each week  (30  minutes a day, 5 days a week).  Do muscle strengthening activities 2 days a week. These help control your weight and prevent disease.  No smoking.  Wear sunscreen to prevent skin cancer.  Have a dental exam and cleaning every 6 months.  Yearly exams  See your health care team every year to talk about:  Any changes in your health.  Any medicines your care team has prescribed.  Preventive care, family planning, and ways to prevent chronic diseases.  Shots (vaccines)   HPV shots (up to age 26), if you've never had them before.  Hepatitis B shots (up to age 59), if you've never had them before.  COVID-19 shot: Get this shot when it's due.  Flu shot: Get a flu shot every year.  Tetanus shot: Get a tetanus shot every 10 years.  Pneumococcal, hepatitis A, and RSV shots: Ask your care team if you need these based on your risk.  Shingles shot (for age 50 and up)  General health tests  Diabetes screening:  Starting at age 35, Get screened for diabetes at least every 3 years.  If you are younger than age 35, ask your care team if you should be screened for diabetes.  Cholesterol test: At age 39, start having a cholesterol test every 5 years, or more often if advised.  Bone density scan (DEXA): At age 50, ask your care team if you should have this scan for osteoporosis (brittle bones).  Hepatitis C: Get tested at least once in your life.  STIs (sexually transmitted infections)  Before age 24: Ask your care team if you should be screened for STIs.  After age 24: Get screened for STIs if you're at risk. You are at risk for STIs (including HIV) if:  You are sexually active with more than one person.  You don't use condoms every time.  You or a partner was diagnosed with a sexually transmitted infection.  If you are at risk for HIV, ask about PrEP medicine to prevent HIV.  Get tested for HIV at least once in your life, whether you are at risk for HIV or not.  Cancer screening tests  Cervical cancer screening: If you have a cervix,  "begin getting regular cervical cancer screening tests starting at age 21.  Breast cancer scan (mammogram): If you've ever had breasts, begin having regular mammograms starting at age 40. This is a scan to check for breast cancer.  Colon cancer screening: It is important to start screening for colon cancer at age 45.  Have a colonoscopy test every 10 years (or more often if you're at risk) Or, ask your provider about stool tests like a FIT test every year or Cologuard test every 3 years.  To learn more about your testing options, visit:   .  For help making a decision, visit:   https://bit.ly/ft20418.  Prostate cancer screening test: If you have a prostate, ask your care team if a prostate cancer screening test (PSA) at age 55 is right for you.  Lung cancer screening: If you are a current or former smoker ages 50 to 80, ask your care team if ongoing lung cancer screenings are right for you.  For informational purposes only. Not to replace the advice of your health care provider. Copyright   2023 [facility]. All rights reserved. Clinically reviewed by the [facility]. [employer] 417815 - REV 01/24.  9 Ways to Cut Back on Drinking  Maybe you've found yourself drinking more alcohol than you'd prefer. If you want to cut back, here are some ideas to try.    Think before you drink.  Do you really want a drink, or is it just a habit? If you're used to having a drink at a certain time, try doing something else then.     Look for substitutes.  Find some no-alcohol drinks that you enjoy, like flavored seltzer water, tea with honey, or tonic with a slice of lime. Or try alcohol-free beer or \"virgin\" cocktails (without the alcohol).     Drink more water.  Use water to quench your thirst. Drink a glass of water before you have any alcohol. Have another glass along with every drink or between drinks.     Shrink your drink.  For example, have a bottle of beer instead of a pint. Use a smaller " "glass for wine. Choose drinks with lower alcohol content (ABV%). Or use less liquor and more mixer in cocktails.     Slow down.  It's easy to drink quickly and without thinking about it. Pay attention, and make each drink last longer.     Do the math.  Total up how much you spend on alcohol each month. How much is that a year? If you cut back, what could you do with the money you save?     Take a break.  Choose a day or two each week when you won't drink at all. Notice how you feel on those days, physically and emotionally. How did you sleep? Do you feel better? Over time, add more break days.     Count calories.  Would you like to lose some weight? For some people that's a good motivator for cutting back. Figure out how many calories are in each drink. How many does that add up to in a day? In a week? In a month?     Practice saying no.  Be ready when someone offers you a drink. Try: \"Thanks, I've had enough.\" Or \"Thanks, but I'm cutting back.\" Or \"No, thanks. I feel better when I drink less.\"   Current as of: August 20, 2024  Content Version: 14.4    0140-9165 [employer].   Care instructions adapted under license by your healthcare professional. If you have questions about a medical condition or this instruction, always ask your healthcare professional. [employer] disclaims any warranty or liability for your use of this information.     "

## 2025-04-18 LAB
ALBUMIN SERPL BCG-MCNC: 4.4 G/DL (ref 3.5–5.2)
ALP SERPL-CCNC: 82 U/L (ref 40–150)
ALT SERPL W P-5'-P-CCNC: 35 U/L (ref 0–70)
ANION GAP SERPL CALCULATED.3IONS-SCNC: 10 MMOL/L (ref 7–15)
AST SERPL W P-5'-P-CCNC: 37 U/L (ref 0–45)
BILIRUB SERPL-MCNC: 0.7 MG/DL
BUN SERPL-MCNC: 16.4 MG/DL (ref 8–23)
CALCIUM SERPL-MCNC: 9.6 MG/DL (ref 8.8–10.4)
CHLORIDE SERPL-SCNC: 101 MMOL/L (ref 98–107)
CHOLEST SERPL-MCNC: 257 MG/DL
CREAT SERPL-MCNC: 1.12 MG/DL (ref 0.67–1.17)
EGFRCR SERPLBLD CKD-EPI 2021: 75 ML/MIN/1.73M2
FASTING STATUS PATIENT QL REPORTED: NO
FASTING STATUS PATIENT QL REPORTED: NO
GLUCOSE SERPL-MCNC: 95 MG/DL (ref 70–99)
HCO3 SERPL-SCNC: 25 MMOL/L (ref 22–29)
HDLC SERPL-MCNC: 44 MG/DL
LDLC SERPL CALC-MCNC: 163 MG/DL
NONHDLC SERPL-MCNC: 213 MG/DL
POTASSIUM SERPL-SCNC: 4.5 MMOL/L (ref 3.4–5.3)
PROT SERPL-MCNC: 7.8 G/DL (ref 6.4–8.3)
SODIUM SERPL-SCNC: 136 MMOL/L (ref 135–145)
TRIGL SERPL-MCNC: 251 MG/DL

## 2025-05-06 NOTE — PROGRESS NOTES
I called pt she would like to have refills of Budesonide, due to [place] at [place] end of the month for 6 weeks. Pt states she has no problems at this time but in case of diarrhea while away. Please send to [facility] in [place].   Surgical Office Location:  [facility]  [address] [place] [phone]

## 2025-05-08 ENCOUNTER — OFFICE VISIT (OUTPATIENT)
Dept: DERMATOLOGY | Facility: CLINIC | Age: 60
End: 2025-05-08
Payer: COMMERCIAL

## 2025-05-08 DIAGNOSIS — D18.01 ANGIOMA OF SKIN: ICD-10-CM

## 2025-05-08 DIAGNOSIS — C44.729 SQUAMOUS CELL CARCINOMA OF LEFT LOWER LEG: ICD-10-CM

## 2025-05-08 DIAGNOSIS — L81.4 LENTIGO: ICD-10-CM

## 2025-05-08 DIAGNOSIS — D23.9 DERMAL NEVUS: Primary | ICD-10-CM

## 2025-05-08 ASSESSMENT — PAIN SCALES - GENERAL: PAINLEVEL_OUTOF10: NO PAIN (0)

## 2025-05-08 NOTE — PATIENT INSTRUCTIONS
Open Wound Care       No strenuous activity for 48 hours    Take Tylenol as needed for discomfort.                                                .         Do not drink alcoholic beverages for 48 hours.    Keep the pressure bandage in place for 24 hours. If the bandage becomes blood tinged or loose, reinforce it with gauze and tape.        (Refer to the reverse side of this page for management of bleeding).    Remove bandage in 24 hours and begin wound care as follows:     Clean area with tap water using a Q tip or gauze pad, (shower / bathe normally)  Dry wound with Q tip or gauze pad  Apply Aquaphor, Vaseline, Polysporin or Bacitracin Ointment with a Q tip  Do NOT use Neosporin Ointment   Cover the wound with a band-aid or nonstick gauze pad and paper tape.  Repeat wound care once a day until wound is completely healed.    It is an old wives tale that a wound heals better when it is exposed to air and allowed to dry out. The wound will heal faster with a better cosmetic result if it is kept moist with ointment and covered with a bandage.  Do not let the wound dry out.    Supplies Needed:                Qtips or gauze pads                Vaseline, Aquaphor, Polysporin Ointment or Bacitracin Ointment (NOT NEOSPORIN)                Bandaids or nonstick gauze pads and paper tape    BLEEDING:    Use tightly rolled up gauze or cloth to apply direct pressure over the bandage for 20   minutes.  Reapply pressure for an additional 20 minutes if necessary  Call the office or go to the nearest emergency room if pressure fails to stop the bleeding.  Use additional gauze and tape to maintain pressure once the bleeding has stopped.  Begin wound care 24 hours after surgery as directed.                WOUND HEALING    One week after surgery a pink / red halo will form around the outside of the wound.   This is new skin.  The center of the wound will appear yellowish white and produce some drainage.  The pink halo will slowly  migrate in toward the center of the wound until the wound is covered with new shiny pink skin.  There will be no more drainage when the wound is completely healed.  It will take six months to one year for the redness to fade.  The scar may be itchy, tight and sensitive to extreme temperatures for a year after the surgery.  Massaging the area several times a day for several minutes after the wound is completely healed will help the scar soften and normalize faster. Begin massage only after healing is complete.      SIGNS OF INFECTION  - If you notice any of these signs of infection, call your doctor right away: expanding redness around the wound.  - Yellow or greenish-colored pus or cloudy wound drainage.    - Red streaking spreading from the wound.  - Increased swelling, tenderness, or pain around the wound.   - Fever.    Please remember that yellow and clear drainage from a wound can be normal and related to normal wound healing.  Isolated drainage from a wound without a combination of the above features does not indicate infection.     In case of emergency call: Dr Bah: [phone]     [facility]: [phone]  [facility]: [phone]

## 2025-05-08 NOTE — PROGRESS NOTES
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  Patient has no other skin complaints today.  Remainder of the HPI, Meds, PMH, Allergies, FH, and SH was reviewed in chart.      Past Medical History:   Diagnosis Date    External hemorrhoids with other complication 07/25/2008    Banding 2007.    Hyperlipidemia LDL goal <130 08/12/2010    Hypertriglyceridemia 08/19/2010    Impaired fasting glucose 12/07/2009    Squamous cell carcinoma of skin, unspecified        Past Surgical History:   Procedure Laterality Date    COLONOSCOPY  When I was 44    DECOMPRESSION LUMBAR ONE LEVEL N/A 12/18/2022    Procedure: THORACIC LUMBAR DECOMPRESSION FOR EVACUATION AND EPIDURAL ABSCESS VERSUS HEMATOMA;  Surgeon: Evans Courtney MD;  Location: [facility] OR    IR PICC PLACEMENT > 5 YRS OF AGE  01/02/2023    TONSILLECTOMY  1970s        Family History   Problem Relation Age of Onset    Heart Disease Mother         quad bipass    Hypertension Mother     Hyperlipidemia Mother     Neurologic Disorder Father         [place]    Cancer - colorectal Maternal Grandmother     Heart Disease Maternal Grandfather     Heart Disease Paternal Grandfather     Heart Disease Maternal Aunt     Heart Disease Maternal Uncle     Skin Cancer No family hx of        Social History     Socioeconomic History    Marital status:      Spouse name: Not on file    Number of children: Not on file    Years of education: Not on file    Highest education level: Not on file   Occupational History    Not on file   Tobacco Use    Smoking status: Never     Passive exposure: Never    Smokeless tobacco: Never   Vaping Use    Vaping status: Never Used   Substance and Sexual Activity    Alcohol use: Yes     Alcohol/week: 7.0 standard drinks of alcohol     Types: 7 Standard drinks or equivalent per week     Comment: pn the weekends    Drug use: No    Sexual activity: Yes     Partners: Female      Birth control/protection: None   Other Topics Concern    Parent/sibling w/ CABG, MI or angioplasty before 65F 55M? No   Social History Narrative    Not on file     Social Drivers of Health     Financial Resource Strain: Low Risk  (4/14/2025)    Financial Resource Strain     Within the past 12 months, have you or your family members you live with been unable to get utilities (heat, electricity) when it was really needed?: No   Food Insecurity: Low Risk  (4/14/2025)    Food Insecurity     Within the past 12 months, did you worry that your food would run out before you got money to buy more?: No     Within the past 12 months, did the food you bought just not last and you didn t have money to get more?: No   Transportation Needs: Low Risk  (4/14/2025)    Transportation Needs     Within the past 12 months, has lack of transportation kept you from medical appointments, getting your medicines, non-medical meetings or appointments, work, or from getting things that you need?: No   Physical Activity: Sufficiently Active (4/14/2025)    Exercise Vital Sign     Days of Exercise per Week: 5 days     Minutes of Exercise per Session: 50 min   Stress: No Stress Concern Present (4/14/2025)    [employer] - Occupational Stress Questionnaire     Feeling of Stress : Not at all   Social Connections: Unknown (4/14/2025)    Social Connection and Isolation Panel [NHANES]     Frequency of Communication with Friends and Family: Not on file     Frequency of Social Gatherings with Friends and Family: Once a week     Attends Restoration Services: Not on file     Active Member of Clubs or Organizations: Not on file     Attends Club or Organization Meetings: Not on file     Marital Status: Not on file   Interpersonal Safety: Low Risk  (4/17/2025)    Interpersonal Safety     Do you feel physically and emotionally safe where you currently live?: Yes     Within the past 12 months, have you been hit, slapped, kicked or  otherwise physically hurt by someone?: No     Within the past 12 months, have you been humiliated or emotionally abused in other ways by your partner or ex-partner?: No   Housing Stability: Low Risk  (4/14/2025)    Housing Stability     Do you have housing? : Yes     Are you worried about losing your housing?: No       Outpatient Encounter Medications as of 5/8/2025   Medication Sig Dispense Refill    amLODIPine (NORVASC) 5 MG tablet Take 1 tablet (5 mg) by mouth daily. 90 tablet 3    fish oil-omega-3 fatty acids 1000 MG capsule Take 2 capsules by mouth daily. 120 capsule 3    ibuprofen (ADVIL/MOTRIN) 800 MG tablet Take 800 mg by mouth every 8 hours as needed for moderate pain      MULTIVITAMIN TABS   OR Take 2 tablets by mouth daily       No facility-administered encounter medications on file as of 5/8/2025.             Review Of Systems  Skin: As above  Eyes: negative  Ears/Nose/Throat: negative  Respiratory: No shortness of breath, dyspnea on exertion, cough, or hemoptysis  Cardiovascular: negative  Gastrointestinal: negative  Genitourinary: negative  Musculoskeletal: negative  Neurologic: negative  Psychiatric: negative  Hematologic/Lymphatic/Immunologic: negative  Endocrine: negative      O:   NAD, WDWN, Alert & Oriented, Mood & Affect wnl, Vitals stable   General appearance james ii   Vitals stable   Alert, oriented and in no acute distress      Following lymph nodes palpated: popliteal no lad  L thompson 9mm scaly papule   brown macules on trunk and ext    Red papules on trunk   Flesh colored papules on trunk          Eyes: Conjunctivae/lids:Normal     ENT: Lips, mucosa: normal    MSK:Normal    Cardiovascular: peripheral edema none    Pulm: Breathing Normal    Neuro/Psych: Orientation:Normal; Mood/Affect:Normal      A/P:  1. lentigo, angioma, dermal nevus  2. L shin squamous cell carcinoma   It was a pleasure speaking to Ian Moncada today.  Previous clinic  notes and pertinent laboratory tests were reviewed prior  to Ian Moncada's visit.  Patient encouraged to perform monthly skin exams.  UV precautions reviewed with patient.  Risks of non-melanoma skin cancer discussed with patient   Return to clinic 6 months  PROCEDURE NOTE  L shin squamous cell carcinoma   MOHS:   Location    The rationale for Mohs surgery was discussed with the patient and consent was obtained.  The risks and benefits as well as alternatives to therapy were discussed, in detail.  Specifically, the risks of infection, scarring, bleeding, prolonged wound healing, incomplete removal, allergy to anesthesia, nerve injury and recurrence were addressed.  Indication for Mohs was Location. Prior to the procedure, the treatment site was clearly identified and, if available, confirmed with previous photos and confirmed by the patient   All components of the Universal Protocol/PAUSE rule were completed.  The Mohs surgeon operated in two distinct and integrated capacities as the surgeon and pathologist.      The area was prepped with Betasept.  A rim of normal appearing skin was marked circumferentially around the lesion.  The area was infiltrated with local anesthesia.  The tumor was first debulked to remove all clinically apparent tumor.  An incision following the standard Mohs approach was done and the specimen was oriented,mapped and placed in 1 block(s).  Each specimen was then chromacoded and processed in the Mohs laboratory using standard Mohs technique and submitted for frozen section histology.  Frozen section analysis showed no residual tumor but CLEAR MARGINS.      The tumor was excised using standard Mohs technique in 1 stages(s).  CLEAR MARGINS OBTAINED and Final defect size was 1.3 cm.     We discussed the options for wound management in full with the patient including risks/benefits/ possible outcomes.      REPAIR SECOND INTENT: We discussed the options for wound management in full with the patient including risks/benefits/possible outcomes. Decision  made to allow the wound to heal by second intention. Cautery was used for for hemostasis. EBL minimal; complications none; wound care routine.  The patient was discharged in good condition and will return in one month or prn for wound evaluation.

## 2025-05-08 NOTE — LETTER
5/8/2025      Ian Moncada  [address] [place] [address]      Dear Colleague,    Thank you for referring your patient, Ian Moncada, to the [facility]. Please see a copy of my visit note below.    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  Patient has no other skin complaints today.  Remainder of the HPI, Meds, PMH, Allergies, FH, and SH was reviewed in chart.      Past Medical History:   Diagnosis Date     External hemorrhoids with other complication 07/25/2008    Banding 2007.     Hyperlipidemia LDL goal <130 08/12/2010     Hypertriglyceridemia 08/19/2010     Impaired fasting glucose 12/07/2009     Squamous cell carcinoma of skin, unspecified        Past Surgical History:   Procedure Laterality Date     COLONOSCOPY  When I was 44     DECOMPRESSION LUMBAR ONE LEVEL N/A 12/18/2022    Procedure: THORACIC LUMBAR DECOMPRESSION FOR EVACUATION AND EPIDURAL ABSCESS VERSUS HEMATOMA;  Surgeon: Evans Courtney MD;  Location: [facility] OR     IR PICC PLACEMENT > 5 YRS OF AGE  01/02/2023     TONSILLECTOMY  1970s        Family History   Problem Relation Age of Onset     Heart Disease Mother         quad bipass     Hypertension Mother      Hyperlipidemia Mother      Neurologic Disorder Father         Guillaine Monongahela     Cancer - colorectal Maternal Grandmother      Heart Disease Maternal Grandfather      Heart Disease Paternal Grandfather      Heart Disease Maternal Aunt      Heart Disease Maternal Uncle      Skin Cancer No family hx of        Social History     Socioeconomic History     Marital status:      Spouse name: Not on file     Number of children: Not on file     Years of education: Not on file     Highest education level: Not on file   Occupational History     Not on file   Tobacco Use      Smoking status: Never     Passive exposure: Never     Smokeless tobacco: Never   Vaping Use     Vaping status: Never Used   Substance and Sexual Activity     Alcohol use: Yes     Alcohol/week: 7.0 standard drinks of alcohol     Types: 7 Standard drinks or equivalent per week     Comment: pn the weekends     Drug use: No     Sexual activity: Yes     Partners: Female     Birth control/protection: None   Other Topics Concern     Parent/sibling w/ CABG, MI or angioplasty before 65F 55M? No   Social History Narrative     Not on file     Social Drivers of Health     Financial Resource Strain: Low Risk  (4/14/2025)    Financial Resource Strain      Within the past 12 months, have you or your family members you live with been unable to get utilities (heat, electricity) when it was really needed?: No   Food Insecurity: Low Risk  (4/14/2025)    Food Insecurity      Within the past 12 months, did you worry that your food would run out before you got money to buy more?: No      Within the past 12 months, did the food you bought just not last and you didn t have money to get more?: No   Transportation Needs: Low Risk  (4/14/2025)    Transportation Needs      Within the past 12 months, has lack of transportation kept you from medical appointments, getting your medicines, non-medical meetings or appointments, work, or from getting things that you need?: No   Physical Activity: Sufficiently Active (4/14/2025)    Exercise Vital Sign      Days of Exercise per Week: 5 days      Minutes of Exercise per Session: 50 min   Stress: No Stress Concern Present (4/14/2025)    [place] - Occupational Stress Questionnaire      Feeling of Stress : Not at all   Social Connections: Unknown (4/14/2025)    Social Connection and Isolation Panel [NHANES]      Frequency of Communication with Friends and Family: Not on file      Frequency of Social Gatherings with Friends and Family: Once a week      Attends [employer]  Services: Not on file      Active Member of Clubs or Organizations: Not on file      Attends Club or Organization Meetings: Not on file      Marital Status: Not on file   Interpersonal Safety: Low Risk  (4/17/2025)    Interpersonal Safety      Do you feel physically and emotionally safe where you currently live?: Yes      Within the past 12 months, have you been hit, slapped, kicked or otherwise physically hurt by someone?: No      Within the past 12 months, have you been humiliated or emotionally abused in other ways by your partner or ex-partner?: No   Housing Stability: Low Risk  (4/14/2025)    Housing Stability      Do you have housing? : Yes      Are you worried about losing your housing?: No       Outpatient Encounter Medications as of 5/8/2025   Medication Sig Dispense Refill     amLODIPine (NORVASC) 5 MG tablet Take 1 tablet (5 mg) by mouth daily. 90 tablet 3     fish oil-omega-3 fatty acids 1000 MG capsule Take 2 capsules by mouth daily. 120 capsule 3     ibuprofen (ADVIL/MOTRIN) 800 MG tablet Take 800 mg by mouth every 8 hours as needed for moderate pain       MULTIVITAMIN TABS   OR Take 2 tablets by mouth daily       No facility-administered encounter medications on file as of 5/8/2025.             Review Of Systems  Skin: As above  Eyes: negative  Ears/Nose/Throat: negative  Respiratory: No shortness of breath, dyspnea on exertion, cough, or hemoptysis  Cardiovascular: negative  Gastrointestinal: negative  Genitourinary: negative  Musculoskeletal: negative  Neurologic: negative  Psychiatric: negative  Hematologic/Lymphatic/Immunologic: negative  Endocrine: negative      O:   NAD, WDWN, Alert & Oriented, Mood & Affect wnl, Vitals stable   General appearance james ii   Vitals stable   Alert, oriented and in no acute distress      Following lymph nodes palpated: popliteal no lad  L thompson 9mm scaly papule   brown macules on trunk and ext    Red papules on trunk   Flesh colored papules on trunk          Eyes:  Conjunctivae/lids:Normal     ENT: Lips, mucosa: normal    MSK:Normal    Cardiovascular: peripheral edema none    Pulm: Breathing Normal    Neuro/Psych: Orientation:Normal; Mood/Affect:Normal      A/P:  1. lentigo, angioma, dermal nevus  2. L shin squamous cell carcinoma   It was a pleasure speaking to Ian Moncada today.  Previous clinic  notes and pertinent laboratory tests were reviewed prior to Ian Moncada's visit.  Patient encouraged to perform monthly skin exams.  UV precautions reviewed with patient.  Risks of non-melanoma skin cancer discussed with patient   Return to clinic 6 months  PROCEDURE NOTE  L shin squamous cell carcinoma   MOHS:   Location    The rationale for Mohs surgery was discussed with the patient and consent was obtained.  The risks and benefits as well as alternatives to therapy were discussed, in detail.  Specifically, the risks of infection, scarring, bleeding, prolonged wound healing, incomplete removal, allergy to anesthesia, nerve injury and recurrence were addressed.  Indication for Mohs was Location. Prior to the procedure, the treatment site was clearly identified and, if available, confirmed with previous photos and confirmed by the patient   All components of the Universal Protocol/PAUSE rule were completed.  The Mohs surgeon operated in two distinct and integrated capacities as the surgeon and pathologist.      The area was prepped with Betasept.  A rim of normal appearing skin was marked circumferentially around the lesion.  The area was infiltrated with local anesthesia.  The tumor was first debulked to remove all clinically apparent tumor.  An incision following the standard Mohs approach was done and the specimen was oriented,mapped and placed in 1 block(s).  Each specimen was then chromacoded and processed in the Mohs laboratory using standard Mohs technique and submitted for frozen section histology.  Frozen section analysis showed no residual tumor but CLEAR MARGINS.       The tumor was excised using standard Mohs technique in 1 stages(s).  CLEAR MARGINS OBTAINED and Final defect size was 1.3 cm.     We discussed the options for wound management in full with the patient including risks/benefits/ possible outcomes.      REPAIR SECOND INTENT: We discussed the options for wound management in full with the patient including risks/benefits/possible outcomes. Decision made to allow the wound to heal by second intention. Cautery was used for for hemostasis. EBL minimal; complications none; wound care routine.  The patient was discharged in good condition and will return in one month or prn for wound evaluation.        Again, thank you for allowing me to participate in the care of your patient.        Sincerely,        Paul Bah MD    Electronically signed

## (undated) DEVICE — PREP DURAPREP 26ML APL 8630

## (undated) DEVICE — DRAPE MICROSCOPE LEICA 54X150" AR8033650

## (undated) DEVICE — SPONGE SURGIFOAM 100 1974

## (undated) DEVICE — TUBE NASOGASTRIC PEDS PURPLE PVC ENFIT 5FR 16" 460802E

## (undated) DEVICE — Device

## (undated) DEVICE — CUP AND LID 2PK 2OZ STERILE  SSK9006A

## (undated) DEVICE — TAPE MEDIPORE 6"X10YD 2966

## (undated) DEVICE — TOOL DISSECT MIDAS MR8 12CM TELESC MATCH 2.5 MR8-T12MH25

## (undated) DEVICE — ESU GROUND PAD UNIVERSAL W/O CORD

## (undated) DEVICE — POSITIONER PT PRONESAFE HEAD REST W/DERMAPROX INSERT 40599

## (undated) DEVICE — SOL WATER IRRIG 1000ML BOTTLE 2F7114

## (undated) DEVICE — ESU ELEC BLADE 6" COATED E1450-6

## (undated) DEVICE — DRAPE COVER C-ARM SEAMLESS SNAP-KAP 03-KP26 LATEX FREE

## (undated) DEVICE — SU VICRYL 4-0 PS-2 18" UND J496H

## (undated) DEVICE — TAPE MEDIPORE 2" 2962

## (undated) DEVICE — PACK SPINE SM CUSTOM SNE15SSFSK

## (undated) DEVICE — BLADE KNIFE SURG 15 371115

## (undated) DEVICE — TUBE FEEDING NEOCONNECT POLY ENFIT 8FR 40CM PFTS8.0P-NC

## (undated) DEVICE — MANIFOLD NEPTUNE 4 PORT 700-20

## (undated) DEVICE — SU VICRYL 3-0 RB-1 18" J713D

## (undated) DEVICE — LINEN TOWEL PACK X5 5464

## (undated) DEVICE — DRSG GAUZE 4X4" 3033

## (undated) RX ORDER — GLYCOPYRROLATE 0.2 MG/ML
INJECTION, SOLUTION INTRAMUSCULAR; INTRAVENOUS
Status: DISPENSED
Start: 2022-12-18

## (undated) RX ORDER — NEOSTIGMINE METHYLSULFATE 1 MG/ML
VIAL (ML) INJECTION
Status: DISPENSED
Start: 2022-12-18

## (undated) RX ORDER — FENTANYL CITRATE 50 UG/ML
INJECTION, SOLUTION INTRAMUSCULAR; INTRAVENOUS
Status: DISPENSED
Start: 2022-12-18

## (undated) RX ORDER — LIDOCAINE HYDROCHLORIDE 10 MG/ML
INJECTION, SOLUTION INFILTRATION; PERINEURAL
Status: DISPENSED
Start: 2023-01-02

## (undated) RX ORDER — HYDROMORPHONE HYDROCHLORIDE 1 MG/ML
INJECTION, SOLUTION INTRAMUSCULAR; INTRAVENOUS; SUBCUTANEOUS
Status: DISPENSED
Start: 2022-12-18